# Patient Record
Sex: MALE | Race: WHITE | NOT HISPANIC OR LATINO | Employment: OTHER | URBAN - METROPOLITAN AREA
[De-identification: names, ages, dates, MRNs, and addresses within clinical notes are randomized per-mention and may not be internally consistent; named-entity substitution may affect disease eponyms.]

---

## 2017-01-06 ENCOUNTER — OFFICE VISIT (OUTPATIENT)
Dept: CARDIOLOGY | Facility: CLINIC | Age: 72
End: 2017-01-06

## 2017-01-06 VITALS
WEIGHT: 163 LBS | HEART RATE: 49 BPM | BODY MASS INDEX: 24.07 KG/M2 | DIASTOLIC BLOOD PRESSURE: 75 MMHG | SYSTOLIC BLOOD PRESSURE: 135 MMHG

## 2017-01-06 DIAGNOSIS — I25.10 CHRONIC CORONARY ARTERY DISEASE: Primary | ICD-10-CM

## 2017-01-06 DIAGNOSIS — E03.9 ACQUIRED HYPOTHYROIDISM: ICD-10-CM

## 2017-01-06 DIAGNOSIS — E78.5 HYPERLIPIDEMIA, UNSPECIFIED HYPERLIPIDEMIA TYPE: ICD-10-CM

## 2017-01-06 PROCEDURE — 93000 ELECTROCARDIOGRAM COMPLETE: CPT | Performed by: INTERNAL MEDICINE

## 2017-01-06 PROCEDURE — 99214 OFFICE O/P EST MOD 30 MIN: CPT | Performed by: INTERNAL MEDICINE

## 2017-01-06 NOTE — PROGRESS NOTES
Procedure   [facility]  Cardiology Progress Note    Patient Identification:  Name:Kunal Vargas  Age:71 y.o.  Sex: male  :  1945  MRN: [id number]           2017    Subjective:    Chief Complaint   Patient presents with   • Coronary Artery Disease       HPI     The patient is a 71-year-old white male with history for coronary artery disease, hyperdynamic left ventricle, frequent ventricular contractions, who presents for cardiac follow-up. He is on pravastatin and Toprol-XL. From a cardiac standpoint, he has no complaints. He is asymptomatic. He denies chest pain, pressure tightness. Denies palpitations, dizziness or syncope. Denies shortness of breath, orthopnea or PND. No edema. No recent change in weight, 158 pounds according to her office scale fully clothed. Cholesterol studies are followed by, Dr. Daniels.He is physically active most days of the week. He tries to watch his diet closely.     He had cardiac cath done in  that showed some mild plaque and he is on statin. His LDL is good. His ECG has been stable.    Review of Systems   Constitution: Negative for chills, fever and malaise/fatigue.   HENT: Negative for headaches.    Eyes: Negative for blurred vision and double vision.   Cardiovascular: Negative for chest pain, irregular heartbeat, leg swelling and palpitations.   Respiratory: Positive for snoring. Negative for shortness of breath.    Skin: Negative for color change.   Musculoskeletal: Positive for arthritis. Negative for joint pain.   Gastrointestinal: Negative for abdominal pain, nausea and vomiting.   Neurological: Positive for numbness. Negative for dizziness and light-headedness.   Psychiatric/Behavioral: Negative for depression.       Past Medical History   Diagnosis Date   • Hyperlipidemia        History reviewed. No pertinent past surgical history.    Social History     Social History   • Marital status:      Spouse name: N/A   • Number of children: N/A    • Years of education: N/A     Occupational History   • Not on file.     Social History Main Topics   • Smoking status: Never Smoker   • Smokeless tobacco: Not on file   • Alcohol use No   • Drug use: Not on file   • Sexual activity: Not on file     Other Topics Concern   • Not on file     Social History Narrative       Family History   Problem Relation Age of Onset   • Rheum arthritis Mother    • Aneurysm Father        Scheduled Meds:    Current Outpatient Prescriptions:   •  levothyroxine (LEVOTHROID) 25 MCG tablet, Take 1 tablet by mouth daily. (Patient taking differently: Take 50 mcg by mouth Daily.), Disp: 90 tablet, Rfl: 3  •  metoprolol succinate XL (TOPROL-XL) 50 MG 24 hr tablet, Take 1 tablet by mouth Daily., Disp: 30 tablet, Rfl: 3  •  pravastatin (PRAVACHOL) 40 MG tablet, Take 1 tablet by mouth Daily., Disp: 30 tablet, Rfl: 3    Objective:  Visit Vitals   • /75   • Pulse (!) 49   • Wt 163 lb (73.9 kg)   • BMI 24.07 kg/m2        Physical Exam  Physical Exam:    General: No acute distress.    Skin: Warm and dry, no diaphoresis noted   HEENT: No ptosis; external ear and nose normal; oral mucosa moist   Neck: Supple; no carotid bruits; no JVD, Trachea mid line   Heart: S1S2 regular rate and rhythm; mild murmurs; no gallop or rub appreciated, apex not displaced   Chest: Respirations regular, unlabored at rest, bilateral breath sounds have good air entry; no  wheezes auscultated.     Abdomen: Soft, non-tender, non-distended, positive bowel sounds  No hepatosplenomegaly   Extremities: Bilateral lower extremities have no pre-tibial pitting edema; Radials are palpable   Neurological: Alert and oriented x 3; no new motor deficits,           ECG 12 Lead  Date/Time: 1/6/2017 11:52 AM  Performed by: DEISY LISA  Authorized by: DEISY LISA   Rhythm: sinus bradycardia  Rate: normal  QRS axis: normal               Assessment:  Problem List Items Addressed This Visit        Cardiovascular and  Mediastinum    Chronic coronary artery disease - Primary       Endocrine    Acquired hypothyroidism       Other    Hyperlipidemia          Plan:    Overall clinically he has been stable with no angina.  His heart rate has been stable.  He has hyperdynamic LV and with betablocker he is doing OK.  He has sinus bradycardia. I asked him to cut down Toprol to 25 mg . I will see him only prn    He has family history of AAA and I ordered US of aorta abd      01/06/2017  Arben Butcher MD, FACC

## 2017-01-06 NOTE — MR AVS SNAPSHOT
Kunal Vargas   2017 10:30 AM   Office Visit    Dept Phone:  [phone]   Encounter #:  [id number]    Provider:  Mireya Butcher MD   Department:  [facility]                Your Full Care Plan              Your Updated Medication List          This list is accurate as of: 17 12:01 PM.  Always use your most recent med list.                levothyroxine 25 MCG tablet   Commonly known as:  LEVOTHROID   Take 1 tablet by mouth daily.       metoprolol succinate XL 50 MG 24 hr tablet   Commonly known as:  TOPROL-XL   Take 1 tablet by mouth Daily.       pravastatin 40 MG tablet   Commonly known as:  PRAVACHOL   Take 1 tablet by mouth Daily.               We Performed the Following     ECG 12 Lead     US Abdominal Aorta Limited       You Were Diagnosed With        Codes Comments    Chronic coronary artery disease    -  Primary ICD-10-CM: I25.10  ICD-9-CM: 414.00     Acquired hypothyroidism     ICD-10-CM: E03.9  ICD-9-CM: 244.9     Hyperlipidemia, unspecified hyperlipidemia type     ICD-10-CM: E78.5  ICD-9-CM: 272.4       Instructions     None    Patient Instructions History      Upcoming Appointments     Visit Type Date Time Department    OFFICE VISIT 2017 10:30 AM [facility]    [facility] 2/15/2017  9:00 AM Ocision[facility]    OFFICE VISIT 2017  9:45 AM Practice Management e-Tools Signup     [facility] [employer] allows you to send messages to your doctor, view your test results, renew your prescriptions, schedule appointments, and more. To sign up, go to [employer] and click on the Sign Up Now link in the New User? box. Enter your [employer] Activation Code exactly as it appears below along with the last four digits of your Social Security Number and your Date of Birth () to complete the sign-up process. If you do not sign up before the expiration date, you must request a new code.    [employer]  Activation Code: [id number]  Expires: 1/20/2017 12:01 PM    If you have questions, you can email [email or web address] or call [phone] to talk to our MyChart staff. Remember, [employer] is NOT to be used for urgent needs. For medical emergencies, dial 911.               Other Info from Your Visit           Your Appointments     Feb 15, 2017  9:00 AM EST   [facility] with [facility] (--)    [id number] [place] [id number] [phone]            Feb 22, 2017  9:45 AM EST   Office Visit with Chai Montesinos MD   [facility] (--)    [address] [place] [id number] [phone]           Arrive 15 minutes prior to appointment.              Allergies     No Known Allergies      Reason for Visit     Coronary Artery Disease           Vital Signs     Blood Pressure Pulse Weight Body Mass Index Smoking Status       135/75 49 163 lb (73.9 kg) 24.07 kg/m2 Never Smoker       Problems and Diagnoses Noted     Acquired underactive thyroid    Heart disease due to blocked artery    High cholesterol or triglycerides      Results     ECG 12 Lead

## 2017-01-06 NOTE — LETTER
2017     Chai Montesinos MD  [id number] [facility] [address]    Patient: Kunal Vargas   YOB: 1945   Date of Visit: 2017       Dear Dr. Jaguar MD:    Thank you for referring Kunal Vargas to me for evaluation. Below are the relevant portions of my assessment and plan of care.    If you have questions, please do not hesitate to call me. I look forward to following Kunal along with you.         Sincerely,        Mireya Butcher MD        CC: No Recipients  Mireya Butcher MD  2017 11:55 AM  Sign at close encounter  Procedure   [facility]  Cardiology Progress Note    Patient Identification:  Name:Kunal Vargas  Age:71 y.o.  Sex: male  :  1945  MRN: [id number]           2017    Subjective:    Chief Complaint   Patient presents with   • Coronary Artery Disease       HPI     The patient is a 71-year-old white male with history for coronary artery disease, hyperdynamic left ventricle, frequent ventricular contractions, who presents for cardiac follow-up. He is on pravastatin and Toprol-XL. From a cardiac standpoint, he has no complaints. He is asymptomatic. He denies chest pain, pressure tightness. Denies palpitations, dizziness or syncope. Denies shortness of breath, orthopnea or PND. No edema. No recent change in weight, 158 pounds according to her office scale fully clothed. Cholesterol studies are followed by, Dr. Daniels.He is physically active most days of the week. He tries to watch his diet closely.     He had cardiac cath done in  that showed some mild plaque and he is on statin. His LDL is good. His ECG has been stable.    Review of Systems   Constitution: Negative for chills, fever and malaise/fatigue.   HENT: Negative for headaches.    Eyes: Negative for blurred vision and double vision.   Cardiovascular: Negative for chest pain, irregular heartbeat, leg swelling and palpitations.   Respiratory: Positive for snoring.  Negative for shortness of breath.    Skin: Negative for color change.   Musculoskeletal: Positive for arthritis. Negative for joint pain.   Gastrointestinal: Negative for abdominal pain, nausea and vomiting.   Neurological: Positive for numbness. Negative for dizziness and light-headedness.   Psychiatric/Behavioral: Negative for depression.       Past Medical History   Diagnosis Date   • Hyperlipidemia        History reviewed. No pertinent past surgical history.    Social History     Social History   • Marital status:      Spouse name: N/A   • Number of children: N/A   • Years of education: N/A     Occupational History   • Not on file.     Social History Main Topics   • Smoking status: Never Smoker   • Smokeless tobacco: Not on file   • Alcohol use No   • Drug use: Not on file   • Sexual activity: Not on file     Other Topics Concern   • Not on file     Social History Narrative       Family History   Problem Relation Age of Onset   • Rheum arthritis Mother    • Aneurysm Father        Scheduled Meds:    Current Outpatient Prescriptions:   •  levothyroxine (LEVOTHROID) 25 MCG tablet, Take 1 tablet by mouth daily. (Patient taking differently: Take 50 mcg by mouth Daily.), Disp: 90 tablet, Rfl: 3  •  metoprolol succinate XL (TOPROL-XL) 50 MG 24 hr tablet, Take 1 tablet by mouth Daily., Disp: 30 tablet, Rfl: 3  •  pravastatin (PRAVACHOL) 40 MG tablet, Take 1 tablet by mouth Daily., Disp: 30 tablet, Rfl: 3    Objective:  Visit Vitals   • /75   • Pulse (!) 49   • Wt 163 lb (73.9 kg)   • BMI 24.07 kg/m2        Physical Exam  Physical Exam:    General: No acute distress.    Skin: Warm and dry, no diaphoresis noted   HEENT: No ptosis; external ear and nose normal; oral mucosa moist   Neck: Supple; no carotid bruits; no JVD, Trachea mid line   Heart: S1S2 regular rate and rhythm; mild murmurs; no gallop or rub appreciated, apex not displaced   Chest: Respirations regular, unlabored at rest, bilateral breath sounds  have good air entry; no  wheezes auscultated.     Abdomen: Soft, non-tender, non-distended, positive bowel sounds  No hepatosplenomegaly   Extremities: Bilateral lower extremities have no pre-tibial pitting edema; Radials are palpable   Neurological: Alert and oriented x 3; no new motor deficits,           ECG 12 Lead  Date/Time: 1/6/2017 11:52 AM  Performed by: DEISY BUTCHER  Authorized by: DEISY BUTCHER   Rhythm: sinus bradycardia  Rate: normal  QRS axis: normal               Assessment:  Problem List Items Addressed This Visit        Cardiovascular and Mediastinum    Chronic coronary artery disease - Primary       Endocrine    Acquired hypothyroidism       Other    Hyperlipidemia          Plan:    Overall clinically he has been stable with no angina.  His heart rate has been stable.  He has hyperdynamic LV and with betablocker he is doing OK.  He has sinus bradycardia. I asked him to cut down Toprol to 25 mg . I will see him only prn      01/06/2017  Arben Butcher MD, FACC

## 2017-01-12 ENCOUNTER — HOSPITAL ENCOUNTER (OUTPATIENT)
Dept: ULTRASOUND IMAGING | Facility: HOSPITAL | Age: 72
Discharge: HOME | End: 2017-01-12
Attending: INTERNAL MEDICINE | Admitting: INTERNAL MEDICINE

## 2017-01-12 PROCEDURE — 76775 US EXAM ABDO BACK WALL LIM: CPT

## 2017-01-18 ENCOUNTER — TELEPHONE (OUTPATIENT)
Dept: FAMILY MEDICINE CLINIC | Facility: CLINIC | Age: 72
End: 2017-01-18

## 2017-01-18 RX ORDER — LEVOTHYROXINE SODIUM 0.05 MG/1
50 TABLET ORAL DAILY
Qty: 30 TABLET | Refills: 1 | Status: SHIPPED | OUTPATIENT
Start: 2017-01-18 | End: 2017-03-16 | Stop reason: SDUPTHER

## 2017-01-19 RX ORDER — METOPROLOL SUCCINATE 50 MG/1
50 TABLET, EXTENDED RELEASE ORAL DAILY
Qty: 30 TABLET | Refills: 3 | Status: SHIPPED | OUTPATIENT
Start: 2017-01-19 | End: 2017-01-26 | Stop reason: SDUPTHER

## 2017-01-19 RX ORDER — PRAVASTATIN SODIUM 40 MG
40 TABLET ORAL DAILY
Qty: 30 TABLET | Refills: 3 | Status: SHIPPED | OUTPATIENT
Start: 2017-01-19 | End: 2017-05-18 | Stop reason: SDUPTHER

## 2017-01-25 ENCOUNTER — ANESTHESIA EVENT (OUTPATIENT)
Dept: GASTROENTEROLOGY | Facility: HOSPITAL | Age: 72
End: 2017-01-25

## 2017-01-25 ENCOUNTER — ANESTHESIA (OUTPATIENT)
Dept: GASTROENTEROLOGY | Facility: HOSPITAL | Age: 72
End: 2017-01-25

## 2017-01-25 PROCEDURE — 25010000002 PROPOFOL 10 MG/ML EMULSION: Performed by: ANESTHESIOLOGY

## 2017-01-25 RX ORDER — LIDOCAINE HYDROCHLORIDE 20 MG/ML
INJECTION, SOLUTION INFILTRATION; PERINEURAL AS NEEDED
Status: DISCONTINUED | OUTPATIENT
Start: 2017-01-25 | End: 2017-01-25 | Stop reason: SURG

## 2017-01-25 RX ORDER — PROPOFOL 10 MG/ML
VIAL (ML) INTRAVENOUS AS NEEDED
Status: DISCONTINUED | OUTPATIENT
Start: 2017-01-25 | End: 2017-01-25 | Stop reason: SURG

## 2017-01-25 RX ORDER — PROPOFOL 10 MG/ML
VIAL (ML) INTRAVENOUS CONTINUOUS PRN
Status: DISCONTINUED | OUTPATIENT
Start: 2017-01-25 | End: 2017-01-25 | Stop reason: SURG

## 2017-01-25 RX ADMIN — PROPOFOL 150 MG: 10 INJECTION, EMULSION INTRAVENOUS at 10:14

## 2017-01-25 RX ADMIN — PROPOFOL 140 MCG/KG/MIN: 10 INJECTION, EMULSION INTRAVENOUS at 10:14

## 2017-01-25 RX ADMIN — LIDOCAINE HYDROCHLORIDE 50 MG: 20 INJECTION, SOLUTION INFILTRATION; PERINEURAL at 10:14

## 2017-01-25 NOTE — ANESTHESIA POSTPROCEDURE EVALUATION
Patient: Kunal Vargas    Procedure Summary     Date Anesthesia Start Anesthesia Stop Room / Location    01/25/17 1010 1029  [facility] /  [facility]       Procedure Diagnosis Surgeon Provider    COLONOSCOPY TO CECUM AND TI WITH POLYPECTOMY  (N/A ) Screen for colon cancer  (Screen for colon cancer [Z12.11]) MD Jeff Bianchi MD          Anesthesia Type: MAC  Last vitals  /72 (01/25/17 1058)    Temp      Pulse 57 (01/25/17 1058)   Resp 14 (01/25/17 1058)    SpO2 100 % (01/25/17 1058)      Post Anesthesia Care and Evaluation    Patient location during evaluation: PACU  Patient participation: complete - patient participated  Level of consciousness: awake and alert  Pain management: adequate  Airway patency: patent  Anesthetic complications: No anesthetic complications    Cardiovascular status: acceptable  Respiratory status: acceptable  Hydration status: acceptable

## 2017-01-25 NOTE — ANESTHESIA PREPROCEDURE EVALUATION
Anesthesia Evaluation     Patient summary reviewed    Airway   Mallampati: II  TM distance: >3 FB  Neck ROM: limited  no difficulty expected  Dental - normal exam     Pulmonary     breath sounds clear to auscultation  Cardiovascular   Exercise tolerance: good (4-7 METS)    Rhythm: regular  Rate: normal    Neuro/Psych  GI/Hepatic/Renal/Endo      Musculoskeletal     Abdominal    Substance History      OB/GYN          Other                             Anesthesia Plan    ASA 2     MAC     intravenous induction

## 2017-01-26 ENCOUNTER — TELEPHONE (OUTPATIENT)
Dept: CARDIOLOGY | Facility: CLINIC | Age: 72
End: 2017-01-26

## 2017-01-26 RX ORDER — METOPROLOL SUCCINATE 25 MG/1
25 TABLET, EXTENDED RELEASE ORAL DAILY
Qty: 90 TABLET | Refills: 1 | Status: SHIPPED | OUTPATIENT
Start: 2017-01-26 | End: 2017-10-10 | Stop reason: SDUPTHER

## 2017-01-26 NOTE — TELEPHONE ENCOUNTER
----- Message from Geraldine Caballero sent at 1/26/2017 10:39 AM EST -----  Contact: [phone]  Abdominal ultrasound results  He thought he was reducing his Metoprolol from 50 mg to 25 mg?        S/w pt per [facility] last note pt to reduce metoprolol from 50 mg to 25 mg. Gave pt instructions and sent in refill to [facility].   Gave pt abdominal ultrasound results

## 2017-01-27 ENCOUNTER — DOCUMENTATION (OUTPATIENT)
Dept: SURGERY | Facility: CLINIC | Age: 72
End: 2017-01-27

## 2017-01-27 ENCOUNTER — TELEPHONE (OUTPATIENT)
Dept: SURGERY | Facility: CLINIC | Age: 72
End: 2017-01-27

## 2017-01-27 NOTE — TELEPHONE ENCOUNTER
----- Message from Cachorro Hancock MD sent at 1/27/2017  6:40 AM EST -----  Please inform him:  Colonoscopy 1/25/07 demonstrated small ascending colon polyp, pathology tubular adenoma. Five year surveillance recommended-put in computer for reminder

## 2017-01-27 NOTE — PROGRESS NOTES
Colonoscopy 1/25/07 demonstrated small ascending colon polyp, pathology tubular adenoma. Five year surveillance recommended.    Cachorro Hancock M.D.

## 2017-01-31 ENCOUNTER — TELEPHONE (OUTPATIENT)
Dept: CARDIOLOGY | Facility: CLINIC | Age: 72
End: 2017-01-31

## 2017-01-31 NOTE — TELEPHONE ENCOUNTER
----- Message from Mireya Butcher MD sent at 1/30/2017  7:04 AM EST -----  Normal aorta , no aneurysm, notify patient  ----- Message -----     From: Aimee, Rad Results Aline In     Sent: 1/12/2017   3:52 PM       To: Mireya Butcher MD        Pt was notified on 1/26

## 2017-02-13 DIAGNOSIS — E03.9 ACQUIRED HYPOTHYROIDISM: Primary | ICD-10-CM

## 2017-02-15 LAB
T4 FREE SERPL-MCNC: 1.35 NG/DL (ref 0.93–1.7)
TSH SERPL DL<=0.005 MIU/L-ACNC: 6.34 MIU/ML (ref 0.27–4.2)

## 2017-02-22 ENCOUNTER — OFFICE VISIT (OUTPATIENT)
Dept: FAMILY MEDICINE CLINIC | Facility: CLINIC | Age: 72
End: 2017-02-22

## 2017-02-22 VITALS
WEIGHT: 161.8 LBS | HEART RATE: 54 BPM | BODY MASS INDEX: 21.44 KG/M2 | SYSTOLIC BLOOD PRESSURE: 120 MMHG | HEIGHT: 73 IN | RESPIRATION RATE: 18 BRPM | DIASTOLIC BLOOD PRESSURE: 58 MMHG

## 2017-02-22 DIAGNOSIS — E78.5 HYPERLIPIDEMIA, UNSPECIFIED HYPERLIPIDEMIA TYPE: Primary | ICD-10-CM

## 2017-02-22 DIAGNOSIS — I25.10 CHRONIC CORONARY ARTERY DISEASE: ICD-10-CM

## 2017-02-22 DIAGNOSIS — E03.9 ACQUIRED HYPOTHYROIDISM: ICD-10-CM

## 2017-02-22 PROCEDURE — 99213 OFFICE O/P EST LOW 20 MIN: CPT | Performed by: INTERNAL MEDICINE

## 2017-02-22 NOTE — PATIENT INSTRUCTIONS
Your blood pressure is excellent.  Thyroid-stimulating hormone level is mildly elevated and free T4 level is normal.  Will continue current levothyroxine dose.  Continue current diet, exercise, and medicines and follow-up in 6 months with labs.

## 2017-03-17 RX ORDER — LEVOTHYROXINE SODIUM 0.05 MG/1
TABLET ORAL
Qty: 30 TABLET | Refills: 5 | Status: SHIPPED | OUTPATIENT
Start: 2017-03-17 | End: 2017-08-30

## 2017-04-03 ENCOUNTER — OFFICE VISIT (OUTPATIENT)
Dept: FAMILY MEDICINE CLINIC | Facility: CLINIC | Age: 72
End: 2017-04-03

## 2017-04-03 VITALS
WEIGHT: 158.2 LBS | SYSTOLIC BLOOD PRESSURE: 118 MMHG | OXYGEN SATURATION: 98 % | HEART RATE: 68 BPM | DIASTOLIC BLOOD PRESSURE: 62 MMHG | HEIGHT: 73 IN | BODY MASS INDEX: 20.97 KG/M2 | TEMPERATURE: 97.9 F

## 2017-04-03 DIAGNOSIS — J40 BRONCHITIS: Primary | ICD-10-CM

## 2017-04-03 PROCEDURE — 99213 OFFICE O/P EST LOW 20 MIN: CPT | Performed by: INTERNAL MEDICINE

## 2017-04-03 RX ORDER — AZITHROMYCIN 250 MG/1
TABLET, FILM COATED ORAL
Qty: 6 TABLET | Refills: 0 | Status: SHIPPED | OUTPATIENT
Start: 2017-04-03 | End: 2017-08-30

## 2017-04-03 NOTE — PROGRESS NOTES
Subjective   Kunal Vargas is a 71 y.o. male. Patient is here today for   Chief Complaint   Patient presents with   • URI   • Fever     low grade one week ago   • Cough   • Ear Fullness     not much   • Dizziness     feeling light headed          Vitals:    04/03/17 1601   BP: 118/62   Pulse: 68   Temp: 97.9 °F (36.6 °C)   SpO2: 98%     The following portions of the patient's history were reviewed and updated as appropriate: allergies, current medications, past family history, past medical history, past social history, past surgical history and problem list.    Past Medical History:   Diagnosis Date   • Disease of thyroid gland    • Hyperlipidemia    • Hyperlipidemia    • Irregular heartbeat       No Known Allergies   Social History     Social History   • Marital status:      Spouse name: N/A   • Number of children: N/A   • Years of education: N/A     Occupational History   • Not on file.     Social History Main Topics   • Smoking status: Never Smoker   • Smokeless tobacco: Not on file   • Alcohol use No   • Drug use: Not on file   • Sexual activity: Defer     Other Topics Concern   • Not on file     Social History Narrative        Current Outpatient Prescriptions:   •  azithromycin (ZITHROMAX) 250 MG tablet, Take 2 tablets the first day, then 1 tablet daily for 4 days., Disp: 6 tablet, Rfl: 0  •  levothyroxine (SYNTHROID, LEVOTHROID) 50 MCG tablet, TAKE ONE TABLET BY MOUTH ONCE DAILY, Disp: 30 tablet, Rfl: 5  •  metoprolol succinate XL (TOPROL-XL) 25 MG 24 hr tablet, Take 1 tablet by mouth Daily., Disp: 90 tablet, Rfl: 1  •  pravastatin (PRAVACHOL) 40 MG tablet, Take 1 tablet by mouth Daily., Disp: 30 tablet, Rfl: 3     Objective     History of Present Illness Kunal complains of a cough and some shortness of breath as well as lightheadedness that started a couple weeks ago.  He felt bad for a few days and then felt better.  He initially had a slight fever which resolved.  Today he complains of a deep  cough and some tightness in his chest.      Review of Systems   Constitutional: Positive for fatigue. Negative for chills and fever.   HENT: Negative for congestion, sinus pressure and sore throat.    Respiratory: Positive for cough and shortness of breath.    Musculoskeletal: Negative for myalgias.   Neurological: Negative for light-headedness.       Physical Exam   Constitutional: He appears well-developed and well-nourished.   HENT:   Right Ear: Tympanic membrane normal.   Left Ear: Tympanic membrane normal.   Nose: No mucosal edema.   Cardiovascular: Normal rate, regular rhythm and normal heart sounds.    Pulmonary/Chest: Effort normal and breath sounds normal. He has no wheezes. He has no rales.   Neurological: He is alert.   Vitals reviewed.      ASSESSMENT     Problem List Items Addressed This Visit        Respiratory    Bronchitis - Primary    Relevant Medications    azithromycin (ZITHROMAX) 250 MG tablet          PLAN  Patient Instructions   Your symptoms are consistent with bronchitis.  Your lung exam is clear.  A lot of fluids and start azithromycin as instructed.    Return if symptoms worsen or fail to improve.

## 2017-04-03 NOTE — PATIENT INSTRUCTIONS
Your symptoms are consistent with bronchitis.  Your lung exam is clear.  A lot of fluids and start azithromycin as instructed.

## 2017-05-19 RX ORDER — PRAVASTATIN SODIUM 40 MG
TABLET ORAL
Qty: 30 TABLET | Refills: 0 | Status: SHIPPED | OUTPATIENT
Start: 2017-05-19 | End: 2017-06-20 | Stop reason: SDUPTHER

## 2017-06-23 RX ORDER — PRAVASTATIN SODIUM 40 MG
TABLET ORAL
Qty: 30 TABLET | Refills: 0 | Status: SHIPPED | OUTPATIENT
Start: 2017-06-23 | End: 2017-07-24 | Stop reason: SDUPTHER

## 2017-07-25 RX ORDER — PRAVASTATIN SODIUM 40 MG
TABLET ORAL
Qty: 30 TABLET | Refills: 0 | Status: SHIPPED | OUTPATIENT
Start: 2017-07-25 | End: 2017-08-19 | Stop reason: SDUPTHER

## 2017-08-21 DIAGNOSIS — E03.9 ACQUIRED HYPOTHYROIDISM: ICD-10-CM

## 2017-08-21 DIAGNOSIS — E78.5 HYPERLIPIDEMIA, UNSPECIFIED HYPERLIPIDEMIA TYPE: ICD-10-CM

## 2017-08-21 DIAGNOSIS — I25.10 CHRONIC CORONARY ARTERY DISEASE: ICD-10-CM

## 2017-08-22 RX ORDER — PRAVASTATIN SODIUM 40 MG
TABLET ORAL
Qty: 30 TABLET | Refills: 3 | Status: SHIPPED | OUTPATIENT
Start: 2017-08-22 | End: 2017-12-27 | Stop reason: SDUPTHER

## 2017-08-24 LAB
ALBUMIN SERPL-MCNC: 4.2 G/DL (ref 3.5–5.2)
ALBUMIN/GLOB SERPL: 1.8 G/DL
ALP SERPL-CCNC: 73 U/L (ref 39–117)
ALT SERPL-CCNC: 20 U/L (ref 1–41)
APPEARANCE UR: CLEAR
AST SERPL-CCNC: 23 U/L (ref 1–40)
BACTERIA #/AREA URNS HPF: NORMAL /HPF
BASOPHILS # BLD AUTO: 0.02 10*3/MM3 (ref 0–0.2)
BASOPHILS NFR BLD AUTO: 0.3 % (ref 0–1.5)
BILIRUB SERPL-MCNC: 0.5 MG/DL (ref 0.1–1.2)
BILIRUB UR QL STRIP: NEGATIVE
BUN SERPL-MCNC: 22 MG/DL (ref 8–23)
BUN/CREAT SERPL: 18.2 (ref 7–25)
CALCIUM SERPL-MCNC: 9.5 MG/DL (ref 8.6–10.5)
CASTS URNS MICRO: NORMAL
CHLORIDE SERPL-SCNC: 104 MMOL/L (ref 98–107)
CHOLEST SERPL-MCNC: 139 MG/DL (ref 100–199)
CO2 SERPL-SCNC: 25.2 MMOL/L (ref 22–29)
COLOR UR: YELLOW
CREAT SERPL-MCNC: 1.21 MG/DL (ref 0.76–1.27)
EOSINOPHIL # BLD AUTO: 0.17 10*3/MM3 (ref 0–0.7)
EOSINOPHIL NFR BLD AUTO: 2.9 % (ref 0.3–6.2)
EPI CELLS #/AREA URNS HPF: NORMAL /HPF
ERYTHROCYTE [DISTWIDTH] IN BLOOD BY AUTOMATED COUNT: 13.2 % (ref 11.5–14.5)
GLOBULIN SER CALC-MCNC: 2.4 GM/DL
GLUCOSE SERPL-MCNC: 97 MG/DL (ref 65–99)
GLUCOSE UR QL: NEGATIVE
HCT VFR BLD AUTO: 41.4 % (ref 40.4–52.2)
HDL SERPL-SCNC: 32 UMOL/L
HDLC SERPL-MCNC: 49 MG/DL
HGB BLD-MCNC: 13 G/DL (ref 13.7–17.6)
HGB UR QL STRIP: NEGATIVE
IMM GRANULOCYTES # BLD: 0 10*3/MM3 (ref 0–0.03)
IMM GRANULOCYTES NFR BLD: 0 % (ref 0–0.5)
KETONES UR QL STRIP: NEGATIVE
LDL SERPL QN: 21.2 NM
LDL SERPL-SCNC: 912 NMOL/L
LDL SMALL SERPL-SCNC: 507 NMOL/L
LDLC SERPL CALC-MCNC: 80 MG/DL (ref 0–99)
LEUKOCYTE ESTERASE UR QL STRIP: NEGATIVE
LYMPHOCYTES # BLD AUTO: 1.81 10*3/MM3 (ref 0.9–4.8)
LYMPHOCYTES NFR BLD AUTO: 31 % (ref 19.6–45.3)
MCH RBC QN AUTO: 31.8 PG (ref 27–32.7)
MCHC RBC AUTO-ENTMCNC: 31.4 G/DL (ref 32.6–36.4)
MCV RBC AUTO: 101.2 FL (ref 79.8–96.2)
MONOCYTES # BLD AUTO: 0.65 10*3/MM3 (ref 0.2–1.2)
MONOCYTES NFR BLD AUTO: 11.1 % (ref 5–12)
NEUTROPHILS # BLD AUTO: 3.18 10*3/MM3 (ref 1.9–8.1)
NEUTROPHILS NFR BLD AUTO: 54.7 % (ref 42.7–76)
NITRITE UR QL STRIP: NEGATIVE
PH UR STRIP: 7 [PH] (ref 5–8)
PLATELET # BLD AUTO: 215 10*3/MM3 (ref 140–500)
POTASSIUM SERPL-SCNC: 4.7 MMOL/L (ref 3.5–5.2)
PROT SERPL-MCNC: 6.6 G/DL (ref 6–8.5)
PROT UR QL STRIP: NEGATIVE
RBC # BLD AUTO: 4.09 10*6/MM3 (ref 4.6–6)
RBC #/AREA URNS HPF: NORMAL /HPF
SODIUM SERPL-SCNC: 142 MMOL/L (ref 136–145)
SP GR UR: 1.01 (ref 1–1.03)
T4 FREE SERPL-MCNC: 1.34 NG/DL (ref 0.93–1.7)
TRIGL SERPL-MCNC: 49 MG/DL (ref 0–149)
TSH SERPL DL<=0.005 MIU/L-ACNC: 6.13 MIU/ML (ref 0.27–4.2)
UROBILINOGEN UR STRIP-MCNC: (no result) MG/DL
WBC # BLD AUTO: 5.83 10*3/MM3 (ref 4.5–10.7)
WBC #/AREA URNS HPF: NORMAL /HPF

## 2017-08-30 ENCOUNTER — OFFICE VISIT (OUTPATIENT)
Dept: FAMILY MEDICINE CLINIC | Facility: CLINIC | Age: 72
End: 2017-08-30

## 2017-08-30 VITALS
WEIGHT: 155 LBS | HEIGHT: 73 IN | HEART RATE: 48 BPM | SYSTOLIC BLOOD PRESSURE: 114 MMHG | DIASTOLIC BLOOD PRESSURE: 56 MMHG | BODY MASS INDEX: 20.54 KG/M2 | RESPIRATION RATE: 18 BRPM

## 2017-08-30 DIAGNOSIS — E78.5 HYPERLIPIDEMIA, UNSPECIFIED HYPERLIPIDEMIA TYPE: Primary | ICD-10-CM

## 2017-08-30 DIAGNOSIS — E03.9 ACQUIRED HYPOTHYROIDISM: ICD-10-CM

## 2017-08-30 DIAGNOSIS — L98.9 SKIN LESION: ICD-10-CM

## 2017-08-30 PROCEDURE — 99214 OFFICE O/P EST MOD 30 MIN: CPT | Performed by: INTERNAL MEDICINE

## 2017-08-30 RX ORDER — LEVOTHYROXINE SODIUM 0.07 MG/1
75 TABLET ORAL DAILY
Qty: 30 TABLET | Refills: 11 | Status: SHIPPED | OUTPATIENT
Start: 2017-08-30 | End: 2018-07-05 | Stop reason: SDUPTHER

## 2017-08-30 NOTE — PROGRESS NOTES
Subjective   Kunal Vargas is a 72 y.o. male. Patient is here today for   Chief Complaint   Patient presents with   • Hyperlipidemia   • Hypothyroidism          Vitals:    08/30/17 0955   BP: 114/56   Pulse: (!) 48   Resp: 18       The following portions of the patient's history were reviewed and updated as appropriate: allergies, current medications, past family history, past medical history, past social history, past surgical history and problem list.    Past Medical History:   Diagnosis Date   • Disease of thyroid gland    • Hyperlipidemia    • Hyperlipidemia    • Irregular heartbeat       No Known Allergies   Social History     Social History   • Marital status:      Spouse name: N/A   • Number of children: N/A   • Years of education: N/A     Occupational History   • Not on file.     Social History Main Topics   • Smoking status: Never Smoker   • Smokeless tobacco: Not on file   • Alcohol use No   • Drug use: Not on file   • Sexual activity: Defer     Other Topics Concern   • Not on file     Social History Narrative        Current Outpatient Prescriptions:   •  metoprolol succinate XL (TOPROL-XL) 25 MG 24 hr tablet, Take 1 tablet by mouth Daily., Disp: 90 tablet, Rfl: 1  •  pravastatin (PRAVACHOL) 40 MG tablet, TAKE ONE TABLET BY MOUTH ONCE DAILY, Disp: 30 tablet, Rfl: 3  •  levothyroxine (SYNTHROID) 75 MCG tablet, Take 1 tablet by mouth Daily., Disp: 30 tablet, Rfl: 11     Objective   History of Present Illness is here today for a blood pressure and lab follow-up.  He has hypothyroidism, hyperlipidemia, and a history of coronary artery disease.  He eats healthy and is physically active.  He does also exercise some.  His weight is been stable.  Today he has a skin lesion close to his nose that he wants me to look at.  He had an echocardiogram and a stress test 3 years ago.  He had a normal colonoscopy earlier this year.    Review of Systems   Constitutional: Negative for activity change and unexpected  weight change.   Respiratory: Negative.    Cardiovascular: Negative for chest pain.   Gastrointestinal: Negative.    Genitourinary: Negative.    Skin: Negative for color change.   Neurological: Negative for light-headedness.   Psychiatric/Behavioral: Negative.        Physical Exam   Constitutional: He appears well-developed and well-nourished.   Neck: Carotid bruit is not present. No thyromegaly present.   Cardiovascular: Normal rate, regular rhythm and normal heart sounds.    122/52   Pulmonary/Chest: Effort normal and breath sounds normal.   Neurological: He is alert.   Skin:   3 mm warty growth lateral to right nose   Psychiatric: He has a normal mood and affect.   Vitals reviewed.      ASSESSMENT     Problem List Items Addressed This Visit        Cardiovascular and Mediastinum    Hyperlipidemia - Primary       Endocrine    Acquired hypothyroidism    Relevant Medications    levothyroxine (SYNTHROID) 75 MCG tablet       Musculoskeletal and Integument    Skin lesion          PLAN  Patient Instructions   Your blood pressure is stable.  Suggest that you monitor it periodically.  LDL cholesterol is at goal.  A comprehensive metabolic panel is normal.  Thyroid-stimulating hormone level is elevated.  Will increase levothyroxine to 75 µg daily and plan on rechecking a thyroid-stimulating hormone level again in 6 months.  Also discussed exercise per AHA guidelines.      Return in about 6 months (around 2/28/2018) for labs TSH, lipid, CBC.

## 2017-08-30 NOTE — PATIENT INSTRUCTIONS
Your blood pressure is stable.  Suggest that you monitor it periodically.  LDL cholesterol is at goal.  A comprehensive metabolic panel is normal.  Thyroid-stimulating hormone level is elevated.  Will increase levothyroxine to 75 µg daily and plan on rechecking a thyroid-stimulating hormone level again in 6 months.  Also discussed exercise per AHA guidelines.

## 2017-10-10 ENCOUNTER — TELEPHONE (OUTPATIENT)
Dept: FAMILY MEDICINE CLINIC | Facility: CLINIC | Age: 72
End: 2017-10-10

## 2017-10-10 RX ORDER — METOPROLOL SUCCINATE 25 MG/1
25 TABLET, EXTENDED RELEASE ORAL DAILY
Qty: 90 TABLET | Refills: 1 | Status: SHIPPED | OUTPATIENT
Start: 2017-10-10 | End: 2018-03-21 | Stop reason: SDUPTHER

## 2017-10-10 NOTE — TELEPHONE ENCOUNTER
OK PET DR JOE. RX SENT TO PHARMACY     ----- Message from Viry Decker MA sent at 10/10/2017  1:44 PM EDT -----  Contact: PT   PT CALLED IN TODAY AND STATES DR. LISA WROTE HES METROPROLOL BUT IS NO LONGER IN PRACTICE. WOULD LIKE A REFILL BECAUSE HE STATES DR. JOE SAID HE WOULD WRITE THE PRESCRIPTIONS FOR HIM.

## 2017-12-27 ENCOUNTER — TELEPHONE (OUTPATIENT)
Dept: FAMILY MEDICINE CLINIC | Facility: CLINIC | Age: 72
End: 2017-12-27

## 2017-12-27 RX ORDER — PRAVASTATIN SODIUM 40 MG
40 TABLET ORAL NIGHTLY
Qty: 90 TABLET | Refills: 0 | Status: SHIPPED | OUTPATIENT
Start: 2017-12-27 | End: 2018-03-21 | Stop reason: SDUPTHER

## 2017-12-27 NOTE — TELEPHONE ENCOUNTER
Rx sent to pharmacy      ----- Message from Spring Zaragoza MA sent at 12/26/2017  9:32 AM EST -----  Contact: PT  I was going to send out medication, but it appears that this is prescribed by another provider.   ----- Message -----     From: Rhiannon Fan MA     Sent: 12/26/2017   9:20 AM       To: Spring Zaragoza MA      PT WOULD LIKE RX REFILL FOR pravastatin (PRAVACHOL) 40 MG tablet HE WOULD LIKE A 90 DAY SUPPLY RATHER THAN A 30 DAY IF POSSIBLE    PT IS USING [address] [phone] [facility] [phone]             PT CAN BE REACHED [phone]

## 2018-01-20 ENCOUNTER — HOSPITAL ENCOUNTER (INPATIENT)
Facility: HOSPITAL | Age: 73
LOS: 2 days | Discharge: HOME | End: 2018-01-22
Attending: EMERGENCY MEDICINE | Admitting: SURGERY

## 2018-01-20 ENCOUNTER — APPOINTMENT (OUTPATIENT)
Dept: CT IMAGING | Facility: HOSPITAL | Age: 73
End: 2018-01-20

## 2018-01-20 ENCOUNTER — APPOINTMENT (OUTPATIENT)
Dept: GENERAL RADIOLOGY | Facility: HOSPITAL | Age: 73
End: 2018-01-20

## 2018-01-20 DIAGNOSIS — K56.609 SBO (SMALL BOWEL OBSTRUCTION) (HCC): Primary | ICD-10-CM

## 2018-01-20 LAB
ALBUMIN SERPL-MCNC: 3.9 G/DL (ref 3.5–5.2)
ALBUMIN/GLOB SERPL: 1.3 G/DL
ALP SERPL-CCNC: 66 U/L (ref 39–117)
ALT SERPL W P-5'-P-CCNC: 16 U/L (ref 1–41)
ANION GAP SERPL CALCULATED.3IONS-SCNC: 9.9 MMOL/L
AST SERPL-CCNC: 22 U/L (ref 1–40)
BASOPHILS # BLD AUTO: 0.02 10*3/MM3 (ref 0–0.2)
BASOPHILS NFR BLD AUTO: 0.2 % (ref 0–1.5)
BILIRUB SERPL-MCNC: 0.5 MG/DL (ref 0.1–1.2)
BILIRUB UR QL STRIP: NEGATIVE
BUN BLD-MCNC: 24 MG/DL (ref 8–23)
BUN/CREAT SERPL: 22 (ref 7–25)
CALCIUM SPEC-SCNC: 8.9 MG/DL (ref 8.6–10.5)
CHLORIDE SERPL-SCNC: 104 MMOL/L (ref 98–107)
CLARITY UR: CLEAR
CO2 SERPL-SCNC: 25.1 MMOL/L (ref 22–29)
COLOR UR: YELLOW
CREAT BLD-MCNC: 1.09 MG/DL (ref 0.76–1.27)
DEPRECATED RDW RBC AUTO: 45.8 FL (ref 37–54)
EOSINOPHIL # BLD AUTO: 0.09 10*3/MM3 (ref 0–0.7)
EOSINOPHIL NFR BLD AUTO: 1.1 % (ref 0.3–6.2)
ERYTHROCYTE [DISTWIDTH] IN BLOOD BY AUTOMATED COUNT: 12.6 % (ref 11.5–14.5)
GFR SERPL CREATININE-BSD FRML MDRD: 66 ML/MIN/1.73
GLOBULIN UR ELPH-MCNC: 2.9 GM/DL
GLUCOSE BLD-MCNC: 126 MG/DL (ref 65–99)
GLUCOSE UR STRIP-MCNC: NEGATIVE MG/DL
HCT VFR BLD AUTO: 40.5 % (ref 40.4–52.2)
HGB BLD-MCNC: 13.2 G/DL (ref 13.7–17.6)
HGB UR QL STRIP.AUTO: NEGATIVE
HOLD SPECIMEN: NORMAL
HOLD SPECIMEN: NORMAL
IMM GRANULOCYTES # BLD: 0 10*3/MM3 (ref 0–0.03)
IMM GRANULOCYTES NFR BLD: 0 % (ref 0–0.5)
KETONES UR QL STRIP: NEGATIVE
LEUKOCYTE ESTERASE UR QL STRIP.AUTO: NEGATIVE
LIPASE SERPL-CCNC: 27 U/L (ref 13–60)
LYMPHOCYTES # BLD AUTO: 1.43 10*3/MM3 (ref 0.9–4.8)
LYMPHOCYTES NFR BLD AUTO: 17.5 % (ref 19.6–45.3)
MCH RBC QN AUTO: 32.5 PG (ref 27–32.7)
MCHC RBC AUTO-ENTMCNC: 32.6 G/DL (ref 32.6–36.4)
MCV RBC AUTO: 99.8 FL (ref 79.8–96.2)
MONOCYTES # BLD AUTO: 0.5 10*3/MM3 (ref 0.2–1.2)
MONOCYTES NFR BLD AUTO: 6.1 % (ref 5–12)
NEUTROPHILS # BLD AUTO: 6.11 10*3/MM3 (ref 1.9–8.1)
NEUTROPHILS NFR BLD AUTO: 75.1 % (ref 42.7–76)
NITRITE UR QL STRIP: NEGATIVE
PH UR STRIP.AUTO: 7 [PH] (ref 5–8)
PLATELET # BLD AUTO: 197 10*3/MM3 (ref 140–500)
PMV BLD AUTO: 10.7 FL (ref 6–12)
POTASSIUM BLD-SCNC: 4.7 MMOL/L (ref 3.5–5.2)
PROT SERPL-MCNC: 6.8 G/DL (ref 6–8.5)
PROT UR QL STRIP: NEGATIVE
RBC # BLD AUTO: 4.06 10*6/MM3 (ref 4.6–6)
SODIUM BLD-SCNC: 139 MMOL/L (ref 136–145)
SP GR UR STRIP: 1.01 (ref 1–1.03)
UROBILINOGEN UR QL STRIP: NORMAL
WBC NRBC COR # BLD: 8.15 10*3/MM3 (ref 4.5–10.7)
WHOLE BLOOD HOLD SPECIMEN: NORMAL
WHOLE BLOOD HOLD SPECIMEN: NORMAL

## 2018-01-20 PROCEDURE — 74177 CT ABD & PELVIS W/CONTRAST: CPT

## 2018-01-20 PROCEDURE — 25010000002 ONDANSETRON PER 1 MG: Performed by: EMERGENCY MEDICINE

## 2018-01-20 PROCEDURE — 83690 ASSAY OF LIPASE: CPT | Performed by: EMERGENCY MEDICINE

## 2018-01-20 PROCEDURE — 74018 RADEX ABDOMEN 1 VIEW: CPT

## 2018-01-20 PROCEDURE — 0 IOPAMIDOL 61 % SOLUTION: Performed by: EMERGENCY MEDICINE

## 2018-01-20 PROCEDURE — 99284 EMERGENCY DEPT VISIT MOD MDM: CPT

## 2018-01-20 PROCEDURE — 80053 COMPREHEN METABOLIC PANEL: CPT | Performed by: EMERGENCY MEDICINE

## 2018-01-20 PROCEDURE — 81003 URINALYSIS AUTO W/O SCOPE: CPT | Performed by: EMERGENCY MEDICINE

## 2018-01-20 PROCEDURE — 99221 1ST HOSP IP/OBS SF/LOW 40: CPT | Performed by: SURGERY

## 2018-01-20 PROCEDURE — 25010000002 HYDROMORPHONE PER 4 MG: Performed by: EMERGENCY MEDICINE

## 2018-01-20 PROCEDURE — 85025 COMPLETE CBC W/AUTO DIFF WBC: CPT | Performed by: EMERGENCY MEDICINE

## 2018-01-20 RX ORDER — METOPROLOL SUCCINATE 25 MG/1
25 TABLET, EXTENDED RELEASE ORAL DAILY
Status: DISCONTINUED | OUTPATIENT
Start: 2018-01-20 | End: 2018-01-22 | Stop reason: HOSPADM

## 2018-01-20 RX ORDER — ONDANSETRON 2 MG/ML
4 INJECTION INTRAMUSCULAR; INTRAVENOUS EVERY 6 HOURS PRN
Status: DISCONTINUED | OUTPATIENT
Start: 2018-01-20 | End: 2018-01-22 | Stop reason: HOSPADM

## 2018-01-20 RX ORDER — ONDANSETRON 4 MG/1
4 TABLET, FILM COATED ORAL EVERY 6 HOURS PRN
Status: DISCONTINUED | OUTPATIENT
Start: 2018-01-20 | End: 2018-01-22 | Stop reason: HOSPADM

## 2018-01-20 RX ORDER — ONDANSETRON 2 MG/ML
4 INJECTION INTRAMUSCULAR; INTRAVENOUS ONCE
Status: COMPLETED | OUTPATIENT
Start: 2018-01-20 | End: 2018-01-20

## 2018-01-20 RX ORDER — HYDROMORPHONE HYDROCHLORIDE 1 MG/ML
0.5 INJECTION, SOLUTION INTRAMUSCULAR; INTRAVENOUS; SUBCUTANEOUS
Status: DISCONTINUED | OUTPATIENT
Start: 2018-01-20 | End: 2018-01-22 | Stop reason: HOSPADM

## 2018-01-20 RX ORDER — SODIUM CHLORIDE 9 MG/ML
75 INJECTION, SOLUTION INTRAVENOUS CONTINUOUS
Status: DISCONTINUED | OUTPATIENT
Start: 2018-01-20 | End: 2018-01-22

## 2018-01-20 RX ORDER — HYDROMORPHONE HYDROCHLORIDE 1 MG/ML
0.5 INJECTION, SOLUTION INTRAMUSCULAR; INTRAVENOUS; SUBCUTANEOUS ONCE
Status: COMPLETED | OUTPATIENT
Start: 2018-01-20 | End: 2018-01-20

## 2018-01-20 RX ORDER — SODIUM CHLORIDE 0.9 % (FLUSH) 0.9 %
1-10 SYRINGE (ML) INJECTION AS NEEDED
Status: DISCONTINUED | OUTPATIENT
Start: 2018-01-20 | End: 2018-01-22 | Stop reason: HOSPADM

## 2018-01-20 RX ORDER — ONDANSETRON 4 MG/1
4 TABLET, ORALLY DISINTEGRATING ORAL EVERY 6 HOURS PRN
Status: DISCONTINUED | OUTPATIENT
Start: 2018-01-20 | End: 2018-01-22 | Stop reason: HOSPADM

## 2018-01-20 RX ORDER — LEVOTHYROXINE SODIUM 0.07 MG/1
75 TABLET ORAL DAILY
Status: DISCONTINUED | OUTPATIENT
Start: 2018-01-20 | End: 2018-01-22 | Stop reason: HOSPADM

## 2018-01-20 RX ORDER — SODIUM CHLORIDE 0.9 % (FLUSH) 0.9 %
10 SYRINGE (ML) INJECTION AS NEEDED
Status: DISCONTINUED | OUTPATIENT
Start: 2018-01-20 | End: 2018-01-20

## 2018-01-20 RX ORDER — NALOXONE HCL 0.4 MG/ML
0.4 VIAL (ML) INJECTION
Status: DISCONTINUED | OUTPATIENT
Start: 2018-01-20 | End: 2018-01-22 | Stop reason: HOSPADM

## 2018-01-20 RX ADMIN — IOPAMIDOL 85 ML: 612 INJECTION, SOLUTION INTRAVENOUS at 10:33

## 2018-01-20 RX ADMIN — ONDANSETRON 4 MG: 2 INJECTION INTRAMUSCULAR; INTRAVENOUS at 10:12

## 2018-01-20 RX ADMIN — SODIUM CHLORIDE 100 ML/HR: 9 INJECTION, SOLUTION INTRAVENOUS at 13:54

## 2018-01-20 RX ADMIN — SODIUM CHLORIDE 100 ML/HR: 9 INJECTION, SOLUTION INTRAVENOUS at 23:51

## 2018-01-20 RX ADMIN — HYDROMORPHONE HYDROCHLORIDE 0.5 MG: 1 INJECTION, SOLUTION INTRAMUSCULAR; INTRAVENOUS; SUBCUTANEOUS at 10:11

## 2018-01-20 RX ADMIN — SODIUM CHLORIDE 1000 ML: 9 INJECTION, SOLUTION INTRAVENOUS at 10:13

## 2018-01-20 RX ADMIN — FAMOTIDINE 20 MG: 10 INJECTION INTRAVENOUS at 21:07

## 2018-01-20 NOTE — ED TRIAGE NOTES
Pt complains of all over abdominal pain since 3 am. Denies nausea, vomiting or fever. Pt states pain is constant

## 2018-01-20 NOTE — ED PROVIDER NOTES
EMERGENCY DEPARTMENT ENCOUNTER    CHIEF COMPLAINT  Chief Complaint: abdominal pain  History given by: patient, spouse  History limited by: nothing  Room Number: 38/38  PMD: Chai Montesinos MD      HPI:  Pt is a 72 y.o. male who presents complaining of generalized, cramping abdominal pain that woke him from sleep around 03:00 this morning. Pt states there is nothing he can to do exacerbate or alleviate his pain and denies fever, chills, N/V/D, abdominal distension, constipation, dysuria, or hematuria. Pt states he did go out to eat last night and it is possible he ate some bad food. No abdominal surgical hx. Pt denies EtOH or smoking cigarettes.     Duration:  6 hours  Onset: sudden  Timing: waxing and waning  Location: generalized  Radiation: none  Quality: cramping  Intensity/Severity: moderate  Progression: unchanged  Associated Symptoms: none  Aggravating Factors: none  Alleviating Factors: none  Previous Episodes: none  Treatment before arrival: none    PAST MEDICAL HISTORY  Active Ambulatory Problems     Diagnosis Date Noted   • Hyperlipidemia 08/03/2016   • Echocardiogram abnormal 08/03/2016   • Abnormal electrocardiogram 08/03/2016   • Chronic coronary artery disease 08/03/2016   • Skin lesion 08/03/2016   • Acquired hypothyroidism 08/18/2016   • Bronchitis 04/03/2017     Resolved Ambulatory Problems     Diagnosis Date Noted   • No Resolved Ambulatory Problems     Past Medical History:   Diagnosis Date   • Disease of thyroid gland    • Hyperlipidemia    • Hyperlipidemia    • Irregular heartbeat        PAST SURGICAL HISTORY  Past Surgical History:   Procedure Laterality Date   • COLONOSCOPY N/A 1/25/2017    Procedure: COLONOSCOPY TO CECUM AND TI WITH POLYPECTOMY ;  Surgeon: Cachorro Hancock MD;  Location: [facility] ENDOSCOPY;  Service:        FAMILY HISTORY  Family History   Problem Relation Age of Onset   • Rheum arthritis Mother    • Aneurysm Father        SOCIAL HISTORY  Social History     Social History    • Marital status:      Spouse name: N/A   • Number of children: N/A   • Years of education: N/A     Occupational History   • Not on file.     Social History Main Topics   • Smoking status: Never Smoker   • Smokeless tobacco: Not on file   • Alcohol use No   • Drug use: Not on file   • Sexual activity: Defer     Other Topics Concern   • Not on file     Social History Narrative       ALLERGIES  Review of patient's allergies indicates no known allergies.    REVIEW OF SYSTEMS  Review of Systems   Constitutional: Negative.  Negative for chills and fever.   HENT: Negative.  Negative for sore throat.    Eyes: Negative.    Respiratory: Negative.  Negative for cough.    Cardiovascular: Negative.  Negative for chest pain.   Gastrointestinal: Positive for abdominal pain (generalized).   Genitourinary: Negative.  Negative for dysuria.   Musculoskeletal: Negative.  Negative for back pain.   Skin: Negative.  Negative for rash.   Neurological: Negative.  Negative for headaches.       PHYSICAL EXAM  ED Triage Vitals   Temp Heart Rate Resp BP SpO2   01/20/18 0757 01/20/18 0757 01/20/18 0757 01/20/18 0759 01/20/18 0757   97.5 °F (36.4 °C) 51 16 132/84 100 %      Temp src Heart Rate Source Patient Position BP Location FiO2 (%)   01/20/18 0757 01/20/18 0857 01/20/18 0759 01/20/18 0759 --   Tympanic Monitor Sitting Left arm        Physical Exam   Constitutional: He is oriented to person, place, and time and well-developed, well-nourished, and in no distress.   HENT:   Head: Normocephalic and atraumatic.   Mouth/Throat: Mucous membranes are dry.   Eyes: EOM are normal. Pupils are equal, round, and reactive to light.   Neck: Normal range of motion. Neck supple.   Cardiovascular: Regular rhythm and normal heart sounds.   Occasional extrasystoles are present. Bradycardia present.    Pulmonary/Chest: Effort normal and breath sounds normal. No respiratory distress.   Abdominal: Soft. He exhibits no distension. Bowel sounds are  hyperactive. There is tenderness (mild) in the left lower quadrant. There is no rebound and no guarding.   Musculoskeletal: Normal range of motion. He exhibits no edema.   Lymphadenopathy:     He has no cervical adenopathy.   Neurological: He is alert and oriented to person, place, and time. He has normal sensation and normal strength.   Skin: Skin is warm and dry.   Psychiatric: Mood and affect normal.   Nursing note and vitals reviewed.      LAB RESULTS  Lab Results (last 24 hours)     Procedure Component Value Units Date/Time    Urinalysis With / Culture If Indicated - Urine, Clean Catch [[id number]]  (Normal) Collected:  01/20/18 0828    Specimen:  Urine from Urine, Clean Catch Updated:  01/20/18 0834     Color, UA Yellow     Appearance, UA Clear     pH, UA 7.0     Specific Gravity, UA 1.013     Glucose, UA Negative     Ketones, UA Negative     Bilirubin, UA Negative     Blood, UA Negative     Protein, UA Negative     Leuk Esterase, UA Negative     Nitrite, UA Negative     Urobilinogen, UA 0.2 E.U./dL    Narrative:       Urine microscopic not indicated.    CBC & Differential [[id number]] Collected:  01/20/18 0856    Specimen:  Blood Updated:  01/20/18 0913    Narrative:       The following orders were created for panel order CBC & Differential.  Procedure                               Abnormality         Status                     ---------                               -----------         ------                     CBC Auto Differential[[id number]]        Abnormal            Final result                 Please view results for these tests on the individual orders.    Comprehensive Metabolic Panel [[id number]]  (Abnormal) Collected:  01/20/18 0856    Specimen:  Blood Updated:  01/20/18 0937     Glucose 126 (H) mg/dL      BUN 24 (H) mg/dL      Creatinine 1.09 mg/dL      Sodium 139 mmol/L      Potassium 4.7 mmol/L      Chloride 104 mmol/L      CO2 25.1 mmol/L      Calcium 8.9 mg/dL      Total Protein 6.8 g/dL       Albumin 3.90 g/dL      ALT (SGPT) 16 U/L      AST (SGOT) 22 U/L      Alkaline Phosphatase 66 U/L      Total Bilirubin 0.5 mg/dL      eGFR Non African Amer 66 mL/min/1.73      Globulin 2.9 gm/dL      A/G Ratio 1.3 g/dL      BUN/Creatinine Ratio 22.0     Anion Gap 9.9 mmol/L     Narrative:       The MDRD GFR formula is only valid for adults with stable renal function between ages 18 and 70.    Lipase [[id number]]  (Normal) Collected:  01/20/18 0856    Specimen:  Blood Updated:  01/20/18 0937     Lipase 27 U/L     CBC Auto Differential [[id number]]  (Abnormal) Collected:  01/20/18 0856    Specimen:  Blood Updated:  01/20/18 0913     WBC 8.15 10*3/mm3      RBC 4.06 (L) 10*6/mm3      Hemoglobin 13.2 (L) g/dL      Hematocrit 40.5 %      MCV 99.8 (H) fL      MCH 32.5 pg      MCHC 32.6 g/dL      RDW 12.6 %      RDW-SD 45.8 fl      MPV 10.7 fL      Platelets 197 10*3/mm3      Neutrophil % 75.1 %      Lymphocyte % 17.5 (L) %      Monocyte % 6.1 %      Eosinophil % 1.1 %      Basophil % 0.2 %      Immature Grans % 0.0 %      Neutrophils, Absolute 6.11 10*3/mm3      Lymphocytes, Absolute 1.43 10*3/mm3      Monocytes, Absolute 0.50 10*3/mm3      Eosinophils, Absolute 0.09 10*3/mm3      Basophils, Absolute 0.02 10*3/mm3      Immature Grans, Absolute 0.00 10*3/mm3           I ordered the above labs and reviewed the results    RADIOLOGY  CT Abdomen Pelvis With Contrast   Final Result           1. Evidence of early or partial small bowel obstruction, close follow-up   advised.   2. Cholelithiasis.   3. No obstructive uropathy. Subcentimeter hepatic and left renal low   density too small to characterize.       Discussed by telephone with Dr. Rodrigues at time of interpretation, 1043,   01/20/2018.       This report was finalized on 1/20/2018 10:50 AM by Dr. Ortiz Montes MD.          CT abd/pelvis: early partial SBO, gallstones, diverticulosis    I ordered the above noted radiological studies. Interpreted by radiologist. Discussed  with radiologist. Reviewed by me in PACS.       PROCEDURES  Procedures      PROGRESS AND CONSULTS  ED Course     9:16 AM  Ordered CT abd/pelvis, dilaudid, zofran and IV fluids.     10:48 AM  Pt rechecked and is resting comfortably. BP- 135/65 HR- 54 Temp- 97.5 °F (36.4 °C) (Tympanic) O2 sat- 100%. All pertinent lab/imaging/EKG findings discussed including early partial SBO seen on CT as well as gallstones and diverticulosis. Discussed with Pt that SBO does not appear to be surgical at this time and should resolved on its own with bowel rest. Pt/family verbalized understanding and agree with plan to consult general surgery regarding further treatment and admit. Pt states he has had colonoscopy by Dr. Hancock (general surgeon) in the past. All questions and concerns addressed at this time.     10:53 AM  Consult placed to general surgery.     11:41 AM  Discussed Pt's case with Dr. Gunter (general surgeon) who agrees to admit.         MEDICAL DECISION MAKING  Results were reviewed/discussed with the patient and they were also made aware of online access. Pt also made aware that some labs, such as cultures, will not be resulted during ER visit and follow up with PMD is necessary.     MDM  Number of Diagnoses or Management Options  SBO (small bowel obstruction):      Amount and/or Complexity of Data Reviewed  Clinical lab tests: ordered and reviewed (Unremarkable )  Tests in the radiology section of CPT®: ordered and reviewed (CT abd/pelvis: early partial SBO, gallstones, diverticulosis)  Discuss the patient with other providers: yes (Dr. Gunter (general surgeon))  Independent visualization of images, tracings, or specimens: yes    Patient Progress  Patient progress: stable         DIAGNOSIS  Final diagnoses:   SBO (small bowel obstruction)       DISPOSITION  ADMISSION    Discussed treatment plan and reason for admission with pt/family and admitting physician.  Pt/family voiced understanding of the plan for  admission for further testing/treatment as needed.         Latest Documented Vital Signs:  As of 11:43 AM  BP- 126/76 HR- 58 Temp- 97.5 °F (36.4 °C) (Tympanic) O2 sat- 100%    --  Documentation assistance provided by brenda Castle for Dr. Rodrigues.  Information recorded by the scribe was done at my direction and has been verified and validated by me.          Isa Castle  01/20/18 1143       Dc Rodrigues MD  01/20/18 1473

## 2018-01-20 NOTE — PLAN OF CARE
Problem: Patient Care Overview (Adult)  Goal: Plan of Care Review  Outcome: Ongoing (interventions implemented as appropriate)   01/20/18 4722   Coping/Psychosocial Response Interventions   Plan Of Care Reviewed With patient;spouse   Patient Care Overview   Progress no change   Outcome Evaluation   Outcome Summary/Follow up Plan started iv fluids, kub completed, npo except sips, no c/o presently     Goal: Adult Individualization and Mutuality  Outcome: Ongoing (interventions implemented as appropriate)    Goal: Discharge Needs Assessment  Outcome: Ongoing (interventions implemented as appropriate)      Problem: Pain, Acute (Adult)  Goal: Identify Related Risk Factors and Signs and Symptoms  Outcome: Outcome(s) achieved Date Met: 01/20/18    Goal: Acceptable Pain Control/Comfort Level  Outcome: Ongoing (interventions implemented as appropriate)      Problem: Bowel Obstruction (Adult)  Goal: Signs and Symptoms of Listed Potential Problems Will be Absent or Manageable (Bowel Obstruction)  Outcome: Ongoing (interventions implemented as appropriate)

## 2018-01-21 LAB
ALBUMIN SERPL-MCNC: 3.2 G/DL (ref 3.5–5.2)
ALBUMIN/GLOB SERPL: 1.2 G/DL
ALP SERPL-CCNC: 57 U/L (ref 39–117)
ALT SERPL W P-5'-P-CCNC: 13 U/L (ref 1–41)
ANION GAP SERPL CALCULATED.3IONS-SCNC: 10.2 MMOL/L
AST SERPL-CCNC: 17 U/L (ref 1–40)
BILIRUB SERPL-MCNC: 0.6 MG/DL (ref 0.1–1.2)
BUN BLD-MCNC: 19 MG/DL (ref 8–23)
BUN/CREAT SERPL: 18.4 (ref 7–25)
CALCIUM SPEC-SCNC: 8.4 MG/DL (ref 8.6–10.5)
CHLORIDE SERPL-SCNC: 108 MMOL/L (ref 98–107)
CO2 SERPL-SCNC: 22.8 MMOL/L (ref 22–29)
CREAT BLD-MCNC: 1.03 MG/DL (ref 0.76–1.27)
DEPRECATED RDW RBC AUTO: 46 FL (ref 37–54)
ERYTHROCYTE [DISTWIDTH] IN BLOOD BY AUTOMATED COUNT: 12.5 % (ref 11.5–14.5)
GFR SERPL CREATININE-BSD FRML MDRD: 71 ML/MIN/1.73
GLOBULIN UR ELPH-MCNC: 2.6 GM/DL
GLUCOSE BLD-MCNC: 81 MG/DL (ref 65–99)
HCT VFR BLD AUTO: 37.1 % (ref 40.4–52.2)
HGB BLD-MCNC: 11.9 G/DL (ref 13.7–17.6)
MCH RBC QN AUTO: 32.2 PG (ref 27–32.7)
MCHC RBC AUTO-ENTMCNC: 32.1 G/DL (ref 32.6–36.4)
MCV RBC AUTO: 100.5 FL (ref 79.8–96.2)
PLATELET # BLD AUTO: 177 10*3/MM3 (ref 140–500)
PMV BLD AUTO: 10.7 FL (ref 6–12)
POTASSIUM BLD-SCNC: 4.4 MMOL/L (ref 3.5–5.2)
PROT SERPL-MCNC: 5.8 G/DL (ref 6–8.5)
RBC # BLD AUTO: 3.69 10*6/MM3 (ref 4.6–6)
SODIUM BLD-SCNC: 141 MMOL/L (ref 136–145)
WBC NRBC COR # BLD: 5.9 10*3/MM3 (ref 4.5–10.7)

## 2018-01-21 PROCEDURE — 80053 COMPREHEN METABOLIC PANEL: CPT | Performed by: SURGERY

## 2018-01-21 PROCEDURE — 99232 SBSQ HOSP IP/OBS MODERATE 35: CPT | Performed by: SURGERY

## 2018-01-21 PROCEDURE — 85027 COMPLETE CBC AUTOMATED: CPT | Performed by: SURGERY

## 2018-01-21 RX ADMIN — FAMOTIDINE 20 MG: 10 INJECTION INTRAVENOUS at 20:30

## 2018-01-21 RX ADMIN — SODIUM CHLORIDE 100 ML/HR: 9 INJECTION, SOLUTION INTRAVENOUS at 20:06

## 2018-01-21 RX ADMIN — FAMOTIDINE 20 MG: 10 INJECTION INTRAVENOUS at 08:08

## 2018-01-21 RX ADMIN — SODIUM CHLORIDE 100 ML/HR: 9 INJECTION, SOLUTION INTRAVENOUS at 10:05

## 2018-01-21 RX ADMIN — METOPROLOL SUCCINATE 25 MG: 25 TABLET, FILM COATED, EXTENDED RELEASE ORAL at 20:31

## 2018-01-21 RX ADMIN — LEVOTHYROXINE SODIUM 75 MCG: 75 TABLET ORAL at 08:07

## 2018-01-21 NOTE — PLAN OF CARE
Problem: Patient Care Overview (Adult)  Goal: Plan of Care Review  Outcome: Ongoing (interventions implemented as appropriate)   01/21/18 0448   Coping/Psychosocial Response Interventions   Plan Of Care Reviewed With patient   Patient Care Overview   Progress improving   Outcome Evaluation   Outcome Summary/Follow up Plan ED admit for abdominal pain. no c/o pain or n/v since admission. BM x 1. repeat KUB in the AM. Tolerating ice chips.        Problem: Pain, Acute (Adult)  Goal: Acceptable Pain Control/Comfort Level  Outcome: Ongoing (interventions implemented as appropriate)      Problem: Bowel Obstruction (Adult)  Goal: Signs and Symptoms of Listed Potential Problems Will be Absent or Manageable (Bowel Obstruction)  Outcome: Ongoing (interventions implemented as appropriate)

## 2018-01-21 NOTE — H&P
CHIEF COMPLAINT:    Bowel obstruction    HISTORY OF PRESENT ILLNESS:    Kunal Vargas is a 72 y.o. male who presented to the emergency room today with a several hour history of cramping abdominal pain.  It awoke him this morning at around 3 AM.  There were no aggravating or relieving factors.  There is no associated nausea or diarrhea.  He has never experienced any similar pain.  Last bowel movement was yesterday.  It was normal.  There were no ill contacts at home.    Past Medical History:   Diagnosis Date   • Disease of thyroid gland    • Hyperlipidemia    • Hyperlipidemia    • Irregular heartbeat        Past Surgical History:   Procedure Laterality Date   • COLONOSCOPY N/A 1/25/2017    Procedure: COLONOSCOPY TO CECUM AND TI WITH POLYPECTOMY ;  Surgeon: Cachorro Hancock MD;  Location: [facility] ENDOSCOPY;  Service:          Current Facility-Administered Medications:   •  famotidine (PEPCID) injection 20 mg, 20 mg, Intravenous, BID, Ángel Gunter MD  •  HYDROmorphone (DILAUDID) injection 0.5 mg, 0.5 mg, Intravenous, Q2H PRN **AND** naloxone (NARCAN) injection 0.4 mg, 0.4 mg, Intravenous, Q5 Min PRN, Ángel Gunter MD  •  levothyroxine (SYNTHROID, LEVOTHROID) tablet 75 mcg, 75 mcg, Oral, Daily, Ángel Gunter MD  •  metoprolol succinate XL (TOPROL-XL) 24 hr tablet 25 mg, 25 mg, Oral, Daily, Ángel Gunter MD  •  ondansetron (ZOFRAN) tablet 4 mg, 4 mg, Oral, Q6H PRN **OR** ondansetron ODT (ZOFRAN-ODT) disintegrating tablet 4 mg, 4 mg, Oral, Q6H PRN **OR** ondansetron (ZOFRAN) injection 4 mg, 4 mg, Intravenous, Q6H PRN, Ángel Gunter MD  •  sodium chloride 0.9 % flush 1-10 mL, 1-10 mL, Intravenous, PRN, Ángel Gunter MD  •  sodium chloride 0.9 % infusion, 100 mL/hr, Intravenous, Continuous, Ángel Gunter MD, Last Rate: 100 mL/hr at 01/20/18 1354, 100 mL/hr at 01/20/18 1354    No Known Allergies    Family History   Problem Relation Age of Onset   • Rheum arthritis Mother    •  "Aneurysm Father        Social History     Social History   • Marital status:      Spouse name: N/A   • Number of children: N/A   • Years of education: N/A     Occupational History   • Not on file.     Social History Main Topics   • Smoking status: Never Smoker   • Smokeless tobacco: Not on file   • Alcohol use No   • Drug use: Not on file   • Sexual activity: Defer     Other Topics Concern   • Not on file     Social History Narrative       Review of Systems   Constitutional: Negative for unexpected weight change.   HENT: Negative for trouble swallowing.    Respiratory: Negative for shortness of breath.    Cardiovascular: Negative for chest pain.   Gastrointestinal: Positive for abdominal pain. Negative for blood in stool, constipation, diarrhea and nausea.   Genitourinary: Negative for dysuria.       Objective     /59 (BP Location: Right arm, Patient Position: Sitting)  Pulse 50  Temp 97.6 °F (36.4 °C) (Oral)   Resp 16  Ht 175.3 cm (69\")  Wt 70.3 kg (155 lb)  SpO2 100%  BMI 22.89 kg/m2    Physical Exam   Constitutional: He is oriented to person, place, and time. He appears well-developed and well-nourished. No distress.   HENT:   Head: Normocephalic and atraumatic.   Eyes: Conjunctivae are normal. No scleral icterus.   Cardiovascular: Normal rate, regular rhythm, normal heart sounds and intact distal pulses.    No murmur heard.  Pulmonary/Chest: Effort normal and breath sounds normal. No respiratory distress. He has no wheezes. He has no rales.   Abdominal: Soft. Bowel sounds are normal. He exhibits no distension. There is no tenderness. There is no guarding.   Musculoskeletal: He exhibits no edema or deformity.   Neurological: He is alert and oriented to person, place, and time.   Skin: Skin is warm and dry.   Psychiatric: He has a normal mood and affect.   Vitals reviewed.      DIAGNOSTIC DATA:    WBC 8.15, Hgb 13.2  Creat 1.09, CO2 25.1  LFT's normal    I reviewed the images and the report of " a CT scan of the abdomen and pelvis performed today.  There are gallstones.  There is no pericholecystic fluid or inflammation.  There is distention of several centrally located bowel loops.  There is decompression of distal bowel loops.  There is gas and stool within the colon.    ASSESSMENT:    Small bowel obstruction versus gastroenteritis    PLAN:    Repeat KUB tomorrow morning.    I will make decisions about dietary advancement based on exam and imaging.

## 2018-01-22 VITALS
DIASTOLIC BLOOD PRESSURE: 59 MMHG | TEMPERATURE: 97.6 F | HEART RATE: 55 BPM | HEIGHT: 69 IN | OXYGEN SATURATION: 98 % | WEIGHT: 155 LBS | RESPIRATION RATE: 16 BRPM | SYSTOLIC BLOOD PRESSURE: 117 MMHG | BODY MASS INDEX: 22.96 KG/M2

## 2018-01-22 RX ADMIN — LEVOTHYROXINE SODIUM 75 MCG: 75 TABLET ORAL at 07:06

## 2018-01-22 RX ADMIN — FAMOTIDINE 20 MG: 10 INJECTION INTRAVENOUS at 08:21

## 2018-01-22 NOTE — PROGRESS NOTES
Chief Complaint:    Small bowel obstruction versus gastroenteritis    Subjective:    No nausea or pain today. + flatus. + liquid BM.    Objective:    Vitals:    01/21/18 0329 01/21/18 0844 01/21/18 1559 01/21/18 1946   BP: 121/57 128/66 130/64 140/69   BP Location: Left arm Left arm Left arm Left arm   Patient Position: Lying Lying Lying Sitting   Pulse: 53 54 58 56   Resp: 16 16 16 16   Temp: 97.3 °F (36.3 °C) 97.2 °F (36.2 °C) 97.6 °F (36.4 °C) 98.1 °F (36.7 °C)   TempSrc: Oral Oral Oral Oral   SpO2: 99% 100% 100% 100%   Weight:       Height:           Lungs: Clear  Heart: Regular  Abdomen: soft. BS present. No tenderness.   Extremities: Warm    Labs reviewed.    Assessment:    Small bowel obstruction versus gastroenteritis    Plan:    Seems improved  Begin clear liquids.

## 2018-01-22 NOTE — PLAN OF CARE
Problem: Patient Care Overview (Adult)  Goal: Plan of Care Review  Outcome: Ongoing (interventions implemented as appropriate)   01/22/18 0118   Coping/Psychosocial Response Interventions   Plan Of Care Reviewed With patient   Patient Care Overview   Progress improving   Outcome Evaluation   Outcome Summary/Follow up Plan NO nausea /vomiting. Diet was advanced to clears tonight. Fulls today. If tolerates diet may possibly go home today. up ad selene. Denies pain       Problem: Pain, Acute (Adult)  Goal: Acceptable Pain Control/Comfort Level  Outcome: Ongoing (interventions implemented as appropriate)      Problem: Bowel Obstruction (Adult)  Goal: Signs and Symptoms of Listed Potential Problems Will be Absent or Manageable (Bowel Obstruction)  Outcome: Ongoing (interventions implemented as appropriate)

## 2018-01-23 ENCOUNTER — EPISODE CHANGES (OUTPATIENT)
Dept: CASE MANAGEMENT | Facility: OTHER | Age: 73
End: 2018-01-23

## 2018-01-29 ENCOUNTER — TRANSITIONAL CARE MANAGEMENT TELEPHONE ENCOUNTER (OUTPATIENT)
Dept: FAMILY MEDICINE CLINIC | Facility: CLINIC | Age: 73
End: 2018-01-29

## 2018-02-01 ENCOUNTER — OFFICE VISIT (OUTPATIENT)
Dept: FAMILY MEDICINE CLINIC | Facility: CLINIC | Age: 73
End: 2018-02-01

## 2018-02-01 VITALS
TEMPERATURE: 98 F | BODY MASS INDEX: 23.52 KG/M2 | SYSTOLIC BLOOD PRESSURE: 116 MMHG | WEIGHT: 158.8 LBS | HEIGHT: 69 IN | HEART RATE: 58 BPM | OXYGEN SATURATION: 98 % | DIASTOLIC BLOOD PRESSURE: 70 MMHG

## 2018-02-01 DIAGNOSIS — Z09 HOSPITAL DISCHARGE FOLLOW-UP: Primary | ICD-10-CM

## 2018-02-01 PROCEDURE — 99213 OFFICE O/P EST LOW 20 MIN: CPT | Performed by: INTERNAL MEDICINE

## 2018-02-01 NOTE — PATIENT INSTRUCTIONS
Reviewed and discussed hospital records including CT  findings and labs.  Gallstones are asymptomatic.  We will plan on repeating a blood count and compress metabolic panel additional labs in one month.

## 2018-02-01 NOTE — PROGRESS NOTES
Subjective   Kunal Vargas is a 72 y.o. male. Patient is here today for   Chief Complaint   Patient presents with   • bowl obstruction     hospital follow up small intestine          Vitals:    02/01/18 1347   BP: 116/70   Pulse: 58   Temp: 98 °F (36.7 °C)   SpO2: 98%     The following portions of the patient's history were reviewed and updated as appropriate: allergies, current medications, past family history, past medical history, past social history, past surgical history and problem list.    Past Medical History:   Diagnosis Date   • Disease of thyroid gland    • Hyperlipidemia    • Hyperlipidemia    • Irregular heartbeat       No Known Allergies   Social History     Social History   • Marital status:      Spouse name: N/A   • Number of children: N/A   • Years of education: N/A     Occupational History   • Not on file.     Social History Main Topics   • Smoking status: Never Smoker   • Smokeless tobacco: Not on file   • Alcohol use No   • Drug use: Not on file   • Sexual activity: Defer     Other Topics Concern   • Not on file     Social History Narrative        Current Outpatient Prescriptions:   •  levothyroxine (SYNTHROID) 75 MCG tablet, Take 1 tablet by mouth Daily., Disp: 30 tablet, Rfl: 11  •  metoprolol succinate XL (TOPROL-XL) 25 MG 24 hr tablet, Take 1 tablet by mouth Daily. (Patient taking differently: Take 25 mg by mouth Every Night.), Disp: 90 tablet, Rfl: 1  •  pravastatin (PRAVACHOL) 40 MG tablet, Take 1 tablet by mouth Every Night., Disp: 90 tablet, Rfl: 0     Objective     History of Present Illness Kunal is here for hospital discharge follow-up.  He was admitted to [facility] with a partial small bowel obstruction 10 days ago.  CT scan of the abdomen also showed multiple gallstones.  He as by mouth resolved with bowel rest and he was discharged in 2 days.  He has had no further symptoms.  His appetite and diet are back to normal and his bowel movements are regular.    Review of  Systems   Constitutional: Negative for unexpected weight change.   Respiratory: Negative.    Cardiovascular: Negative.    Gastrointestinal: Negative for abdominal pain, blood in stool, constipation, diarrhea, nausea and vomiting.       Physical Exam   Constitutional: He appears well-developed and well-nourished.   Cardiovascular: Normal rate, regular rhythm and normal heart sounds.    Pulmonary/Chest: Effort normal and breath sounds normal.   Abdominal: Soft. Bowel sounds are normal. He exhibits no mass. There is no tenderness.   Neurological: He is alert.   Psychiatric: He has a normal mood and affect.   Vitals reviewed.      ASSESSMENT     Problem List Items Addressed This Visit        Other    Hospital discharge follow-up - Primary          PLAN  Patient Instructions   Reviewed and discussed hospital records including CT  findings and labs.  Gallstones are asymptomatic.  We will plan on repeating a blood count and compress metabolic panel additional labs in one month.    Return in about 1 month (around 3/1/2018) for labsCBC,CMP,LIPID,TSH.

## 2018-02-21 ENCOUNTER — PATIENT OUTREACH (OUTPATIENT)
Dept: CASE MANAGEMENT | Facility: OTHER | Age: 73
End: 2018-02-21

## 2018-02-21 NOTE — OUTREACH NOTE
"Pt. Reports is he doing \"fine\" he has had f/u with PCP and having normal BM'sExplained role of Care Advisor and contact information given to patient.No other questions or concerns voiced at this time. Reviewed Gaps in Care and need to schedule Annual Wellness Visit.  "

## 2018-02-26 DIAGNOSIS — E03.9 ACQUIRED HYPOTHYROIDISM: ICD-10-CM

## 2018-02-26 DIAGNOSIS — E78.5 HYPERLIPIDEMIA, UNSPECIFIED HYPERLIPIDEMIA TYPE: ICD-10-CM

## 2018-02-26 DIAGNOSIS — Z11.59 NEED FOR HEPATITIS C SCREENING TEST: Primary | ICD-10-CM

## 2018-03-01 LAB
ALBUMIN SERPL-MCNC: 4 G/DL (ref 3.5–5.2)
ALBUMIN/GLOB SERPL: 1.7 G/DL
ALP SERPL-CCNC: 63 U/L (ref 39–117)
ALT SERPL-CCNC: 16 U/L (ref 1–41)
AST SERPL-CCNC: 26 U/L (ref 1–40)
BASOPHILS # BLD AUTO: 0.03 10*3/MM3 (ref 0–0.2)
BASOPHILS NFR BLD AUTO: 0.5 % (ref 0–1.5)
BILIRUB SERPL-MCNC: 0.5 MG/DL (ref 0.1–1.2)
BUN SERPL-MCNC: 28 MG/DL (ref 8–23)
BUN/CREAT SERPL: 25.2 (ref 7–25)
CALCIUM SERPL-MCNC: 9 MG/DL (ref 8.6–10.5)
CHLORIDE SERPL-SCNC: 105 MMOL/L (ref 98–107)
CHOLEST SERPL-MCNC: 144 MG/DL (ref 0–200)
CO2 SERPL-SCNC: 24.9 MMOL/L (ref 22–29)
CREAT SERPL-MCNC: 1.11 MG/DL (ref 0.76–1.27)
EOSINOPHIL # BLD AUTO: 0.22 10*3/MM3 (ref 0–0.7)
EOSINOPHIL NFR BLD AUTO: 3.3 % (ref 0.3–6.2)
ERYTHROCYTE [DISTWIDTH] IN BLOOD BY AUTOMATED COUNT: 12.9 % (ref 11.5–14.5)
GFR SERPLBLD CREATININE-BSD FMLA CKD-EPI: 65 ML/MIN/1.73
GFR SERPLBLD CREATININE-BSD FMLA CKD-EPI: 79 ML/MIN/1.73
GLOBULIN SER CALC-MCNC: 2.4 GM/DL
GLUCOSE SERPL-MCNC: 96 MG/DL (ref 65–99)
HCT VFR BLD AUTO: 40.5 % (ref 40.4–52.2)
HCV AB S/CO SERPL IA: <0.1 S/CO RATIO (ref 0–0.9)
HCV AB SERPL QL IA: NORMAL
HDLC SERPL-MCNC: 57 MG/DL (ref 40–60)
HGB BLD-MCNC: 12.9 G/DL (ref 13.7–17.6)
IMM GRANULOCYTES # BLD: 0 10*3/MM3 (ref 0–0.03)
IMM GRANULOCYTES NFR BLD: 0 % (ref 0–0.5)
LDLC SERPL CALC-MCNC: 77 MG/DL (ref 0–100)
LDLC/HDLC SERPL: 1.35 {RATIO}
LYMPHOCYTES # BLD AUTO: 1.82 10*3/MM3 (ref 0.9–4.8)
LYMPHOCYTES NFR BLD AUTO: 27.4 % (ref 19.6–45.3)
MCH RBC QN AUTO: 31.9 PG (ref 27–32.7)
MCHC RBC AUTO-ENTMCNC: 31.9 G/DL (ref 32.6–36.4)
MCV RBC AUTO: 100.2 FL (ref 79.8–96.2)
MONOCYTES # BLD AUTO: 0.58 10*3/MM3 (ref 0.2–1.2)
MONOCYTES NFR BLD AUTO: 8.7 % (ref 5–12)
NEUTROPHILS # BLD AUTO: 4 10*3/MM3 (ref 1.9–8.1)
NEUTROPHILS NFR BLD AUTO: 60.1 % (ref 42.7–76)
PLATELET # BLD AUTO: 242 10*3/MM3 (ref 140–500)
POTASSIUM SERPL-SCNC: 5.2 MMOL/L (ref 3.5–5.2)
PROT SERPL-MCNC: 6.4 G/DL (ref 6–8.5)
RBC # BLD AUTO: 4.04 10*6/MM3 (ref 4.6–6)
SODIUM SERPL-SCNC: 142 MMOL/L (ref 136–145)
TRIGL SERPL-MCNC: 50 MG/DL (ref 0–150)
TSH SERPL DL<=0.005 MIU/L-ACNC: 3.18 MIU/ML (ref 0.27–4.2)
VLDLC SERPL CALC-MCNC: 10 MG/DL (ref 5–40)
WBC # BLD AUTO: 6.65 10*3/MM3 (ref 4.5–10.7)

## 2018-03-08 ENCOUNTER — OFFICE VISIT (OUTPATIENT)
Dept: FAMILY MEDICINE CLINIC | Facility: CLINIC | Age: 73
End: 2018-03-08

## 2018-03-08 VITALS
WEIGHT: 162 LBS | HEIGHT: 69 IN | BODY MASS INDEX: 23.99 KG/M2 | SYSTOLIC BLOOD PRESSURE: 120 MMHG | RESPIRATION RATE: 16 BRPM | DIASTOLIC BLOOD PRESSURE: 66 MMHG | HEART RATE: 53 BPM

## 2018-03-08 DIAGNOSIS — E03.9 ACQUIRED HYPOTHYROIDISM: Primary | ICD-10-CM

## 2018-03-08 DIAGNOSIS — Z23 NEED FOR VACCINATION: ICD-10-CM

## 2018-03-08 DIAGNOSIS — E78.5 HYPERLIPIDEMIA, UNSPECIFIED HYPERLIPIDEMIA TYPE: ICD-10-CM

## 2018-03-08 DIAGNOSIS — I25.10 CHRONIC CORONARY ARTERY DISEASE: ICD-10-CM

## 2018-03-08 DIAGNOSIS — D64.9 ANEMIA, UNSPECIFIED TYPE: ICD-10-CM

## 2018-03-08 PROCEDURE — 90732 PPSV23 VACC 2 YRS+ SUBQ/IM: CPT | Performed by: INTERNAL MEDICINE

## 2018-03-08 PROCEDURE — 99214 OFFICE O/P EST MOD 30 MIN: CPT | Performed by: INTERNAL MEDICINE

## 2018-03-08 PROCEDURE — G0009 ADMIN PNEUMOCOCCAL VACCINE: HCPCS | Performed by: INTERNAL MEDICINE

## 2018-03-08 NOTE — PATIENT INSTRUCTIONS
Blood pressure is normal.  Lipids are excellent.  Thyroid-stimulating hormone level is normal.  Hepatitis C is negative.  Hemoglobin is mildly decreased at 12.9.  We'll check a Hemoccult.  Will give a Pneumovax 23 today.  You need to get a shingles vaccination as well as a TDAP.

## 2018-03-22 RX ORDER — METOPROLOL SUCCINATE 25 MG/1
TABLET, EXTENDED RELEASE ORAL
Qty: 90 TABLET | Refills: 1 | Status: SHIPPED | OUTPATIENT
Start: 2018-03-22 | End: 2018-10-10 | Stop reason: SDUPTHER

## 2018-03-22 RX ORDER — PRAVASTATIN SODIUM 40 MG
TABLET ORAL
Qty: 90 TABLET | Refills: 1 | Status: SHIPPED | OUTPATIENT
Start: 2018-03-22 | End: 2018-09-28 | Stop reason: SDUPTHER

## 2018-03-23 DIAGNOSIS — D64.9 ANEMIA, UNSPECIFIED TYPE: Primary | ICD-10-CM

## 2018-03-23 LAB — HEMOCCULT STL QL IA: NEGATIVE

## 2018-03-23 PROCEDURE — 82274 ASSAY TEST FOR BLOOD FECAL: CPT | Performed by: INTERNAL MEDICINE

## 2018-07-05 RX ORDER — LEVOTHYROXINE SODIUM 0.07 MG/1
75 TABLET ORAL DAILY
Qty: 30 TABLET | Refills: 11 | Status: SHIPPED | OUTPATIENT
Start: 2018-07-05 | End: 2019-06-25 | Stop reason: SDUPTHER

## 2018-08-14 ENCOUNTER — TRANSCRIBE ORDERS (OUTPATIENT)
Dept: ADMINISTRATIVE | Facility: HOSPITAL | Age: 73
End: 2018-08-14

## 2018-08-14 DIAGNOSIS — Z13.9 SCREENING PROCEDURE: Primary | ICD-10-CM

## 2018-08-15 ENCOUNTER — HOSPITAL ENCOUNTER (OUTPATIENT)
Dept: CARDIOLOGY | Facility: HOSPITAL | Age: 73
Discharge: HOME | End: 2018-08-15
Admitting: INTERNAL MEDICINE

## 2018-08-15 VITALS
HEIGHT: 69 IN | HEART RATE: 50 BPM | DIASTOLIC BLOOD PRESSURE: 56 MMHG | WEIGHT: 154 LBS | BODY MASS INDEX: 22.81 KG/M2 | SYSTOLIC BLOOD PRESSURE: 110 MMHG

## 2018-08-15 DIAGNOSIS — Z13.9 SCREENING PROCEDURE: ICD-10-CM

## 2018-08-15 LAB
BH CV ECHO MEAS - DIST AO DIAM: 1.52 CM
BH CV VAS BP LEFT ARM: NORMAL MMHG
BH CV VAS BP RIGHT ARM: NORMAL MMHG
BH CV XLRA MEAS - MID AO DIAM: 1.65 CM
BH CV XLRA MEAS - PAD LEFT ABI DP: 1.23
BH CV XLRA MEAS - PAD LEFT ABI PT: 1.29
BH CV XLRA MEAS - PAD LEFT ARM: 109 MMHG
BH CV XLRA MEAS - PAD LEFT LEG DP: 136 MMHG
BH CV XLRA MEAS - PAD LEFT LEG PT: 142 MMHG
BH CV XLRA MEAS - PAD RIGHT ABI DP: 1.23
BH CV XLRA MEAS - PAD RIGHT ABI PT: 1.27
BH CV XLRA MEAS - PAD RIGHT ARM: 110 MMHG
BH CV XLRA MEAS - PAD RIGHT LEG DP: 136 MMHG
BH CV XLRA MEAS - PAD RIGHT LEG PT: 140 MMHG
BH CV XLRA MEAS - PROX AO DIAM: 2.12 CM
BH CV XLRA MEAS LEFT ICA/CCA RATIO: 1.41
BH CV XLRA MEAS LEFT MID CCA PSV: NORMAL CM/SEC
BH CV XLRA MEAS LEFT MID ICA PSV: NORMAL CM/SEC
BH CV XLRA MEAS LEFT PROX ECA PSV: NORMAL CM/SEC
BH CV XLRA MEAS RIGHT ICA/CCA RATIO: 1.2
BH CV XLRA MEAS RIGHT MID CCA PSV: NORMAL CM/SEC
BH CV XLRA MEAS RIGHT MID ICA PSV: NORMAL CM/SEC
BH CV XLRA MEAS RIGHT PROX ECA PSV: NORMAL CM/SEC

## 2018-08-15 PROCEDURE — 93799 UNLISTED CV SVC/PROCEDURE: CPT

## 2018-09-11 DIAGNOSIS — D64.9 ANEMIA, UNSPECIFIED TYPE: Primary | ICD-10-CM

## 2018-09-12 ENCOUNTER — OFFICE VISIT (OUTPATIENT)
Dept: FAMILY MEDICINE CLINIC | Facility: CLINIC | Age: 73
End: 2018-09-12

## 2018-09-12 VITALS
WEIGHT: 155 LBS | BODY MASS INDEX: 22.96 KG/M2 | HEIGHT: 69 IN | RESPIRATION RATE: 16 BRPM | DIASTOLIC BLOOD PRESSURE: 58 MMHG | OXYGEN SATURATION: 100 % | HEART RATE: 52 BPM | SYSTOLIC BLOOD PRESSURE: 110 MMHG | TEMPERATURE: 97.2 F

## 2018-09-12 DIAGNOSIS — Z00.00 INITIAL MEDICARE ANNUAL WELLNESS VISIT: Primary | ICD-10-CM

## 2018-09-12 PROBLEM — Z09 HOSPITAL DISCHARGE FOLLOW-UP: Status: RESOLVED | Noted: 2018-02-01 | Resolved: 2018-09-12

## 2018-09-12 PROBLEM — J40 BRONCHITIS: Status: RESOLVED | Noted: 2017-04-03 | Resolved: 2018-09-12

## 2018-09-12 LAB
BASOPHILS # BLD AUTO: 0.03 10*3/MM3 (ref 0–0.2)
BASOPHILS NFR BLD AUTO: 0.6 % (ref 0–1.5)
EOSINOPHIL # BLD AUTO: 0.12 10*3/MM3 (ref 0–0.7)
EOSINOPHIL NFR BLD AUTO: 2.2 % (ref 0.3–6.2)
ERYTHROCYTE [DISTWIDTH] IN BLOOD BY AUTOMATED COUNT: 13.2 % (ref 11.5–14.5)
HCT VFR BLD AUTO: 39.4 % (ref 40.4–52.2)
HGB BLD-MCNC: 12.6 G/DL (ref 13.7–17.6)
IMM GRANULOCYTES # BLD: 0 10*3/MM3 (ref 0–0.03)
IMM GRANULOCYTES NFR BLD: 0 % (ref 0–0.5)
LYMPHOCYTES # BLD AUTO: 1.74 10*3/MM3 (ref 0.9–4.8)
LYMPHOCYTES NFR BLD AUTO: 32.2 % (ref 19.6–45.3)
MCH RBC QN AUTO: 31.8 PG (ref 27–32.7)
MCHC RBC AUTO-ENTMCNC: 32 G/DL (ref 32.6–36.4)
MCV RBC AUTO: 99.5 FL (ref 79.8–96.2)
MONOCYTES # BLD AUTO: 0.55 10*3/MM3 (ref 0.2–1.2)
MONOCYTES NFR BLD AUTO: 10.2 % (ref 5–12)
NEUTROPHILS # BLD AUTO: 2.97 10*3/MM3 (ref 1.9–8.1)
NEUTROPHILS NFR BLD AUTO: 54.8 % (ref 42.7–76)
PLATELET # BLD AUTO: 210 10*3/MM3 (ref 140–500)
RBC # BLD AUTO: 3.96 10*6/MM3 (ref 4.6–6)
WBC # BLD AUTO: 5.41 10*3/MM3 (ref 4.5–10.7)

## 2018-09-12 PROCEDURE — G0438 PPPS, INITIAL VISIT: HCPCS | Performed by: NURSE PRACTITIONER

## 2018-09-12 NOTE — PROGRESS NOTES
QUICK REFERENCE INFORMATION:  The ABCs of the Annual Wellness Visit    Initial Medicare Wellness Visit    HEALTH RISK ASSESSMENT    1945    Recent Hospitalizations:  Recently treated at the following:  [facility].        Current Medical Providers:  Patient Care Team:  Chai Montesinos MD as PCP - General  Chai Montesinos MD as PCP - Claims Attributed  Najma Yeager RN as Care Coordinator (Population Health)        Smoking Status:  History   Smoking Status   • Never Smoker   Smokeless Tobacco   • Not on file       Alcohol Consumption:  History   Alcohol Use No       Depression Screen:   PHQ-2/PHQ-9 Depression Screening 9/12/2018   Little interest or pleasure in doing things 0   Feeling down, depressed, or hopeless 0   Trouble falling or staying asleep, or sleeping too much -   Feeling tired or having little energy -   Poor appetite or overeating -   Feeling bad about yourself - or that you are a failure or have let yourself or your family down -   Trouble concentrating on things, such as reading the newspaper or watching television -   Moving or speaking so slowly that other people could have noticed. Or the opposite - being so fidgety or restless that you have been moving around a lot more than usual -   Thoughts that you would be better off dead, or of hurting yourself in some way -   Total Score 0   If you checked off any problems, how difficult have these problems made it for you to do your work, take care of things at home, or get along with other people? -       Health Habits and Functional and Cognitive Screening:  Functional & Cognitive Status 9/12/2018   Do you have difficulty preparing food and eating? No   Do you have difficulty bathing yourself, getting dressed or grooming yourself? No   Do you have difficulty using the toilet? No   Do you have difficulty moving around from place to place? No   Do you have trouble with steps or getting out of a bed or a chair? No   In the past  year have you fallen or experienced a near fall? No   Current Diet Well Balanced Diet   Dental Exam Up to date   Eye Exam Up to date   Exercise (times per week) 4 times per week   Current Exercise Activities Include Aerobics   Do you need help using the phone?  No   Are you deaf or do you have serious difficulty hearing?  Yes   Do you need help with transportation? No   Do you need help shopping? No   Do you need help preparing meals?  No   Do you need help with housework?  No   Do you need help with laundry? No   Do you need help taking your medications? No   Do you need help managing money? No   Do you ever drive or ride in a car without wearing a seat belt? No           Does the patient have evidence of cognitive impairment? No    Asiprin use counseling: Does not need ASA (and currently is not on it)      Recent Lab Results:    Visual Acuity:  No exam data present    Age-appropriate Screening Schedule:  Refer to the list below for future screening recommendations based on patient's age, sex and/or medical conditions. Orders for these recommended tests are listed in the plan section. The patient has been provided with a written plan.    Health Maintenance   Topic Date Due   • TDAP/TD VACCINES (1 - Tdap) 08/22/1964   • INFLUENZA VACCINE  08/01/2018   • LIPID PANEL  02/28/2019   • COLONOSCOPY  01/25/2027   • PNEUMOCOCCAL VACCINES (65+ LOW/MEDIUM RISK)  Completed   • ZOSTER VACCINE  Excluded        Subjective   History of Present Illness    Kunal Vargas is a 73 y.o. male who presents for an Annual Wellness Visit.    The following portions of the patient's history were reviewed and updated as appropriate: allergies, current medications, past family history, past medical history, past social history, past surgical history and problem list.    Outpatient Medications Prior to Visit   Medication Sig Dispense Refill   • levothyroxine (SYNTHROID) 75 MCG tablet Take 1 tablet by mouth Daily. 30 tablet 11   • metoprolol  "succinate XL (TOPROL-XL) 25 MG 24 hr tablet TAKE ONE TABLET BY MOUTH ONCE DAILY 90 tablet 1   • pravastatin (PRAVACHOL) 40 MG tablet TAKE ONE TABLET BY MOUTH EVERY NIGHT 90 tablet 1     No facility-administered medications prior to visit.        Patient Active Problem List   Diagnosis   • Hyperlipidemia   • Echocardiogram abnormal   • Abnormal electrocardiogram   • Chronic coronary artery disease   • Skin lesion   • Acquired hypothyroidism   • SBO (small bowel obstruction)   • Anemia       Advance Care Planning:  has an advance directive - a copy HAS NOT been provided. Have asked the patient to send this to us to add to record.    Identification of Risk Factors:  Risk factors include: cardiovascular risk.    Review of Systems    Compared to one year ago, the patient feels his physical health is the same.  Compared to one year ago, the patient feels his mental health is the same.    Objective     Physical Exam    Vitals:    09/12/18 1050   BP: 110/58   Pulse: 52   Resp: 16   Temp: 97.2 °F (36.2 °C)   SpO2: 100%   Weight: 70.3 kg (155 lb)   Height: 174 cm (68.5\")   PainSc: 0-No pain       Patient's Body mass index is 23.22 kg/m². BMI is within normal parameters. No follow-up required.      Assessment/Plan   Patient Self-Management and Personalized Health Advice  The patient has been provided with information about: prevention of cardiac or vascular disease and fall prevention and preventive services including:   · Fall Risk assessment done, TdaP vaccine, declines shingles vaccine, will get flu vaccine in oct, discussed tdap booster .    Visit Diagnoses:    ICD-10-CM ICD-9-CM   1. Initial Medicare annual wellness visit Z00.00 V70.0       No orders of the defined types were placed in this encounter.      Outpatient Encounter Prescriptions as of 9/12/2018   Medication Sig Dispense Refill   • levothyroxine (SYNTHROID) 75 MCG tablet Take 1 tablet by mouth Daily. 30 tablet 11   • metoprolol succinate XL (TOPROL-XL) 25 MG 24 " hr tablet TAKE ONE TABLET BY MOUTH ONCE DAILY 90 tablet 1   • pravastatin (PRAVACHOL) 40 MG tablet TAKE ONE TABLET BY MOUTH EVERY NIGHT 90 tablet 1     No facility-administered encounter medications on file as of 9/12/2018.        Reviewed use of high risk medication in the elderly: yes  Reviewed for potential of harmful drug interactions in the elderly: yes    Follow Up:  Follow up next week as scheduled      An After Visit Summary and PPPS with all of these plans were given to the patient.

## 2018-09-19 ENCOUNTER — OFFICE VISIT (OUTPATIENT)
Dept: FAMILY MEDICINE CLINIC | Facility: CLINIC | Age: 73
End: 2018-09-19

## 2018-09-19 VITALS
HEIGHT: 69 IN | SYSTOLIC BLOOD PRESSURE: 120 MMHG | TEMPERATURE: 98.1 F | HEART RATE: 49 BPM | BODY MASS INDEX: 22.96 KG/M2 | DIASTOLIC BLOOD PRESSURE: 62 MMHG | WEIGHT: 155 LBS | OXYGEN SATURATION: 99 %

## 2018-09-19 DIAGNOSIS — E78.5 HYPERLIPIDEMIA, UNSPECIFIED HYPERLIPIDEMIA TYPE: Primary | ICD-10-CM

## 2018-09-19 DIAGNOSIS — E03.9 ACQUIRED HYPOTHYROIDISM: ICD-10-CM

## 2018-09-19 DIAGNOSIS — D64.9 ANEMIA, UNSPECIFIED TYPE: ICD-10-CM

## 2018-09-19 DIAGNOSIS — I25.10 CHRONIC CORONARY ARTERY DISEASE: ICD-10-CM

## 2018-09-19 LAB
FERRITIN SERPL-MCNC: 187.2 NG/ML (ref 30–400)
IRON SATN MFR SERPL: 29 % (ref 20–50)
IRON SERPL-MCNC: 88 MCG/DL (ref 59–158)
TIBC SERPL-MCNC: 306 MCG/DL
TSH SERPL DL<=0.005 MIU/L-ACNC: 3.96 MIU/ML (ref 0.27–4.2)
UIBC SERPL-MCNC: 218 MCG/DL
VIT B12 SERPL-MCNC: 425 PG/ML (ref 211–946)

## 2018-09-19 PROCEDURE — 99214 OFFICE O/P EST MOD 30 MIN: CPT | Performed by: INTERNAL MEDICINE

## 2018-09-19 NOTE — PROGRESS NOTES
Subjective   Kunal Vargas is a 73 y.o. male. Patient is here today for   Chief Complaint   Patient presents with   • Hypertension   • Hypothyroidism   • Hyperlipidemia          Vitals:    09/19/18 1004   BP: 120/62   Pulse: (!) 49   Temp: 98.1 °F (36.7 °C)   SpO2: 99%       The following portions of the patient's history were reviewed and updated as appropriate: allergies, current medications, past family history, past medical history, past social history, past surgical history and problem list.    Past Medical History:   Diagnosis Date   • Disease of thyroid gland    • Hyperlipidemia    • Hyperlipidemia    • Irregular heartbeat       No Known Allergies   Social History     Social History   • Marital status:      Spouse name: N/A   • Number of children: N/A   • Years of education: N/A     Occupational History   • Not on file.     Social History Main Topics   • Smoking status: Never Smoker   • Smokeless tobacco: Not on file   • Alcohol use No   • Drug use: Unknown   • Sexual activity: Defer     Other Topics Concern   • Not on file     Social History Narrative   • No narrative on file        Current Outpatient Prescriptions:   •  levothyroxine (SYNTHROID) 75 MCG tablet, Take 1 tablet by mouth Daily., Disp: 30 tablet, Rfl: 11  •  metoprolol succinate XL (TOPROL-XL) 25 MG 24 hr tablet, TAKE ONE TABLET BY MOUTH ONCE DAILY, Disp: 90 tablet, Rfl: 1  •  pravastatin (PRAVACHOL) 40 MG tablet, TAKE ONE TABLET BY MOUTH EVERY NIGHT, Disp: 90 tablet, Rfl: 1     Objective   History of Present Illness Kunal is here for a blood pressure check and lab follow-up.  He has anemia, hypertension, hyperlipidemia, hypothyroidism, and nonobstructive coronary artery disease.  He had a cardiac catheterization in 2016.  He feels well.  He eats healthy and exercises on a regular basis.    Review of Systems   Constitutional: Negative for activity change.   Respiratory: Negative.    Cardiovascular: Negative for chest pain.    Neurological: Negative.    Psychiatric/Behavioral: Negative.        Physical Exam   Constitutional: He appears well-developed and well-nourished.   Neck: Carotid bruit is not present. No thyromegaly present.   Cardiovascular: Normal rate, regular rhythm and normal heart sounds.    118/60   Pulmonary/Chest: Effort normal and breath sounds normal.   Musculoskeletal: He exhibits no edema.   Neurological: He is alert.   Psychiatric: He has a normal mood and affect. His behavior is normal. Judgment and thought content normal.   Vitals reviewed.      ASSESSMENT     Problem List Items Addressed This Visit        Cardiovascular and Mediastinum    Hyperlipidemia - Primary    Chronic coronary artery disease       Endocrine    Acquired hypothyroidism    Relevant Orders    TSH       Hematopoietic and Hemostatic    Anemia    Relevant Orders    Vitamin B12    Ferritin    Iron Profile          PLAN  Patient Instructions   Blood pressure is normal.  Hemoglobin is mildly decreased at 12.6.  Will check some iron studies, B12 level, and thyroid-stimulating hormone level.      No Follow-up on file.

## 2018-09-19 NOTE — PATIENT INSTRUCTIONS
Blood pressure is normal.  Hemoglobin is mildly decreased at 12.6.  Will check some iron studies, B12 level, and thyroid-stimulating hormone level.

## 2018-09-20 ENCOUNTER — TELEPHONE (OUTPATIENT)
Dept: FAMILY MEDICINE CLINIC | Facility: CLINIC | Age: 73
End: 2018-09-20

## 2018-09-20 NOTE — TELEPHONE ENCOUNTER
I called patient and notified him, per Dr. Daniels, that his lab were normal. Patient understood.

## 2018-09-28 RX ORDER — PRAVASTATIN SODIUM 40 MG
40 TABLET ORAL
Qty: 90 TABLET | Refills: 1 | Status: SHIPPED | OUTPATIENT
Start: 2018-09-28 | End: 2019-04-07 | Stop reason: SDUPTHER

## 2018-10-10 RX ORDER — METOPROLOL SUCCINATE 25 MG/1
25 TABLET, EXTENDED RELEASE ORAL DAILY
Qty: 90 TABLET | Refills: 1 | Status: SHIPPED | OUTPATIENT
Start: 2018-10-10 | End: 2019-04-07 | Stop reason: SDUPTHER

## 2019-04-08 RX ORDER — METOPROLOL SUCCINATE 25 MG/1
TABLET, EXTENDED RELEASE ORAL
Qty: 90 TABLET | Refills: 0 | Status: SHIPPED | OUTPATIENT
Start: 2019-04-08 | End: 2019-07-16 | Stop reason: SDUPTHER

## 2019-04-08 RX ORDER — PRAVASTATIN SODIUM 40 MG
TABLET ORAL
Qty: 90 TABLET | Refills: 0 | Status: SHIPPED | OUTPATIENT
Start: 2019-04-08 | End: 2019-07-16 | Stop reason: SDUPTHER

## 2019-06-25 RX ORDER — LEVOTHYROXINE SODIUM 0.07 MG/1
75 TABLET ORAL DAILY
Qty: 30 TABLET | Refills: 0 | Status: SHIPPED | OUTPATIENT
Start: 2019-06-25 | End: 2019-07-16 | Stop reason: SDUPTHER

## 2019-07-16 RX ORDER — LEVOTHYROXINE SODIUM 0.07 MG/1
TABLET ORAL
Qty: 30 TABLET | Refills: 0 | Status: SHIPPED | OUTPATIENT
Start: 2019-07-16 | End: 2019-08-14

## 2019-07-16 RX ORDER — PRAVASTATIN SODIUM 40 MG
TABLET ORAL
Qty: 30 TABLET | Refills: 0 | Status: SHIPPED | OUTPATIENT
Start: 2019-07-16 | End: 2019-10-22

## 2019-07-16 RX ORDER — METOPROLOL SUCCINATE 25 MG/1
TABLET, EXTENDED RELEASE ORAL
Qty: 30 TABLET | Refills: 0 | Status: SHIPPED | OUTPATIENT
Start: 2019-07-16 | End: 2019-08-24 | Stop reason: SDUPTHER

## 2019-07-31 DIAGNOSIS — E03.9 ACQUIRED HYPOTHYROIDISM: Primary | ICD-10-CM

## 2019-07-31 DIAGNOSIS — D64.9 ANEMIA, UNSPECIFIED TYPE: ICD-10-CM

## 2019-08-01 DIAGNOSIS — E78.5 HYPERLIPIDEMIA, UNSPECIFIED HYPERLIPIDEMIA TYPE: Primary | ICD-10-CM

## 2019-08-01 DIAGNOSIS — E03.9 ACQUIRED HYPOTHYROIDISM: ICD-10-CM

## 2019-08-01 LAB
ALBUMIN SERPL-MCNC: 4.1 G/DL (ref 3.5–5.2)
ALBUMIN/GLOB SERPL: 1.9 G/DL
ALP SERPL-CCNC: 76 U/L (ref 39–117)
ALT SERPL-CCNC: 17 U/L (ref 1–41)
AST SERPL-CCNC: 25 U/L (ref 1–40)
BASOPHILS # BLD AUTO: 0.04 10*3/MM3 (ref 0–0.2)
BASOPHILS NFR BLD AUTO: 0.6 % (ref 0–1.5)
BILIRUB SERPL-MCNC: 0.6 MG/DL (ref 0.2–1.2)
BUN SERPL-MCNC: 20 MG/DL (ref 8–23)
BUN/CREAT SERPL: 17.2 (ref 7–25)
CALCIUM SERPL-MCNC: 8.7 MG/DL (ref 8.6–10.5)
CHLORIDE SERPL-SCNC: 104 MMOL/L (ref 98–107)
CHOLEST SERPL-MCNC: 137 MG/DL (ref 0–200)
CO2 SERPL-SCNC: 26 MMOL/L (ref 22–29)
CREAT SERPL-MCNC: 1.16 MG/DL (ref 0.76–1.27)
EOSINOPHIL # BLD AUTO: 0.12 10*3/MM3 (ref 0–0.4)
EOSINOPHIL NFR BLD AUTO: 1.8 % (ref 0.3–6.2)
ERYTHROCYTE [DISTWIDTH] IN BLOOD BY AUTOMATED COUNT: 12.7 % (ref 12.3–15.4)
GLOBULIN SER CALC-MCNC: 2.2 GM/DL
GLUCOSE SERPL-MCNC: 99 MG/DL (ref 65–99)
HCT VFR BLD AUTO: 40.7 % (ref 37.5–51)
HDLC SERPL-MCNC: 56 MG/DL (ref 40–60)
HGB BLD-MCNC: 12.8 G/DL (ref 13–17.7)
IMM GRANULOCYTES # BLD AUTO: 0.03 10*3/MM3 (ref 0–0.05)
IMM GRANULOCYTES NFR BLD AUTO: 0.5 % (ref 0–0.5)
LDLC SERPL CALC-MCNC: 72 MG/DL (ref 0–100)
LDLC/HDLC SERPL: 1.29 {RATIO}
LYMPHOCYTES # BLD AUTO: 1.4 10*3/MM3 (ref 0.7–3.1)
LYMPHOCYTES NFR BLD AUTO: 21.5 % (ref 19.6–45.3)
MCH RBC QN AUTO: 32.1 PG (ref 26.6–33)
MCHC RBC AUTO-ENTMCNC: 31.4 G/DL (ref 31.5–35.7)
MCV RBC AUTO: 102 FL (ref 79–97)
MONOCYTES # BLD AUTO: 0.58 10*3/MM3 (ref 0.1–0.9)
MONOCYTES NFR BLD AUTO: 8.9 % (ref 5–12)
NEUTROPHILS # BLD AUTO: 4.33 10*3/MM3 (ref 1.7–7)
NEUTROPHILS NFR BLD AUTO: 66.7 % (ref 42.7–76)
NRBC BLD AUTO-RTO: 0 /100 WBC (ref 0–0.2)
PLATELET # BLD AUTO: 229 10*3/MM3 (ref 140–450)
POTASSIUM SERPL-SCNC: 4.5 MMOL/L (ref 3.5–5.2)
PROT SERPL-MCNC: 6.3 G/DL (ref 6–8.5)
RBC # BLD AUTO: 3.99 10*6/MM3 (ref 4.14–5.8)
SODIUM SERPL-SCNC: 141 MMOL/L (ref 136–145)
TRIGL SERPL-MCNC: 45 MG/DL (ref 0–150)
TSH SERPL DL<=0.005 MIU/L-ACNC: 5.59 MIU/ML (ref 0.27–4.2)
VLDLC SERPL CALC-MCNC: 9 MG/DL
WBC # BLD AUTO: 6.5 10*3/MM3 (ref 3.4–10.8)

## 2019-08-14 ENCOUNTER — OFFICE VISIT (OUTPATIENT)
Dept: FAMILY MEDICINE CLINIC | Facility: CLINIC | Age: 74
End: 2019-08-14

## 2019-08-14 ENCOUNTER — HOSPITAL ENCOUNTER (OUTPATIENT)
Dept: GENERAL RADIOLOGY | Facility: HOSPITAL | Age: 74
Discharge: HOME | End: 2019-08-14
Admitting: INTERNAL MEDICINE

## 2019-08-14 VITALS
BODY MASS INDEX: 23.11 KG/M2 | SYSTOLIC BLOOD PRESSURE: 120 MMHG | RESPIRATION RATE: 16 BRPM | HEART RATE: 55 BPM | WEIGHT: 156 LBS | TEMPERATURE: 98.3 F | HEIGHT: 69 IN | DIASTOLIC BLOOD PRESSURE: 60 MMHG | OXYGEN SATURATION: 98 %

## 2019-08-14 DIAGNOSIS — E03.9 ACQUIRED HYPOTHYROIDISM: ICD-10-CM

## 2019-08-14 DIAGNOSIS — R20.0 NUMBNESS IN FEET: ICD-10-CM

## 2019-08-14 DIAGNOSIS — E78.5 HYPERLIPIDEMIA, UNSPECIFIED HYPERLIPIDEMIA TYPE: Primary | ICD-10-CM

## 2019-08-14 DIAGNOSIS — M25.531 RIGHT WRIST PAIN: ICD-10-CM

## 2019-08-14 DIAGNOSIS — I25.10 CHRONIC CORONARY ARTERY DISEASE: ICD-10-CM

## 2019-08-14 PROCEDURE — 69200 CLEAR OUTER EAR CANAL: CPT | Performed by: INTERNAL MEDICINE

## 2019-08-14 PROCEDURE — 99214 OFFICE O/P EST MOD 30 MIN: CPT | Performed by: INTERNAL MEDICINE

## 2019-08-14 PROCEDURE — 73110 X-RAY EXAM OF WRIST: CPT

## 2019-08-14 RX ORDER — LEVOTHYROXINE SODIUM 88 UG/1
88 TABLET ORAL DAILY
Qty: 90 TABLET | Refills: 3 | Status: SHIPPED | OUTPATIENT
Start: 2019-08-14 | End: 2020-08-31

## 2019-08-14 NOTE — PATIENT INSTRUCTIONS
Blood pressure is normal.  Lipids are normal.  LDL is 72 and HDL is 56.  Thyroid-stimulating hormone level is elevated.  Will increase levothyroxine to 88 mcg.  Hemoglobin is slightly decreased to 12.8.  Will x-ray wrist and call you the results.  Foot numbness is consistent with neuropathy.  Discussed getting nerve conduction studies.  Removed foreign body from left ear canal with forceps.

## 2019-08-14 NOTE — PROGRESS NOTES
Subjective   Kunal Vargas is a 73 y.o. male. Patient is here today for   Chief Complaint   Patient presents with   • Hyperlipidemia   • Hypertension   • Hypothyroidism          Vitals:    08/14/19 1040   BP: 120/60   Pulse: 55   Resp: 16   Temp: 98.3 °F (36.8 °C)   SpO2: 98%       The following portions of the patient's history were reviewed and updated as appropriate: allergies, current medications, past family history, past medical history, past social history, past surgical history and problem list.    Past Medical History:   Diagnosis Date   • Disease of thyroid gland    • Hyperlipidemia    • Hyperlipidemia    • Irregular heartbeat       No Known Allergies   Social History     Socioeconomic History   • Marital status:      Spouse name: Not on file   • Number of children: Not on file   • Years of education: Not on file   • Highest education level: Not on file   Tobacco Use   • Smoking status: Never Smoker   Substance and Sexual Activity   • Alcohol use: No   • Sexual activity: Defer        Current Outpatient Medications:   •  levothyroxine (SYNTHROID, LEVOTHROID) 88 MCG tablet, Take 1 tablet by mouth Daily., Disp: 90 tablet, Rfl: 3  •  metoprolol succinate XL (TOPROL-XL) 25 MG 24 hr tablet, TAKE 1 TABLET BY MOUTH ONCE DAILY, Disp: 30 tablet, Rfl: 0  •  pravastatin (PRAVACHOL) 40 MG tablet, TAKE 1 TABLET BY MOUTH AT BEDTIME, Disp: 30 tablet, Rfl: 0     Objective   History of Present Illness Kunal is here for a blood pressure lab follow-up.  He has hypertension, hypothyroidism, hyperlipidemia, and nonobstructive coronary artery disease.  He feels well.  He eats healthy and is physically active but has not been formally exercising.  He monitors his blood pressure and reports normal readings.  He wears hearing aids and a piece of the hearing aid is stuck in his left ear canal.  He also complains of right wrist pain and swelling that started in June.  He denies any injury.  He also complains of bilateral  feet numbers right greater than left that has been going on for years and is getting slightly worse.  The numbness does not bother him.  Vascular screening tests 1 year ago showed minimal bilateral carotid artery plaque without stenosis.    Review of Systems   Constitutional: Negative for activity change and unexpected weight change.   Respiratory: Negative.    Cardiovascular:        BP <120/80   Gastrointestinal: Negative.    Genitourinary: Negative.    Musculoskeletal:        As in HPI   Neurological:        As in HPI   Psychiatric/Behavioral: Negative.        Physical Exam   Constitutional: He appears well-developed and well-nourished.   Neck: Carotid bruit is not present.   Cardiovascular: Normal rate, regular rhythm and normal heart sounds.   124/60   Pulmonary/Chest: Effort normal and breath sounds normal.   Psychiatric: He has a normal mood and affect. His behavior is normal. Judgment and thought content normal.   Vitals reviewed.      ASSESSMENT     Problem List Items Addressed This Visit        Cardiovascular and Mediastinum    Hyperlipidemia - Primary    Chronic coronary artery disease       Endocrine    Acquired hypothyroidism    Relevant Medications    levothyroxine (SYNTHROID, LEVOTHROID) 88 MCG tablet       Nervous and Auditory    Right wrist pain    Relevant Orders    XR Wrist 3+ View Right    Numbness in feet          PLAN  Patient Instructions   Blood pressure is normal.  Lipids are normal.  LDL is 72 and HDL is 56.  Thyroid-stimulating hormone level is elevated.  Will increase levothyroxine to 88 mcg.  Hemoglobin is slightly decreased to 12.8.  Will x-ray wrist and call you the results.  Foot numbness is consistent with neuropathy.  Discussed getting nerve conduction studies.  Removed foreign body from left ear canal with forceps.      Return in about 6 months (around 2/14/2020) for labsCBC,B12,LIPID,TSH,PSA,BMP.

## 2019-08-26 RX ORDER — METOPROLOL SUCCINATE 25 MG/1
TABLET, EXTENDED RELEASE ORAL
Qty: 90 TABLET | Refills: 0 | Status: SHIPPED | OUTPATIENT
Start: 2019-08-26 | End: 2019-11-29 | Stop reason: SDUPTHER

## 2019-08-26 RX ORDER — PRAVASTATIN SODIUM 40 MG
TABLET ORAL
Qty: 90 TABLET | Refills: 0 | Status: SHIPPED | OUTPATIENT
Start: 2019-08-26 | End: 2019-11-29 | Stop reason: SDUPTHER

## 2019-10-22 ENCOUNTER — OFFICE VISIT (OUTPATIENT)
Dept: FAMILY MEDICINE CLINIC | Facility: CLINIC | Age: 74
End: 2019-10-22

## 2019-10-22 VITALS
HEIGHT: 69 IN | SYSTOLIC BLOOD PRESSURE: 118 MMHG | TEMPERATURE: 96.1 F | RESPIRATION RATE: 18 BRPM | BODY MASS INDEX: 23.4 KG/M2 | WEIGHT: 158 LBS | OXYGEN SATURATION: 99 % | DIASTOLIC BLOOD PRESSURE: 60 MMHG | HEART RATE: 68 BPM

## 2019-10-22 DIAGNOSIS — M79.89 RIGHT LEG SWELLING: ICD-10-CM

## 2019-10-22 DIAGNOSIS — L03.115 CELLULITIS OF RIGHT LOWER EXTREMITY: Primary | ICD-10-CM

## 2019-10-22 PROCEDURE — 99213 OFFICE O/P EST LOW 20 MIN: CPT | Performed by: INTERNAL MEDICINE

## 2019-10-22 RX ORDER — CEPHALEXIN 500 MG/1
500 CAPSULE ORAL 3 TIMES DAILY
Qty: 21 CAPSULE | Refills: 0 | Status: SHIPPED | OUTPATIENT
Start: 2019-10-22 | End: 2020-03-06

## 2019-10-22 NOTE — PROGRESS NOTES
Subjective   Kunal Vargas is a 74 y.o. male. Patient is here today for   Chief Complaint   Patient presents with   • Leg Problem     RIGHT X 1 WEEK          Vitals:    10/22/19 0942   BP: 118/60   Pulse: 68   Resp: 18   Temp: 96.1 °F (35.6 °C)   SpO2: 99%     The following portions of the patient's history were reviewed and updated as appropriate: allergies, current medications, past family history, past medical history, past social history, past surgical history and problem list.    Past Medical History:   Diagnosis Date   • Disease of thyroid gland    • Hyperlipidemia    • Hyperlipidemia    • Irregular heartbeat       No Known Allergies   Social History     Socioeconomic History   • Marital status:      Spouse name: Not on file   • Number of children: Not on file   • Years of education: Not on file   • Highest education level: Not on file   Tobacco Use   • Smoking status: Never Smoker   Substance and Sexual Activity   • Alcohol use: No   • Sexual activity: Defer        Current Outpatient Medications:   •  cephalexin (KEFLEX) 500 MG capsule, Take 1 capsule by mouth 3 (Three) Times a Day., Disp: 21 capsule, Rfl: 0  •  levothyroxine (SYNTHROID, LEVOTHROID) 88 MCG tablet, Take 1 tablet by mouth Daily., Disp: 90 tablet, Rfl: 3  •  metoprolol succinate XL (TOPROL-XL) 25 MG 24 hr tablet, TAKE 1 TABLET BY MOUTH ONCE DAILY, Disp: 90 tablet, Rfl: 0  •  pravastatin (PRAVACHOL) 40 MG tablet, TAKE 1 TABLET BY MOUTH AT BEDTIME, Disp: 90 tablet, Rfl: 0     Objective     History of Present Illness 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  He irrigated the wound and applied antibiotic ointment.  The wound is now scabbed.  He started to develop right lower extremity swelling yesterday and the area around the wound developed more tenderness.  He denies any fever chills, chest pain, shortness of breath.  He got a  tetanus shot last year.    Review of Systems   Constitutional: Negative for chills and fever.   Respiratory: Negative.    Cardiovascular: Positive for leg swelling.   Skin: Positive for wound.   Neurological: Negative.    Psychiatric/Behavioral: Negative.        Physical Exam   Constitutional: He appears well-developed and well-nourished.   Musculoskeletal:   There is moderate swelling of the right lower leg.  Calf is nontender.  Negative Homans sign.   Skin: There is erythema.   There is a large serrated scabbed abrasion right upper shin.  There is tenderness around the wound and mild erythema.   Psychiatric: He has a normal mood and affect. His behavior is normal. Judgment and thought content normal.   Vitals reviewed.      ASSESSMENT     Problem List Items Addressed This Visit        Other    Cellulitis of right lower extremity - Primary    Right leg swelling    Relevant Orders    Duplex Venous Lower Extremity - Right CAR          PLAN  Patient Instructions   You need to elevate the leg as much as possible above the level of your heart.  Start cephalexin 500 mg 3 times a day until finished.  We will obtain a venous Doppler to make sure you do not have a deep venous thrombosis related to trauma.    Return if symptoms worsen or fail to improve.

## 2019-10-22 NOTE — PATIENT INSTRUCTIONS
You need to elevate the leg as much as possible above the level of your heart.  Start cephalexin 500 mg 3 times a day until finished.  We will obtain a venous Doppler to make sure you do not have a deep venous thrombosis related to trauma.

## 2019-10-25 ENCOUNTER — HOSPITAL ENCOUNTER (OUTPATIENT)
Dept: CARDIOLOGY | Facility: HOSPITAL | Age: 74
Discharge: HOME | End: 2019-10-25
Admitting: INTERNAL MEDICINE

## 2019-10-25 LAB
BH CV LOWER VASCULAR LEFT COMMON FEMORAL AUGMENT: NORMAL
BH CV LOWER VASCULAR LEFT COMMON FEMORAL COMPETENT: NORMAL
BH CV LOWER VASCULAR LEFT COMMON FEMORAL COMPRESS: NORMAL
BH CV LOWER VASCULAR LEFT COMMON FEMORAL PHASIC: NORMAL
BH CV LOWER VASCULAR LEFT COMMON FEMORAL SPONT: NORMAL
BH CV LOWER VASCULAR RIGHT COMMON FEMORAL AUGMENT: NORMAL
BH CV LOWER VASCULAR RIGHT COMMON FEMORAL COMPETENT: NORMAL
BH CV LOWER VASCULAR RIGHT COMMON FEMORAL COMPRESS: NORMAL
BH CV LOWER VASCULAR RIGHT COMMON FEMORAL PHASIC: NORMAL
BH CV LOWER VASCULAR RIGHT COMMON FEMORAL SPONT: NORMAL
BH CV LOWER VASCULAR RIGHT DISTAL FEMORAL COMPRESS: NORMAL
BH CV LOWER VASCULAR RIGHT GASTRONEMIUS COMPRESS: NORMAL
BH CV LOWER VASCULAR RIGHT GREATER SAPH AK COMPRESS: NORMAL
BH CV LOWER VASCULAR RIGHT GREATER SAPH BK COMPRESS: NORMAL
BH CV LOWER VASCULAR RIGHT LESSER SAPH COMPRESS: NORMAL
BH CV LOWER VASCULAR RIGHT MID FEMORAL AUGMENT: NORMAL
BH CV LOWER VASCULAR RIGHT MID FEMORAL COMPETENT: NORMAL
BH CV LOWER VASCULAR RIGHT MID FEMORAL COMPRESS: NORMAL
BH CV LOWER VASCULAR RIGHT MID FEMORAL PHASIC: NORMAL
BH CV LOWER VASCULAR RIGHT MID FEMORAL SPONT: NORMAL
BH CV LOWER VASCULAR RIGHT PERONEAL COMPRESS: NORMAL
BH CV LOWER VASCULAR RIGHT POPLITEAL AUGMENT: NORMAL
BH CV LOWER VASCULAR RIGHT POPLITEAL COMPETENT: NORMAL
BH CV LOWER VASCULAR RIGHT POPLITEAL COMPRESS: NORMAL
BH CV LOWER VASCULAR RIGHT POPLITEAL PHASIC: NORMAL
BH CV LOWER VASCULAR RIGHT POPLITEAL SPONT: NORMAL
BH CV LOWER VASCULAR RIGHT POSTERIOR TIBIAL COMPRESS: NORMAL
BH CV LOWER VASCULAR RIGHT PROXIMAL FEMORAL COMPRESS: NORMAL
BH CV LOWER VASCULAR RIGHT SAPHENOFEMORAL JUNCTION COMPRESS: NORMAL

## 2019-10-25 PROCEDURE — 93971 EXTREMITY STUDY: CPT

## 2019-12-02 RX ORDER — METOPROLOL SUCCINATE 25 MG/1
TABLET, EXTENDED RELEASE ORAL
Qty: 90 TABLET | Refills: 1 | Status: SHIPPED | OUTPATIENT
Start: 2019-12-02 | End: 2020-06-01

## 2019-12-02 RX ORDER — PRAVASTATIN SODIUM 40 MG
TABLET ORAL
Qty: 90 TABLET | Refills: 1 | Status: SHIPPED | OUTPATIENT
Start: 2019-12-02 | End: 2020-06-01

## 2020-02-07 DIAGNOSIS — D64.9 ANEMIA, UNSPECIFIED TYPE: ICD-10-CM

## 2020-02-07 DIAGNOSIS — E78.5 HYPERLIPIDEMIA, UNSPECIFIED HYPERLIPIDEMIA TYPE: ICD-10-CM

## 2020-02-07 DIAGNOSIS — Z12.5 ENCOUNTER FOR SCREENING FOR MALIGNANT NEOPLASM OF PROSTATE: Primary | ICD-10-CM

## 2020-02-07 DIAGNOSIS — E03.9 ACQUIRED HYPOTHYROIDISM: ICD-10-CM

## 2020-02-27 LAB
BASOPHILS # BLD AUTO: 0.04 10*3/MM3 (ref 0–0.2)
BASOPHILS NFR BLD AUTO: 0.8 % (ref 0–1.5)
BUN SERPL-MCNC: 18 MG/DL (ref 8–23)
BUN/CREAT SERPL: 16.7 (ref 7–25)
CALCIUM SERPL-MCNC: 8.8 MG/DL (ref 8.6–10.5)
CHLORIDE SERPL-SCNC: 105 MMOL/L (ref 98–107)
CHOLEST SERPL-MCNC: 136 MG/DL (ref 0–200)
CO2 SERPL-SCNC: 25.2 MMOL/L (ref 22–29)
CREAT SERPL-MCNC: 1.08 MG/DL (ref 0.76–1.27)
EOSINOPHIL # BLD AUTO: 0.12 10*3/MM3 (ref 0–0.4)
EOSINOPHIL NFR BLD AUTO: 2.3 % (ref 0.3–6.2)
ERYTHROCYTE [DISTWIDTH] IN BLOOD BY AUTOMATED COUNT: 12 % (ref 12.3–15.4)
GLUCOSE SERPL-MCNC: 91 MG/DL (ref 65–99)
HCT VFR BLD AUTO: 37.5 % (ref 37.5–51)
HDLC SERPL-MCNC: 56 MG/DL (ref 40–60)
HGB BLD-MCNC: 12.8 G/DL (ref 13–17.7)
IMM GRANULOCYTES # BLD AUTO: 0.02 10*3/MM3 (ref 0–0.05)
IMM GRANULOCYTES NFR BLD AUTO: 0.4 % (ref 0–0.5)
LDLC SERPL CALC-MCNC: 69 MG/DL (ref 0–100)
LDLC/HDLC SERPL: 1.23 {RATIO}
LYMPHOCYTES # BLD AUTO: 1.1 10*3/MM3 (ref 0.7–3.1)
LYMPHOCYTES NFR BLD AUTO: 21.1 % (ref 19.6–45.3)
MCH RBC QN AUTO: 32.5 PG (ref 26.6–33)
MCHC RBC AUTO-ENTMCNC: 34.1 G/DL (ref 31.5–35.7)
MCV RBC AUTO: 95.2 FL (ref 79–97)
MONOCYTES # BLD AUTO: 0.53 10*3/MM3 (ref 0.1–0.9)
MONOCYTES NFR BLD AUTO: 10.2 % (ref 5–12)
NEUTROPHILS # BLD AUTO: 3.4 10*3/MM3 (ref 1.7–7)
NEUTROPHILS NFR BLD AUTO: 65.2 % (ref 42.7–76)
NRBC BLD AUTO-RTO: 0 /100 WBC (ref 0–0.2)
PLATELET # BLD AUTO: 220 10*3/MM3 (ref 140–450)
POTASSIUM SERPL-SCNC: 4.9 MMOL/L (ref 3.5–5.2)
PSA SERPL-MCNC: 0.94 NG/ML (ref 0–4)
RBC # BLD AUTO: 3.94 10*6/MM3 (ref 4.14–5.8)
SODIUM SERPL-SCNC: 140 MMOL/L (ref 136–145)
TRIGL SERPL-MCNC: 57 MG/DL (ref 0–150)
TSH SERPL DL<=0.005 MIU/L-ACNC: 4.42 UIU/ML (ref 0.27–4.2)
VIT B12 SERPL-MCNC: 570 PG/ML (ref 211–946)
VLDLC SERPL CALC-MCNC: 11.4 MG/DL
WBC # BLD AUTO: 5.21 10*3/MM3 (ref 3.4–10.8)

## 2020-03-06 ENCOUNTER — OFFICE VISIT (OUTPATIENT)
Dept: FAMILY MEDICINE CLINIC | Facility: CLINIC | Age: 75
End: 2020-03-06

## 2020-03-06 VITALS
BODY MASS INDEX: 23.22 KG/M2 | OXYGEN SATURATION: 98 % | SYSTOLIC BLOOD PRESSURE: 130 MMHG | RESPIRATION RATE: 16 BRPM | WEIGHT: 156.8 LBS | HEIGHT: 69 IN | HEART RATE: 59 BPM | TEMPERATURE: 97.6 F | DIASTOLIC BLOOD PRESSURE: 62 MMHG

## 2020-03-06 DIAGNOSIS — E78.5 HYPERLIPIDEMIA, UNSPECIFIED HYPERLIPIDEMIA TYPE: Primary | ICD-10-CM

## 2020-03-06 DIAGNOSIS — I25.10 CHRONIC CORONARY ARTERY DISEASE: ICD-10-CM

## 2020-03-06 DIAGNOSIS — E03.9 ACQUIRED HYPOTHYROIDISM: ICD-10-CM

## 2020-03-06 PROCEDURE — 99214 OFFICE O/P EST MOD 30 MIN: CPT | Performed by: INTERNAL MEDICINE

## 2020-03-06 NOTE — PATIENT INSTRUCTIONS
Blood pressure appears stable.  HDL cholesterol is excellent and LDL cholesterol is at goal.  Hemoglobin is slightly decreased at 12.8 and unchanged from previous.  B12 level is normal.  Thyroid-stimulating hormone level is minimally elevated.  Kidney and liver functions are normal.  Prostate-specific antigen is normal.  Continue diet, exercise, and current medicines.

## 2020-03-06 NOTE — PROGRESS NOTES
Subjective   Kunal Vargas is a 74 y.o. male. Patient is here today for   Chief Complaint   Patient presents with   • Hypothyroidism     HLD- FOLLOW UP LABS          Vitals:    03/06/20 1058   BP: 130/62   Pulse: 59   Resp: 16   Temp: 97.6 °F (36.4 °C)   SpO2: 98%     Body mass index is 23.14 kg/m².      The following portions of the patient's history were reviewed and updated as appropriate: allergies, current medications, past family history, past medical history, past social history, past surgical history and problem list.    Past Medical History:   Diagnosis Date   • Disease of thyroid gland    • Hyperlipidemia    • Hyperlipidemia    • Irregular heartbeat       No Known Allergies   Social History     Socioeconomic History   • Marital status:      Spouse name: Not on file   • Number of children: Not on file   • Years of education: Not on file   • Highest education level: Not on file   Tobacco Use   • Smoking status: Never Smoker   • Smokeless tobacco: Never Used   Substance and Sexual Activity   • Alcohol use: No   • Sexual activity: Defer        Current Outpatient Medications:   •  levothyroxine (SYNTHROID, LEVOTHROID) 88 MCG tablet, Take 1 tablet by mouth Daily., Disp: 90 tablet, Rfl: 3  •  metoprolol succinate XL (TOPROL-XL) 25 MG 24 hr tablet, TAKE 1 TABLET BY MOUTH ONCE DAILY, Disp: 90 tablet, Rfl: 1  •  pravastatin (PRAVACHOL) 40 MG tablet, TAKE 1 TABLET BY MOUTH AT BEDTIME, Disp: 90 tablet, Rfl: 1     Objective   History of Present Illness Kunal is here for blood pressure and lab follow-up.  He has hypothyroidism, coronary artery disease, mild anemia, and hyperlipidemia.  He feels well.  He continues to eat healthy and exercises on a regular basis.  His weight is unchanged.  He monitors his blood pressure reports stable readings.  He had vascular screening tests in August 2018 and had minimal carotid artery plaque.  Previous iron studies and B12 levels were normal.    Review of Systems    Constitutional: Negative for activity change and unexpected weight change.   Cardiovascular:        /65 average   Gastrointestinal: Negative.    Genitourinary: Negative.    Musculoskeletal: Negative.    Neurological: Negative.    Psychiatric/Behavioral: Negative.        Physical Exam   Constitutional: He appears well-developed and well-nourished.   Neck: Carotid bruit is not present.   Cardiovascular: Normal rate, regular rhythm and normal heart sounds.   126/62   Pulmonary/Chest: Effort normal and breath sounds normal.   Musculoskeletal: He exhibits no edema.   Psychiatric: He has a normal mood and affect. His behavior is normal. Judgment and thought content normal.   Vitals reviewed.      ASSESSMENT     Problem List Items Addressed This Visit        Cardiovascular and Mediastinum    Hyperlipidemia - Primary    Chronic coronary artery disease       Endocrine    Acquired hypothyroidism          PLAN  Patient Instructions   Blood pressure appears stable.  HDL cholesterol is excellent and LDL cholesterol is at goal.  Hemoglobin is slightly decreased at 12.8 and unchanged from previous.  B12 level is normal.  Thyroid-stimulating hormone level is minimally elevated.  Kidney and liver functions are normal.  Prostate-specific antigen is normal.  Continue diet, exercise, and current medicines.      Return in about 6 months (around 9/6/2020) for labs LIPID,CMP,TSH.

## 2020-06-01 RX ORDER — PRAVASTATIN SODIUM 40 MG
TABLET ORAL
Qty: 90 TABLET | Refills: 1 | Status: SHIPPED | OUTPATIENT
Start: 2020-06-01 | End: 2020-11-30

## 2020-06-01 RX ORDER — METOPROLOL SUCCINATE 25 MG/1
TABLET, EXTENDED RELEASE ORAL
Qty: 90 TABLET | Refills: 1 | Status: SHIPPED | OUTPATIENT
Start: 2020-06-01 | End: 2020-11-30

## 2020-06-08 ENCOUNTER — OFFICE VISIT (OUTPATIENT)
Dept: FAMILY MEDICINE CLINIC | Facility: CLINIC | Age: 75
End: 2020-06-08

## 2020-06-08 VITALS
RESPIRATION RATE: 16 BRPM | HEART RATE: 66 BPM | WEIGHT: 157.2 LBS | OXYGEN SATURATION: 99 % | SYSTOLIC BLOOD PRESSURE: 146 MMHG | HEIGHT: 69 IN | DIASTOLIC BLOOD PRESSURE: 70 MMHG | TEMPERATURE: 98.2 F | BODY MASS INDEX: 23.28 KG/M2

## 2020-06-08 DIAGNOSIS — R31.9 HEMATURIA, UNSPECIFIED TYPE: Primary | ICD-10-CM

## 2020-06-08 DIAGNOSIS — N30.01 ACUTE CYSTITIS WITH HEMATURIA: ICD-10-CM

## 2020-06-08 LAB
BILIRUB BLD-MCNC: NEGATIVE MG/DL
CLARITY, POC: CLEAR
COLOR UR: ABNORMAL
GLUCOSE UR STRIP-MCNC: NEGATIVE MG/DL
KETONES UR QL: NEGATIVE
LEUKOCYTE EST, POC: ABNORMAL
NITRITE UR-MCNC: NEGATIVE MG/ML
PH UR: 6 [PH] (ref 5–8)
PROT UR STRIP-MCNC: ABNORMAL MG/DL
RBC # UR STRIP: ABNORMAL /UL
SP GR UR: 1.01 (ref 1–1.03)
UROBILINOGEN UR QL: NORMAL

## 2020-06-08 PROCEDURE — 81003 URINALYSIS AUTO W/O SCOPE: CPT | Performed by: NURSE PRACTITIONER

## 2020-06-08 PROCEDURE — 99213 OFFICE O/P EST LOW 20 MIN: CPT | Performed by: NURSE PRACTITIONER

## 2020-06-08 RX ORDER — CIPROFLOXACIN 500 MG/1
500 TABLET, FILM COATED ORAL 2 TIMES DAILY
Qty: 20 TABLET | Refills: 0 | Status: SHIPPED | OUTPATIENT
Start: 2020-06-08 | End: 2020-06-10

## 2020-06-08 NOTE — PROGRESS NOTES
"Subjective     Kunal Vargas is a 74 y.o.. male.     Blood in Urine   This is a new problem. Episode onset: two weeks ago. The problem has been waxing and waning since onset. He is experiencing no pain. Irritative symptoms do not include frequency, nocturia or urgency. Obstructive symptoms include a weak stream. Obstructive symptoms do not include a slower stream. Pertinent negatives include no dysuria, fever, flank pain, nausea or vomiting. There is no history of kidney stones.       The following portions of the patient's history were reviewed and updated as appropriate: allergies, current medications, past family history, past medical history, past social history, past surgical history and problem list.    Past Medical History:   Diagnosis Date   • Disease of thyroid gland    • Hyperlipidemia    • Hyperlipidemia    • Irregular heartbeat        Past Surgical History:   Procedure Laterality Date   • COLONOSCOPY N/A 1/25/2017    Procedure: COLONOSCOPY TO CECUM AND TI WITH POLYPECTOMY ;  Surgeon: Cachorro Hancock MD;  Location: [facility] ENDOSCOPY;  Service:        Review of Systems   Constitutional: Negative for fever.   Gastrointestinal: Negative for nausea and vomiting.   Genitourinary: Positive for hematuria. Negative for decreased urine volume, discharge, dysuria, flank pain, frequency, nocturia, penile pain, penile swelling, scrotal swelling, testicular pain and urgency.       No Known Allergies    Objective     Vitals:    06/08/20 1006   BP: 146/70   BP Location: Right arm   Patient Position: Sitting   Cuff Size: Adult   Pulse: 66   Resp: 16   Temp: 98.2 °F (36.8 °C)   SpO2: 99%   Weight: 71.3 kg (157 lb 3.2 oz)   Height: 175.3 cm (69\")     Body mass index is 23.21 kg/m².    Physical Exam   Constitutional: He appears well-developed.   HENT:   Head: Normocephalic.   Eyes: Pupils are equal, round, and reactive to light.   Cardiovascular: Normal rate and regular rhythm.   Pulmonary/Chest: Effort normal and breath " sounds normal.   Musculoskeletal: Normal range of motion.   Neurological: He is alert.   Vitals reviewed.        Current Outpatient Medications:   •  levothyroxine (SYNTHROID, LEVOTHROID) 88 MCG tablet, Take 1 tablet by mouth Daily., Disp: 90 tablet, Rfl: 3  •  metoprolol succinate XL (TOPROL-XL) 25 MG 24 hr tablet, TAKE ONE TABLET BY MOUTH EVERY DAY, Disp: 90 tablet, Rfl: 1  •  pravastatin (PRAVACHOL) 40 MG tablet, TAKE ONE TABLET BY MOUTH EVERY NIGHT AT BEDTIME, Disp: 90 tablet, Rfl: 1  •  ciprofloxacin (Cipro) 500 MG tablet, Take 1 tablet by mouth 2 (Two) Times a Day for 10 days., Disp: 20 tablet, Rfl: 0    Lab Results (last 24 hours)     Procedure Component Value Units Date/Time    POC Urinalysis Dipstick, Automated [[id number]]  (Abnormal) Collected:  06/08/20 1025    Specimen:  Urine Updated:  06/08/20 1030     Color Red     Clarity, UA Clear     Specific Gravity  1.010     pH, Urine 6.0     Leukocytes Trace     Nitrite, UA Negative     Protein,  mg/dL mg/dL      Glucose, UA Negative mg/dL      Ketones, UA Negative     Urobilinogen, UA Normal     Bilirubin Negative     Blood, UA Large        Assessment/Plan   Kunal was seen today for blood in urine.    Diagnoses and all orders for this visit:    Hematuria, unspecified type  -     POC Urinalysis Dipstick, Automated    Acute cystitis with hematuria  -     ciprofloxacin (Cipro) 500 MG tablet; Take 1 tablet by mouth 2 (Two) Times a Day for 10 days.  -     Urine Culture - Urine, Urine, Clean Catch        Patient Instructions   Drink more water      Return if symptoms worsen or fail to improve.

## 2020-06-10 ENCOUNTER — TELEPHONE (OUTPATIENT)
Dept: FAMILY MEDICINE CLINIC | Facility: CLINIC | Age: 75
End: 2020-06-10

## 2020-06-10 LAB
BACTERIA UR CULT: NO GROWTH
BACTERIA UR CULT: NORMAL

## 2020-06-10 NOTE — TELEPHONE ENCOUNTER
Pt called and informed of negative urine culture results. Pt stating he stopped abx d/t leg cramps; pt informed that is fine. Pt informed he can throw abx away. Pt stating that he has not had anymore bleeding when urinating. Pt informed possible clearing of kidney stone. Pt informed to continue to drink plenty of water and if hematuria comes back to call/rto. Pt verb. Understanding.

## 2020-06-10 NOTE — TELEPHONE ENCOUNTER
PATIENT CALLED AND STATES HE IS ON  ciprofloxacin (Cipro) 500 MG tablet  HE IS HAVING SOME ISSUES TAKING THIS MEDICATION. HE IS HAVING LEG CRAMPS, AND THE LITERATURE STATES ANYONE WITH FAMILY HISTORY OF AORTIC ANEURYSM SHOULD NOT TAKE THIS MEDICATION. HIS FATHER AND COUSIN ON HIS FATHER SIDE HAD AORTIC ANEURYSM AND  FROM THIS. HE IS TESTED EVERY 2 YEARS.       PLEASE CALL AND ADVISE [phone]    [id number] [place], [place] - [address] [address] AT [facility] - [phone]  - [phone]   [phone]

## 2020-06-17 ENCOUNTER — TELEPHONE (OUTPATIENT)
Dept: FAMILY MEDICINE CLINIC | Facility: CLINIC | Age: 75
End: 2020-06-17

## 2020-06-17 NOTE — TELEPHONE ENCOUNTER
PATIENT CALLED IN AND STATED HE IS STILL HAVING BLOOD IN HIS URINE AND WANTED TO KNOW WHAT TO DO NEXT PLEASE CALL PATIENT BACK AT [phone]

## 2020-06-18 ENCOUNTER — OFFICE VISIT (OUTPATIENT)
Dept: FAMILY MEDICINE CLINIC | Facility: CLINIC | Age: 75
End: 2020-06-18

## 2020-06-18 VITALS
HEIGHT: 69 IN | WEIGHT: 157.4 LBS | TEMPERATURE: 98.4 F | HEART RATE: 53 BPM | RESPIRATION RATE: 18 BRPM | SYSTOLIC BLOOD PRESSURE: 140 MMHG | OXYGEN SATURATION: 99 % | BODY MASS INDEX: 23.31 KG/M2 | DIASTOLIC BLOOD PRESSURE: 60 MMHG

## 2020-06-18 DIAGNOSIS — R31.9 HEMATURIA, UNSPECIFIED TYPE: Primary | ICD-10-CM

## 2020-06-18 LAB
BILIRUB BLD-MCNC: NEGATIVE MG/DL
CLARITY, POC: CLEAR
COLOR UR: ABNORMAL
GLUCOSE UR STRIP-MCNC: NEGATIVE MG/DL
KETONES UR QL: NEGATIVE
LEUKOCYTE EST, POC: NEGATIVE
NITRITE UR-MCNC: NEGATIVE MG/ML
PH UR: 7 [PH] (ref 5–8)
PROT UR STRIP-MCNC: NEGATIVE MG/DL
RBC # UR STRIP: ABNORMAL /UL
SP GR UR: 1.01 (ref 1–1.03)
UROBILINOGEN UR QL: NORMAL

## 2020-06-18 PROCEDURE — 99213 OFFICE O/P EST LOW 20 MIN: CPT | Performed by: NURSE PRACTITIONER

## 2020-06-18 PROCEDURE — 81003 URINALYSIS AUTO W/O SCOPE: CPT | Performed by: NURSE PRACTITIONER

## 2020-06-18 NOTE — PROGRESS NOTES
"Subjective     Kunal Vargas is a 74 y.o.. male.     Pt here today for f/u on hematuria. Pt seen on 6/8/20 for hematuria which had started one week before that. At that time U/A results showed leukocytes, protein and blood. Pt started on Cipro for possible cystitis. Pt went home and started on cipro but thought he was having calf pain and was instructed to stop medication. Pt's urine culture came back from that U/A which was negative. Pt stating hematuria waxed and waned and still having increase in light and darkness of hematuria. Pt stating he is drinking more water now but still is drinking about 5-6 cups of coffee a day. Pt stating he is drinking tea occassionally.       The following portions of the patient's history were reviewed and updated as appropriate: allergies, current medications, past family history, past medical history, past social history, past surgical history and problem list.    Past Medical History:   Diagnosis Date   • Disease of thyroid gland    • Hyperlipidemia    • Hyperlipidemia    • Irregular heartbeat        Past Surgical History:   Procedure Laterality Date   • COLONOSCOPY N/A 1/25/2017    Procedure: COLONOSCOPY TO CECUM AND TI WITH POLYPECTOMY ;  Surgeon: Cachorro Hancock MD;  Location: [facility] ENDOSCOPY;  Service:        Review of Systems   Constitutional: Negative for fever.   Gastrointestinal: Negative for nausea and vomiting.   Genitourinary: Positive for hematuria. Negative for discharge, dysuria, flank pain, frequency, penile pain, penile swelling, scrotal swelling, testicular pain and urgency.       No Known Allergies    Objective     Vitals:    06/18/20 1110   BP: 140/60   Pulse: 53   Resp: 18   Temp: 98.4 °F (36.9 °C)   SpO2: 99%   Weight: 71.4 kg (157 lb 6.4 oz)   Height: 175.3 cm (69.02\")     Body mass index is 23.23 kg/m².    Physical Exam   Constitutional: He is oriented to person, place, and time. He appears well-developed.   HENT:   Head: Normocephalic.   Eyes: Pupils " are equal, round, and reactive to light.   Cardiovascular: Normal rate and regular rhythm.   Pulmonary/Chest: Effort normal and breath sounds normal.   Musculoskeletal: Normal range of motion.   Neurological: He is alert and oriented to person, place, and time.   Vitals reviewed.        Current Outpatient Medications:   •  levothyroxine (SYNTHROID, LEVOTHROID) 88 MCG tablet, Take 1 tablet by mouth Daily., Disp: 90 tablet, Rfl: 3  •  metoprolol succinate XL (TOPROL-XL) 25 MG 24 hr tablet, TAKE ONE TABLET BY MOUTH EVERY DAY, Disp: 90 tablet, Rfl: 1  •  pravastatin (PRAVACHOL) 40 MG tablet, TAKE ONE TABLET BY MOUTH EVERY NIGHT AT BEDTIME, Disp: 90 tablet, Rfl: 1    Lab Results (last 24 hours)     Procedure Component Value Units Date/Time    POC Urinalysis Dipstick, Automated [[id number]]  (Abnormal) Collected:  06/18/20 1139    Specimen:  Urine Updated:  06/18/20 1139     Color Straw     Clarity, UA Clear     Specific Gravity  1.010     pH, Urine 7.0     Leukocytes Negative     Nitrite, UA Negative     Protein, POC Negative mg/dL      Glucose, UA Negative mg/dL      Ketones, UA Negative     Urobilinogen, UA Normal     Bilirubin Negative     Blood, UA Large          Assessment/Plan   Kunal was seen today for follow-up.    Diagnoses and all orders for this visit:    Hematuria, unspecified type  -     POC Urinalysis Dipstick, Automated  -     Cancel: Microalbumin / Creatinine Urine Ratio - Urine, Clean Catch; Future  -     US Renal Bilateral; Future  -     Comprehensive metabolic panel  -     CBC & Differential  -     Microalbumin / Creatinine Urine Ratio - Urine, Clean Catch        Patient Instructions   Hematuria: will collect labs today (CBC, CMP, urine micro/creat ratio), order renal U/S and await results; possible referral to urology in future, awaiting testing results.       Return if symptoms worsen or fail to improve, for follow up as needed.

## 2020-06-18 NOTE — PATIENT INSTRUCTIONS
Hematuria: will collect labs today (CBC, CMP, urine micro/creat ratio), order renal U/S and await results; possible referral to urology in future, awaiting testing results.

## 2020-06-19 ENCOUNTER — TELEPHONE (OUTPATIENT)
Dept: FAMILY MEDICINE CLINIC | Facility: CLINIC | Age: 75
End: 2020-06-19

## 2020-06-19 DIAGNOSIS — R31.9 HEMATURIA, UNSPECIFIED TYPE: Primary | ICD-10-CM

## 2020-06-19 LAB
ALBUMIN SERPL-MCNC: 4.4 G/DL (ref 3.5–5.2)
ALBUMIN/CREAT UR: 303 MG/G CREAT (ref 0–29)
ALBUMIN/GLOB SERPL: 2 G/DL
ALP SERPL-CCNC: 75 U/L (ref 39–117)
ALT SERPL-CCNC: 16 U/L (ref 1–41)
AST SERPL-CCNC: 23 U/L (ref 1–40)
BASOPHILS # BLD AUTO: 0.05 10*3/MM3 (ref 0–0.2)
BASOPHILS NFR BLD AUTO: 0.8 % (ref 0–1.5)
BILIRUB SERPL-MCNC: 0.5 MG/DL (ref 0.2–1.2)
BUN SERPL-MCNC: 18 MG/DL (ref 8–23)
BUN/CREAT SERPL: 15.5 (ref 7–25)
CALCIUM SERPL-MCNC: 9.3 MG/DL (ref 8.6–10.5)
CHLORIDE SERPL-SCNC: 105 MMOL/L (ref 98–107)
CO2 SERPL-SCNC: 27.2 MMOL/L (ref 22–29)
CREAT SERPL-MCNC: 1.16 MG/DL (ref 0.76–1.27)
CREAT UR-MCNC: 22.9 MG/DL
EOSINOPHIL # BLD AUTO: 0.15 10*3/MM3 (ref 0–0.4)
EOSINOPHIL NFR BLD AUTO: 2.5 % (ref 0.3–6.2)
ERYTHROCYTE [DISTWIDTH] IN BLOOD BY AUTOMATED COUNT: 12.2 % (ref 12.3–15.4)
GLOBULIN SER CALC-MCNC: 2.2 GM/DL
GLUCOSE SERPL-MCNC: 95 MG/DL (ref 65–99)
HCT VFR BLD AUTO: 37.5 % (ref 37.5–51)
HGB BLD-MCNC: 12.5 G/DL (ref 13–17.7)
IMM GRANULOCYTES # BLD AUTO: 0.02 10*3/MM3 (ref 0–0.05)
IMM GRANULOCYTES NFR BLD AUTO: 0.3 % (ref 0–0.5)
LYMPHOCYTES # BLD AUTO: 1.59 10*3/MM3 (ref 0.7–3.1)
LYMPHOCYTES NFR BLD AUTO: 26.4 % (ref 19.6–45.3)
MCH RBC QN AUTO: 32.6 PG (ref 26.6–33)
MCHC RBC AUTO-ENTMCNC: 33.3 G/DL (ref 31.5–35.7)
MCV RBC AUTO: 97.9 FL (ref 79–97)
MICROALBUMIN UR-MCNC: 69.5 UG/ML
MONOCYTES # BLD AUTO: 0.71 10*3/MM3 (ref 0.1–0.9)
MONOCYTES NFR BLD AUTO: 11.8 % (ref 5–12)
NEUTROPHILS # BLD AUTO: 3.5 10*3/MM3 (ref 1.7–7)
NEUTROPHILS NFR BLD AUTO: 58.2 % (ref 42.7–76)
NRBC BLD AUTO-RTO: 0 /100 WBC (ref 0–0.2)
PLATELET # BLD AUTO: 202 10*3/MM3 (ref 140–450)
POTASSIUM SERPL-SCNC: 5 MMOL/L (ref 3.5–5.2)
PROT SERPL-MCNC: 6.6 G/DL (ref 6–8.5)
RBC # BLD AUTO: 3.83 10*6/MM3 (ref 4.14–5.8)
SODIUM SERPL-SCNC: 138 MMOL/L (ref 136–145)
WBC # BLD AUTO: 6.02 10*3/MM3 (ref 3.4–10.8)

## 2020-06-19 NOTE — TELEPHONE ENCOUNTER
Pt called and informed of lab results. Pt informed to keep appt. For renal u/s. Pt informed of referral to urologist. Pt verb. Understanding.

## 2020-06-26 ENCOUNTER — HOSPITAL ENCOUNTER (OUTPATIENT)
Dept: ULTRASOUND IMAGING | Facility: HOSPITAL | Age: 75
Discharge: HOME | End: 2020-06-26
Admitting: NURSE PRACTITIONER

## 2020-06-26 DIAGNOSIS — R31.9 HEMATURIA, UNSPECIFIED TYPE: ICD-10-CM

## 2020-06-26 PROCEDURE — 76775 US EXAM ABDO BACK WALL LIM: CPT

## 2020-08-19 DIAGNOSIS — E78.5 HYPERLIPIDEMIA, UNSPECIFIED HYPERLIPIDEMIA TYPE: Primary | ICD-10-CM

## 2020-08-31 RX ORDER — LEVOTHYROXINE SODIUM 88 UG/1
TABLET ORAL
Qty: 90 TABLET | Refills: 5 | Status: SHIPPED | OUTPATIENT
Start: 2020-08-31 | End: 2021-09-07

## 2020-09-01 ENCOUNTER — TRANSCRIBE ORDERS (OUTPATIENT)
Dept: PREADMISSION TESTING | Facility: HOSPITAL | Age: 75
End: 2020-09-01

## 2020-09-01 DIAGNOSIS — Z01.818 OTHER SPECIFIED PRE-OPERATIVE EXAMINATION: Primary | ICD-10-CM

## 2020-09-03 ENCOUNTER — APPOINTMENT (OUTPATIENT)
Dept: PREADMISSION TESTING | Facility: HOSPITAL | Age: 75
End: 2020-09-03

## 2020-09-03 VITALS
BODY MASS INDEX: 23.11 KG/M2 | DIASTOLIC BLOOD PRESSURE: 66 MMHG | HEART RATE: 52 BPM | HEIGHT: 69 IN | TEMPERATURE: 97.6 F | SYSTOLIC BLOOD PRESSURE: 153 MMHG | OXYGEN SATURATION: 99 % | WEIGHT: 156 LBS | RESPIRATION RATE: 16 BRPM

## 2020-09-03 LAB
ANION GAP SERPL CALCULATED.3IONS-SCNC: 7.3 MMOL/L (ref 5–15)
BUN SERPL-MCNC: 19 MG/DL (ref 8–23)
BUN/CREAT SERPL: 17.1 (ref 7–25)
CALCIUM SPEC-SCNC: 9.1 MG/DL (ref 8.6–10.5)
CHLORIDE SERPL-SCNC: 106 MMOL/L (ref 98–107)
CO2 SERPL-SCNC: 23.7 MMOL/L (ref 22–29)
CREAT SERPL-MCNC: 1.11 MG/DL (ref 0.76–1.27)
DEPRECATED RDW RBC AUTO: 43.7 FL (ref 37–54)
ERYTHROCYTE [DISTWIDTH] IN BLOOD BY AUTOMATED COUNT: 12.1 % (ref 12.3–15.4)
GFR SERPL CREATININE-BSD FRML MDRD: 65 ML/MIN/1.73
GLUCOSE SERPL-MCNC: 107 MG/DL (ref 65–99)
HCT VFR BLD AUTO: 35.8 % (ref 37.5–51)
HGB BLD-MCNC: 12.1 G/DL (ref 13–17.7)
MCH RBC QN AUTO: 33 PG (ref 26.6–33)
MCHC RBC AUTO-ENTMCNC: 33.8 G/DL (ref 31.5–35.7)
MCV RBC AUTO: 97.5 FL (ref 79–97)
PLATELET # BLD AUTO: 192 10*3/MM3 (ref 140–450)
PMV BLD AUTO: 10.5 FL (ref 6–12)
POTASSIUM SERPL-SCNC: 4.4 MMOL/L (ref 3.5–5.2)
RBC # BLD AUTO: 3.67 10*6/MM3 (ref 4.14–5.8)
SODIUM SERPL-SCNC: 137 MMOL/L (ref 136–145)
WBC # BLD AUTO: 4.85 10*3/MM3 (ref 3.4–10.8)

## 2020-09-03 PROCEDURE — 85027 COMPLETE CBC AUTOMATED: CPT | Performed by: UROLOGY

## 2020-09-03 PROCEDURE — 93005 ELECTROCARDIOGRAM TRACING: CPT

## 2020-09-03 PROCEDURE — 93010 ELECTROCARDIOGRAM REPORT: CPT | Performed by: INTERNAL MEDICINE

## 2020-09-03 PROCEDURE — 36415 COLL VENOUS BLD VENIPUNCTURE: CPT

## 2020-09-03 PROCEDURE — 80048 BASIC METABOLIC PNL TOTAL CA: CPT | Performed by: UROLOGY

## 2020-09-03 NOTE — DISCHARGE INSTRUCTIONS
ARRIVAL TIME 07:30          General Instructions:  • Do not eat or drink anything after midnight the night before surgery.  • Patients who avoid smoking, chewing tobacco and alcohol for 4 weeks prior to surgery have a reduced risk of post-operative complications.  Quit smoking as many days before surgery as you can.  • Do not smoke, use chewing tobacco or drink alcohol the day of surgery.   • If applicable bring your C-PAP/ BI-PAP machine.  • Bring any papers given to you in the doctor’s office.  • Wear clean comfortable clothes.  • Do not wear contact lenses, false eyelashes or make-up.  Bring a case for your glasses.   • Bring crutches or walker if applicable.  • Remove all piercings.  Leave jewelry and any other valuables at home.  • Hair extensions with metal clips must be removed prior to surgery.  • The Pre-Admission Testing nurse will instruct you to bring medications if unable to obtain an accurate list in Pre-Admission Testing.            Preventing a Surgical Site Infection:  • For 2 to 3 days before surgery, avoid shaving with a razor because the razor can irritate skin and make it easier to develop an infection.    • Any areas of open skin can increase the risk of a post-operative wound infection by allowing bacteria to enter and travel throughout the body.  Notify your surgeon if you have any skin wounds / rashes even if it is not near the expected surgical site.  The area will need assessed to determine if surgery should be delayed until it is healed.  • The night prior to surgery shower using a fresh bar of anti-bacterial soap (such as Dial) and clean washcloth.  Sleep in a clean bed with clean clothing.  Do not allow pets to sleep with you.  • Shower on the morning of surgery using a fresh bar of anti-bacterial soap (such as Dial) and clean washcloth.  Dry with a clean towel and dress in clean clothing.  • Ask your surgeon if you will be receiving antibiotics prior to surgery.  • Make sure you, your  family, and all healthcare providers clean their hands with soap and water or an alcohol based hand  before caring for you or your wound.    Day of surgery:  Your arrival time is approximately two hours before your scheduled surgery time.  Upon arrival, a Pre-op nurse and Anesthesiologist will review your health history, obtain vital signs, and answer questions you may have.  The only belongings needed at this time will be your home medications and if applicable your C-PAP/BI-PAP machine.  If you are staying overnight your family can leave the rest of your belongings in the car and bring them to your room later.  A Pre-op nurse will start an IV and you may receive medication in preparation for surgery, including something to help you relax.  Your family will be able to see you in the Pre-op area.  Two visitors at a time will be allowed in the Pre-op room.  While you are in surgery your family should notify the waiting room  if they leave the waiting room area and provide a contact phone number.    Please be aware that surgery does come with discomfort.  We want to make every effort to control your discomfort so please discuss any uncontrolled symptoms with your nurse.   Your doctor will most likely have prescribed pain medications.      If you are going home after surgery you will receive individualized written care instructions before being discharged.  A responsible adult must drive you to and from the hospital on the day of your surgery and stay with you for 24 hours.    If you are staying overnight following surgery, you will be transported to your hospital room following the recovery period.  [facility] has all private rooms.    If you have any questions please call Pre-Admission Testing at [phone].  Deductibles and co-payments are collected on the day of service. Please be prepared to pay the required co-pay, deductible or deposit on the day of service as defined by  your plan.    Patient Education for Self-Quarantine Process    Following your COVID testing, we strongly recommend that you do not leave your home after you have been tested for COVID except to get medical care. This includes not going to work, school or to public areas.  If this is not possible for you to do please limit your activities to only required outings.  Be sure to wear a mask when you are with other people, practice social distancing and wash your hands frequently.      The following items provide additional details to keep you safe.  • Wash your hands with soap and water frequently for at least 20 seconds.   • Avoid touching your eyes, nose and mouth with unwashed hands.  • Do not share anything - utensils, towels, food from the same bowl.   • Have your own utensils, drinking glass, dishes, towels and bedding.   • Do not have visitors.   • Do use FaceTime to stay in touch with family and friends.  • You should stay in a specific room away from others if possible.   • Stay at least 6 feet away from others in the home if you cannot have a dedicated room to yourself.   • Do not snuggle with your pet. While the CDC says there is no evidence that pets can spread COVID-19 or be infected from humans, it is probably best to avoid “petting, snuggling, being kissed or licked and sharing food (during self-quarantine)”, according to the CDC.   • Sanitize household surfaces daily. Include all high touch areas (door handles, light switches, phones, countertops, etc.)  • Do not share a bathroom with others, if possible.   • Wear a mask around others in your home if you are unable to stay in a separate room or 6 feet apart. If  you are unable to wear a mask, have your family member wear a mask if they must be within 6 feet of you.   Call your surgeon immediately if you experience any of the following symptoms:  • Sore Throat  • Shortness of Breath or difficulty breathing  • Cough  • Chills  • Body soreness or muscle  pain  • Headache  • Fever  • New loss of taste or smell  • Do not arrive for your surgery ill.  Your procedure will need to be rescheduled to another time.  You will need to call your physician before the day of surgery to avoid any unnecessary exposure to hospital staff as well as other patients.

## 2020-09-08 ENCOUNTER — LAB (OUTPATIENT)
Dept: LAB | Facility: HOSPITAL | Age: 75
End: 2020-09-08

## 2020-09-08 DIAGNOSIS — Z01.818 OTHER SPECIFIED PRE-OPERATIVE EXAMINATION: ICD-10-CM

## 2020-09-08 PROCEDURE — C9803 HOPD COVID-19 SPEC COLLECT: HCPCS

## 2020-09-08 PROCEDURE — U0004 COV-19 TEST NON-CDC HGH THRU: HCPCS

## 2020-09-09 LAB
ALBUMIN SERPL-MCNC: 4.3 G/DL (ref 3.5–5.2)
ALBUMIN/GLOB SERPL: 2.5 G/DL
ALP SERPL-CCNC: 82 U/L (ref 39–117)
ALT SERPL-CCNC: 12 U/L (ref 1–41)
AST SERPL-CCNC: 20 U/L (ref 1–40)
BILIRUB SERPL-MCNC: 0.5 MG/DL (ref 0–1.2)
BUN SERPL-MCNC: 18 MG/DL (ref 8–23)
BUN/CREAT SERPL: 15.1 (ref 7–25)
CALCIUM SERPL-MCNC: 8.8 MG/DL (ref 8.6–10.5)
CHLORIDE SERPL-SCNC: 104 MMOL/L (ref 98–107)
CHOLEST SERPL-MCNC: 138 MG/DL (ref 0–200)
CO2 SERPL-SCNC: 26 MMOL/L (ref 22–29)
CREAT SERPL-MCNC: 1.19 MG/DL (ref 0.76–1.27)
GLOBULIN SER CALC-MCNC: 1.7 GM/DL
GLUCOSE SERPL-MCNC: 97 MG/DL (ref 65–99)
HDLC SERPL-MCNC: 58 MG/DL (ref 40–60)
LDLC SERPL CALC-MCNC: 68 MG/DL (ref 0–100)
LDLC/HDLC SERPL: 1.17 {RATIO}
POTASSIUM SERPL-SCNC: 4.3 MMOL/L (ref 3.5–5.2)
PROT SERPL-MCNC: 6 G/DL (ref 6–8.5)
SARS-COV-2 RNA RESP QL NAA+PROBE: NOT DETECTED
SODIUM SERPL-SCNC: 137 MMOL/L (ref 136–145)
TRIGL SERPL-MCNC: 60 MG/DL (ref 0–150)
TSH SERPL DL<=0.005 MIU/L-ACNC: 3.96 UIU/ML (ref 0.27–4.2)
VLDLC SERPL CALC-MCNC: 12 MG/DL

## 2020-09-10 ENCOUNTER — ANESTHESIA (OUTPATIENT)
Dept: PERIOP | Facility: HOSPITAL | Age: 75
End: 2020-09-10
Payer: MEDICARE

## 2020-09-10 ENCOUNTER — HOSPITAL ENCOUNTER (OUTPATIENT)
Facility: HOSPITAL | Age: 75
Setting detail: HOSPITAL OUTPATIENT SURGERY
Discharge: HOME | End: 2020-09-10
Attending: UROLOGY | Admitting: UROLOGY
Payer: MEDICARE

## 2020-09-10 ENCOUNTER — ANESTHESIA EVENT (OUTPATIENT)
Dept: PERIOP | Facility: HOSPITAL | Age: 75
End: 2020-09-10
Payer: MEDICARE

## 2020-09-10 ENCOUNTER — APPOINTMENT (OUTPATIENT)
Dept: GENERAL RADIOLOGY | Facility: HOSPITAL | Age: 75
End: 2020-09-10
Payer: MEDICARE

## 2020-09-10 VITALS
RESPIRATION RATE: 16 BRPM | SYSTOLIC BLOOD PRESSURE: 160 MMHG | DIASTOLIC BLOOD PRESSURE: 74 MMHG | HEIGHT: 69 IN | HEART RATE: 58 BPM | BODY MASS INDEX: 22.66 KG/M2 | TEMPERATURE: 97.6 F | OXYGEN SATURATION: 100 % | WEIGHT: 153 LBS

## 2020-09-10 DIAGNOSIS — D30.10: ICD-10-CM

## 2020-09-10 PROCEDURE — 25010000002 FENTANYL CITRATE (PF) 100 MCG/2ML SOLUTION: Performed by: REGISTERED NURSE

## 2020-09-10 PROCEDURE — C1758 CATHETER, URETERAL: HCPCS | Performed by: UROLOGY

## 2020-09-10 PROCEDURE — C2617 STENT, NON-COR, TEM W/O DEL: HCPCS | Performed by: UROLOGY

## 2020-09-10 PROCEDURE — 25010000002 CEFAZOLIN 1-4 GM/50ML-% SOLUTION: Performed by: UROLOGY

## 2020-09-10 PROCEDURE — 25010000002 PROPOFOL 10 MG/ML EMULSION: Performed by: REGISTERED NURSE

## 2020-09-10 PROCEDURE — 88305 TISSUE EXAM BY PATHOLOGIST: CPT | Performed by: UROLOGY

## 2020-09-10 PROCEDURE — C1769 GUIDE WIRE: HCPCS | Performed by: UROLOGY

## 2020-09-10 PROCEDURE — 74420 UROGRAPHY RTRGR +-KUB: CPT

## 2020-09-10 PROCEDURE — 25010000002 ONDANSETRON PER 1 MG: Performed by: REGISTERED NURSE

## 2020-09-10 PROCEDURE — 0 IOTHALAMATE 60 % SOLUTION: Performed by: UROLOGY

## 2020-09-10 PROCEDURE — 25010000002 KETOROLAC TROMETHAMINE PER 15 MG: Performed by: REGISTERED NURSE

## 2020-09-10 DEVICE — URETERAL STENT
Type: IMPLANTABLE DEVICE | Site: URETER | Status: FUNCTIONAL
Brand: CONTOUR™

## 2020-09-10 RX ORDER — FLUMAZENIL 0.1 MG/ML
0.2 INJECTION INTRAVENOUS AS NEEDED
Status: DISCONTINUED | OUTPATIENT
Start: 2020-09-10 | End: 2020-09-10 | Stop reason: HOSPADM

## 2020-09-10 RX ORDER — DIPHENHYDRAMINE HCL 25 MG
25 CAPSULE ORAL
Status: DISCONTINUED | OUTPATIENT
Start: 2020-09-10 | End: 2020-09-10 | Stop reason: HOSPADM

## 2020-09-10 RX ORDER — HYDRALAZINE HYDROCHLORIDE 20 MG/ML
5 INJECTION INTRAMUSCULAR; INTRAVENOUS
Status: DISCONTINUED | OUTPATIENT
Start: 2020-09-10 | End: 2020-09-10 | Stop reason: HOSPADM

## 2020-09-10 RX ORDER — FAMOTIDINE 10 MG/ML
20 INJECTION, SOLUTION INTRAVENOUS ONCE
Status: COMPLETED | OUTPATIENT
Start: 2020-09-10 | End: 2020-09-10

## 2020-09-10 RX ORDER — PROPOFOL 10 MG/ML
VIAL (ML) INTRAVENOUS AS NEEDED
Status: DISCONTINUED | OUTPATIENT
Start: 2020-09-10 | End: 2020-09-10 | Stop reason: SURG

## 2020-09-10 RX ORDER — FENTANYL CITRATE 50 UG/ML
50 INJECTION, SOLUTION INTRAMUSCULAR; INTRAVENOUS
Status: DISCONTINUED | OUTPATIENT
Start: 2020-09-10 | End: 2020-09-10 | Stop reason: HOSPADM

## 2020-09-10 RX ORDER — OXYCODONE AND ACETAMINOPHEN 7.5; 325 MG/1; MG/1
1 TABLET ORAL ONCE AS NEEDED
Status: DISCONTINUED | OUTPATIENT
Start: 2020-09-10 | End: 2020-09-10 | Stop reason: HOSPADM

## 2020-09-10 RX ORDER — SODIUM CHLORIDE 0.9 % (FLUSH) 0.9 %
3-10 SYRINGE (ML) INJECTION AS NEEDED
Status: DISCONTINUED | OUTPATIENT
Start: 2020-09-10 | End: 2020-09-10 | Stop reason: HOSPADM

## 2020-09-10 RX ORDER — PROMETHAZINE HYDROCHLORIDE 25 MG/1
25 TABLET ORAL ONCE AS NEEDED
Status: DISCONTINUED | OUTPATIENT
Start: 2020-09-10 | End: 2020-09-10 | Stop reason: HOSPADM

## 2020-09-10 RX ORDER — LIDOCAINE HYDROCHLORIDE 10 MG/ML
0.5 INJECTION, SOLUTION EPIDURAL; INFILTRATION; INTRACAUDAL; PERINEURAL ONCE AS NEEDED
Status: DISCONTINUED | OUTPATIENT
Start: 2020-09-10 | End: 2020-09-10 | Stop reason: HOSPADM

## 2020-09-10 RX ORDER — NALOXONE HCL 0.4 MG/ML
0.2 VIAL (ML) INJECTION AS NEEDED
Status: DISCONTINUED | OUTPATIENT
Start: 2020-09-10 | End: 2020-09-10 | Stop reason: HOSPADM

## 2020-09-10 RX ORDER — KETOROLAC TROMETHAMINE 30 MG/ML
INJECTION, SOLUTION INTRAMUSCULAR; INTRAVENOUS AS NEEDED
Status: DISCONTINUED | OUTPATIENT
Start: 2020-09-10 | End: 2020-09-10 | Stop reason: SURG

## 2020-09-10 RX ORDER — EPHEDRINE SULFATE 50 MG/ML
5 INJECTION, SOLUTION INTRAVENOUS ONCE AS NEEDED
Status: DISCONTINUED | OUTPATIENT
Start: 2020-09-10 | End: 2020-09-10 | Stop reason: HOSPADM

## 2020-09-10 RX ORDER — FENTANYL CITRATE 50 UG/ML
INJECTION, SOLUTION INTRAMUSCULAR; INTRAVENOUS AS NEEDED
Status: DISCONTINUED | OUTPATIENT
Start: 2020-09-10 | End: 2020-09-10 | Stop reason: SURG

## 2020-09-10 RX ORDER — LABETALOL HYDROCHLORIDE 5 MG/ML
5 INJECTION, SOLUTION INTRAVENOUS
Status: DISCONTINUED | OUTPATIENT
Start: 2020-09-10 | End: 2020-09-10 | Stop reason: HOSPADM

## 2020-09-10 RX ORDER — SODIUM CHLORIDE, SODIUM LACTATE, POTASSIUM CHLORIDE, CALCIUM CHLORIDE 600; 310; 30; 20 MG/100ML; MG/100ML; MG/100ML; MG/100ML
9 INJECTION, SOLUTION INTRAVENOUS CONTINUOUS
Status: DISCONTINUED | OUTPATIENT
Start: 2020-09-10 | End: 2020-09-10 | Stop reason: HOSPADM

## 2020-09-10 RX ORDER — LIDOCAINE HYDROCHLORIDE 20 MG/ML
INJECTION, SOLUTION INFILTRATION; PERINEURAL AS NEEDED
Status: DISCONTINUED | OUTPATIENT
Start: 2020-09-10 | End: 2020-09-10 | Stop reason: SURG

## 2020-09-10 RX ORDER — CEFAZOLIN SODIUM 1 G/50ML
1 INJECTION, SOLUTION INTRAVENOUS ONCE
Status: COMPLETED | OUTPATIENT
Start: 2020-09-10 | End: 2020-09-10

## 2020-09-10 RX ORDER — MIDAZOLAM HYDROCHLORIDE 1 MG/ML
1 INJECTION INTRAMUSCULAR; INTRAVENOUS
Status: DISCONTINUED | OUTPATIENT
Start: 2020-09-10 | End: 2020-09-10 | Stop reason: HOSPADM

## 2020-09-10 RX ORDER — HYDROMORPHONE HYDROCHLORIDE 1 MG/ML
0.5 INJECTION, SOLUTION INTRAMUSCULAR; INTRAVENOUS; SUBCUTANEOUS
Status: DISCONTINUED | OUTPATIENT
Start: 2020-09-10 | End: 2020-09-10 | Stop reason: HOSPADM

## 2020-09-10 RX ORDER — SODIUM CHLORIDE 0.9 % (FLUSH) 0.9 %
3 SYRINGE (ML) INJECTION EVERY 12 HOURS SCHEDULED
Status: DISCONTINUED | OUTPATIENT
Start: 2020-09-10 | End: 2020-09-10 | Stop reason: HOSPADM

## 2020-09-10 RX ORDER — ONDANSETRON 2 MG/ML
INJECTION INTRAMUSCULAR; INTRAVENOUS AS NEEDED
Status: DISCONTINUED | OUTPATIENT
Start: 2020-09-10 | End: 2020-09-10 | Stop reason: SURG

## 2020-09-10 RX ORDER — DIPHENHYDRAMINE HYDROCHLORIDE 50 MG/ML
12.5 INJECTION INTRAMUSCULAR; INTRAVENOUS
Status: DISCONTINUED | OUTPATIENT
Start: 2020-09-10 | End: 2020-09-10 | Stop reason: HOSPADM

## 2020-09-10 RX ORDER — PROMETHAZINE HYDROCHLORIDE 25 MG/1
25 SUPPOSITORY RECTAL ONCE AS NEEDED
Status: DISCONTINUED | OUTPATIENT
Start: 2020-09-10 | End: 2020-09-10 | Stop reason: HOSPADM

## 2020-09-10 RX ORDER — HYDROCODONE BITARTRATE AND ACETAMINOPHEN 7.5; 325 MG/1; MG/1
1 TABLET ORAL ONCE AS NEEDED
Status: DISCONTINUED | OUTPATIENT
Start: 2020-09-10 | End: 2020-09-10 | Stop reason: HOSPADM

## 2020-09-10 RX ORDER — ONDANSETRON 2 MG/ML
4 INJECTION INTRAMUSCULAR; INTRAVENOUS ONCE AS NEEDED
Status: DISCONTINUED | OUTPATIENT
Start: 2020-09-10 | End: 2020-09-10 | Stop reason: HOSPADM

## 2020-09-10 RX ADMIN — PROPOFOL 200 MG: 10 INJECTION, EMULSION INTRAVENOUS at 09:41

## 2020-09-10 RX ADMIN — CEFAZOLIN SODIUM 1 G: 1 INJECTION, SOLUTION INTRAVENOUS at 09:46

## 2020-09-10 RX ADMIN — FENTANYL CITRATE 50 MCG: 50 INJECTION INTRAMUSCULAR; INTRAVENOUS at 09:47

## 2020-09-10 RX ADMIN — SODIUM CHLORIDE, POTASSIUM CHLORIDE, SODIUM LACTATE AND CALCIUM CHLORIDE 9 ML/HR: 600; 310; 30; 20 INJECTION, SOLUTION INTRAVENOUS at 08:56

## 2020-09-10 RX ADMIN — FENTANYL CITRATE 25 MCG: 50 INJECTION INTRAMUSCULAR; INTRAVENOUS at 09:56

## 2020-09-10 RX ADMIN — KETOROLAC TROMETHAMINE 30 MG: 30 INJECTION, SOLUTION INTRAMUSCULAR; INTRAVENOUS at 10:21

## 2020-09-10 RX ADMIN — FAMOTIDINE 20 MG: 10 INJECTION INTRAVENOUS at 08:56

## 2020-09-10 RX ADMIN — FENTANYL CITRATE 25 MCG: 50 INJECTION INTRAMUSCULAR; INTRAVENOUS at 10:11

## 2020-09-10 RX ADMIN — LIDOCAINE HYDROCHLORIDE 100 MG: 20 INJECTION, SOLUTION INFILTRATION; PERINEURAL at 09:41

## 2020-09-10 RX ADMIN — ONDANSETRON HYDROCHLORIDE 4 MG: 2 SOLUTION INTRAMUSCULAR; INTRAVENOUS at 09:47

## 2020-09-10 NOTE — ANESTHESIA POSTPROCEDURE EVALUATION
"Patient: Kunal Vargas    Procedure Summary     Date:  09/10/20 Room / Location:  [facility] OR  / [facility] MAIN OR    Anesthesia Start:  0936 Anesthesia Stop:  1033    Procedure:  CYSTOSCOPY, LEFT URETEROSCOPY W/BIOPSY AND STENT PLACEMENT, TRANSURETHRAL RESECTION OF BLADDER TUMOR (N/A ) Diagnosis:      Surgeon:  Ángel Florentino MD Provider:  Tonio Landeros MD    Anesthesia Type:  general ASA Status:  2          Anesthesia Type: general    Vitals  Vitals Value Taken Time   /71 9/10/2020 11:00 AM   Temp 36.4 °C (97.6 °F) 9/10/2020 10:30 AM   Pulse 56 9/10/2020 11:15 AM   Resp 14 9/10/2020 11:15 AM   SpO2 100 % 9/10/2020 11:15 AM           Post Anesthesia Care and Evaluation    Patient location during evaluation: bedside  Patient participation: complete - patient participated  Level of consciousness: awake and alert  Pain management: adequate  Airway patency: patent  Anesthetic complications: No anesthetic complications    Cardiovascular status: acceptable  Respiratory status: acceptable  Hydration status: acceptable    Comments: /74   Pulse 58   Temp 36.4 °C (97.6 °F) (Oral)   Resp 16   Ht 175.3 cm (69\")   Wt 69.4 kg (153 lb)   SpO2 100%   BMI 22.59 kg/m²       "

## 2020-09-10 NOTE — ANESTHESIA PROCEDURE NOTES
Airway  Urgency: elective    Date/Time: 9/10/2020 9:42 AM  Airway not difficult    General Information and Staff    Patient location during procedure: OR  CRNA: Mariama Hidalgo CRNA    Indications and Patient Condition  Indications for airway management: airway protection    Preoxygenated: yes  MILS maintained throughout  Mask difficulty assessment: 0 - not attempted    Final Airway Details  Final airway type: supraglottic airway      Successful airway: LMA  Size 4    Number of attempts at approach: 1  Assessment: lips, teeth, and gum same as pre-op and atraumatic intubation    Additional Comments  Atraumatic insertion

## 2020-09-10 NOTE — ANESTHESIA PREPROCEDURE EVALUATION
Anesthesia Evaluation     Patient summary reviewed and Nursing notes reviewed                Airway   Mallampati: II  TM distance: >3 FB  Neck ROM: full  No difficulty expected  Dental - normal exam     Pulmonary - normal exam   Cardiovascular     Rhythm: regular  Rate: abnormal    (+) CAD, hyperlipidemia,     ROS comment: SB on ECG/mild CAD on cath  PE comment: SB    Neuro/Psych  (+) numbness,       ROS Comment: Numbness in feet, was told it was neuropathy  GI/Hepatic/Renal/Endo    (+)  GERD,  thyroid problem hypothyroidism    Musculoskeletal     Abdominal  - normal exam   Substance History      OB/GYN          Other      history of cancer      Other Comment: Hx bladder CA                Anesthesia Plan    ASA 2     general   (Probable LMA unless surgeon needs muscle relaxation)  intravenous induction     Anesthetic plan, all risks, benefits, and alternatives have been provided, discussed and informed consent has been obtained with: patient.    Plan discussed with CRNA.

## 2020-09-11 LAB
CYTO UR: NORMAL
LAB AP CASE REPORT: NORMAL
PATH REPORT.FINAL DX SPEC: NORMAL
PATH REPORT.GROSS SPEC: NORMAL

## 2020-09-16 ENCOUNTER — OFFICE VISIT (OUTPATIENT)
Dept: FAMILY MEDICINE CLINIC | Facility: CLINIC | Age: 75
End: 2020-09-16

## 2020-09-16 VITALS
HEIGHT: 69 IN | DIASTOLIC BLOOD PRESSURE: 70 MMHG | WEIGHT: 154 LBS | BODY MASS INDEX: 22.81 KG/M2 | RESPIRATION RATE: 18 BRPM | SYSTOLIC BLOOD PRESSURE: 150 MMHG | HEART RATE: 47 BPM | OXYGEN SATURATION: 98 % | TEMPERATURE: 98 F

## 2020-09-16 DIAGNOSIS — E78.5 HYPERLIPIDEMIA, UNSPECIFIED HYPERLIPIDEMIA TYPE: ICD-10-CM

## 2020-09-16 DIAGNOSIS — E03.9 ACQUIRED HYPOTHYROIDISM: ICD-10-CM

## 2020-09-16 DIAGNOSIS — Z23 NEED FOR VACCINATION: Primary | ICD-10-CM

## 2020-09-16 DIAGNOSIS — I10 ESSENTIAL HYPERTENSION: ICD-10-CM

## 2020-09-16 DIAGNOSIS — I25.10 CHRONIC CORONARY ARTERY DISEASE: ICD-10-CM

## 2020-09-16 PROBLEM — D64.9 ANEMIA: Status: RESOLVED | Noted: 2018-03-08 | Resolved: 2020-09-16

## 2020-09-16 PROCEDURE — G0008 ADMIN INFLUENZA VIRUS VAC: HCPCS | Performed by: INTERNAL MEDICINE

## 2020-09-16 PROCEDURE — 90694 VACC AIIV4 NO PRSRV 0.5ML IM: CPT | Performed by: INTERNAL MEDICINE

## 2020-09-16 PROCEDURE — 99214 OFFICE O/P EST MOD 30 MIN: CPT | Performed by: INTERNAL MEDICINE

## 2020-09-16 RX ORDER — HYDROCODONE BITARTRATE AND ACETAMINOPHEN 5; 325 MG/1; MG/1
1 TABLET ORAL EVERY 4 HOURS PRN
COMMUNITY
Start: 2020-09-10 | End: 2021-04-07

## 2020-09-16 NOTE — PROGRESS NOTES
Subjective   Kunal Vargas is a 75 y.o. male. Patient is here today for   Chief Complaint   Patient presents with   • Hyperlipidemia   • Hypothyroidism   • Hypertension          Vitals:    09/16/20 1101   BP: 150/70   Pulse: (!) 47   Resp: 18   Temp: 98 °F (36.7 °C)   SpO2: 98%     Body mass index is 22.74 kg/m².      The following portions of the patient's history were reviewed and updated as appropriate: allergies, current medications, past family history, past medical history, past social history, past surgical history and problem list.    Past Medical History:   Diagnosis Date   • Bladder tumor    • Eczema    • GERD (gastroesophageal reflux disease)     NO MEDS   • Hematuria    • History of acute hepatitis     1962   • Hyperlipidemia    • Hypothyroid       No Known Allergies   Social History     Socioeconomic History   • Marital status:      Spouse name: Not on file   • Number of children: Not on file   • Years of education: Not on file   • Highest education level: Not on file   Tobacco Use   • Smoking status: Never Smoker   • Smokeless tobacco: Never Used   Substance and Sexual Activity   • Alcohol use: No   • Drug use: Never   • Sexual activity: Defer        Current Outpatient Medications:   •  HYDROcodone-acetaminophen (NORCO) 5-325 MG per tablet, TK 1-2 TS PO Q 4 H PRN P, Disp: , Rfl:   •  levothyroxine (SYNTHROID, LEVOTHROID) 88 MCG tablet, TAKE ONE TABLET BY MOUTH EVERY DAY (Patient taking differently: Take 88 mcg by mouth Daily.), Disp: 90 tablet, Rfl: 5  •  metoprolol succinate XL (TOPROL-XL) 25 MG 24 hr tablet, TAKE ONE TABLET BY MOUTH EVERY DAY (Patient taking differently: Take 25 mg by mouth Every Night.), Disp: 90 tablet, Rfl: 1  •  pravastatin (PRAVACHOL) 40 MG tablet, TAKE ONE TABLET BY MOUTH EVERY NIGHT AT BEDTIME (Patient taking differently: Take 40 mg by mouth Every Night.), Disp: 90 tablet, Rfl: 1     Objective   History of Present Illness Kunal is here for blood pressure check and  lab follow-up.  He has hypertension, hyperlipidemia, hypothyroidism, and nonobstructive coronary artery disease.  He generally feels well.  He eats healthy but has not been exercising.  He recently had a kidney biopsy after he developed hematuria and a subsequent CT showed filling defects.  Pathology is in epic.  He has not been checking his blood pressure.  His weight is down a few pounds from 6 months ago.  He had vascular screening test 2 years ago which showed some carotid artery plaque without stenosis.  He had a cardiac catheterization in 2014 which showed nonobstructive coronary artery disease.  Immunizations are up-to-date except for this years flu shot.    Review of Systems   Constitutional: Positive for activity change.        Weight loss 4 lbs   Respiratory: Negative for cough and shortness of breath.    Cardiovascular: Negative.    Gastrointestinal: Negative.    Genitourinary:        As in HPI   Neurological: Negative.    Psychiatric/Behavioral: Negative.        Physical Exam  Constitutional:       Appearance: Normal appearance.   Neck:      Vascular: No carotid bruit.   Cardiovascular:      Rate and Rhythm: Normal rate and regular rhythm.      Comments: 136/60,142/60  Pulmonary:      Effort: Pulmonary effort is normal.      Breath sounds: Normal breath sounds.   Musculoskeletal:      Right lower leg: No edema.      Left lower leg: No edema.   Neurological:      Mental Status: He is alert.   Psychiatric:         Mood and Affect: Mood normal.         Behavior: Behavior normal.         Thought Content: Thought content normal.         Judgment: Judgment normal.         ASSESSMENT     Problem List Items Addressed This Visit        Cardiovascular and Mediastinum    Hyperlipidemia    Chronic coronary artery disease    Essential hypertension       Endocrine    Acquired hypothyroidism      Other Visit Diagnoses     Need for vaccination    -  Primary    Relevant Orders    Fluad Quad 65+ yrs (4230-5321)           PLAN  Patient Instructions   Blood pressure is high today.  You need to monitor your blood pressure correctly at home as instructed while resting relaxed.  Normal is less than 120/80.  Lipids are excellent.  Kidney and liver functions are normal.  Thyroid-stimulating hormone level is normal.  Continue diet and resume exercise when able.  We will continue current medicines.  Suggest getting carotid vascular screening sometime in the next 6 months.  Reviewed and discussed biopsy results.      Return in about 6 months (around 3/16/2021) for labsTSH,BMP,CBC.

## 2020-09-16 NOTE — PATIENT INSTRUCTIONS
Blood pressure is high today.  You need to monitor your blood pressure correctly at home as instructed while resting relaxed.  Normal is less than 120/80.  Lipids are excellent.  Kidney and liver functions are normal.  Thyroid-stimulating hormone level is normal.  Continue diet and resume exercise when able.  We will continue current medicines.  Suggest getting carotid vascular screening sometime in the next 6 months.  Reviewed and discussed biopsy results.

## 2020-11-02 ENCOUNTER — TRANSCRIBE ORDERS (OUTPATIENT)
Dept: PREADMISSION TESTING | Facility: HOSPITAL | Age: 75
End: 2020-11-02

## 2020-11-02 DIAGNOSIS — Z01.818 OTHER SPECIFIED PRE-OPERATIVE EXAMINATION: Primary | ICD-10-CM

## 2020-11-05 ENCOUNTER — APPOINTMENT (OUTPATIENT)
Dept: PREADMISSION TESTING | Facility: HOSPITAL | Age: 75
End: 2020-11-05

## 2020-11-05 VITALS
OXYGEN SATURATION: 100 % | WEIGHT: 156.5 LBS | RESPIRATION RATE: 16 BRPM | HEIGHT: 69 IN | HEART RATE: 59 BPM | TEMPERATURE: 97.9 F | SYSTOLIC BLOOD PRESSURE: 134 MMHG | DIASTOLIC BLOOD PRESSURE: 60 MMHG | BODY MASS INDEX: 23.18 KG/M2

## 2020-11-05 LAB
ANION GAP SERPL CALCULATED.3IONS-SCNC: 6.9 MMOL/L (ref 5–15)
BUN SERPL-MCNC: 20 MG/DL (ref 8–23)
BUN/CREAT SERPL: 19 (ref 7–25)
CALCIUM SPEC-SCNC: 9.3 MG/DL (ref 8.6–10.5)
CHLORIDE SERPL-SCNC: 106 MMOL/L (ref 98–107)
CO2 SERPL-SCNC: 26.1 MMOL/L (ref 22–29)
CREAT SERPL-MCNC: 1.05 MG/DL (ref 0.76–1.27)
DEPRECATED RDW RBC AUTO: 46.6 FL (ref 37–54)
ERYTHROCYTE [DISTWIDTH] IN BLOOD BY AUTOMATED COUNT: 12.4 % (ref 12.3–15.4)
GFR SERPL CREATININE-BSD FRML MDRD: 69 ML/MIN/1.73
GLUCOSE SERPL-MCNC: 88 MG/DL (ref 65–99)
HCT VFR BLD AUTO: 38.6 % (ref 37.5–51)
HGB BLD-MCNC: 12.6 G/DL (ref 13–17.7)
MCH RBC QN AUTO: 32.6 PG (ref 26.6–33)
MCHC RBC AUTO-ENTMCNC: 32.6 G/DL (ref 31.5–35.7)
MCV RBC AUTO: 100 FL (ref 79–97)
PLATELET # BLD AUTO: 219 10*3/MM3 (ref 140–450)
PMV BLD AUTO: 10.2 FL (ref 6–12)
POTASSIUM SERPL-SCNC: 5.3 MMOL/L (ref 3.5–5.2)
RBC # BLD AUTO: 3.86 10*6/MM3 (ref 4.14–5.8)
SODIUM SERPL-SCNC: 139 MMOL/L (ref 136–145)
WBC # BLD AUTO: 5.79 10*3/MM3 (ref 3.4–10.8)

## 2020-11-05 PROCEDURE — 36415 COLL VENOUS BLD VENIPUNCTURE: CPT

## 2020-11-05 PROCEDURE — 80048 BASIC METABOLIC PNL TOTAL CA: CPT | Performed by: UROLOGY

## 2020-11-05 PROCEDURE — 85027 COMPLETE CBC AUTOMATED: CPT | Performed by: UROLOGY

## 2020-11-05 NOTE — DISCHARGE INSTRUCTIONS
Take the following medications the morning of surgery with a small sip of water:  LEVOTHYROXINE    ARRIVAL TIME 0530 TO MAIN OR       If you are on prescription narcotic pain medication to control your pain you may also take that medication the morning of surgery.    General Instructions:  • Do not eat or drink anything after midnight the night before surgery.  • Infants may have breast milk up to four hours before surgery.  • Infants drinking formula may drink formula up to six hours before surgery.   • Patients who avoid smoking, chewing tobacco and alcohol for 4 weeks prior to surgery have a reduced risk of post-operative complications.  Quit smoking as many days before surgery as you can.  • Do not smoke, use chewing tobacco or drink alcohol the day of surgery.   • If applicable bring your C-PAP/ BI-PAP machine.  • Bring any papers given to you in the doctor’s office.  • Wear clean comfortable clothes.  • Do not wear contact lenses, false eyelashes or make-up.  Bring a case for your glasses.   • Bring crutches or walker if applicable.  • Remove all piercings.  Leave jewelry and any other valuables at home.  • Hair extensions with metal clips must be removed prior to surgery.  • The Pre-Admission Testing nurse will instruct you to bring medications if unable to obtain an accurate list in Pre-Admission Testing.            Preventing a Surgical Site Infection:  • For 2 to 3 days before surgery, avoid shaving with a razor because the razor can irritate skin and make it easier to develop an infection.    • Any areas of open skin can increase the risk of a post-operative wound infection by allowing bacteria to enter and travel throughout the body.  Notify your surgeon if you have any skin wounds / rashes even if it is not near the expected surgical site.  The area will need assessed to determine if surgery should be delayed until it is healed.  • The night prior to surgery shower using a fresh bar of anti-bacterial  soap (such as Dial) and clean washcloth.  Sleep in a clean bed with clean clothing.  Do not allow pets to sleep with you.  • Shower on the morning of surgery using a fresh bar of anti-bacterial soap (such as Dial) and clean washcloth.  Dry with a clean towel and dress in clean clothing.  • Ask your surgeon if you will be receiving antibiotics prior to surgery.  • Make sure you, your family, and all healthcare providers clean their hands with soap and water or an alcohol based hand  before caring for you or your wound.    Day of surgery:  Your arrival time is approximately two hours before your scheduled surgery time.  Upon arrival, a Pre-op nurse and Anesthesiologist will review your health history, obtain vital signs, and answer questions you may have.  The only belongings needed at this time will be your home medications and if applicable your C-PAP/BI-PAP machine.  If you are staying overnight your family can leave the rest of your belongings in the car and bring them to your room later.  A Pre-op nurse will start an IV and you may receive medication in preparation for surgery, including something to help you relax.  While you are in surgery your family should notify the waiting room  if they leave the waiting room area and provide a contact phone number.    Please be aware that surgery does come with discomfort.  We want to make every effort to control your discomfort so please discuss any uncontrolled symptoms with your nurse.   Your doctor will most likely have prescribed pain medications.      If you are going home after surgery you will receive individualized written care instructions before being discharged.  A responsible adult must drive you to and from the hospital on the day of your surgery and stay with you for 24 hours.    If you are staying overnight following surgery, you will be transported to your hospital room following the recovery period.  [facility] has all  private rooms.    If you have any questions please call Pre-Admission Testing at [phone].  Deductibles and co-payments are collected on the day of service. Please be prepared to pay the required co-pay, deductible or deposit on the day of service as defined by your plan.    Patient Education for Self-Quarantine Process    Following your COVID testing, we strongly recommend that you do not leave your home after you have been tested for COVID except to get medical care. This includes not going to work, school or to public areas.  If this is not possible for you to do please limit your activities to only required outings.  Be sure to wear a mask when you are with other people, practice social distancing and wash your hands frequently.      The following items provide additional details to keep you safe.  • Wash your hands with soap and water frequently for at least 20 seconds.   • Avoid touching your eyes, nose and mouth with unwashed hands.  • Do not share anything - utensils, towels, food from the same bowl.   • Have your own utensils, drinking glass, dishes, towels and bedding.   • Do not have visitors.   • Do use FaceTime to stay in touch with family and friends.  • You should stay in a specific room away from others if possible.   • Stay at least 6 feet away from others in the home if you cannot have a dedicated room to yourself.   • Do not snuggle with your pet. While the CDC says there is no evidence that pets can spread COVID-19 or be infected from humans, it is probably best to avoid “petting, snuggling, being kissed or licked and sharing food (during self-quarantine)”, according to the CDC.   • Sanitize household surfaces daily. Include all high touch areas (door handles, light switches, phones, countertops, etc.)  • Do not share a bathroom with others, if possible.   • Wear a mask around others in your home if you are unable to stay in a separate room or 6 feet apart. If  you are unable to wear a mask,  have your family member wear a mask if they must be within 6 feet of you.   Call your surgeon immediately if you experience any of the following symptoms:  • Sore Throat  • Shortness of Breath or difficulty breathing  • Cough  • Chills  • Body soreness or muscle pain  • Headache  • Fever  • New loss of taste or smell  • Do not arrive for your surgery ill.  Your procedure will need to be rescheduled to another time.  You will need to call your physician before the day of surgery to avoid any unnecessary exposure to hospital staff as well as other patients.

## 2020-11-10 ENCOUNTER — LAB (OUTPATIENT)
Dept: LAB | Facility: HOSPITAL | Age: 75
End: 2020-11-10

## 2020-11-10 DIAGNOSIS — Z01.818 OTHER SPECIFIED PRE-OPERATIVE EXAMINATION: ICD-10-CM

## 2020-11-10 PROCEDURE — C9803 HOPD COVID-19 SPEC COLLECT: HCPCS

## 2020-11-10 PROCEDURE — U0004 COV-19 TEST NON-CDC HGH THRU: HCPCS

## 2020-11-11 LAB — SARS-COV-2 RNA RESP QL NAA+PROBE: NOT DETECTED

## 2020-11-11 NOTE — H&P
*   2020 - Ángel Florentino MD (Signed-Off)      Patient: ARMANDO WHITE (Male) Date: 2020 at 3:45PM     : 1945 (74) PCP: TRUNG Acharya) M.D. (FAX: [phone])     Account: [id number] Referring: TRUNG Acharya) M.D. (FAX: [phone])         Subjective    Chief Complaint  NEW PT HEMATURIA  History of Present Illness   BPH: ARMANDO is a 74 year old, Declined to specify, Male who presents today with symptoms consistent with BPH. The patient has the following prior PSAs:   2016: 0.95 ng/mL   2020: 0.93 ng/mL  Paitnet c/o noc x 2, [place]ka FOS  No BPH meds  No family h/o CaP I reviewed information above on 2020.   QUINTIN 2020 - normal  CT A/P with 2018 - normal  UC 2020 - neg      Patinet c/o intermittent gross hem  Paitnet denies kidney stone, badders stones, flank pain I reviewed information above on 2020.  History  Past Medical History: Anemia; Blood in Urine/ Stool; HEMATURIA; Gastrointestinal Issues; SMALL BOWEL OBSTRUCTION; Heart Disease; CAD; Urination Problems Flu vaccine Pneumococcal vaccine THYROID DISEASE   Surgical History: Patient denies.   Hospitalization History: Patient denies.   Social History: Current smoking status: Never smoker. Alcohol consumption status: never. ARMANDO denies the use of recreational drugs. The patient is not sexually active.   Family Medical History:   Father: AORTIC ANEURYSM.   Complete PFSH reviewed on 2020 no changes except NP.  Other Treating Clinicians:   Primary Care Physician: DR. TRUNG JOE  Review of Systems   Constitutional: No Fever.   Immunologic: No Latex Allergy.   Neurological: No Decreased Sensation.   Gastrointestinal: No Constipation.   Respiratory: No Shortness of Breath, Current tobacco non-user.   Ear/Nose/Throat/Mouth: No Hearing Loss.   Hematologic: No Easy Bruising.   Musculoskeletal: No new bone pain.   Cardiovascular: No Chest Pain.   Genitourinary: No Leaking Urine or Wetting himself.   Pain:  Patient reports no pain.   BMI: BMI is abnormal - high.   Current Meds: Documented.   Complete ROS reviewed 2020, no changes.  Allergies   No Known Drug Allergies  Current Medications   levothyroxine oral - ORAL   pravastatin oral - ORAL   Toprol XL 25 mg tablet,extended release - Tablet Sustained Release 24 hr ORAL    Objective    Vitals - 3:55 PM   Height: 5 ft 9 in   Weight: 155 lbs   BMI: 22.89   BSA: 1.85   Blood Pressure:   Sittin/64 mmHg - Left Upper Extremity; 55 Beats/Min  Urinalysis Results   Procedure: Automated urinalysis with microscopy   Urinalysis:   Color: Yellow   Clarity: Clear   Glucose: Negative   Bilirubin: Negative   Ketone: Negative   Specific Gravity: 1.020   Blood: moderate   pH: 6.0   Protein: Negative   Urobilinogens: 0.2 mg/100mL   Nitrates: Negative   Leukocytes: Negative  Bladder Scan Results   Post Void Residual: 0 mL   N40.1 BPH with LUTS  Physical Exam   Constitutional: General appearance is normal. Vitals: Reviewed. Ht-5 ft 9 in Wt-155 lbs BP-150/64.   Psychiatric: Orientation normal. Mood and affect normal.   Eyes: Pupils/irises normal. Exterior inspection conjunctivae and lids normal.   ENT: Hearing normal. Exterior inspection of ears and nose normal.   Skin: Inspection normal. Palpation normal.   Musculoskeletal: Gait normal. Inspection of nails and digits reveal no abnormalities.   Male Genitourinary: Prostate is enlarged. Prostate is smooth. Prostate is symmetric.   Procedure: Automated urinalysis with microscopy   Urine Micro:   RBC: 3-5/ HPF    Assessment      Plan    Counseling  Today I discussed the following with ARMANDO:   General Issues:   The patient was asked to call if any new voiding symptoms or  issues arise.   The risks and complications of therapy were discussed with the patient.   All risks were discussed and all questions were answered   BPH - observe  Gross Hematuria TCCA RENAL PELVIS  Needs nephrou discussed pros cons risks at length  Will  schedule

## 2020-11-12 ENCOUNTER — HOSPITAL ENCOUNTER (INPATIENT)
Facility: HOSPITAL | Age: 75
LOS: 5 days | Discharge: HOME | End: 2020-11-17
Attending: UROLOGY | Admitting: UROLOGY

## 2020-11-12 ENCOUNTER — ANESTHESIA (OUTPATIENT)
Dept: PERIOP | Facility: HOSPITAL | Age: 75
End: 2020-11-12

## 2020-11-12 ENCOUNTER — ANESTHESIA EVENT (OUTPATIENT)
Dept: PERIOP | Facility: HOSPITAL | Age: 75
End: 2020-11-12

## 2020-11-12 DIAGNOSIS — R31.0 GROSS HEMATURIA: ICD-10-CM

## 2020-11-12 DIAGNOSIS — C65.2 MALIGNANT NEOPLASM OF LEFT RENAL PELVIS (HCC): ICD-10-CM

## 2020-11-12 PROBLEM — N28.89 RENAL MASS: Status: ACTIVE | Noted: 2020-11-12

## 2020-11-12 PROCEDURE — 25010000002 FENTANYL CITRATE (PF) 100 MCG/2ML SOLUTION: Performed by: NURSE ANESTHETIST, CERTIFIED REGISTERED

## 2020-11-12 PROCEDURE — 25010000002 DEXAMETHASONE PER 1 MG: Performed by: NURSE ANESTHETIST, CERTIFIED REGISTERED

## 2020-11-12 PROCEDURE — 25010000002 PROPOFOL 10 MG/ML EMULSION: Performed by: NURSE ANESTHETIST, CERTIFIED REGISTERED

## 2020-11-12 PROCEDURE — 25010000002 HYDROMORPHONE PER 4 MG: Performed by: NURSE ANESTHETIST, CERTIFIED REGISTERED

## 2020-11-12 PROCEDURE — 25010000002 ONDANSETRON PER 1 MG: Performed by: NURSE ANESTHETIST, CERTIFIED REGISTERED

## 2020-11-12 PROCEDURE — 88307 TISSUE EXAM BY PATHOLOGIST: CPT | Performed by: UROLOGY

## 2020-11-12 PROCEDURE — 25010000003 CEFAZOLIN IN DEXTROSE 2-4 GM/100ML-% SOLUTION: Performed by: UROLOGY

## 2020-11-12 PROCEDURE — 25010000002 CEFAZOLIN 1-4 GM/50ML-% SOLUTION: Performed by: UROLOGY

## 2020-11-12 PROCEDURE — 25010000002 NEOSTIGMINE PER 0.5 MG: Performed by: NURSE ANESTHETIST, CERTIFIED REGISTERED

## 2020-11-12 PROCEDURE — 25010000002 SUCCINYLCHOLINE PER 20 MG: Performed by: NURSE ANESTHETIST, CERTIFIED REGISTERED

## 2020-11-12 DEVICE — ENDOPATH ETS45 2.5MM RELOADS (VASCULAR/THIN)
Type: IMPLANTABLE DEVICE | Status: FUNCTIONAL
Brand: ENDOPATH

## 2020-11-12 DEVICE — LIGACLIP 10-M/L, 10MM ENDOSCOPIC ROTATING MULTIPLE CLIP APPLIERS
Type: IMPLANTABLE DEVICE | Status: FUNCTIONAL
Brand: LIGACLIP

## 2020-11-12 RX ORDER — DIPHENHYDRAMINE HYDROCHLORIDE 50 MG/ML
12.5 INJECTION INTRAMUSCULAR; INTRAVENOUS
Status: DISCONTINUED | OUTPATIENT
Start: 2020-11-12 | End: 2020-11-12 | Stop reason: HOSPADM

## 2020-11-12 RX ORDER — SODIUM CHLORIDE 0.9 % (FLUSH) 0.9 %
3 SYRINGE (ML) INJECTION EVERY 12 HOURS SCHEDULED
Status: DISCONTINUED | OUTPATIENT
Start: 2020-11-12 | End: 2020-11-12 | Stop reason: HOSPADM

## 2020-11-12 RX ORDER — DEXTROSE AND SODIUM CHLORIDE 5; .45 G/100ML; G/100ML
100 INJECTION, SOLUTION INTRAVENOUS CONTINUOUS
Status: DISCONTINUED | OUTPATIENT
Start: 2020-11-12 | End: 2020-11-14

## 2020-11-12 RX ORDER — HYDROCODONE BITARTRATE AND ACETAMINOPHEN 5; 325 MG/1; MG/1
2 TABLET ORAL EVERY 4 HOURS PRN
Status: DISCONTINUED | OUTPATIENT
Start: 2020-11-12 | End: 2020-11-17 | Stop reason: HOSPADM

## 2020-11-12 RX ORDER — MAGNESIUM HYDROXIDE 1200 MG/15ML
LIQUID ORAL AS NEEDED
Status: DISCONTINUED | OUTPATIENT
Start: 2020-11-12 | End: 2020-11-12 | Stop reason: HOSPADM

## 2020-11-12 RX ORDER — ROCURONIUM BROMIDE 10 MG/ML
INJECTION, SOLUTION INTRAVENOUS AS NEEDED
Status: DISCONTINUED | OUTPATIENT
Start: 2020-11-12 | End: 2020-11-12 | Stop reason: SURG

## 2020-11-12 RX ORDER — NALOXONE HCL 0.4 MG/ML
0.4 VIAL (ML) INJECTION
Status: DISCONTINUED | OUTPATIENT
Start: 2020-11-12 | End: 2020-11-17 | Stop reason: HOSPADM

## 2020-11-12 RX ORDER — LABETALOL HYDROCHLORIDE 5 MG/ML
5 INJECTION, SOLUTION INTRAVENOUS
Status: DISCONTINUED | OUTPATIENT
Start: 2020-11-12 | End: 2020-11-12 | Stop reason: HOSPADM

## 2020-11-12 RX ORDER — PROMETHAZINE HYDROCHLORIDE 12.5 MG/1
12.5 TABLET ORAL EVERY 6 HOURS PRN
Status: DISCONTINUED | OUTPATIENT
Start: 2020-11-12 | End: 2020-11-17 | Stop reason: HOSPADM

## 2020-11-12 RX ORDER — GLYCOPYRROLATE 0.2 MG/ML
INJECTION INTRAMUSCULAR; INTRAVENOUS AS NEEDED
Status: DISCONTINUED | OUTPATIENT
Start: 2020-11-12 | End: 2020-11-12 | Stop reason: SURG

## 2020-11-12 RX ORDER — LIDOCAINE HYDROCHLORIDE 10 MG/ML
0.5 INJECTION, SOLUTION EPIDURAL; INFILTRATION; INTRACAUDAL; PERINEURAL ONCE AS NEEDED
Status: DISCONTINUED | OUTPATIENT
Start: 2020-11-12 | End: 2020-11-12 | Stop reason: HOSPADM

## 2020-11-12 RX ORDER — EPHEDRINE SULFATE 50 MG/ML
5 INJECTION, SOLUTION INTRAVENOUS ONCE AS NEEDED
Status: DISCONTINUED | OUTPATIENT
Start: 2020-11-12 | End: 2020-11-12 | Stop reason: HOSPADM

## 2020-11-12 RX ORDER — FENTANYL CITRATE 50 UG/ML
50 INJECTION, SOLUTION INTRAMUSCULAR; INTRAVENOUS
Status: DISCONTINUED | OUTPATIENT
Start: 2020-11-12 | End: 2020-11-12 | Stop reason: HOSPADM

## 2020-11-12 RX ORDER — FENTANYL CITRATE 50 UG/ML
INJECTION, SOLUTION INTRAMUSCULAR; INTRAVENOUS AS NEEDED
Status: DISCONTINUED | OUTPATIENT
Start: 2020-11-12 | End: 2020-11-12 | Stop reason: SURG

## 2020-11-12 RX ORDER — DIPHENHYDRAMINE HCL 25 MG
25 CAPSULE ORAL
Status: DISCONTINUED | OUTPATIENT
Start: 2020-11-12 | End: 2020-11-12 | Stop reason: HOSPADM

## 2020-11-12 RX ORDER — FAMOTIDINE 10 MG/ML
20 INJECTION, SOLUTION INTRAVENOUS ONCE
Status: COMPLETED | OUTPATIENT
Start: 2020-11-12 | End: 2020-11-12

## 2020-11-12 RX ORDER — DEXAMETHASONE SODIUM PHOSPHATE 10 MG/ML
INJECTION INTRAMUSCULAR; INTRAVENOUS AS NEEDED
Status: DISCONTINUED | OUTPATIENT
Start: 2020-11-12 | End: 2020-11-12 | Stop reason: SURG

## 2020-11-12 RX ORDER — SUCCINYLCHOLINE CHLORIDE 20 MG/ML
INJECTION INTRAMUSCULAR; INTRAVENOUS AS NEEDED
Status: DISCONTINUED | OUTPATIENT
Start: 2020-11-12 | End: 2020-11-12 | Stop reason: SURG

## 2020-11-12 RX ORDER — NALOXONE HCL 0.4 MG/ML
0.2 VIAL (ML) INJECTION AS NEEDED
Status: DISCONTINUED | OUTPATIENT
Start: 2020-11-12 | End: 2020-11-12 | Stop reason: HOSPADM

## 2020-11-12 RX ORDER — PROPOFOL 10 MG/ML
VIAL (ML) INTRAVENOUS AS NEEDED
Status: DISCONTINUED | OUTPATIENT
Start: 2020-11-12 | End: 2020-11-12 | Stop reason: SURG

## 2020-11-12 RX ORDER — METOPROLOL SUCCINATE 25 MG/1
25 TABLET, EXTENDED RELEASE ORAL NIGHTLY
Status: DISCONTINUED | OUTPATIENT
Start: 2020-11-12 | End: 2020-11-17 | Stop reason: HOSPADM

## 2020-11-12 RX ORDER — HYDROCODONE BITARTRATE AND ACETAMINOPHEN 7.5; 325 MG/1; MG/1
1 TABLET ORAL ONCE AS NEEDED
Status: DISCONTINUED | OUTPATIENT
Start: 2020-11-12 | End: 2020-11-12 | Stop reason: HOSPADM

## 2020-11-12 RX ORDER — PROMETHAZINE HYDROCHLORIDE 12.5 MG/1
12.5 SUPPOSITORY RECTAL EVERY 6 HOURS PRN
Status: DISCONTINUED | OUTPATIENT
Start: 2020-11-12 | End: 2020-11-17 | Stop reason: HOSPADM

## 2020-11-12 RX ORDER — LEVOTHYROXINE SODIUM 88 UG/1
88 TABLET ORAL DAILY
Status: DISCONTINUED | OUTPATIENT
Start: 2020-11-12 | End: 2020-11-17 | Stop reason: HOSPADM

## 2020-11-12 RX ORDER — OXYCODONE AND ACETAMINOPHEN 7.5; 325 MG/1; MG/1
1 TABLET ORAL ONCE AS NEEDED
Status: DISCONTINUED | OUTPATIENT
Start: 2020-11-12 | End: 2020-11-12 | Stop reason: HOSPADM

## 2020-11-12 RX ORDER — BISACODYL 10 MG
10 SUPPOSITORY, RECTAL RECTAL 2 TIMES DAILY
Status: DISCONTINUED | OUTPATIENT
Start: 2020-11-12 | End: 2020-11-17 | Stop reason: HOSPADM

## 2020-11-12 RX ORDER — WOUND DRESSING ADHESIVE - LIQUID
LIQUID MISCELLANEOUS AS NEEDED
Status: DISCONTINUED | OUTPATIENT
Start: 2020-11-12 | End: 2020-11-12 | Stop reason: HOSPADM

## 2020-11-12 RX ORDER — PROMETHAZINE HYDROCHLORIDE 12.5 MG/1
12.5 TABLET ORAL EVERY 6 HOURS PRN
Status: DISCONTINUED | OUTPATIENT
Start: 2020-11-12 | End: 2020-11-12 | Stop reason: SDUPTHER

## 2020-11-12 RX ORDER — CEFAZOLIN SODIUM 1 G/50ML
1 INJECTION, SOLUTION INTRAVENOUS ONCE
Status: COMPLETED | OUTPATIENT
Start: 2020-11-12 | End: 2020-11-12

## 2020-11-12 RX ORDER — BUPIVACAINE HYDROCHLORIDE 2.5 MG/ML
INJECTION, SOLUTION INFILTRATION; PERINEURAL AS NEEDED
Status: DISCONTINUED | OUTPATIENT
Start: 2020-11-12 | End: 2020-11-12 | Stop reason: HOSPADM

## 2020-11-12 RX ORDER — SODIUM CHLORIDE, SODIUM LACTATE, POTASSIUM CHLORIDE, CALCIUM CHLORIDE 600; 310; 30; 20 MG/100ML; MG/100ML; MG/100ML; MG/100ML
9 INJECTION, SOLUTION INTRAVENOUS CONTINUOUS
Status: DISCONTINUED | OUTPATIENT
Start: 2020-11-12 | End: 2020-11-16

## 2020-11-12 RX ORDER — CEFAZOLIN SODIUM 2 G/100ML
2 INJECTION, SOLUTION INTRAVENOUS EVERY 8 HOURS
Status: COMPLETED | OUTPATIENT
Start: 2020-11-12 | End: 2020-11-12

## 2020-11-12 RX ORDER — LIDOCAINE HYDROCHLORIDE 20 MG/ML
INJECTION, SOLUTION INFILTRATION; PERINEURAL AS NEEDED
Status: DISCONTINUED | OUTPATIENT
Start: 2020-11-12 | End: 2020-11-12 | Stop reason: SURG

## 2020-11-12 RX ORDER — FLUMAZENIL 0.1 MG/ML
0.2 INJECTION INTRAVENOUS AS NEEDED
Status: DISCONTINUED | OUTPATIENT
Start: 2020-11-12 | End: 2020-11-12 | Stop reason: HOSPADM

## 2020-11-12 RX ORDER — ALBUTEROL SULFATE 2.5 MG/3ML
2.5 SOLUTION RESPIRATORY (INHALATION) ONCE AS NEEDED
Status: DISCONTINUED | OUTPATIENT
Start: 2020-11-12 | End: 2020-11-12 | Stop reason: HOSPADM

## 2020-11-12 RX ORDER — SODIUM CHLORIDE 0.9 % (FLUSH) 0.9 %
3-10 SYRINGE (ML) INJECTION AS NEEDED
Status: DISCONTINUED | OUTPATIENT
Start: 2020-11-12 | End: 2020-11-12 | Stop reason: HOSPADM

## 2020-11-12 RX ORDER — PROMETHAZINE HYDROCHLORIDE 25 MG/1
25 SUPPOSITORY RECTAL ONCE AS NEEDED
Status: DISCONTINUED | OUTPATIENT
Start: 2020-11-12 | End: 2020-11-12 | Stop reason: HOSPADM

## 2020-11-12 RX ORDER — ONDANSETRON 2 MG/ML
4 INJECTION INTRAMUSCULAR; INTRAVENOUS ONCE AS NEEDED
Status: DISCONTINUED | OUTPATIENT
Start: 2020-11-12 | End: 2020-11-12 | Stop reason: HOSPADM

## 2020-11-12 RX ORDER — MORPHINE SULFATE 2 MG/ML
2 INJECTION, SOLUTION INTRAMUSCULAR; INTRAVENOUS
Status: DISCONTINUED | OUTPATIENT
Start: 2020-11-12 | End: 2020-11-17 | Stop reason: HOSPADM

## 2020-11-12 RX ORDER — PRAVASTATIN SODIUM 40 MG
40 TABLET ORAL NIGHTLY
Status: DISCONTINUED | OUTPATIENT
Start: 2020-11-12 | End: 2020-11-17 | Stop reason: HOSPADM

## 2020-11-12 RX ORDER — ONDANSETRON 2 MG/ML
INJECTION INTRAMUSCULAR; INTRAVENOUS AS NEEDED
Status: DISCONTINUED | OUTPATIENT
Start: 2020-11-12 | End: 2020-11-12 | Stop reason: SURG

## 2020-11-12 RX ORDER — PROMETHAZINE HYDROCHLORIDE 25 MG/1
25 TABLET ORAL ONCE AS NEEDED
Status: DISCONTINUED | OUTPATIENT
Start: 2020-11-12 | End: 2020-11-12 | Stop reason: HOSPADM

## 2020-11-12 RX ORDER — SODIUM CHLORIDE 9 MG/ML
INJECTION, SOLUTION INTRAVENOUS AS NEEDED
Status: DISCONTINUED | OUTPATIENT
Start: 2020-11-12 | End: 2020-11-12 | Stop reason: HOSPADM

## 2020-11-12 RX ORDER — HYDROMORPHONE HYDROCHLORIDE 1 MG/ML
0.5 INJECTION, SOLUTION INTRAMUSCULAR; INTRAVENOUS; SUBCUTANEOUS
Status: DISCONTINUED | OUTPATIENT
Start: 2020-11-12 | End: 2020-11-12 | Stop reason: HOSPADM

## 2020-11-12 RX ADMIN — DEXTROSE AND SODIUM CHLORIDE 100 ML/HR: 5; 450 INJECTION, SOLUTION INTRAVENOUS at 15:07

## 2020-11-12 RX ADMIN — PRAVASTATIN SODIUM 40 MG: 40 TABLET ORAL at 22:29

## 2020-11-12 RX ADMIN — FAMOTIDINE 20 MG: 10 INJECTION INTRAVENOUS at 07:03

## 2020-11-12 RX ADMIN — SODIUM CHLORIDE, POTASSIUM CHLORIDE, SODIUM LACTATE AND CALCIUM CHLORIDE: 600; 310; 30; 20 INJECTION, SOLUTION INTRAVENOUS at 09:56

## 2020-11-12 RX ADMIN — HYDROMORPHONE HYDROCHLORIDE 0.5 MG: 1 INJECTION, SOLUTION INTRAMUSCULAR; INTRAVENOUS; SUBCUTANEOUS at 11:56

## 2020-11-12 RX ADMIN — CEFAZOLIN SODIUM 2 G: 2 INJECTION, SOLUTION INTRAVENOUS at 23:17

## 2020-11-12 RX ADMIN — ROCURONIUM BROMIDE 5 MG: 10 INJECTION INTRAVENOUS at 07:35

## 2020-11-12 RX ADMIN — FENTANYL CITRATE 50 MCG: 50 INJECTION, SOLUTION INTRAMUSCULAR; INTRAVENOUS at 11:30

## 2020-11-12 RX ADMIN — GLYCOPYRROLATE 0.2 MG: 0.2 INJECTION INTRAMUSCULAR; INTRAVENOUS at 07:33

## 2020-11-12 RX ADMIN — ROCURONIUM BROMIDE 45 MG: 10 INJECTION INTRAVENOUS at 07:44

## 2020-11-12 RX ADMIN — FENTANYL CITRATE 50 MCG: 50 INJECTION, SOLUTION INTRAMUSCULAR; INTRAVENOUS at 11:25

## 2020-11-12 RX ADMIN — GLYCOPYRROLATE 0.4 MG: 0.2 INJECTION INTRAMUSCULAR; INTRAVENOUS at 10:44

## 2020-11-12 RX ADMIN — CEFAZOLIN SODIUM 1 G: 1 INJECTION, SOLUTION INTRAVENOUS at 07:31

## 2020-11-12 RX ADMIN — SUCCINYLCHOLINE CHLORIDE 120 MG: 20 INJECTION, SOLUTION INTRAMUSCULAR; INTRAVENOUS; PARENTERAL at 07:35

## 2020-11-12 RX ADMIN — PROPOFOL 150 MG: 10 INJECTION, EMULSION INTRAVENOUS at 07:35

## 2020-11-12 RX ADMIN — SODIUM CHLORIDE, POTASSIUM CHLORIDE, SODIUM LACTATE AND CALCIUM CHLORIDE 9 ML/HR: 600; 310; 30; 20 INJECTION, SOLUTION INTRAVENOUS at 07:03

## 2020-11-12 RX ADMIN — NEOSTIGMINE METHYLSULFATE 2 MG: 1 INJECTION INTRAMUSCULAR; INTRAVENOUS; SUBCUTANEOUS at 10:48

## 2020-11-12 RX ADMIN — CEFAZOLIN SODIUM 2 G: 2 INJECTION, SOLUTION INTRAVENOUS at 16:22

## 2020-11-12 RX ADMIN — HYDROMORPHONE HYDROCHLORIDE 0.5 MG: 1 INJECTION, SOLUTION INTRAMUSCULAR; INTRAVENOUS; SUBCUTANEOUS at 12:17

## 2020-11-12 RX ADMIN — DEXAMETHASONE SODIUM PHOSPHATE 8 MG: 10 INJECTION INTRAMUSCULAR; INTRAVENOUS at 08:42

## 2020-11-12 RX ADMIN — METOPROLOL SUCCINATE 25 MG: 25 TABLET, EXTENDED RELEASE ORAL at 20:43

## 2020-11-12 RX ADMIN — FENTANYL CITRATE 50 MCG: 50 INJECTION INTRAMUSCULAR; INTRAVENOUS at 07:55

## 2020-11-12 RX ADMIN — ROCURONIUM BROMIDE 10 MG: 10 INJECTION INTRAVENOUS at 10:00

## 2020-11-12 RX ADMIN — LIDOCAINE HYDROCHLORIDE 80 MG: 20 INJECTION, SOLUTION INFILTRATION; PERINEURAL at 07:35

## 2020-11-12 RX ADMIN — FENTANYL CITRATE 50 MCG: 50 INJECTION INTRAMUSCULAR; INTRAVENOUS at 07:33

## 2020-11-12 RX ADMIN — ONDANSETRON HYDROCHLORIDE 4 MG: 2 SOLUTION INTRAMUSCULAR; INTRAVENOUS at 10:43

## 2020-11-12 NOTE — PLAN OF CARE
Goal Outcome Evaluation:  Plan of Care Reviewed With: patient     Outcome Summary: vss POD 0 left nephrectomy, patient state he does not like taking pain medication.  Patient tolerating activity able to get up to the chair stand by asst.  Patient is [place].  Wife at bedside.  Patient on ancef IV.  Tolerating clear  liquid diet.  Has ROBYN x1 and FC.  CTM closely

## 2020-11-12 NOTE — PLAN OF CARE
Goal Outcome Evaluation:  Plan of Care Reviewed With: patient     Outcome Summary: vss POD 0 left nephrectomy, patient state he does not like taking pain medication.  Patient tolerating activity able to get up to the chair stand by asst.  Patient [place].  Wife at bedside.  Patient on ancef IV.  Tolerating clear  liquid diet.  Has ROBYN x1 and FC.  CTM closely

## 2020-11-12 NOTE — CONSULTS
Internal Medicine Consult  INTERNAL MEDICINE   [facility]       Patient Identification:  Name: Kunal Vargas  Age: 75 y.o.  Sex: male  :  1945  MRN: [id number]                   Primary Care Physician: Chai Montesinos MD                               Requesting physician: Dr. Florentino  Date of consultation: 2020  Reason for consultation medical comanagement of hypertension and hypothyroidism in the patient who is elective left nephroureterectomy for transitional cell carcinoma.  Chief Complaint: Came in today for elective left nephroureterectomy.    History of Present Illness:   Patient is a 75-year-old male with past medical history as noted below developed hematuria few months ago resulting in referral to Dr. Florentino. Extensive  work-up and evaluation including resulted in the diagnosis of transitional cell carcinoma.  Patient was.  And was brought in today for elective left nephroureterectomy.  Patient was seen postoperatively.  Patient is currently comfortable and denies any specific complaints.  He specifically denies chest pain shortness breath nausea vomiting or diarrhea.      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)      Past Surgical History:  Past Surgical History:   Procedure Laterality Date   • CARDIAC CATHETERIZATION     • COLONOSCOPY N/A 2017    Procedure: COLONOSCOPY TO CECUM AND TI WITH POLYPECTOMY ;  Surgeon: Cachorro Hancock MD;  Location: [facility] ENDOSCOPY;  Service:    • TRANSURETHRAL RESECTION OF BLADDER TUMOR N/A 9/10/2020    Procedure: CYSTOSCOPY, LEFT URETEROSCOPY W/BIOPSY AND STENT PLACEMENT, TRANSURETHRAL RESECTION OF BLADDER TUMOR;  Surgeon: Mishel  Ángel LEAL MD;  Location: [facility];  Service: Urology;  Laterality: N/A;      Home Meds:  Medications Prior to Admission   Medication Sig Dispense Refill Last Dose   • levothyroxine (SYNTHROID, LEVOTHROID) 88 MCG tablet TAKE ONE TABLET BY MOUTH EVERY DAY (Patient taking differently: Take 88 mcg by mouth Daily.) 90 tablet 5 11/12/2020 at 0400   • metoprolol succinate XL (TOPROL-XL) 25 MG 24 hr tablet TAKE ONE TABLET BY MOUTH EVERY DAY (Patient taking differently: Take 25 mg by mouth Every Night.) 90 tablet 1 11/11/2020 at 2100   • pravastatin (PRAVACHOL) 40 MG tablet TAKE ONE TABLET BY MOUTH EVERY NIGHT AT BEDTIME (Patient taking differently: Take 40 mg by mouth Every Night.) 90 tablet 1 11/11/2020 at 2100   • HYDROcodone-acetaminophen (NORCO) 5-325 MG per tablet Take 1 tablet by mouth Every 4 (Four) Hours As Needed.   Unknown at Unknown time     Current Meds:     Current Facility-Administered Medications:   •  bisacodyl (DULCOLAX) suppository 10 mg, 10 mg, Rectal, BID, Ángel Florentino MD  •  ceFAZolin in dextrose (ANCEF) IVPB solution 2 g, 2 g, Intravenous, Q8H, Ángel Florentino MD, 2 g at 11/12/20 1622  •  dextrose 5 % and sodium chloride 0.45 % infusion, 100 mL/hr, Intravenous, Continuous, Ángel Florentino MD, Last Rate: 100 mL/hr at 11/12/20 1507, 100 mL/hr at 11/12/20 1507  •  HYDROcodone-acetaminophen (NORCO) 5-325 MG per tablet 2 tablet, 2 tablet, Oral, Q4H PRN, Ángel Florentino MD  •  lactated ringers infusion, 9 mL/hr, Intravenous, Continuous, Ángel Florentino MD, Last Rate: 9 mL/hr at 11/12/20 0703  •  levothyroxine (SYNTHROID, LEVOTHROID) tablet 88 mcg, 88 mcg, Oral, Daily, Ángel Florentino MD  •  metoprolol succinate XL (TOPROL-XL) 24 hr tablet 25 mg, 25 mg, Oral, Nightly, Ángel Florentino MD  •  morphine injection 2 mg, 2 mg, Intravenous, Q1H PRN **AND** naloxone (NARCAN) injection 0.4 mg, 0.4 mg, Intravenous, Q5 Min PRN, Ángel Florentino MD  •  pravastatin (PRAVACHOL) tablet 40 mg,  "40 mg, Oral, Nightly, Ángel Florentino MD  •  promethazine (PHENERGAN) tablet 12.5 mg, 12.5 mg, Oral, Q6H PRN **OR** promethazine (PHENERGAN) suppository 12.5 mg, 12.5 mg, Rectal, Q6H PRN, Ángel Florentino MD  Allergies:  No Known Allergies  Social History:   Social History     Tobacco Use   • Smoking status: Never Smoker   • Smokeless tobacco: Never Used   Substance Use Topics   • Alcohol use: No      Family History:  Family History   Problem Relation Age of Onset   • Rheum arthritis Mother    • Aortic aneurysm Father    • Aortic aneurysm Cousin    • Malig Hyperthermia Neg Hx           Review of Systems  See history of present illness and past medical history.   Constitutional: Remarkable for no fever or chills.  Cardiovascular: Remarkable for no chest pain or shortness of breath  GI: Remarkable for no nausea vomiting or diarrhea  : Remarkable for no burning urination frequency urgency he has currently catheter in place and has left-sided nephroureterectomy site dressed.  Musculoskeletal: Remarkable for no specific joint aches and pain  Neurological: Remarkable for no loss of consciousness or continence.    Remainder of ROS is negative.      Vitals:   /76 (BP Location: Left arm, Patient Position: Lying)   Pulse 66   Temp 97 °F (36.1 °C) (Oral)   Resp 16   Ht 175.3 cm (69\")   Wt 70.8 kg (156 lb 1.4 oz)   SpO2 100%   BMI 23.05 kg/m²   I/O:     Intake/Output Summary (Last 24 hours) at 11/12/2020 1732  Last data filed at 11/12/2020 1253  Gross per 24 hour   Intake 1400 ml   Output 585 ml   Net 815 ml     Exam:  Patient is examined using the personal protective equipment as per guidelines from infection control for this particular patient as enacted.  Hand washing was performed before and after patient interaction.  General Appearance:    Alert, cooperative, no distress, appears stated age, comfortable does not appear to be in any acute distress sitting in the recliner.   Head:    Normocephalic, " without obvious abnormality, atraumatic   Eyes:    PERRL, conjunctiva/corneas clear, EOM's intact, both eyes   Ears:    Normal external ear canals, both ears   Nose:   Nares normal, septum midline, mucosa normal, no drainage    or sinus tenderness   Throat:   Lips, tongue, gums normal; oral mucosa pink and moist   Neck:   Supple, symmetrical, trachea midline, no adenopathy;     thyroid:  no enlargement/tenderness/nodules; no carotid    bruit or JVD   Back:     Symmetric, no curvature, ROM normal, no CVA tenderness   Lungs:     Clear to auscultation bilaterally, respirations unlabored   Chest Wall:    No tenderness or deformity    Heart:   S1-S2 regular   Abdomen:    Left-sided nephroureterectomy at the site dressed   Extremities:   Extremities normal, atraumatic, no cyanosis or edema   Pulses:   Pulses palpable in all extremities; symmetric all extremities   Skin:   Skin color normal, Skin is warm and dry,  no rashes or palpable lesions   Neurologic:  Grossly nonfocal       Data Review:  Preop labs from 11/5/2020 reviewed  Microbiology Results (last 10 days)     Procedure Component Value - Date/Time    COVID PRE-OP / PRE-PROCEDURE SCREENING ORDER (NO ISOLATION) - Swab, Nasopharynx [[id number]] Collected: 11/10/20 1015    Lab Status: Final result Specimen: Swab from Nasopharynx Updated: 11/11/20 1150    Narrative:      The following orders were created for panel order COVID PRE-OP / PRE-PROCEDURE SCREENING ORDER (NO ISOLATION) - Swab, Nasopharynx.  Procedure                               Abnormality         Status                     ---------                               -----------         ------                     COVID-19,BIOTAP, NP/OP S...[[id number]]                      Final result                 Please view results for these tests on the individual orders.    COVID-19,BIOTAP, NP/OP SWAB IN TRANSPORT MEDIA OR SALINE 24-36 HR TAT - Swab, Nasopharynx [[id number]] Collected: 11/10/20 1015    Lab Status: Final  result Specimen: Swab from Nasopharynx Updated: 11/11/20 1150     SARS-CoV-2 PCR Not Detected     Comment: Nucleic acid specific to SARS-CoV-2 (COVID-19) virus was not detected in  this sample by the TaqPath (TM) COVID-19 Combo Kit.          SARS-CoV-2 (COVID-19) nucleic acid testing performed using [employer] Aptima (R) SARS-CoV-2 Assay or [employer] TaqPath (TM)  COVID-19 Combo Kit as indicated above under Emergency Use Authorization (EUA) from the FDA. Aptima (R) and TaqPath (TM) test performance  characteristics verified by [facility] in accordance with the FDAs Guidance Document (Policy for Diagnostic Tests for Coronavirus Disease-2019  during the Public Health Emergency) issued on March 16, 2020. The laboratory is regulated under CLIA as qualified to perform high-complexity testing  Unless otherwise noted, all testing was performed at [facility], CLIA No. [id number], [place] State Licensee No. 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) [C65.2] Unknown   • Status post nephrectomy - left nephrouretrectomy -11/12/2020 [Z90.5] Not Applicable   • Essential hypertension [I10] Yes   • Acquired hypothyroidism [E03.9] Yes   • Chronic coronary artery disease [I25.10] Yes       Medical decision making:  Left renal mass with history of hematuria with biopsy-proven transitional cell carcinoma of the renal pelvis-patient is status post left nephroureterectomy on 11/12/2020-management of pain, DVT prophylaxis, activity guidelines and urinary retention including decision for keeping or removing the Nagy catheter and eventual discharge disposition per primary urology service.  Coronary artery disease history of  - currently asymptomatic-monitor keep an eye on his hemodynamics.  History of hypertension-continue his beta-blockers and monitor his clinical course.  Avoid hypotensive  episodes.  Keep an eye on his renal function.  Hypothyroidism-continue thyroid replacement therapy.  Lab results performed as part of the preop evaluation shows borderline hyperkalemia-plan is to check BMP in the morning.      Swati Otto MD   11/12/2020  17:32 EST  Much of this encounter note is an electronic transcription/translation of spoken language to printed text. The electronic translation of spoken language may permit erroneous, or at times, nonsensical words or phrases to be inadvertently transcribed; Although I have reviewed the note for such errors, some may still exist

## 2020-11-12 NOTE — OP NOTE
PREOPERATIVE DIAGNOSIS:  Left renal transitional cell carcinoma.      POSTOPERATIVE DIAGNOSIS:  Left renal transitional cell carcinoma.      PROCEDURE PERFORMED:  Left nephroureterectomy.     COMPLICATIONS:  None.      CONSULTATIONS:  None.     SURGEON:  Ángel Caban MD    ASSISTANT:  DEREK Cameron    ESTIMATED BLOOD LOSS:  Minimal.     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  He presents now for nephroureterectomy.  I discussed the pros, cons, risks and complications.  He understands and wishes to proceed.     DESCRIPTION OF PROCEDURE:  The patient was brought into the operating room and placed on the operating room table.  General anesthesia was induced.  A Nagy catheter and sequential compression boots were placed.  He was placed in the lateral decubitus position. After sterile prep and drape, standard retroperitoneal trocar sites were placed.  One at the tip of the 12th rib, one over the iliac crest over the anterior iliac spine and one at the tip of the 11th rib.  The ureter was identified first, dissected free.  It was moderately adherent due to the indwelling stent.  The renal artery and vein were then identified.  The artery was triply clipped proximally and distally before division.  An Endo BEVERLY was used to transect and control the renal vein.  The specimen was dissected free with Gerota fascia and the adrenal.  Blood supply  was clipped before division.  Eventually, the kidney was completely free.  All trocars were removed.  All retroperitoneal gas was evacuated.  There was a small rent in the peritoneum so free intra-abdominal air postop should be expected.  Steri-Strips and Mastisol were applied.  After closing the fascial incisions with 0 Vicryl suture and the skin with 4-0 Vicryl suture.  Patient was placed supine, reprepped and draped.  A suprapubic skin incision was made from the umbilicus to the pubis.  The  retroperitoneal space was entered.  The specimen was delivered into the wound.  The ureter was dissected as far possibly before opening the bladder.  The bladder was then opened between opposing Babcocks.  The distal ureter and cuff of bladder were excised.  The specimen was passed off the surgical field.  The defect from the ureter was closed with a 2-0 Vicryl and 2-0 chromic suture.  The 2-0 chromic being on the mucosal layer.  The anterior cystotomy was closed in identical fashion.   A Herbert drain was brought up through a stab incision in the right lower quadrant and sutured to the skin.  It drained not only the bladder repair, but also the renal bed.  The fascia was closed with a running 4-0 Vicryl subcuticular suture.  A 2-0 silk suture was used to secure the drain.  Skin was closed with skin clips.  It should be noted the pelvis and the wound were thoroughly irrigated in the process of closing.  The patient was then awakened and taken to the recovery room in good and stable condition.  There were no complications.

## 2020-11-12 NOTE — ANESTHESIA PROCEDURE NOTES
Airway  Urgency: elective    Date/Time: 11/12/2020 7:38 AM  Airway not difficult    General Information and Staff    Patient location during procedure: OR  Anesthesiologist: Christelle Del Angel MD  CRNA: Ángel Padilla CRNA    Indications and Patient Condition  Indications for airway management: airway protection    Preoxygenated: yes  MILS maintained throughout  Mask difficulty assessment: 1 - vent by mask    Final Airway Details  Final airway type: endotracheal airway      Successful airway: ETT  Cuffed: yes   Successful intubation technique: direct laryngoscopy  Facilitating devices/methods: cricoid pressure  Endotracheal tube insertion site: oral  Blade: Jose Carlos  Blade size: 3  ETT size (mm): 8.0  Cormack-Lehane Classification: grade I - full view of glottis  Placement verified by: chest auscultation and capnometry   Inital cuff pressure (cm H2O): 22  Cuff volume (mL): 8  Measured from: lips  ETT/EBT  to lips (cm): 24  Number of attempts at approach: 2

## 2020-11-12 NOTE — ANESTHESIA PREPROCEDURE EVALUATION
Anesthesia Evaluation     Patient summary reviewed and Nursing notes reviewed   no history of anesthetic complications:  NPO Solid Status: > 8 hours  NPO Liquid Status: > 2 hours           Airway   Mallampati: II  TM distance: >3 FB  Neck ROM: full  Dental          Pulmonary    (-) COPD, asthma, shortness of breath, sleep apnea, not a smoker  Cardiovascular   Exercise tolerance: poor (<4 METS)    ECG reviewed  Patient on routine beta blocker and Beta blocker given within 24 hours of surgery    (+) hypertension well controlled less than 2 medications, CAD, hyperlipidemia,   (-) pacemaker, valvular problems/murmurs, past MI, dysrhythmias, angina, CHF, PALOMARES, cardiac stents, CABG, PVD, DVT    ROS comment: Nonobstructive CAD on cath  In 2014    Neuro/Psych  (-) seizures, TIA, CVA, weakness, numbness, dementia    ROS Comment: Hard of hearing   GI/Hepatic/Renal/Endo    (+)  GERD well controlled,  thyroid problem hypothyroidism  (-)  obesity, morbid obesity, liver disease, no renal disease, diabetes    ROS Comment: Renal mass     Musculoskeletal     (-) back pain, neck pain, neck stiffness, chronic pain  Abdominal    Substance History   (-) alcohol use, drug use     OB/GYN    (-)  Pregnant        Other        (-) blood dyscrasia, arthritis, history of cancer, autoimmune disease, chronic steroid use                  Anesthesia Plan    ASA 3     general     intravenous induction     Anesthetic plan, all risks, benefits, and alternatives have been provided, discussed and informed consent has been obtained with: patient.

## 2020-11-12 NOTE — BRIEF OP NOTE
NEPHROURETERECTOMY LAPAROSCOPIC  Progress Note    Kunal Vargas  11/12/2020    Pre-op Diagnosis:   **tcca pelvis  Post-Op Diagnosis Codes:   **same    Procedure/CPT® Codes:        Procedure(s):  LEFT LAPAROSCOPIC NEPHROURETERECTOMY    Surgeon(s):  Ángel Florentino MD    Anesthesia: General    Staff:   Circulator: Nidia Hallman RN; Pavithra Ortez RN  Scrub Person: Jaiden Baez  Assistant: Monica Sanchez PA  Orientee: Kelli Andrade RN  Assistant: Monica Sanchez PA      Estimated Blood Loss: <500ml    Urine Voided: * No values recorded between 11/12/2020  7:25 AM and 11/12/2020 10:54 AM *    Specimens:                Specimens     ID Source Type Tests Collected By Collected At Frozen?      A Kidney, Left Tissue · TISSUE PATHOLOGY EXAM   Ángel Florentino MD 11/12/20 1027 No     Description: LEFT KIDNEY AND LEFT URETER    This specimen was not marked as sent.                Drains:   Closed/Suction Drain Abdomen Bulb 19 Fr. (Active)   Dressing Status Clean;Dry;Intact 11/12/20 1053   Status To bulb suction 11/12/20 1053       Urethral Catheter Silicone;Double-lumen;Straight-tip 16 Fr. (Active)       [REMOVED] Ureteral Drain/Stent Left ureter 6 Fr. (Removed)       Findings: 0    Complications: **0    Assistant: Monica Sanchez PA  was responsible for performing the following activities: Suturing and their skilled assistance was necessary for the success of this case.    Ángel Florentino MD     Date: 11/12/2020  Time: 10:54 EST

## 2020-11-12 NOTE — ANESTHESIA POSTPROCEDURE EVALUATION
"Patient: Kunal Vargas    Procedure Summary     Date: 11/12/20 Room / Location: [facility] OR  / [facility] MAIN OR    Anesthesia Start: 0729 Anesthesia Stop: 1118    Procedure: LEFT LAPAROSCOPIC NEPHROURETERECTOMY (Left Abdomen) Diagnosis:     Surgeon: Ángel Florentino MD Provider: Christelle Del Angel MD    Anesthesia Type: general ASA Status: 3          Anesthesia Type: general    Vitals  Vitals Value Taken Time   /84 11/12/20 1215   Temp 36.4 °C (97.6 °F) 11/12/20 1113   Pulse 60 11/12/20 1145   Resp 16 11/12/20 1145   SpO2 100 % 11/12/20 1230   Vitals shown include unvalidated device data.        Post Anesthesia Care and Evaluation    Patient location during evaluation: bedside  Patient participation: complete - patient participated  Level of consciousness: awake  Pain score: 3  Pain management: adequate  Airway patency: patent  Anesthetic complications: No anesthetic complications  PONV Status: none  Cardiovascular status: acceptable  Respiratory status: acceptable  Hydration status: acceptable    Comments: /79   Pulse 60   Temp 36.4 °C (97.6 °F) (Oral)   Resp 16   Ht 175.3 cm (69\")   Wt 70.8 kg (156 lb 1.4 oz)   SpO2 100%   BMI 23.05 kg/m²       "

## 2020-11-13 LAB
ANION GAP SERPL CALCULATED.3IONS-SCNC: 5.1 MMOL/L (ref 5–15)
BASOPHILS # BLD AUTO: 0.03 10*3/MM3 (ref 0–0.2)
BASOPHILS NFR BLD AUTO: 0.3 % (ref 0–1.5)
BUN SERPL-MCNC: 21 MG/DL (ref 8–23)
BUN/CREAT SERPL: 12.4 (ref 7–25)
CALCIUM SPEC-SCNC: 8 MG/DL (ref 8.6–10.5)
CHLORIDE SERPL-SCNC: 104 MMOL/L (ref 98–107)
CO2 SERPL-SCNC: 22.9 MMOL/L (ref 22–29)
CREAT SERPL-MCNC: 1.7 MG/DL (ref 0.76–1.27)
DEPRECATED RDW RBC AUTO: 42.6 FL (ref 37–54)
EOSINOPHIL # BLD AUTO: 0 10*3/MM3 (ref 0–0.4)
EOSINOPHIL NFR BLD AUTO: 0 % (ref 0.3–6.2)
ERYTHROCYTE [DISTWIDTH] IN BLOOD BY AUTOMATED COUNT: 12.2 % (ref 12.3–15.4)
GFR SERPL CREATININE-BSD FRML MDRD: 39 ML/MIN/1.73
GLUCOSE SERPL-MCNC: 128 MG/DL (ref 65–99)
HCT VFR BLD AUTO: 31.4 % (ref 37.5–51)
HGB BLD-MCNC: 10.3 G/DL (ref 13–17.7)
IMM GRANULOCYTES # BLD AUTO: 0.05 10*3/MM3 (ref 0–0.05)
IMM GRANULOCYTES NFR BLD AUTO: 0.5 % (ref 0–0.5)
LYMPHOCYTES # BLD AUTO: 1.16 10*3/MM3 (ref 0.7–3.1)
LYMPHOCYTES NFR BLD AUTO: 11.1 % (ref 19.6–45.3)
MCH RBC QN AUTO: 31.6 PG (ref 26.6–33)
MCHC RBC AUTO-ENTMCNC: 32.8 G/DL (ref 31.5–35.7)
MCV RBC AUTO: 96.3 FL (ref 79–97)
MONOCYTES # BLD AUTO: 1.17 10*3/MM3 (ref 0.1–0.9)
MONOCYTES NFR BLD AUTO: 11.2 % (ref 5–12)
NEUTROPHILS NFR BLD AUTO: 76.9 % (ref 42.7–76)
NEUTROPHILS NFR BLD AUTO: 8.03 10*3/MM3 (ref 1.7–7)
NRBC BLD AUTO-RTO: 0 /100 WBC (ref 0–0.2)
PLATELET # BLD AUTO: 169 10*3/MM3 (ref 140–450)
PMV BLD AUTO: 10.7 FL (ref 6–12)
POTASSIUM SERPL-SCNC: 4.8 MMOL/L (ref 3.5–5.2)
RBC # BLD AUTO: 3.26 10*6/MM3 (ref 4.14–5.8)
SODIUM SERPL-SCNC: 132 MMOL/L (ref 136–145)
WBC # BLD AUTO: 10.44 10*3/MM3 (ref 3.4–10.8)

## 2020-11-13 PROCEDURE — 63710000001 DIPHENHYDRAMINE PER 50 MG: Performed by: UROLOGY

## 2020-11-13 PROCEDURE — 85025 COMPLETE CBC W/AUTO DIFF WBC: CPT | Performed by: UROLOGY

## 2020-11-13 PROCEDURE — 80048 BASIC METABOLIC PNL TOTAL CA: CPT | Performed by: UROLOGY

## 2020-11-13 RX ORDER — ACETAMINOPHEN 500 MG
1000 TABLET ORAL EVERY 8 HOURS PRN
Status: DISCONTINUED | OUTPATIENT
Start: 2020-11-13 | End: 2020-11-17 | Stop reason: HOSPADM

## 2020-11-13 RX ORDER — ACETAMINOPHEN 325 MG/1
650 TABLET ORAL EVERY 6 HOURS PRN
Status: DISCONTINUED | OUTPATIENT
Start: 2020-11-13 | End: 2020-11-17 | Stop reason: HOSPADM

## 2020-11-13 RX ORDER — DIPHENHYDRAMINE HCL 25 MG
25 CAPSULE ORAL NIGHTLY PRN
Status: DISCONTINUED | OUTPATIENT
Start: 2020-11-13 | End: 2020-11-17 | Stop reason: HOSPADM

## 2020-11-13 RX ADMIN — DEXTROSE AND SODIUM CHLORIDE 100 ML/HR: 5; 450 INJECTION, SOLUTION INTRAVENOUS at 20:05

## 2020-11-13 RX ADMIN — DIPHENHYDRAMINE HYDROCHLORIDE 25 MG: 25 CAPSULE ORAL at 22:05

## 2020-11-13 RX ADMIN — ACETAMINOPHEN 1000 MG: 500 TABLET, FILM COATED ORAL at 22:06

## 2020-11-13 RX ADMIN — BISACODYL 10 MG: 10 SUPPOSITORY RECTAL at 20:05

## 2020-11-13 RX ADMIN — DEXTROSE AND SODIUM CHLORIDE 100 ML/HR: 5; 450 INJECTION, SOLUTION INTRAVENOUS at 11:25

## 2020-11-13 RX ADMIN — METOPROLOL SUCCINATE 25 MG: 25 TABLET, EXTENDED RELEASE ORAL at 20:05

## 2020-11-13 RX ADMIN — ACETAMINOPHEN 1000 MG: 500 TABLET, FILM COATED ORAL at 14:03

## 2020-11-13 RX ADMIN — LEVOTHYROXINE SODIUM 88 MCG: 88 TABLET ORAL at 09:27

## 2020-11-13 RX ADMIN — BISACODYL 10 MG: 10 SUPPOSITORY RECTAL at 09:27

## 2020-11-13 RX ADMIN — ACETAMINOPHEN 650 MG: 325 TABLET, FILM COATED ORAL at 00:50

## 2020-11-13 RX ADMIN — DEXTROSE AND SODIUM CHLORIDE 100 ML/HR: 5; 450 INJECTION, SOLUTION INTRAVENOUS at 00:52

## 2020-11-13 RX ADMIN — PRAVASTATIN SODIUM 40 MG: 40 TABLET ORAL at 20:05

## 2020-11-13 RX ADMIN — HYDROCODONE BITARTRATE AND ACETAMINOPHEN 2 TABLET: 5; 325 TABLET ORAL at 02:42

## 2020-11-13 NOTE — PLAN OF CARE
Goal Outcome Evaluation:  Plan of Care Reviewed With: patient  Progress: no change  VSS. Pain controlled with tylenol extra strength. Nagy draining pink urine, ROBYN drain on the right. 3 lap sites on the left and midline incision closed with staples. Ambulated in the biggs twice and has sat in the chair. Suppository given. Continue to monitor.

## 2020-11-13 NOTE — PROGRESS NOTES
Discharge Planning Assessment  [facility]     Patient Name: Kunal Vargas  MRN: [id number]  Today's Date: 11/13/2020    Admit Date: 11/12/2020    Discharge Needs Assessment     Row Name 11/13/20 1242       Living Environment    Lives With  spouse    Current Living Arrangements  home/apartment/condo    Potentially Unsafe Housing Conditions  other (see comments) no concerns    Primary Care Provided by  self    Provides Primary Care For  no one    Family Caregiver if Needed  spouse    Quality of Family Relationships  helpful;involved;supportive    Able to Return to Prior Arrangements  yes       Resource/Environmental Concerns    Resource/Environmental Concerns  none    Transportation Concerns  car, none       Transition Planning    Patient/Family Anticipates Transition to  home with family    Patient/Family Anticipated Services at Transition  none    Transportation Anticipated  family or friend will provide       Discharge Needs Assessment    Readmission Within the Last 30 Days  no previous admission in last 30 days    Equipment Currently Used at Home  none    Concerns to be Addressed  no discharge needs identified;denies needs/concerns at this time    Anticipated Changes Related to Illness  none    Equipment Needed After Discharge  none        Discharge Plan     Row Name 11/13/20 1933       Plan    Plan  Home with spouse    Patient/Family in Agreement with Plan  yes    Plan Comments  CCP met with patient and patient’s spouse at bedside. CCP role explained and discharge planning discussed. Face sheet verified and IMM noted. Patient’s PCP is Dr. Daniels. Patient lives with his spouse, with 6 steps to the entrance (handrail present). Patient’s bedroom and bathroom are on the main level. Patient is independent with his ADLs and does not use DME. Patient has not used home health or been to SNF. Patient’s preferred pharmacy is [facility] in [place]. Patient has been ambulating without difficulty. Patient is to be  discharged with alford cath; CCP discussed home health; patient declined stating his wife can assist him or his granddaughter who is an RN. CCP will follow for any needs to arise. Wanda CONWAY        Continued Care and Services - Admitted Since 11/12/2020    Coordination has not been started for this encounter.         Demographic Summary     Row Name 11/13/20 1242       General Information    Admission Type  inpatient    Required Notices Provided  Important Message from Medicare    Referral Source  admission list    Reason for Consult  discharge planning    Preferred Language  English     Used During This Interaction  no        Functional Status     Row Name 11/13/20 1242       Functional Status    Usual Activity Tolerance  good    Current Activity Tolerance  good       Functional Status, IADL    Medications  independent    Meal Preparation  independent    Housekeeping  independent    Laundry  independent    Shopping  independent       Mental Status    General Appearance WDL  WDL       Mental Status Summary    Recent Changes in Mental Status/Cognitive Functioning  no changes        Psychosocial    No documentation.       Abuse/Neglect    No documentation.       Legal    No documentation.       Substance Abuse    No documentation.       Patient Forms    No documentation.           ZORAIDA Maurer

## 2020-11-13 NOTE — PROGRESS NOTES
Name: Kunal Vargas ADMIT: 2020   : 1945  PCP: Chai Montesinos MD    MRN: [id number] LOS: 1 days   AGE/SEX: 75 y.o. male  ROOM: [facility]     Subjective   Subjective   Patient is lying on the bed and is not in any major distress.  Denies nausea, vomiting abdominal pain, chest pain.       Objective   Objective   Vital Signs  Temp:  [97.1 °F (36.2 °C)-98.9 °F (37.2 °C)] 97.2 °F (36.2 °C)  Heart Rate:  [50-61] 57  Resp:  [16-18] 16  BP: (108-150)/(48-59) 140/59  SpO2:  [98 %-100 %] 100 %  on   ;   Device (Oxygen Therapy): room air  Body mass index is 23.05 kg/m².  Physical Exam   HEENT:  Atraumatic, normocephalic.  PERRLA.  Extraocular movements intact.  Conjunctivae pink.  Sclerae, no icterus.  Mucous membranes dry.  Oropharynx is  clear.  NECK:  Supple.  No JVD.  HEART:  Regular rate and rhythm.  Normal S1, S2.   LUNGS:  Fairly clear to auscultation anteriorly.  No wheezes.  No crackles.  ABDOMEN:   Soft, nontender.  Bowel sounds present.  No rebound.  No  Guarding.  His midline incision has been healing well with no drainage.  EXTREMITIES:  No cyanosis, clubbing, or edema.  Palpable pedal pulses.  NEURO:  Grossly nonfocal.  No facial asymmetry.  Good strength in all 4  extremities.  SKIN:  Warm and dry.  No evidence of rashes.      Results Review     I reviewed the patient's new clinical results.  Results from last 7 days   Lab Units 20  0544   WBC 10*3/mm3 10.44   HEMOGLOBIN g/dL 10.3*   PLATELETS 10*3/mm3 169     Results from last 7 days   Lab Units 20  0544   SODIUM mmol/L 132*   POTASSIUM mmol/L 4.8   CHLORIDE mmol/L 104   CO2 mmol/L 22.9   BUN mg/dL 21   CREATININE mg/dL 1.70*   GLUCOSE mg/dL 128*   Estimated Creatinine Clearance: 37.6 mL/min (A) (by C-G formula based on SCr of 1.7 mg/dL (H)).    Results from last 7 days   Lab Units 20  0544   CALCIUM mg/dL 8.0*       SARS-CoV-2 PCR   Date Value Ref Range Status   11/10/2020 Not Detected Not Detected Final     Comment:      Nucleic acid specific to SARS-CoV-2 (COVID-19) virus was not detected in  this sample by the TaqPath (TM) COVID-19 Combo Kit.          SARS-CoV-2 (COVID-19) nucleic acid testing performed using [employer] Aptima (R) SARS-CoV-2 Assay or Admitly TaqPath (TM)  COVID-19 Combo Kit as indicated above under Emergency Use Authorization (EUA) from the FDA. Aptima (R) and TaqPath (TM) test performance  characteristics verified by [facility] in accordance with the FDAs Guidance Document (Policy for Diagnostic Tests for Coronavirus Disease-2019  during the Public Health Emergency) issued on March 16, 2020. The laboratory is regulated under [employer] as qualified to perform high-complexity testing  Unless otherwise noted, all testing was performed at [facility], [id number], [place] State Licensee No. [id number]   09/08/2020 Not Detected  Final     Comment:     Nucleic acid specific to SARS-CoV-2 (COVID-19) virus was not detected inthis sample by the TaqPath (TM) COVID-19 Combo Kit.SARS-CoV-2 (COVID-19) nucleic acid testing performed using [employer] Aptima (R) SARS-CoV-2 Assay or Admitly TaqPath   (TM)COVID-19 Combo Kit as indicated above under Emergency Use Authorization (EUA) from the FDA. Aptima (R) and TaqPath (TM) test performancecharacteristics verified by [facility] in accordance with the FDAs Guidance Document (Policy for Diagnostic   Tests for Coronavirus Disease-2019during the Public Health Emergency) issued on March 16, 2020. The laboratory is regulated under CLIA as qualified to perform high-complexity testingUnless otherwise noted, all testing was performed at [facility],   [address]IA No[address] [id number], [place] State Licensee No. [id number]     No results found for: HGBA1C, POCGLU    FL Retrograde Pyelogram In OR  INTRAOPERATIVE PORTABLE VIEW ABDOMEN     CLINICAL INFORMATION: Post cystogram     FINDINGS: A complicating process is not identified.     Fluoroscopy time 1 minute 30 seconds 3 images      This report was finalized on 9/10/2020 10:49 AM by Dr. Rich Marie M.D.       Scheduled Medications  bisacodyl, 10 mg, Rectal, BID  levothyroxine, 88 mcg, Oral, Daily  metoprolol succinate XL, 25 mg, Oral, Nightly  pravastatin, 40 mg, Oral, Nightly    Infusions  dextrose 5 % and sodium chloride 0.45 %, 100 mL/hr, Last Rate: 100 mL/hr (11/13/20 1125)  lactated ringers, 9 mL/hr, Last Rate: 9 mL/hr (11/12/20 0703)    Diet  Diet Clear Liquid       Assessment/Plan     Active Hospital Problems    Diagnosis  POA   • **Renal mass [N28.89]  Yes   • 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 (CMS/HCC) [C65.2]  Unknown   • Status post nephrectomy - left nephrouretrectomy -11/12/2020 [Z90.5]  Not Applicable   • Essential hypertension [I10]  Yes   • Acquired hypothyroidism [E03.9]  Yes   • Chronic coronary artery disease [I25.10]  Yes      Resolved Hospital Problems   No resolved problems to display.         1. Status post left nephrectomy, today is postop day 1. Of note underwent nephrectomy secondary to transitional cell cancer.  I have encouraged patient to use incentive spirometry and participate with physical therapy.  Urology is also following.  2. Hypertension, on metoprolol and blood pressure is reasonably well controlled.    3. Hypothyroidism, on Synthroid.    4. Hyperlipidemia, on statins.  5. On SCDs for DVT prophylaxis.      Joao Ivey MD  [facility]  11/13/20  17:16 EST

## 2020-11-13 NOTE — PROGRESS NOTES
Pod one  Done well mod pain. No nvfc  Wounds ok. Nagy in place  Urine clear  Will plan dc Sunday Monday and cath out two weeks. rx left onnchart

## 2020-11-13 NOTE — PLAN OF CARE
Problem: Adult Inpatient Plan of Care  Goal: Plan of Care Review  Outcome: Ongoing, Progressing  Flowsheets  Taken 11/13/2020 0608 by Lisa Belcher, RN  Progress: no change  Outcome Summary: Pt had difficulty falling asleep, would like something ordered for sleep tonight. NORCO x1. Tylenol x1. Abx continued. Jeancarlos/rocío in place. vss     98

## 2020-11-13 NOTE — NURSING NOTE
"Falls Prevention Teach-Back Education     Kunal Vargas is a 75 y.o. male admitted to [facility] on 11/12/2020  5:41 AM. Diagnoses of Gross hematuria and Malignant neoplasm of left renal pelvis (CMS/HCC) were pertinent to this visit.    Fall Risk Assessment  [facility] High Risk Falls Assessment (If Fall score is >/=13, add the Fall Risk CPG to the care plan)   Fallen in past 6 months: 0--> No  Mental Status: 0--> no mental status change  Elimination: 0--> No elimination issues  Mobility: 2--> Requires assistance- transfer, walker, etc.  Medications: 1--> Sedatives, 1--> Laxatives  Nurses' Clinical Judgement: 5  Total Fall Risk Score: 11  Total Fall Risk Score: 11    The patient viewed the informational video on strategies to prevent falling while hospitalized entitled \"Patient Safety: Protecting Yourself in the Hospital (Part 3)\".  The patient was able to teach back at least three things that can be done to lessen the risk for falling while in the hospital.  Additionally, the patient was able to verbalize what they need to do before getting out of bed in an effort to prevent a fall.    Nurse: Kae Julien RN  "

## 2020-11-14 PROCEDURE — 63710000001 PROMETHAZINE PER 12.5 MG: Performed by: UROLOGY

## 2020-11-14 PROCEDURE — 63710000001 DIPHENHYDRAMINE PER 50 MG: Performed by: UROLOGY

## 2020-11-14 RX ORDER — CALCIUM CARBONATE 200(500)MG
2 TABLET,CHEWABLE ORAL ONCE
Status: COMPLETED | OUTPATIENT
Start: 2020-11-15 | End: 2020-11-14

## 2020-11-14 RX ORDER — SODIUM CHLORIDE 9 MG/ML
150 INJECTION, SOLUTION INTRAVENOUS CONTINUOUS
Status: DISCONTINUED | OUTPATIENT
Start: 2020-11-14 | End: 2020-11-17 | Stop reason: HOSPADM

## 2020-11-14 RX ADMIN — SODIUM CHLORIDE 125 ML/HR: 9 INJECTION, SOLUTION INTRAVENOUS at 14:39

## 2020-11-14 RX ADMIN — LEVOTHYROXINE SODIUM 88 MCG: 88 TABLET ORAL at 08:54

## 2020-11-14 RX ADMIN — DEXTROSE AND SODIUM CHLORIDE 100 ML/HR: 5; 450 INJECTION, SOLUTION INTRAVENOUS at 04:19

## 2020-11-14 RX ADMIN — ANTACID TABLETS 2 TABLET: 500 TABLET, CHEWABLE ORAL at 23:40

## 2020-11-14 RX ADMIN — ACETAMINOPHEN 1000 MG: 500 TABLET, FILM COATED ORAL at 21:24

## 2020-11-14 RX ADMIN — METOPROLOL SUCCINATE 25 MG: 25 TABLET, EXTENDED RELEASE ORAL at 20:44

## 2020-11-14 RX ADMIN — PRAVASTATIN SODIUM 40 MG: 40 TABLET ORAL at 20:44

## 2020-11-14 RX ADMIN — PROMETHAZINE HYDROCHLORIDE 12.5 MG: 12.5 TABLET ORAL at 10:24

## 2020-11-14 RX ADMIN — DIPHENHYDRAMINE HYDROCHLORIDE 25 MG: 25 CAPSULE ORAL at 21:24

## 2020-11-14 RX ADMIN — ACETAMINOPHEN 1000 MG: 500 TABLET, FILM COATED ORAL at 13:05

## 2020-11-14 NOTE — PROGRESS NOTES
POD 2 Lap left nephroureterectomy     CC: I feel pretty good    Patient sitting up in chair  Pain is controlled  Abdomen - no distention, incision c/d/i  ROBYN - serosanguinous, 20cc  Alford intact, yellow urine  Phallus wnl    AVSS  WBC 10.4  Hgb 10.3  Cr 1.7    Tolerating clears    Plan:  Ambulate  I/S  Advance to full liquid    Possible d/c Sun or Monday  Will plan home with alford

## 2020-11-14 NOTE — PLAN OF CARE
Problem: Adult Inpatient Plan of Care  Goal: Plan of Care Review  Outcome: Ongoing, Progressing  Flowsheets  Taken 11/14/2020 0420 by Lisa Belcher RN  Progress: improving  Outcome Summary: Pt slept well tonight. Pain is under control. Ambulated halls. ROBYN/Lilo in place. Incisions c/d/i. Suppository given. vss

## 2020-11-14 NOTE — PLAN OF CARE
Goal Outcome Evaluation:  Plan of Care Reviewed With: patient, spouse  Progress: improving  Outcome Summary: diet advanced to full liquids, oral phenergan given once for heartburn/nausea/belching, had bm, walked in halls, alford with pink urine, shaheed with mod output, tylenol give once for pain of 3

## 2020-11-15 ENCOUNTER — APPOINTMENT (OUTPATIENT)
Dept: GENERAL RADIOLOGY | Facility: HOSPITAL | Age: 75
End: 2020-11-15

## 2020-11-15 PROBLEM — N17.9 ACUTE KIDNEY FAILURE: Status: ACTIVE | Noted: 2020-11-15

## 2020-11-15 LAB
ANION GAP SERPL CALCULATED.3IONS-SCNC: 7.4 MMOL/L (ref 5–15)
BASOPHILS # BLD AUTO: 0.04 10*3/MM3 (ref 0–0.2)
BASOPHILS NFR BLD AUTO: 0.4 % (ref 0–1.5)
BUN SERPL-MCNC: 22 MG/DL (ref 8–23)
BUN/CREAT SERPL: 11.3 (ref 7–25)
CALCIUM SPEC-SCNC: 8.8 MG/DL (ref 8.6–10.5)
CHLORIDE SERPL-SCNC: 108 MMOL/L (ref 98–107)
CHLORIDE UR-SCNC: 136 MMOL/L
CO2 SERPL-SCNC: 23.6 MMOL/L (ref 22–29)
CREAT FLD-MCNC: 1.9 MG/DL
CREAT SERPL-MCNC: 1.94 MG/DL (ref 0.76–1.27)
CREAT UR-MCNC: 125.8 MG/DL
DEPRECATED RDW RBC AUTO: 39.8 FL (ref 37–54)
EOSINOPHIL # BLD AUTO: 0.05 10*3/MM3 (ref 0–0.4)
EOSINOPHIL NFR BLD AUTO: 0.5 % (ref 0.3–6.2)
ERYTHROCYTE [DISTWIDTH] IN BLOOD BY AUTOMATED COUNT: 11.7 % (ref 12.3–15.4)
GFR SERPL CREATININE-BSD FRML MDRD: 34 ML/MIN/1.73
GLUCOSE SERPL-MCNC: 104 MG/DL (ref 65–99)
HCT VFR BLD AUTO: 33.7 % (ref 37.5–51)
HGB BLD-MCNC: 11.6 G/DL (ref 13–17.7)
IMM GRANULOCYTES # BLD AUTO: 0.07 10*3/MM3 (ref 0–0.05)
IMM GRANULOCYTES NFR BLD AUTO: 0.7 % (ref 0–0.5)
LYMPHOCYTES # BLD AUTO: 0.97 10*3/MM3 (ref 0.7–3.1)
LYMPHOCYTES NFR BLD AUTO: 10.2 % (ref 19.6–45.3)
MCH RBC QN AUTO: 32.1 PG (ref 26.6–33)
MCHC RBC AUTO-ENTMCNC: 34.4 G/DL (ref 31.5–35.7)
MCV RBC AUTO: 93.4 FL (ref 79–97)
MONOCYTES # BLD AUTO: 0.82 10*3/MM3 (ref 0.1–0.9)
MONOCYTES NFR BLD AUTO: 8.6 % (ref 5–12)
NEUTROPHILS NFR BLD AUTO: 7.53 10*3/MM3 (ref 1.7–7)
NEUTROPHILS NFR BLD AUTO: 79.6 % (ref 42.7–76)
NRBC BLD AUTO-RTO: 0 /100 WBC (ref 0–0.2)
PLATELET # BLD AUTO: 175 10*3/MM3 (ref 140–450)
PMV BLD AUTO: 10.6 FL (ref 6–12)
POTASSIUM SERPL-SCNC: 5.4 MMOL/L (ref 3.5–5.2)
PROT UR-MCNC: 93 MG/DL
PROT/CREAT UR: 739.3 MG/G CREA (ref 0–200)
RBC # BLD AUTO: 3.61 10*6/MM3 (ref 4.14–5.8)
SODIUM SERPL-SCNC: 139 MMOL/L (ref 136–145)
SODIUM UR-SCNC: 155 MMOL/L
WBC # BLD AUTO: 9.48 10*3/MM3 (ref 3.4–10.8)

## 2020-11-15 PROCEDURE — 85025 COMPLETE CBC W/AUTO DIFF WBC: CPT | Performed by: INTERNAL MEDICINE

## 2020-11-15 PROCEDURE — 82436 ASSAY OF URINE CHLORIDE: CPT | Performed by: INTERNAL MEDICINE

## 2020-11-15 PROCEDURE — 74018 RADEX ABDOMEN 1 VIEW: CPT

## 2020-11-15 PROCEDURE — 84156 ASSAY OF PROTEIN URINE: CPT | Performed by: INTERNAL MEDICINE

## 2020-11-15 PROCEDURE — 63710000001 PROMETHAZINE PER 12.5 MG: Performed by: UROLOGY

## 2020-11-15 PROCEDURE — 63710000001 DIPHENHYDRAMINE PER 50 MG: Performed by: UROLOGY

## 2020-11-15 PROCEDURE — 80048 BASIC METABOLIC PNL TOTAL CA: CPT | Performed by: INTERNAL MEDICINE

## 2020-11-15 PROCEDURE — 84300 ASSAY OF URINE SODIUM: CPT | Performed by: INTERNAL MEDICINE

## 2020-11-15 PROCEDURE — 82570 ASSAY OF URINE CREATININE: CPT | Performed by: INTERNAL MEDICINE

## 2020-11-15 PROCEDURE — 82570 ASSAY OF URINE CREATININE: CPT | Performed by: NURSE PRACTITIONER

## 2020-11-15 RX ORDER — SODIUM POLYSTYRENE SULFONATE 15 G/60ML
15 SUSPENSION ORAL; RECTAL ONCE
Status: COMPLETED | OUTPATIENT
Start: 2020-11-15 | End: 2020-11-15

## 2020-11-15 RX ORDER — PANTOPRAZOLE SODIUM 40 MG/10ML
40 INJECTION, POWDER, LYOPHILIZED, FOR SOLUTION INTRAVENOUS
Status: DISCONTINUED | OUTPATIENT
Start: 2020-11-15 | End: 2020-11-17 | Stop reason: HOSPADM

## 2020-11-15 RX ORDER — ONDANSETRON 2 MG/ML
4 INJECTION INTRAMUSCULAR; INTRAVENOUS EVERY 6 HOURS PRN
Status: DISCONTINUED | OUTPATIENT
Start: 2020-11-15 | End: 2020-11-17 | Stop reason: HOSPADM

## 2020-11-15 RX ORDER — LACTULOSE 10 G/15ML
20 SOLUTION ORAL 2 TIMES DAILY
Status: DISCONTINUED | OUTPATIENT
Start: 2020-11-15 | End: 2020-11-17 | Stop reason: HOSPADM

## 2020-11-15 RX ADMIN — METOPROLOL SUCCINATE 25 MG: 25 TABLET, EXTENDED RELEASE ORAL at 23:05

## 2020-11-15 RX ADMIN — LEVOTHYROXINE SODIUM 88 MCG: 88 TABLET ORAL at 09:17

## 2020-11-15 RX ADMIN — PRAVASTATIN SODIUM 40 MG: 40 TABLET ORAL at 23:05

## 2020-11-15 RX ADMIN — PROMETHAZINE HYDROCHLORIDE 12.5 MG: 12.5 TABLET ORAL at 12:29

## 2020-11-15 RX ADMIN — PANTOPRAZOLE SODIUM 40 MG: 40 INJECTION, POWDER, FOR SOLUTION INTRAVENOUS at 23:04

## 2020-11-15 RX ADMIN — DIPHENHYDRAMINE HYDROCHLORIDE 25 MG: 25 CAPSULE ORAL at 23:05

## 2020-11-15 RX ADMIN — BISACODYL 10 MG: 10 SUPPOSITORY RECTAL at 03:17

## 2020-11-15 RX ADMIN — BISACODYL 10 MG: 10 SUPPOSITORY RECTAL at 23:05

## 2020-11-15 RX ADMIN — PROMETHAZINE HYDROCHLORIDE 12.5 MG: 12.5 TABLET ORAL at 03:15

## 2020-11-15 RX ADMIN — ACETAMINOPHEN 1000 MG: 500 TABLET, FILM COATED ORAL at 23:04

## 2020-11-15 RX ADMIN — SODIUM POLYSTYRENE SULFONATE 15 G: 15 SUSPENSION ORAL; RECTAL at 19:10

## 2020-11-15 RX ADMIN — LACTULOSE 20 G: 20 SOLUTION ORAL at 23:06

## 2020-11-15 RX ADMIN — BISACODYL 10 MG: 10 SUPPOSITORY RECTAL at 09:25

## 2020-11-15 NOTE — PLAN OF CARE
Problem: Adult Inpatient Plan of Care  Goal: Plan of Care Review  Outcome: Ongoing, Not Progressing  Flowsheets (Taken 11/15/2020 0446)  Progress: no change  Plan of Care Reviewed With: patient  Outcome Summary: Pt had an episode of heartburn, belching and nausea this a.m. Phenergan given & cool cloth applied. Suppository given..IVF continued. vss    1 episode of large amount of green emesis this morning.

## 2020-11-15 NOTE — PLAN OF CARE
Goal Outcome Evaluation:  Plan of Care Reviewed With: patient  Progress: no change  Complaining of not feeling well today. Patient had 1 episode of emesis - about 250cc green output today.  Phenergan tablet given.  Full liquid diet - not tolerating well but wants to have clears & full liquids for variety.  ROBYN body fluid sent for creatinine.  Ambulated halls x1. May go home with alford.  Last BM 11-14 - small liquid.  Patient states not passing gas.  +Belching.  Faint bowel sounds.

## 2020-11-15 NOTE — PROGRESS NOTES
Name: Kunal Vargas ADMIT: 2020   : 1945  PCP: Chai Montesinos MD    MRN: [id number] LOS: 3 days   AGE/SEX: 75 y.o. male  ROOM: [facility]     Subjective   Subjective   Patient feels better today.  Resolved blood in the Nagy catheter.  Positive abdominal pain..  No fever or chills.  Positive repeated nausea and vomiting.  No bowel movement for the last few days.  Positive gas.      Review of Systems  Cardiovascular/respiratory.  No chest pain/no shortness of breath/no cough/no hemoptysis     Objective   Objective   Vital Signs  Temp:  [98.6 °F (37 °C)-99.3 °F (37.4 °C)] 98.6 °F (37 °C)  Heart Rate:  [60-65] 63  Resp:  [16-18] 18  BP: (133-169)/(64-73) 169/70  SpO2:  [96 %-99 %] 98 %  on   ;   Device (Oxygen Therapy): room air    Intake/Output Summary (Last 24 hours) at 11/15/2020 1757  Last data filed at 11/15/2020 1614  Gross per 24 hour   Intake --   Output 3135 ml   Net -3135 ml     Body mass index is 23.05 kg/m².      20  0606   Weight: 70.8 kg (156 lb 1.4 oz)     Physical Exam    General.  Elderly gentleman.  Alert oriented x3 no apparent pain distress or diaphoresis normal mood and affect  Eyes.  Pupils equal round and reactive intact extraocular musculature no pallor no jaundice normal conjunctivae and lids  Oral cavity.  Moist mucous membrane.  Neck.  Supple no JVD no lymphadenopathy no thyromegaly  Cardiovascular.  Regular rate and rhythm no gallops or murmurs  Chest.  Clear to auscultation bilaterally with no added sounds  Abdomen.  Soft lax no localized tenderness.  Mild distention.  Normal bowel sounds.  Of the surgical wound of the lower abdomen with a drain draining a clear bloody fluid  .  Clear urine in the Nagy catheter.  Extremities.  No clubbing cyanosis or edema  CNS.  No acute focal neurological deficits    Results Review:      Results from last 7 days   Lab Units 11/15/20  0758 20  0544   SODIUM mmol/L 139 132*   POTASSIUM mmol/L 5.4* 4.8   CHLORIDE mmol/L  108* 104   CO2 mmol/L 23.6 22.9   BUN mg/dL 22 21   CREATININE mg/dL 1.94* 1.70*   GLUCOSE mg/dL 104* 128*   CALCIUM mg/dL 8.8 8.0*     Estimated Creatinine Clearance: 32.9 mL/min (A) (by C-G formula based on SCr of 1.94 mg/dL (H)).                            Invalid input(s):  PHOS        Invalid input(s): LDLCALC  Results from last 7 days   Lab Units 11/15/20  0758 11/13/20  0544   WBC 10*3/mm3 9.48 10.44   HEMOGLOBIN g/dL 11.6* 10.3*   HEMATOCRIT % 33.7* 31.4*   PLATELETS 10*3/mm3 175 169   MCV fL 93.4 96.3   MCH pg 32.1 31.6   MCHC g/dL 34.4 32.8   RDW % 11.7* 12.2*   RDW-SD fl 39.8 42.6   MPV fL 10.6 10.7   NEUTROPHIL % % 79.6* 76.9*   LYMPHOCYTE % % 10.2* 11.1*   MONOCYTES % % 8.6 11.2   EOSINOPHIL % % 0.5 0.0*   BASOPHIL % % 0.4 0.3   IMM GRAN % % 0.7* 0.5   NEUTROS ABS 10*3/mm3 7.53* 8.03*   LYMPHS ABS 10*3/mm3 0.97 1.16   MONOS ABS 10*3/mm3 0.82 1.17*   EOS ABS 10*3/mm3 0.05 0.00   BASOS ABS 10*3/mm3 0.04 0.03   IMMATURE GRANS (ABS) 10*3/mm3 0.07* 0.05   NRBC /100 WBC 0.0 0.0                                           Imaging:  Imaging Results (Last 24 Hours)     ** No results found for the last 24 hours. 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           · Postoperative day #3 of left nephrectomy secondary to transitional cell carcinoma.  Increasing abdominal pain.  New nausea and vomiting.  Will check KUB..  Urology following the patient.  · Post operative blood loss anemia.  Stable hemoglobin.     · Acute renal failure/hyperkalemia.  Most likely secondary to hypovolemia during surgery leading to prerenal azotemia and renal mass loss.  Worsening BUN and creatinine today.  Will increase IV fluids.  And give Kayexalate x1.  Consult nephrology.  · Hypertension.  Good control on metoprolol.  No evidence of angina or congestive heart failure continue the same.  · Hypothyroidism.  Clinically euthyroid on replacement continue the same.  · Dyslipidemia.  Currently on pravastatin.  · Nausea/vomiting/constipation.  Will check KUB.  Continue as needed Zofran and Phenergan.  Plan IV Protonix check KUB and add lactulose  · Discussed with patient and wife.      Aby Lake MD  [facility]  11/15/20  17:57 EST

## 2020-11-15 NOTE — PROGRESS NOTES
CC: I threw up last night    POD 3 Lap left nephroureterectomy      Patient resting in bed  Pain is controlled  Complains of intermittent nausea, had emesis last night  Reports small BM last night    Abdomen - no distention, incision c/d/i  ROBYN - serosanguinous, 220cc out last 24 hr  Alford intact, blood tinged  Phallus wnl     AVSS  AM bmp pending  WBC 9.4  Hgb 11.6    Plan:  Ambulate  I/S  Full liquid or clears as tolerated  ROBYN fluid for cre  Zofran     Possible d/c Monday  Will plan home with alford

## 2020-11-15 NOTE — PROGRESS NOTES
I attempted to do the consult as requested when I went to see the patient he was not in his room he was in radiology, I reviewed the chart, I ordered urine studies and will check labs early in the morning and I will do the consult early tomorrow morning.  I discussed my presence in the room with the patient's wife

## 2020-11-16 ENCOUNTER — APPOINTMENT (OUTPATIENT)
Dept: CT IMAGING | Facility: HOSPITAL | Age: 75
End: 2020-11-16

## 2020-11-16 PROBLEM — K56.7 ILEUS (HCC): Status: ACTIVE | Noted: 2020-11-16

## 2020-11-16 LAB
ALBUMIN SERPL-MCNC: 3.2 G/DL (ref 3.5–5.2)
ANION GAP SERPL CALCULATED.3IONS-SCNC: 9.3 MMOL/L (ref 5–15)
BASOPHILS # BLD AUTO: 0.02 10*3/MM3 (ref 0–0.2)
BASOPHILS NFR BLD AUTO: 0.3 % (ref 0–1.5)
BUN SERPL-MCNC: 27 MG/DL (ref 8–23)
BUN/CREAT SERPL: 14.3 (ref 7–25)
CALCIUM SPEC-SCNC: 8.7 MG/DL (ref 8.6–10.5)
CHLORIDE SERPL-SCNC: 108 MMOL/L (ref 98–107)
CO2 SERPL-SCNC: 21.7 MMOL/L (ref 22–29)
CREAT SERPL-MCNC: 1.89 MG/DL (ref 0.76–1.27)
CREAT UR-MCNC: 125.7 MG/DL
DEPRECATED RDW RBC AUTO: 41.6 FL (ref 37–54)
EOSINOPHIL # BLD AUTO: 0.14 10*3/MM3 (ref 0–0.4)
EOSINOPHIL NFR BLD AUTO: 1.8 % (ref 0.3–6.2)
ERYTHROCYTE [DISTWIDTH] IN BLOOD BY AUTOMATED COUNT: 12 % (ref 12.3–15.4)
GFR SERPL CREATININE-BSD FRML MDRD: 35 ML/MIN/1.73
GLUCOSE SERPL-MCNC: 89 MG/DL (ref 65–99)
HCT VFR BLD AUTO: 31 % (ref 37.5–51)
HGB BLD-MCNC: 10.6 G/DL (ref 13–17.7)
IMM GRANULOCYTES # BLD AUTO: 0.03 10*3/MM3 (ref 0–0.05)
IMM GRANULOCYTES NFR BLD AUTO: 0.4 % (ref 0–0.5)
LYMPHOCYTES # BLD AUTO: 1.23 10*3/MM3 (ref 0.7–3.1)
LYMPHOCYTES NFR BLD AUTO: 15.5 % (ref 19.6–45.3)
MAGNESIUM SERPL-MCNC: 2.2 MG/DL (ref 1.6–2.4)
MCH RBC QN AUTO: 33 PG (ref 26.6–33)
MCHC RBC AUTO-ENTMCNC: 34.2 G/DL (ref 31.5–35.7)
MCV RBC AUTO: 96.6 FL (ref 79–97)
MONOCYTES # BLD AUTO: 0.78 10*3/MM3 (ref 0.1–0.9)
MONOCYTES NFR BLD AUTO: 9.8 % (ref 5–12)
NEUTROPHILS NFR BLD AUTO: 5.75 10*3/MM3 (ref 1.7–7)
NEUTROPHILS NFR BLD AUTO: 72.2 % (ref 42.7–76)
NRBC BLD AUTO-RTO: 0 /100 WBC (ref 0–0.2)
PHOSPHATE SERPL-MCNC: 3.2 MG/DL (ref 2.5–4.5)
PLATELET # BLD AUTO: 181 10*3/MM3 (ref 140–450)
PMV BLD AUTO: 10.7 FL (ref 6–12)
POTASSIUM SERPL-SCNC: 4.5 MMOL/L (ref 3.5–5.2)
PROT UR-MCNC: 137 MG/DL
PROT/CREAT UR: 1089.9 MG/G CREA (ref 0–200)
RBC # BLD AUTO: 3.21 10*6/MM3 (ref 4.14–5.8)
SODIUM SERPL-SCNC: 139 MMOL/L (ref 136–145)
URATE SERPL-MCNC: 6.6 MG/DL (ref 3.4–7)
WBC # BLD AUTO: 7.95 10*3/MM3 (ref 3.4–10.8)

## 2020-11-16 PROCEDURE — 82570 ASSAY OF URINE CREATININE: CPT | Performed by: INTERNAL MEDICINE

## 2020-11-16 PROCEDURE — 80069 RENAL FUNCTION PANEL: CPT | Performed by: INTERNAL MEDICINE

## 2020-11-16 PROCEDURE — 84156 ASSAY OF PROTEIN URINE: CPT | Performed by: INTERNAL MEDICINE

## 2020-11-16 PROCEDURE — 74176 CT ABD & PELVIS W/O CONTRAST: CPT

## 2020-11-16 PROCEDURE — 84550 ASSAY OF BLOOD/URIC ACID: CPT | Performed by: INTERNAL MEDICINE

## 2020-11-16 PROCEDURE — 99221 1ST HOSP IP/OBS SF/LOW 40: CPT | Performed by: SURGERY

## 2020-11-16 PROCEDURE — 83735 ASSAY OF MAGNESIUM: CPT | Performed by: INTERNAL MEDICINE

## 2020-11-16 PROCEDURE — 85025 COMPLETE CBC W/AUTO DIFF WBC: CPT | Performed by: INTERNAL MEDICINE

## 2020-11-16 RX ADMIN — LACTULOSE 20 G: 20 SOLUTION ORAL at 20:53

## 2020-11-16 RX ADMIN — PRAVASTATIN SODIUM 40 MG: 40 TABLET ORAL at 20:54

## 2020-11-16 RX ADMIN — BISACODYL 10 MG: 10 SUPPOSITORY RECTAL at 09:38

## 2020-11-16 RX ADMIN — LACTULOSE 20 G: 20 SOLUTION ORAL at 09:38

## 2020-11-16 RX ADMIN — METOPROLOL SUCCINATE 25 MG: 25 TABLET, EXTENDED RELEASE ORAL at 20:54

## 2020-11-16 RX ADMIN — PANTOPRAZOLE SODIUM 40 MG: 40 INJECTION, POWDER, FOR SOLUTION INTRAVENOUS at 06:10

## 2020-11-16 RX ADMIN — LEVOTHYROXINE SODIUM 88 MCG: 88 TABLET ORAL at 09:39

## 2020-11-16 RX ADMIN — SODIUM CHLORIDE 150 ML/HR: 9 INJECTION, SOLUTION INTRAVENOUS at 19:47

## 2020-11-16 RX ADMIN — BISACODYL 10 MG: 10 SUPPOSITORY RECTAL at 20:54

## 2020-11-16 NOTE — PROGRESS NOTES
Pod 4  Doing well except nv. Has flatus and bm and decreased abd cramping this am.  Getting dulcolax intermittently.   vss af  abd with moderate distention wound ok  Labs stable and decreasing shaheed output  Likely has ileus. Will await ct. Cont ambulation and dulcolax.

## 2020-11-16 NOTE — CONSULTS
Referring Provider: Aby Lake MD  Reason for Consultation: Elevation patient with worsening renal function    Subjective     Chief complaint No chief complaint on file.      History of present illness:    This is a very pleasant 75-year-old  male who underwent left radical nephro ureterectomy on 11/12/2020 because of biopsy-proven transitional cell cancer in the left renal pelvis.  He had increased creatinine since the procedure, hence nephrology consult was requested.  Preoperative creatinine on 11/5/2020 was 1.05 and GFR was 69 mL/min.  Postoperatively creatinine started to rise 1.7 on the day after the procedure and it is today 1.89 very stable for the past 3 days.  He has a history of coronary artery disease, eczema, GERD, history of hepatitis in 1962, dyslipidemia and hypothyroidism, denies diabetes mellitus or hypertension, no history of lung disease.    The patient today is feeling much better he had no abdominal discomfort no nausea or vomiting, he started passing flatus, he had no dysuria.  No lightheadedness.  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)      Past Surgical History:   Procedure Laterality Date   • CARDIAC CATHETERIZATION     • COLONOSCOPY N/A 1/25/2017    Procedure: COLONOSCOPY TO CECUM AND TI WITH POLYPECTOMY ;  Surgeon: Cachorro Hancock MD;  Location: [facility] ENDOSCOPY;  Service:    • NEPHROURETERECTOMY Left 11/12/2020    Procedure: LEFT LAPAROSCOPIC NEPHROURETERECTOMY;  Surgeon: Ángel Florentino MD;  Location: [facility] OR;  Service: Urology;  Laterality: Left;   • TRANSURETHRAL RESECTION OF BLADDER TUMOR N/A 9/10/2020    Procedure: CYSTOSCOPY, LEFT URETEROSCOPY W/BIOPSY AND STENT PLACEMENT,  "TRANSURETHRAL RESECTION OF BLADDER TUMOR;  Surgeon: Ánegl Florentino MD;  Location: [facility] OR;  Service: Urology;  Laterality: N/A;     Family History   Problem Relation Age of Onset   • Rheum arthritis Mother    • Aortic aneurysm Father    • Aortic aneurysm Cousin    • Malig Hyperthermia Neg Hx        Social History     Tobacco Use   • Smoking status: Never Smoker   • Smokeless tobacco: Never Used   Substance Use Topics   • Alcohol use: No   • Drug use: Never     Medications Prior to Admission   Medication Sig Dispense Refill Last Dose   • levothyroxine (SYNTHROID, LEVOTHROID) 88 MCG tablet TAKE ONE TABLET BY MOUTH EVERY DAY (Patient taking differently: Take 88 mcg by mouth Daily.) 90 tablet 5 11/12/2020 at 0400   • metoprolol succinate XL (TOPROL-XL) 25 MG 24 hr tablet TAKE ONE TABLET BY MOUTH EVERY DAY (Patient taking differently: Take 25 mg by mouth Every Night.) 90 tablet 1 11/11/2020 at 2100   • pravastatin (PRAVACHOL) 40 MG tablet TAKE ONE TABLET BY MOUTH EVERY NIGHT AT BEDTIME (Patient taking differently: Take 40 mg by mouth Every Night.) 90 tablet 1 11/11/2020 at 2100   • HYDROcodone-acetaminophen (NORCO) 5-325 MG per tablet Take 1 tablet by mouth Every 4 (Four) Hours As Needed.   Unknown at Unknown time     Allergies:  Patient has no known allergies.    Review of Systems  14 points review of system was performed and it was negative other than what noted above in the HPI    Objective     Vital Signs  Temp:  [98.2 °F (36.8 °C)-98.7 °F (37.1 °C)] 98.3 °F (36.8 °C)  Heart Rate:  [57-65] 58  Resp:  [16-18] 16  BP: (137-169)/(58-73) 140/65    Flowsheet Rows      First Filed Value   Admission Height  175.3 cm (69\") Documented at 11/12/2020 0606   Admission Weight  70.8 kg (156 lb 1.4 oz) Documented at 11/12/2020 0606           No intake/output data recorded.  I/O last 3 completed shifts:  In: -   Out: 2565 [Urine:2070; Emesis/NG output:275; Drains:220]    Intake/Output Summary (Last 24 hours) at 11/16/2020 " 1047  Last data filed at 11/16/2020 0659  Gross per 24 hour   Intake --   Output 1365 ml   Net -1365 ml       Physical Exam:  General Appearance: alert, oriented x 3, no acute distress,   Skin: warm and dry  HEENT: pupils round and reactive to light, oral mucosa normal,   Neck: supple, no JVD, trachea midline, no carotid bruits, no cervical or supraclavicular lymphadenopathy  Lungs: CTA, unlabored breathing effort  Heart: RRR, normal S1 and S2, no S3, no rub  Abdomen: soft, tenderness associated with the surgical incision, normoactive bowel sounds  : no palpable bladder,  Extremities: no edema, cyanosis or clubbing  Neuro: normal speech and mental status, moving all extremities      Results Review:  Results for orders placed or performed during the hospital encounter of 11/12/20   Basic Metabolic Panel    Specimen: Blood   Result Value Ref Range    Glucose 128 (H) 65 - 99 mg/dL    BUN 21 8 - 23 mg/dL    Creatinine 1.70 (H) 0.76 - 1.27 mg/dL    Sodium 132 (L) 136 - 145 mmol/L    Potassium 4.8 3.5 - 5.2 mmol/L    Chloride 104 98 - 107 mmol/L    CO2 22.9 22.0 - 29.0 mmol/L    Calcium 8.0 (L) 8.6 - 10.5 mg/dL    eGFR Non African Amer 39 (L) >60 mL/min/1.73    BUN/Creatinine Ratio 12.4 7.0 - 25.0    Anion Gap 5.1 5.0 - 15.0 mmol/L   CBC Auto Differential    Specimen: Blood   Result Value Ref Range    WBC 10.44 3.40 - 10.80 10*3/mm3    RBC 3.26 (L) 4.14 - 5.80 10*6/mm3    Hemoglobin 10.3 (L) 13.0 - 17.7 g/dL    Hematocrit 31.4 (L) 37.5 - 51.0 %    MCV 96.3 79.0 - 97.0 fL    MCH 31.6 26.6 - 33.0 pg    MCHC 32.8 31.5 - 35.7 g/dL    RDW 12.2 (L) 12.3 - 15.4 %    RDW-SD 42.6 37.0 - 54.0 fl    MPV 10.7 6.0 - 12.0 fL    Platelets 169 140 - 450 10*3/mm3    Neutrophil % 76.9 (H) 42.7 - 76.0 %    Lymphocyte % 11.1 (L) 19.6 - 45.3 %    Monocyte % 11.2 5.0 - 12.0 %    Eosinophil % 0.0 (L) 0.3 - 6.2 %    Basophil % 0.3 0.0 - 1.5 %    Immature Grans % 0.5 0.0 - 0.5 %    Neutrophils, Absolute 8.03 (H) 1.70 - 7.00 10*3/mm3     Lymphocytes, Absolute 1.16 0.70 - 3.10 10*3/mm3    Monocytes, Absolute 1.17 (H) 0.10 - 0.90 10*3/mm3    Eosinophils, Absolute 0.00 0.00 - 0.40 10*3/mm3    Basophils, Absolute 0.03 0.00 - 0.20 10*3/mm3    Immature Grans, Absolute 0.05 0.00 - 0.05 10*3/mm3    nRBC 0.0 0.0 - 0.2 /100 WBC   Basic Metabolic Panel    Specimen: Blood   Result Value Ref Range    Glucose 104 (H) 65 - 99 mg/dL    BUN 22 8 - 23 mg/dL    Creatinine 1.94 (H) 0.76 - 1.27 mg/dL    Sodium 139 136 - 145 mmol/L    Potassium 5.4 (H) 3.5 - 5.2 mmol/L    Chloride 108 (H) 98 - 107 mmol/L    CO2 23.6 22.0 - 29.0 mmol/L    Calcium 8.8 8.6 - 10.5 mg/dL    eGFR Non African Amer 34 (L) >60 mL/min/1.73    BUN/Creatinine Ratio 11.3 7.0 - 25.0    Anion Gap 7.4 5.0 - 15.0 mmol/L   CBC Auto Differential    Specimen: Blood   Result Value Ref Range    WBC 9.48 3.40 - 10.80 10*3/mm3    RBC 3.61 (L) 4.14 - 5.80 10*6/mm3    Hemoglobin 11.6 (L) 13.0 - 17.7 g/dL    Hematocrit 33.7 (L) 37.5 - 51.0 %    MCV 93.4 79.0 - 97.0 fL    MCH 32.1 26.6 - 33.0 pg    MCHC 34.4 31.5 - 35.7 g/dL    RDW 11.7 (L) 12.3 - 15.4 %    RDW-SD 39.8 37.0 - 54.0 fl    MPV 10.6 6.0 - 12.0 fL    Platelets 175 140 - 450 10*3/mm3    Neutrophil % 79.6 (H) 42.7 - 76.0 %    Lymphocyte % 10.2 (L) 19.6 - 45.3 %    Monocyte % 8.6 5.0 - 12.0 %    Eosinophil % 0.5 0.3 - 6.2 %    Basophil % 0.4 0.0 - 1.5 %    Immature Grans % 0.7 (H) 0.0 - 0.5 %    Neutrophils, Absolute 7.53 (H) 1.70 - 7.00 10*3/mm3    Lymphocytes, Absolute 0.97 0.70 - 3.10 10*3/mm3    Monocytes, Absolute 0.82 0.10 - 0.90 10*3/mm3    Eosinophils, Absolute 0.05 0.00 - 0.40 10*3/mm3    Basophils, Absolute 0.04 0.00 - 0.20 10*3/mm3    Immature Grans, Absolute 0.07 (H) 0.00 - 0.05 10*3/mm3    nRBC 0.0 0.0 - 0.2 /100 WBC   Creatinine, Body Fluid - Body Fluid, Peritoneum    Specimen: Peritoneum; Body Fluid   Result Value Ref Range    Creatinine, Fluid 1.9 mg/dL   Protein / Creatinine Ratio, Urine -    Specimen: Urine   Result Value Ref Range     Protein/Creatinine Ratio, Urine 739.3 (H) 0.0 - 200.0 mg/G Crea    Creatinine, Urine 125.8 mg/dL    Total Protein, Urine 93.0 mg/dL   Sodium, Urine, Random -    Specimen: Urine   Result Value Ref Range    Sodium, Urine 155 mmol/L   Chloride, Urine, Random -    Specimen: Urine   Result Value Ref Range    Chloride, Urine 136 mmol/L   Uric Acid    Specimen: Blood   Result Value Ref Range    Uric Acid 6.6 3.4 - 7.0 mg/dL   Renal Function Panel    Specimen: Blood   Result Value Ref Range    Glucose 89 65 - 99 mg/dL    BUN 27 (H) 8 - 23 mg/dL    Creatinine 1.89 (H) 0.76 - 1.27 mg/dL    Sodium 139 136 - 145 mmol/L    Potassium 4.5 3.5 - 5.2 mmol/L    Chloride 108 (H) 98 - 107 mmol/L    CO2 21.7 (L) 22.0 - 29.0 mmol/L    Calcium 8.7 8.6 - 10.5 mg/dL    Albumin 3.20 (L) 3.50 - 5.20 g/dL    Phosphorus 3.2 2.5 - 4.5 mg/dL    Anion Gap 9.3 5.0 - 15.0 mmol/L    BUN/Creatinine Ratio 14.3 7.0 - 25.0    eGFR Non African Amer 35 (L) >60 mL/min/1.73   Magnesium    Specimen: Blood   Result Value Ref Range    Magnesium 2.2 1.6 - 2.4 mg/dL   CBC Auto Differential    Specimen: Blood   Result Value Ref Range    WBC 7.95 3.40 - 10.80 10*3/mm3    RBC 3.21 (L) 4.14 - 5.80 10*6/mm3    Hemoglobin 10.6 (L) 13.0 - 17.7 g/dL    Hematocrit 31.0 (L) 37.5 - 51.0 %    MCV 96.6 79.0 - 97.0 fL    MCH 33.0 26.6 - 33.0 pg    MCHC 34.2 31.5 - 35.7 g/dL    RDW 12.0 (L) 12.3 - 15.4 %    RDW-SD 41.6 37.0 - 54.0 fl    MPV 10.7 6.0 - 12.0 fL    Platelets 181 140 - 450 10*3/mm3    Neutrophil % 72.2 42.7 - 76.0 %    Lymphocyte % 15.5 (L) 19.6 - 45.3 %    Monocyte % 9.8 5.0 - 12.0 %    Eosinophil % 1.8 0.3 - 6.2 %    Basophil % 0.3 0.0 - 1.5 %    Immature Grans % 0.4 0.0 - 0.5 %    Neutrophils, Absolute 5.75 1.70 - 7.00 10*3/mm3    Lymphocytes, Absolute 1.23 0.70 - 3.10 10*3/mm3    Monocytes, Absolute 0.78 0.10 - 0.90 10*3/mm3    Eosinophils, Absolute 0.14 0.00 - 0.40 10*3/mm3    Basophils, Absolute 0.02 0.00 - 0.20 10*3/mm3    Immature Grans, Absolute 0.03 0.00  - 0.05 10*3/mm3    nRBC 0.0 0.0 - 0.2 /100 WBC     Imaging Results (Last 72 Hours)     Procedure Component Value Units Date/Time    CT Abdomen Pelvis Without Contrast [[id number]] Collected: 11/16/20 1039     Updated: 11/16/20 1039    Narrative:      CT ABDOMEN AND PELVIS WITHOUT IV CONTRAST     HISTORY: Nausea and vomiting; status post left radical nephrectomy  11/12/2020.     TECHNIQUE: Radiation dose reduction techniques were utilized, including  automated exposure control and exposure modulation based on body size.   3 mm images were obtained through the abdomen and pelvis without IV  contrast.      COMPARISON: CT abdomen and pelvis 01/20/2018.     FINDINGS:  Trace left pleural effusion is present.     Proximal small bowel loops are mild to moderately dilated measuring up  to 3.8 cm. Distal small bowel loops are decompressed. There is a  relative transition point within the left lower quadrant (image 108).  The colon and rectum are filled with air and a few air-fluid levels. The  appendix is unremarkable.     Cholelithiasis is present. The liver, pancreas, spleen, right adrenal  gland and right kidney have an unremarkable noncontrast CT appearance.  There is no right-sided hydronephrosis. Gallbladder seen compressed by  Nagy catheter not well visualized.     Postsurgical changes from left radical nephrectomy are present.  Ill-defined soft tissue thickening and fat stranding is present within  the operative bed. Small amount of free intraperitoneal fluid is  scattered throughout the abdomen and pelvis.     No abdominopelvic adenopathy is present by size criteria.     Colonic diverticulosis is present. Scattered free intraperitoneal air is  present throughout the abdomen and pelvis.       Impression:      1.  Mild-to-moderate distention of small bowel loops proximally with  decompression of small bowel loops distally. While findings may be  related to ileus, given the presence of a transition point within the  mid  to distal small bowel in early versus partial small bowel  obstruction would appear similar in the appropriate clinical context and  correlation with patient history is recommended. Small bowel  follow-through and/or general surgery consultation should be considered.  2.  Postsurgical changes from recent left radical nephrectomy are  present.  3.  Other findings as above.             XR Abdomen KUB [[id number]] Collected: 11/15/20 1950     Updated: 11/15/20 1955    Narrative:      PROCEDURE:  XR ABDOMEN KUB-     HISTORY: Abdominal pain, nausea, vomiting. Left kidney removed on  Thursday.     COMPARISON: KUB 01/20/2018.     FINDINGS:    2 views of the abdomen and pelvis were obtained. The lung bases are not  included in the field-of-view. There are dilated air-filled loops of  small bowel in the upper abdomen measuring up to 3.3 cm. There is also  air in multiple loops of colon. Findings could reflect obstruction or  ileus and would be better assessed with CT. There are surgical clips  projecting over the left upper abdomen. There are surgical staples  projecting over the midline pelvis. There is a left lower quadrant  surgical drain and a Nagy catheter. There is multilevel degenerative  disc disease.     This report was finalized on 11/15/2020 7:52 PM by Dr. Michelle Mclain M.D.               bisacodyl, 10 mg, Rectal, BID  lactulose, 20 g, Oral, BID  levothyroxine, 88 mcg, Oral, Daily  metoprolol succinate XL, 25 mg, Oral, Nightly  pantoprazole, 40 mg, Intravenous, Q AM  pravastatin, 40 mg, Oral, Nightly      lactated ringers, 9 mL/hr, Last Rate: 9 mL/hr (11/12/20 0703)  sodium chloride, 150 mL/hr, Last Rate: 150 mL/hr (11/15/20 1853)        Assessment/Plan   1.  Acute kidney injury on probable CKD stage II which is considered normal for the patient at his age.  Associated with the loss of renal mass after his radical left nephrectomy.  Given the patient age not certain if right kidney with hypertrophy large enough  to compensate for the loss of the left kidney.  His electrolytes and volume status are within normal range.  2.  Radical left nephrectomy for transitional cell cancer of the left renal pelvis  3.  Coronary artery disease, stable  4.  Hypothyroidism  5.  Anemia, hemoglobin today is 10.6.      Plan:  1.  I agree with the present treatment.  2.  The patient needs to follow no added salt diet and avoid all type of nonsteroidal anti-inflammatory drugs.  3.  Avoid all potential nephrotoxic agents  4.  Surveillance lab  5.  Needs follow-up as an outpatient after discharge        Thank you for asking me to see this patient in consult        I discussed the patient's findings and my recommendations with the patient    Watson Lowe MD  11/16/20  10:47 EST      Much of this encounter note is an electronic transcription/translation of spoken language to printed text. The electronic translation of spoken language may permit erroneous, or at times, nonsensical words or phrases to be inadvertently transcribed; Although I have reviewed the note for such errors, some may still exist

## 2020-11-16 NOTE — NURSING NOTE
Emesis x2, about 1000cc. KUB shows possible obstruction/illeus.. Notified [facility]. New order for CT, NPO & NG-tube placement if vomiting persists.

## 2020-11-16 NOTE — PLAN OF CARE
Problem: Adult Inpatient Plan of Care  Goal: Plan of Care Review  Outcome: Ongoing, Not Progressing  Flowsheets  Taken 11/16/2020 0413  Progress: no change  Outcome Summary: Pt did not have anymore episodes of emesis after new orders placed. CT was not able to get pt in lastnight, will get CT this A.M. Remains NPO. Slept well through-out night. IVF contiued. vss

## 2020-11-16 NOTE — PROGRESS NOTES
"    Name: Kunal Vargas ADMIT: 2020   : 1945  PCP: Chai Montesinos MD    MRN: [id number] LOS: 4 days   AGE/SEX: 75 y.o. male  ROOM: [facility]     Subjective   Subjective    Feeling \"great\".  No nausea or vomiting since yesterday evening.  Had a large soft, slightly formed and some liquid bowel movement today.  Denies pain.  Ambulating in the [place], already 3 times today and plans to walk again.  Using IS.  No additional concerns.       Objective   Objective   Vital Signs  Temp:  [98.2 °F (36.8 °C)-98.6 °F (37 °C)] 98.3 °F (36.8 °C)  Heart Rate:  [57-65] 58  Resp:  [16-18] 16  BP: (137-169)/(58-70) 140/65  SpO2:  [98 %-99 %] 98 %  on   ;   Device (Oxygen Therapy): room air  Body mass index is 23.05 kg/m².  Physical Exam  Constitutional:       General: He is not in acute distress.     Appearance: Normal appearance. He is normal weight.      Comments: Elderly-appearing   HENT:      Head: Normocephalic and atraumatic.      Nose: Nose normal.      Mouth/Throat:      Mouth: Mucous membranes are moist.   Eyes:      General:         Right eye: No discharge.         Left eye: No discharge.      Extraocular Movements: Extraocular movements intact.      Conjunctiva/sclera: Conjunctivae normal.   Neck:      Musculoskeletal: Normal range of motion and neck supple.   Cardiovascular:      Rate and Rhythm: Normal rate and regular rhythm.      Pulses: Normal pulses.      Heart sounds: Normal heart sounds.   Pulmonary:      Effort: Pulmonary effort is normal. No respiratory distress.      Breath sounds: Normal breath sounds.   Abdominal:      General: Bowel sounds are normal. There is no distension.      Palpations: Abdomen is soft.      Tenderness: There is abdominal tenderness.   Genitourinary:     Comments: Nagy catheter intact draining blood-tinged urine.  Musculoskeletal: Normal range of motion.   Skin:     General: Skin is warm and dry.      Capillary Refill: Capillary refill takes less than 2 seconds.      " Comments: Low midline abdominal incision open to air, closed with staples.  No redness draining or inflammation.    ROBYN drain to right lower quadrant, dressing intact, bulb compressed, draining serous sanguinous moderate amount.   Neurological:      General: No focal deficit present.      Mental Status: He is alert and oriented to person, place, and time.   Psychiatric:         Mood and Affect: Mood normal.         Behavior: Behavior normal.         Results Review     I reviewed the patient's new clinical results.  Results from last 7 days   Lab Units 11/16/20  0546 11/15/20  0758 11/13/20  0544   WBC 10*3/mm3 7.95 9.48 10.44   HEMOGLOBIN g/dL 10.6* 11.6* 10.3*   PLATELETS 10*3/mm3 181 175 169     Results from last 7 days   Lab Units 11/16/20  0546 11/15/20  0758 11/13/20  0544   SODIUM mmol/L 139 139 132*   POTASSIUM mmol/L 4.5 5.4* 4.8   CHLORIDE mmol/L 108* 108* 104   CO2 mmol/L 21.7* 23.6 22.9   BUN mg/dL 27* 22 21   CREATININE mg/dL 1.89* 1.94* 1.70*   GLUCOSE mg/dL 89 104* 128*   Estimated Creatinine Clearance: 33.8 mL/min (A) (by C-G formula based on SCr of 1.89 mg/dL (H)).  Results from last 7 days   Lab Units 11/16/20  0546   ALBUMIN g/dL 3.20*     Results from last 7 days   Lab Units 11/16/20  0546 11/15/20  0758 11/13/20  0544   CALCIUM mg/dL 8.7 8.8 8.0*   ALBUMIN g/dL 3.20*  --   --    MAGNESIUM mg/dL 2.2  --   --    PHOSPHORUS mg/dL 3.2  --   --        SARS-CoV-2 PCR   Date Value Ref Range Status   11/10/2020 Not Detected Not Detected Final     Comment:     Nucleic acid specific to SARS-CoV-2 (COVID-19) virus was not detected in  this sample by the TaqPath (TM) COVID-19 Combo Kit.          SARS-CoV-2 (COVID-19) nucleic acid testing performed using [employer] Aptima (R) SARS-CoV-2 Assay or [employer] TaqPath (TM)  COVID-19 Combo Kit as indicated above under Emergency Use Authorization (EUA) from the FDA. Aptima (R) and TaqPath (TM) test performance  characteristics verified by [facility] in  accordance with the FDAs Guidance Document (Policy for Diagnostic Tests for Coronavirus Disease-2019  during the Public Health Emergency) issued on March 16, 2020. The laboratory is regulated under CLIA as qualified to perform high-complexity testing  Unless otherwise noted, all testing was performed at [facility], CLIA No. [id number], [place] State Licensee No. [id number]   09/08/2020 Not Detected  Final     Comment:     Nucleic acid specific to SARS-CoV-2 (COVID-19) virus was not detected inthis sample by the TaqPath (TM) COVID-19 Combo Kit.SARS-CoV-2 (COVID-19) nucleic acid testing performed using [employer] Aptima (R) SARS-CoV-2 Assay or [employer] TaqPath   (TM)COVID-19 Combo Kit as indicated above under Emergency Use Authorization (EUA) from the FDA. Aptima (R) and TaqPath (TM) test performancecharacteristics verified by [facility] in accordance with the FDAs Guidance Document (Policy for Diagnostic   Tests for Coronavirus Disease-2019during the Public Health Emergency) issued on March 16, 2020. The laboratory is regulated under CLIA as qualified to perform high-complexity testingUnless otherwise noted, all testing was performed at [facility],   [employer] No. [id number], [place] State Licensee No. [id number]     No results found for: HGBA1C, POCGLU    CT Abdomen Pelvis Without Contrast  Narrative: CT ABDOMEN AND PELVIS WITHOUT IV CONTRAST     HISTORY: Nausea and vomiting; status post left radical nephrectomy  11/12/2020.     TECHNIQUE: Radiation dose reduction techniques were utilized, including  automated exposure control and exposure modulation based on body size.   3 mm images were obtained through the abdomen and pelvis without IV  contrast.      COMPARISON: CT abdomen and pelvis 01/20/2018.     FINDINGS:  Trace left pleural effusion is present.     Proximal small bowel loops are mild to moderately dilated measuring up  to 3.8 cm. Distal small bowel loops are decompressed. There is a  relative transition  point within the left lower quadrant (image 108).  The colon and rectum are filled with air and a few air-fluid levels. The  appendix is unremarkable.     Cholelithiasis is present. The liver, pancreas, spleen, right adrenal  gland and right kidney have an unremarkable noncontrast CT appearance.  There is no right-sided hydronephrosis. Gallbladder seen compressed by  Nagy catheter not well visualized.     Postsurgical changes from left radical nephrectomy are present.  Ill-defined soft tissue thickening and fat stranding is present within  the operative bed. Small amount of free intraperitoneal fluid is  scattered throughout the abdomen and pelvis.     No abdominopelvic adenopathy is present by size criteria.     Colonic diverticulosis is present. Scattered free intraperitoneal air is  present throughout the abdomen and pelvis.     Impression: 1.  Mild-to-moderate distention of small bowel loops proximally with  decompression of small bowel loops distally. While findings may be  related to ileus, given the presence of a transition point within the  mid to distal small bowel in early versus partial small bowel  obstruction would appear similar in the appropriate clinical context and  correlation with patient history is recommended. Small bowel  follow-through and/or general surgery consultation should be considered.  2.  Postsurgical changes from recent left radical nephrectomy are  present.  3.  Other findings as above.             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     · Possible ileus/nausea/vomiting/constipation.    KUB and CT abdomen and pelvis both show air loops and distention and possible ileus.  No additional nausea and vomiting since last night.  He had a large soft loose and formed bowel movement.  General surgery was consulted by urology.  Will hold off on diet advancement and defer to general surgery.    · Postoperative day #4 of left nephrectomy secondary to transitional cell carcinoma.    Defer postop care to Urology following the patient.  He is ambulating and doing regular pulmonary hygiene.  · Post operative blood loss anemia.  Hemoglobin stable at 10.6.    · Acute renal failure/on probable CKD stage II.  Most likely secondary to hypovolemia during surgery leading to prerenal azotemia and renal mass loss.  Creatinine improving, 1.9 today down from 1.94.  BUN slightly increased at 27.  Will continue IV fluids.    Hyperkalemia resolved, potassium now 4.5.   Low-salt diet when diet advanced.  Nephrology following.  · Hypertension.  Good control on metoprolol, BP running 130s-140s/58-73.  No evidence of angina or congestive heart failure continue the same.  · Hypothyroidism.  Clinically euthyroid on replacement continue the same.  · Dyslipidemia.  Currently on pravastatin.     PAVAN Levy  [facility]  11/16/20  11:38 EST    Patient was placed in face mask on first look.  I wore protective equipment throughout this patient encounter including a face mask, gloves and protective eyewear.  Hand hygiene was  performed before donning protective equipment and after removal when leaving the room.

## 2020-11-16 NOTE — PLAN OF CARE
Goal Outcome Evaluation:  Plan of Care Reviewed With: patient  Progress: no change       Pt is feeling much better today and has not vomited during shift. BM this morning. Ambulated in halls with RN and spouse. Bowel sounds active.  Still NPO

## 2020-11-16 NOTE — CONSULTS
Inpatient General Surgery Consult  Consult performed by: Juan M Alcantar Jr., MD  Consult ordered by: Ángel Florentino MD          Patient Care Team:  Chai Montesinos MD as PCP - General    Chief complaint: Ileus    Subjective     History of Present Illness     The patient is a very pleasant 75-year-old male who was admitted on 11/12/2020 for definitive management of left renal transitional cell carcinoma.  He underwent a left nephroureterectomy on the day of admission.  He initially did quite well until postoperative day 3 when he developed nausea and vomiting.  He began having flatus and bowel movements but continued to have nausea and vomiting.  A CT scan of the abdomen and pelvis was performed that showed dilated loops of small bowel with a transition that was felt to be suspicious for a partial small bowel obstruction.  The patient feels much better today with no nausea or vomiting and has had several bowel movements.    Review of Systems   Constitutional: Negative for fatigue and fever.   Respiratory: Negative for chest tightness and shortness of breath.    Cardiovascular: Negative for chest pain and palpitations.   Gastrointestinal: Negative for abdominal pain, blood in stool, constipation, diarrhea, nausea and vomiting.        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)    ,   Past Surgical History:   Procedure Laterality Date   • CARDIAC CATHETERIZATION     • COLONOSCOPY N/A 1/25/2017    Procedure: COLONOSCOPY TO CECUM AND TI WITH POLYPECTOMY ;  Surgeon: Cachorro Hancock MD;  Location: [facility] ENDOSCOPY;  Service:    • NEPHROURETERECTOMY Left 11/12/2020    Procedure: LEFT LAPAROSCOPIC NEPHROURETERECTOMY;  Surgeon: Ángel Florentino,  MD;  Location: [facility] OR;  Service: Urology;  Laterality: Left;   • TRANSURETHRAL RESECTION OF BLADDER TUMOR N/A 9/10/2020    Procedure: CYSTOSCOPY, LEFT URETEROSCOPY W/BIOPSY AND STENT PLACEMENT, TRANSURETHRAL RESECTION OF BLADDER TUMOR;  Surgeon: Ángel Florentino MD;  Location: [facility] OR;  Service: Urology;  Laterality: N/A;   ,   Family History   Problem Relation Age of Onset   • Rheum arthritis Mother    • Aortic aneurysm Father    • Aortic aneurysm Cousin    • Malig Hyperthermia Neg Hx    ,   Social History     Tobacco Use   • Smoking status: Never Smoker   • Smokeless tobacco: Never Used   Substance Use Topics   • Alcohol use: No   • Drug use: Never     E-cigarette/Vaping     E-cigarette/Vaping Substances     E-cigarette/Vaping Devices        and Allergies:  Patient has no known allergies.    Objective      Vital Signs  Temp:  [97 °F (36.1 °C)-98.4 °F (36.9 °C)] 97 °F (36.1 °C)  Heart Rate:  [57-65] 58  Resp:  [16-18] 16  BP: (134-149)/(57-65) 134/57    Physical Exam  Constitutional:       Appearance: Normal appearance. He is well-developed. He is not toxic-appearing.   Eyes:      General: No scleral icterus.  Pulmonary:      Effort: Pulmonary effort is normal. No respiratory distress.   Abdominal:      General: Bowel sounds are normal. There is distension (Mildly distended).      Palpations: Abdomen is soft.      Tenderness: There is no abdominal tenderness.   Skin:     General: Skin is warm and dry.   Neurological:      Mental Status: He is alert and oriented to person, place, and time.   Psychiatric:         Behavior: Behavior normal.         Thought Content: Thought content normal.         Judgment: Judgment normal.         Results Review:    I reviewed the patient's new clinical results.        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  Ileus: The patient developed nausea and vomiting on postoperative day 3 after a left nephroureterectomy.  CT scan of the abdomen and pelvis suggested a possible partial small bowel obstruction.  His clinical course is much more suggestive of an expected postop ileus that is now clinically resolved.  He will be started on a full liquid diet and this will be advanced as he tolerates.  If he is able to tolerate a diet, he should be ready for discharge to home tomorrow.    I discussed the patients findings and my recommendations with patient    Juan M Alcantar Jr., MD  11/16/20  18:56 EST

## 2020-11-17 ENCOUNTER — READMISSION MANAGEMENT (OUTPATIENT)
Dept: CALL CENTER | Facility: HOSPITAL | Age: 75
End: 2020-11-17

## 2020-11-17 VITALS
TEMPERATURE: 97.3 F | BODY MASS INDEX: 23.12 KG/M2 | SYSTOLIC BLOOD PRESSURE: 123 MMHG | WEIGHT: 156.09 LBS | OXYGEN SATURATION: 99 % | DIASTOLIC BLOOD PRESSURE: 61 MMHG | HEIGHT: 69 IN | RESPIRATION RATE: 16 BRPM | HEART RATE: 55 BPM

## 2020-11-17 LAB
ALBUMIN SERPL-MCNC: 2.7 G/DL (ref 3.5–5.2)
ANION GAP SERPL CALCULATED.3IONS-SCNC: 6 MMOL/L (ref 5–15)
BASOPHILS # BLD AUTO: 0.04 10*3/MM3 (ref 0–0.2)
BASOPHILS NFR BLD AUTO: 0.6 % (ref 0–1.5)
BUN SERPL-MCNC: 24 MG/DL (ref 8–23)
BUN/CREAT SERPL: 15.6 (ref 7–25)
CALCIUM SPEC-SCNC: 7.5 MG/DL (ref 8.6–10.5)
CHLORIDE SERPL-SCNC: 113 MMOL/L (ref 98–107)
CO2 SERPL-SCNC: 19 MMOL/L (ref 22–29)
CREAT SERPL-MCNC: 1.54 MG/DL (ref 0.76–1.27)
DEPRECATED RDW RBC AUTO: 40.4 FL (ref 37–54)
EOSINOPHIL # BLD AUTO: 0.26 10*3/MM3 (ref 0–0.4)
EOSINOPHIL NFR BLD AUTO: 4.1 % (ref 0.3–6.2)
ERYTHROCYTE [DISTWIDTH] IN BLOOD BY AUTOMATED COUNT: 11.9 % (ref 12.3–15.4)
GFR SERPL CREATININE-BSD FRML MDRD: 44 ML/MIN/1.73
GLUCOSE SERPL-MCNC: 81 MG/DL (ref 65–99)
HCT VFR BLD AUTO: 27.9 % (ref 37.5–51)
HGB BLD-MCNC: 9.3 G/DL (ref 13–17.7)
IMM GRANULOCYTES # BLD AUTO: 0.06 10*3/MM3 (ref 0–0.05)
IMM GRANULOCYTES NFR BLD AUTO: 0.9 % (ref 0–0.5)
LAB AP CASE REPORT: NORMAL
LAB AP DIAGNOSIS COMMENT: NORMAL
LAB AP SYNOPTIC CHECKLIST: NORMAL
LYMPHOCYTES # BLD AUTO: 1.14 10*3/MM3 (ref 0.7–3.1)
LYMPHOCYTES NFR BLD AUTO: 18 % (ref 19.6–45.3)
MAGNESIUM SERPL-MCNC: 1.9 MG/DL (ref 1.6–2.4)
MCH RBC QN AUTO: 31.2 PG (ref 26.6–33)
MCHC RBC AUTO-ENTMCNC: 33.3 G/DL (ref 31.5–35.7)
MCV RBC AUTO: 93.6 FL (ref 79–97)
MONOCYTES # BLD AUTO: 0.7 10*3/MM3 (ref 0.1–0.9)
MONOCYTES NFR BLD AUTO: 11.1 % (ref 5–12)
NEUTROPHILS NFR BLD AUTO: 4.12 10*3/MM3 (ref 1.7–7)
NEUTROPHILS NFR BLD AUTO: 65.3 % (ref 42.7–76)
NRBC BLD AUTO-RTO: 0 /100 WBC (ref 0–0.2)
PATH REPORT.FINAL DX SPEC: NORMAL
PATH REPORT.GROSS SPEC: NORMAL
PHOSPHATE SERPL-MCNC: 2.3 MG/DL (ref 2.5–4.5)
PLATELET # BLD AUTO: 169 10*3/MM3 (ref 140–450)
PMV BLD AUTO: 10.2 FL (ref 6–12)
POTASSIUM SERPL-SCNC: 4.1 MMOL/L (ref 3.5–5.2)
RBC # BLD AUTO: 2.98 10*6/MM3 (ref 4.14–5.8)
SODIUM SERPL-SCNC: 138 MMOL/L (ref 136–145)
URATE SERPL-MCNC: 6.4 MG/DL (ref 3.4–7)
WBC # BLD AUTO: 6.32 10*3/MM3 (ref 3.4–10.8)

## 2020-11-17 PROCEDURE — 0TT70ZZ RESECTION OF LEFT URETER, OPEN APPROACH: ICD-10-PCS | Performed by: UROLOGY

## 2020-11-17 PROCEDURE — 85025 COMPLETE CBC W/AUTO DIFF WBC: CPT | Performed by: INTERNAL MEDICINE

## 2020-11-17 PROCEDURE — 99231 SBSQ HOSP IP/OBS SF/LOW 25: CPT | Performed by: SURGERY

## 2020-11-17 PROCEDURE — 80069 RENAL FUNCTION PANEL: CPT | Performed by: INTERNAL MEDICINE

## 2020-11-17 PROCEDURE — 83735 ASSAY OF MAGNESIUM: CPT | Performed by: INTERNAL MEDICINE

## 2020-11-17 PROCEDURE — 84550 ASSAY OF BLOOD/URIC ACID: CPT | Performed by: INTERNAL MEDICINE

## 2020-11-17 PROCEDURE — 0TT10ZZ RESECTION OF LEFT KIDNEY, OPEN APPROACH: ICD-10-PCS | Performed by: UROLOGY

## 2020-11-17 RX ADMIN — BISACODYL 10 MG: 10 SUPPOSITORY RECTAL at 09:28

## 2020-11-17 RX ADMIN — PANTOPRAZOLE SODIUM 40 MG: 40 INJECTION, POWDER, FOR SOLUTION INTRAVENOUS at 05:25

## 2020-11-17 RX ADMIN — LEVOTHYROXINE SODIUM 88 MCG: 88 TABLET ORAL at 05:25

## 2020-11-17 RX ADMIN — LACTULOSE 20 G: 20 SOLUTION ORAL at 09:28

## 2020-11-17 RX ADMIN — SODIUM CHLORIDE 150 ML/HR: 9 INJECTION, SOLUTION INTRAVENOUS at 03:02

## 2020-11-17 NOTE — DISCHARGE SUMMARY
"Doing well. No nvfc. abd soft. Tolerating po ambulating well min pain urine clear    LOS: 5 days   Patient Care Team:  Chai Montesinos MD as PCP - General        Subjective     History of Present Illness    Subjective      Objective     Vital Signs  Temp:  [97 °F (36.1 °C)-98.3 °F (36.8 °C)] 97.3 °F (36.3 °C)  Heart Rate:  [55-62] 55  Resp:  [16-18] 16  BP: (123-150)/(56-70) 123/61    Objective:  General Appearance:  Comfortable.    Vital signs: (most recent): Blood pressure 123/61, pulse 55, temperature 97.3 °F (36.3 °C), temperature source Oral, resp. rate 16, height 175.3 cm (69\"), weight 70.8 kg (156 lb 1.4 oz), SpO2 99 %.  Vital signs are normal.  No fever.    Output: Producing urine and producing stool.    HEENT: Normal HEENT exam.    Lungs:  Normal effort and normal respiratory rate.    Abdomen: Abdomen is soft.              Results Review:  New clinical results reviewed.        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       Ángel Florentino MD  11/17/20  07:32 EST            "

## 2020-11-17 NOTE — PROGRESS NOTES
Case Management Discharge Note      Final Note: Home with spouse. RN to instruct regarding F/C care. No needs noted. Family to transport per private auto.         Selected Continued Care - Admitted Since 11/12/2020     Destination    No services have been selected for the patient.              Durable Medical Equipment    No services have been selected for the patient.              Dialysis/Infusion    No services have been selected for the patient.              Home Medical Care    No services have been selected for the patient.              Therapy    No services have been selected for the patient.              Community Resources    No services have been selected for the patient.                       Final Discharge Disposition Code: 01 - home or self-care

## 2020-11-17 NOTE — NURSING NOTE
Nagy bag changed to leg bag, instruction provided w/ teach back on how to maintain and change bag. Wife @ bedside. Extra supplies given.

## 2020-11-17 NOTE — NURSING NOTE
S/w Spouse Renetta Vargas regarding norco prescription. RN forgot to include w/ AVS. Spouse confirmed that they do not need the norco prescription and that it will be fine to go ahead and shred the script.

## 2020-11-17 NOTE — PLAN OF CARE
Goal Outcome Evaluation:  Plan of Care Reviewed With: patient  Progress: improving  Outcome Summary: Denies pain or Nausea tonight.  ambulated in halls with assist.  Tolerating full liq diet tonight.  Alford cath to bsd, plan is to go home with alford.  NS at 150 .  ROBYN drain with 60 output, serosang.  BM yesterday.  Lap sites intact.

## 2020-11-17 NOTE — PROGRESS NOTES
Chief Complaint:    Ileus    Subjective:    The patient is feeling well and tolerating a full liquid diet.  He is having bowel function.    Objective:    Temp:  [97 °F (36.1 °C)-98.3 °F (36.8 °C)] 97.3 °F (36.3 °C)  Heart Rate:  [55-62] 55  Resp:  [16-18] 16  BP: (123-150)/(56-70) 123/61    Physical Exam  Constitutional:       Appearance: He is not ill-appearing or toxic-appearing.   Abdominal:      Palpations: Abdomen is soft.      Tenderness: There is no abdominal tenderness.   Neurological:      Mental Status: He is alert.   Psychiatric:         Behavior: Behavior is cooperative.         Results:    WBC is 6.32.  H/H is 9.3/27.9.  BUN is 24 and creatinine is 1.54.    Assessment/Plan:    The patient has an expected postop ileus that is clinically resolving.  His diet will be advanced.  I will sign off but remain available if needed.    Juan M Alcantar Jr., M.D.

## 2020-11-17 NOTE — OUTREACH NOTE
Prep Survey      Responses   [facility] patient discharged from?  [place]   Is LACE score < 7 ?  No   Eligibility  [facility]   Date of Admission  11/12/20   Date of Discharge  11/17/20   Discharge Disposition  Home or Self Care   Discharge diagnosis  Renal mass-left lap nephroureterectomy this visit   Does the patient have one of the following disease processes/diagnoses(primary or secondary)?  General Surgery   Does the patient have Home health ordered?  No   Is there a DME ordered?  No   Prep survey completed?  Yes          Rachna James RN

## 2020-11-18 ENCOUNTER — TRANSITIONAL CARE MANAGEMENT TELEPHONE ENCOUNTER (OUTPATIENT)
Dept: CALL CENTER | Facility: HOSPITAL | Age: 75
End: 2020-11-18

## 2020-11-18 NOTE — OUTREACH NOTE
Call Center TCM Note      Responses   [facility] patient discharged from?  [place]   Does the patient have one of the following disease processes/diagnoses(primary or secondary)?  General Surgery   TCM attempt successful?  Yes   Call start time  0922   Call end time  0927   Meds reviewed with patient/caregiver?  Yes   Does the patient have all medications related to this admission filled (includes all antibiotics, pain medications, etc.)  N/A   Prescription comments  PATIENT STATES HE WAS GIVEN A PAPER PRESCRIPTION FOR HYDROCODONE, BUT HE DID NOT WANT IT, SO IT WAS SHREDDED.    Is the patient taking all medications as directed (includes completed medication regime)?  Yes   Does the patient have a follow up appointment scheduled with their surgeon?  Yes [SURGEON FOLLOW UP IS NEXT WEEK]   Has the patient kept scheduled appointments due by today?  N/A   Has home health visited the patient within 72 hours of discharge?  N/A   Did the patient receive a copy of their discharge instructions?  Yes   Nursing interventions  Reviewed instructions with patient   What is the patient's perception of their health status since discharge?  Improving   Nursing interventions  Nurse provided patient education   Is the patient /caregiver able to teach back basic post-op care?  Continue use of incentive spirometry at least 1 week post discharge, Practice 'cough and deep breath', Drive as instructed by MD in discharge instructions, Take showers only when approved by MD-sponge bathe until then, No tub bath, swimming, or hot tub until instructed by MD, Keep incision areas clean,dry and protected, Do not remove steri-strips, Lifting as instructed by MD in discharge instructions   Is the patient/caregiver able to teach back signs and symptoms of incisional infection?  Increased redness, swelling or pain at the incisonal site, Increased drainage or bleeding, Pus or odor from incision, Fever, Incisional warmth   Is the  patient/caregiver able to teach back steps to recovery at home?  Set small, achievable goals for return to baseline health, Rest and rebuild strength, gradually increase activity, Make a list of questions for surgeon's appointment   Is the patient/caregiver able to teach back the hierarchy of who to call/visit for symptoms/problems? PCP, Specialist, Home health nurse, Urgent Care, ED, 911  Yes   TCM call completed?  Yes          Alesha Sosa LPN    11/18/2020, 09:29 EST

## 2020-11-30 RX ORDER — METOPROLOL SUCCINATE 25 MG/1
TABLET, EXTENDED RELEASE ORAL
Qty: 90 TABLET | Refills: 3 | Status: SHIPPED | OUTPATIENT
Start: 2020-11-30 | End: 2021-12-07

## 2020-11-30 RX ORDER — PRAVASTATIN SODIUM 40 MG
TABLET ORAL
Qty: 90 TABLET | Refills: 3 | Status: SHIPPED | OUTPATIENT
Start: 2020-11-30 | End: 2021-12-07

## 2021-04-07 ENCOUNTER — OFFICE VISIT (OUTPATIENT)
Dept: FAMILY MEDICINE CLINIC | Facility: CLINIC | Age: 76
End: 2021-04-07

## 2021-04-07 VITALS
OXYGEN SATURATION: 98 % | SYSTOLIC BLOOD PRESSURE: 126 MMHG | HEART RATE: 57 BPM | DIASTOLIC BLOOD PRESSURE: 70 MMHG | RESPIRATION RATE: 18 BRPM | BODY MASS INDEX: 23.13 KG/M2 | HEIGHT: 69 IN | WEIGHT: 156.2 LBS | TEMPERATURE: 97.7 F

## 2021-04-07 DIAGNOSIS — E03.9 ACQUIRED HYPOTHYROIDISM: ICD-10-CM

## 2021-04-07 DIAGNOSIS — N18.30 STAGE 3 CHRONIC KIDNEY DISEASE, UNSPECIFIED WHETHER STAGE 3A OR 3B CKD (HCC): ICD-10-CM

## 2021-04-07 DIAGNOSIS — I10 ESSENTIAL HYPERTENSION: ICD-10-CM

## 2021-04-07 DIAGNOSIS — E78.5 HYPERLIPIDEMIA, UNSPECIFIED HYPERLIPIDEMIA TYPE: Primary | ICD-10-CM

## 2021-04-07 DIAGNOSIS — I25.10 CHRONIC CORONARY ARTERY DISEASE: ICD-10-CM

## 2021-04-07 PROCEDURE — 99214 OFFICE O/P EST MOD 30 MIN: CPT | Performed by: INTERNAL MEDICINE

## 2021-04-07 RX ORDER — TRIAMCINOLONE ACETONIDE 1 MG/G
CREAM TOPICAL
COMMUNITY
Start: 2021-02-04

## 2021-04-07 NOTE — PROGRESS NOTES
Subjective   Kunal Vargas is a 75 y.o. male. Patient is here today for   Chief Complaint   Patient presents with   • Hyperlipidemia     follow up           Vitals:    04/07/21 1056   BP: 126/70   Pulse: 57   Resp: 18   Temp: 97.7 °F (36.5 °C)   SpO2: 98%     Body mass index is 23.07 kg/m².      The following portions of the patient's history were reviewed and updated as appropriate: allergies, current medications, past family history, past medical history, past social history, past surgical history and problem list.    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)       No Known Allergies   Social History     Socioeconomic History   • Marital status:      Spouse name: Not on file   • Number of children: Not on file   • Years of education: Not on file   • Highest education level: Not on file   Tobacco Use   • Smoking status: Never Smoker   • Smokeless tobacco: Never Used   Substance and Sexual Activity   • Alcohol use: No   • Drug use: Never   • Sexual activity: Defer        Current Outpatient Medications:   •  levothyroxine (SYNTHROID, LEVOTHROID) 88 MCG tablet, TAKE ONE TABLET BY MOUTH EVERY DAY (Patient taking differently: Take 88 mcg by mouth Daily.), Disp: 90 tablet, Rfl: 5  •  metoprolol succinate XL (TOPROL-XL) 25 MG 24 hr tablet, TAKE 1 TABLET BY MOUTH EVERY DAY, Disp: 90 tablet, Rfl: 3  •  pravastatin (PRAVACHOL) 40 MG tablet, TAKE 1 TABLET BY MOUTH EVERY NIGHT AT BEDTIME, Disp: 90 tablet, Rfl: 3  •  triamcinolone (KENALOG) 0.1 % cream, , Disp: , Rfl:      Objective   History of Present Illness Kunal is here for blood pressure check and lab follow-up.  He has hypertension, chronic kidney disease, anemia, hyperlipidemia,  hypothyroidism, coronary artery disease.  He feels well.  He eats healthy and exercises on a regular basis.  His weight is stable.  He monitors his blood pressure reports stable readings.  He had a nephrectomy last year for renal cancer.  Kidney functions bumped up after surgery and he also had a postop anemia.  Nephrology was consulted.  He had vascular screening tests in August 2018 which showed minimal bilateral carotid artery plaque.    Review of Systems   Constitutional: Negative for activity change and unexpected weight change.   Respiratory: Negative for cough and shortness of breath.    Cardiovascular:        's   Gastrointestinal: Negative.    Genitourinary:        As in HPI   Neurological: Negative.    Psychiatric/Behavioral: Negative.        Physical Exam  Vitals reviewed.   Constitutional:       Appearance: Normal appearance.   Neck:      Vascular: No carotid bruit.   Cardiovascular:      Rate and Rhythm: Normal rate and regular rhythm.      Heart sounds: Normal heart sounds.   Pulmonary:      Effort: Pulmonary effort is normal.      Breath sounds: Normal breath sounds.   Musculoskeletal:      Right lower leg: No edema.      Left lower leg: No edema.   Neurological:      Mental Status: He is alert.   Psychiatric:         Mood and Affect: Mood normal.         Behavior: Behavior normal.         Thought Content: Thought content normal.         Judgment: Judgment normal.         ASSESSMENT     Problems Addressed this Visit        Cardiac and Vasculature    Hyperlipidemia - Primary    Chronic coronary artery disease    Essential hypertension       Endocrine and Metabolic    Acquired hypothyroidism       Genitourinary and Reproductive     Stage 3 chronic kidney disease (CMS/[facility])      Diagnoses       Codes Comments    Hyperlipidemia, unspecified hyperlipidemia type    -  Primary ICD-10-CM: E78.5  ICD-9-CM: 272.4     Essential hypertension     ICD-10-CM: I10  ICD-9-CM: 401.9     Chronic coronary artery  disease     ICD-10-CM: I25.10  ICD-9-CM: 414.00     Acquired hypothyroidism     ICD-10-CM: E03.9  ICD-9-CM: 244.9     Stage 3 chronic kidney disease, unspecified whether stage 3a or 3b CKD (CMS/HCC)     ICD-10-CM: N18.30  ICD-9-CM: 585.3           PLAN  There are no Patient Instructions on file for this visit.    Return in about 6 months (around 10/7/2021), or AWV, for labs CBC,bmp,IRON PROFILE,FERRITIN,M/C RATIO.

## 2021-04-07 NOTE — PATIENT INSTRUCTIONS
Blood pressure is well controlled.  Lipids are normal.  Creatinine is higher at 1.87.  Hemoglobin is mildly decreased at 11.5.  Thyroid-stimulating hormone level is normal.  Discussed importance of adequate hydration and avoidance of nonsteroidal anti-inflammatory agents.  Continue diet, exercise, and current medicines.  Suggest getting vascular screening test again.

## 2021-05-12 ENCOUNTER — TRANSCRIBE ORDERS (OUTPATIENT)
Dept: ADMINISTRATIVE | Facility: HOSPITAL | Age: 76
End: 2021-05-12

## 2021-05-12 DIAGNOSIS — Z13.6 ENCOUNTER FOR SCREENING FOR VASCULAR DISEASE: Primary | ICD-10-CM

## 2021-08-03 ENCOUNTER — HOSPITAL ENCOUNTER (OUTPATIENT)
Dept: CARDIOLOGY | Facility: HOSPITAL | Age: 76
Discharge: HOME | End: 2021-08-03
Admitting: SURGERY

## 2021-08-03 VITALS
DIASTOLIC BLOOD PRESSURE: 66 MMHG | WEIGHT: 151 LBS | SYSTOLIC BLOOD PRESSURE: 125 MMHG | BODY MASS INDEX: 22.88 KG/M2 | HEART RATE: 48 BPM | HEIGHT: 68 IN

## 2021-08-03 DIAGNOSIS — Z13.6 ENCOUNTER FOR SCREENING FOR VASCULAR DISEASE: ICD-10-CM

## 2021-08-03 LAB
BH CV ECHO MEAS - DIST AO DIAM: 1.57 CM
BH CV VAS BP LEFT ARM: NORMAL MMHG
BH CV VAS BP RIGHT ARM: NORMAL MMHG
BH CV XLRA MEAS - MID AO DIAM: 1.77 CM
BH CV XLRA MEAS - PAD LEFT ABI DP: 1.24
BH CV XLRA MEAS - PAD LEFT ABI PT: 1.34
BH CV XLRA MEAS - PAD LEFT ARM: 123 MMHG
BH CV XLRA MEAS - PAD LEFT LEG DP: 155 MMHG
BH CV XLRA MEAS - PAD LEFT LEG PT: 167 MMHG
BH CV XLRA MEAS - PAD RIGHT ABI DP: 1.22
BH CV XLRA MEAS - PAD RIGHT ABI PT: 1.3
BH CV XLRA MEAS - PAD RIGHT ARM: 125 MMHG
BH CV XLRA MEAS - PAD RIGHT LEG DP: 153 MMHG
BH CV XLRA MEAS - PAD RIGHT LEG PT: 163 MMHG
BH CV XLRA MEAS - PROX AO DIAM: 1.84 CM
BH CV XLRA MEAS LEFT ICA/CCA RATIO: 1.21
BH CV XLRA MEAS LEFT MID CCA PSV: NORMAL CM/SEC
BH CV XLRA MEAS LEFT MID ICA PSV: NORMAL CM/SEC
BH CV XLRA MEAS LEFT PROX ECA PSV: NORMAL CM/SEC
BH CV XLRA MEAS RIGHT ICA/CCA RATIO: 1.35
BH CV XLRA MEAS RIGHT MID CCA PSV: NORMAL CM/SEC
BH CV XLRA MEAS RIGHT MID ICA PSV: NORMAL CM/SEC
BH CV XLRA MEAS RIGHT PROX ECA PSV: NORMAL CM/SEC

## 2021-08-03 PROCEDURE — 93799 UNLISTED CV SVC/PROCEDURE: CPT

## 2021-08-13 ENCOUNTER — TELEPHONE (OUTPATIENT)
Dept: FAMILY MEDICINE CLINIC | Facility: CLINIC | Age: 76
End: 2021-08-13

## 2021-08-13 NOTE — TELEPHONE ENCOUNTER
PT IS REQUESTING A CALL BACK TO SEE IF HE NEEDS TO BE SEEN REGARDING HIS RECENT LABS FROM HIS UROLOGISTS.

## 2021-08-20 NOTE — TELEPHONE ENCOUNTER
PATIENT WOULD LIKE TO KNOW IF DR. JOE HAS LOOKED AT HIS TEST RESULTS AND WANTS HIM TO COME IN AND GO OVER THOSE. PLEASE CALL HIM BACK ASAP!

## 2021-08-24 ENCOUNTER — OFFICE VISIT (OUTPATIENT)
Dept: FAMILY MEDICINE CLINIC | Facility: CLINIC | Age: 76
End: 2021-08-24

## 2021-08-24 VITALS
SYSTOLIC BLOOD PRESSURE: 132 MMHG | RESPIRATION RATE: 18 BRPM | WEIGHT: 154 LBS | OXYGEN SATURATION: 98 % | DIASTOLIC BLOOD PRESSURE: 70 MMHG | HEART RATE: 57 BPM | BODY MASS INDEX: 23.34 KG/M2 | TEMPERATURE: 98 F | HEIGHT: 68 IN

## 2021-08-24 DIAGNOSIS — I10 ESSENTIAL HYPERTENSION: Primary | ICD-10-CM

## 2021-08-24 DIAGNOSIS — E78.5 HYPERLIPIDEMIA, UNSPECIFIED HYPERLIPIDEMIA TYPE: ICD-10-CM

## 2021-08-24 DIAGNOSIS — N18.30 STAGE 3 CHRONIC KIDNEY DISEASE, UNSPECIFIED WHETHER STAGE 3A OR 3B CKD (HCC): ICD-10-CM

## 2021-08-24 DIAGNOSIS — E03.9 ACQUIRED HYPOTHYROIDISM: ICD-10-CM

## 2021-08-24 DIAGNOSIS — I25.10 CHRONIC CORONARY ARTERY DISEASE: ICD-10-CM

## 2021-08-24 PROCEDURE — 99214 OFFICE O/P EST MOD 30 MIN: CPT | Performed by: INTERNAL MEDICINE

## 2021-08-24 NOTE — PROGRESS NOTES
Subjective   Kunal Vargas is a 76 y.o. male. Patient is here today for   Chief Complaint   Patient presents with   • Hyperlipidemia     lab fu          Vitals:    08/24/21 1508   BP: 132/70   Pulse: 57   Resp: 18   Temp: 98 °F (36.7 °C)   SpO2: 98%     Body mass index is 23.42 kg/m².    The following portions of the patient's history were reviewed and updated as appropriate: allergies, current medications, past family history, past medical history, past social history, past surgical history and problem list.    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)       No Known Allergies   Social History     Socioeconomic History   • Marital status:      Spouse name: Not on file   • Number of children: Not on file   • Years of education: Not on file   • Highest education level: Not on file   Tobacco Use   • Smoking status: Never Smoker   • Smokeless tobacco: Never Used   Substance and Sexual Activity   • Alcohol use: No   • Drug use: Never   • Sexual activity: Defer        Current Outpatient Medications:   •  levothyroxine (SYNTHROID, LEVOTHROID) 88 MCG tablet, TAKE ONE TABLET BY MOUTH EVERY DAY (Patient taking differently: Take 88 mcg by mouth Daily.), Disp: 90 tablet, Rfl: 5  •  metoprolol succinate XL (TOPROL-XL) 25 MG 24 hr tablet, TAKE 1 TABLET BY MOUTH EVERY DAY, Disp: 90 tablet, Rfl: 3  •  pravastatin (PRAVACHOL) 40 MG tablet, TAKE 1 TABLET BY MOUTH EVERY NIGHT AT BEDTIME, Disp: 90 tablet, Rfl: 3  •  triamcinolone (KENALOG) 0.1 % cream, , Disp: , Rfl:      Objective     History of Present Illness Kunal had a nephrectomy last November and now has stage III chronic kidney disease.  He also has hypothyroidism, hyperlipidemia, coronary  artery disease.  He is followed by urology and recently had labs that showed elevated BUN and creatinine.  He had normal TSH in March.  He does not drink a lot of water.  He does get lightheaded if he stands up too quick.  He does spend time working outside doing yard work at times.    Review of Systems   Constitutional: Positive for fatigue. Negative for activity change and unexpected weight change.   Respiratory: Negative.    Cardiovascular: Negative.    Gastrointestinal: Negative.    Genitourinary: Negative for difficulty urinating.   Neurological: Positive for light-headedness.   Psychiatric/Behavioral: Negative.        Physical Exam  Vitals reviewed.   Constitutional:       Appearance: Normal appearance.   Cardiovascular:      Rate and Rhythm: Normal rate and regular rhythm.      Heart sounds: Normal heart sounds.      Comments: Systolic blood pressure 140 sitting and 120 standing  Musculoskeletal:      Right lower leg: No edema.      Left lower leg: No edema.   Neurological:      Mental Status: He is alert.   Psychiatric:         Mood and Affect: Mood normal.         Behavior: Behavior normal.         Thought Content: Thought content normal.         Judgment: Judgment normal.         ASSESSMENT reviewed and discussed labs from urology.  BUN creatinine are significantly higher than when previously checked.  You have evidence of an adequate fluid intake or dehydration as her systolic blood pressure drops when you stand.  You should be drinking at least a liter and a half a day and much more if you spend any time outside in the heat.  Suggest that you hydrate 1 day and return for recheck of renal function and thyroid-stimulating hormone level.     Problems Addressed this Visit        Cardiac and Vasculature    Hyperlipidemia    Chronic coronary artery disease    Essential hypertension - Primary       Endocrine and Metabolic    Acquired hypothyroidism       Genitourinary and Reproductive     Stage 3 chronic kidney  disease (CMS/[facility])      Diagnoses       Codes Comments    Essential hypertension    -  Primary ICD-10-CM: I10  ICD-9-CM: 401.9     Hyperlipidemia, unspecified hyperlipidemia type     ICD-10-CM: E78.5  ICD-9-CM: 272.4     Chronic coronary artery disease     ICD-10-CM: I25.10  ICD-9-CM: 414.00     Acquired hypothyroidism     ICD-10-CM: E03.9  ICD-9-CM: 244.9     Stage 3 chronic kidney disease, unspecified whether stage 3a or 3b CKD (CMS/HCC)     ICD-10-CM: N18.30  ICD-9-CM: 585.3           PLAN  There are no Patient Instructions on file for this visit.  No follow-ups on file.

## 2021-08-26 DIAGNOSIS — E03.9 ACQUIRED HYPOTHYROIDISM: Primary | ICD-10-CM

## 2021-08-27 LAB
ALBUMIN SERPL-MCNC: 3.9 G/DL (ref 3.7–4.7)
BUN SERPL-MCNC: 32 MG/DL (ref 8–27)
BUN/CREAT SERPL: 16 (ref 10–24)
CALCIUM SERPL-MCNC: 8.3 MG/DL (ref 8.6–10.2)
CHLORIDE SERPL-SCNC: 103 MMOL/L (ref 96–106)
CO2 SERPL-SCNC: 19 MMOL/L (ref 20–29)
CREAT SERPL-MCNC: 1.97 MG/DL (ref 0.76–1.27)
GLUCOSE SERPL-MCNC: 84 MG/DL (ref 65–99)
PHOSPHATE SERPL-MCNC: 3.5 MG/DL (ref 2.8–4.1)
POTASSIUM SERPL-SCNC: 5.2 MMOL/L (ref 3.5–5.2)
SODIUM SERPL-SCNC: 133 MMOL/L (ref 134–144)
TSH SERPL DL<=0.005 MIU/L-ACNC: 1.98 UIU/ML (ref 0.45–4.5)

## 2021-09-07 RX ORDER — LEVOTHYROXINE SODIUM 88 UG/1
88 TABLET ORAL DAILY
Qty: 90 TABLET | Refills: 3 | Status: SHIPPED | OUTPATIENT
Start: 2021-09-07 | End: 2022-08-31

## 2021-09-18 ENCOUNTER — IMMUNIZATION (OUTPATIENT)
Dept: VACCINE CLINIC | Facility: HOSPITAL | Age: 76
End: 2021-09-18

## 2021-09-18 PROCEDURE — 0001A: CPT | Performed by: INTERNAL MEDICINE

## 2021-09-18 PROCEDURE — 91300 HC SARSCOV02 VAC 30MCG/0.3ML IM: CPT | Performed by: INTERNAL MEDICINE

## 2021-09-18 PROCEDURE — 0003A: CPT | Performed by: INTERNAL MEDICINE

## 2021-10-11 DIAGNOSIS — D64.9 ANEMIA, UNSPECIFIED TYPE: Primary | ICD-10-CM

## 2021-10-11 DIAGNOSIS — R73.03 PREDIABETES: ICD-10-CM

## 2021-10-11 DIAGNOSIS — I10 ESSENTIAL HYPERTENSION: ICD-10-CM

## 2021-10-14 LAB
ALBUMIN/CREAT UR: <9 MG/G CREAT (ref 0–29)
BASOPHILS # BLD AUTO: 0.04 10*3/MM3 (ref 0–0.2)
BASOPHILS NFR BLD AUTO: 0.8 % (ref 0–1.5)
BUN SERPL-MCNC: 26 MG/DL (ref 8–23)
BUN/CREAT SERPL: 14.7 (ref 7–25)
CALCIUM SERPL-MCNC: 8.7 MG/DL (ref 8.6–10.5)
CHLORIDE SERPL-SCNC: 108 MMOL/L (ref 98–107)
CO2 SERPL-SCNC: 23.6 MMOL/L (ref 22–29)
CREAT SERPL-MCNC: 1.77 MG/DL (ref 0.76–1.27)
CREAT UR-MCNC: 33.9 MG/DL
EOSINOPHIL # BLD AUTO: 0.26 10*3/MM3 (ref 0–0.4)
EOSINOPHIL NFR BLD AUTO: 5.3 % (ref 0.3–6.2)
ERYTHROCYTE [DISTWIDTH] IN BLOOD BY AUTOMATED COUNT: 12.5 % (ref 12.3–15.4)
FERRITIN SERPL-MCNC: 197 NG/ML (ref 30–400)
GLUCOSE SERPL-MCNC: 91 MG/DL (ref 65–99)
HCT VFR BLD AUTO: 35.1 % (ref 37.5–51)
HGB BLD-MCNC: 11.1 G/DL (ref 13–17.7)
IMM GRANULOCYTES # BLD AUTO: 0.02 10*3/MM3 (ref 0–0.05)
IMM GRANULOCYTES NFR BLD AUTO: 0.4 % (ref 0–0.5)
IRON SATN MFR SERPL: 28 % (ref 20–50)
IRON SERPL-MCNC: 79 MCG/DL (ref 59–158)
LYMPHOCYTES # BLD AUTO: 1.32 10*3/MM3 (ref 0.7–3.1)
LYMPHOCYTES NFR BLD AUTO: 26.9 % (ref 19.6–45.3)
MCH RBC QN AUTO: 31.7 PG (ref 26.6–33)
MCHC RBC AUTO-ENTMCNC: 31.6 G/DL (ref 31.5–35.7)
MCV RBC AUTO: 100.3 FL (ref 79–97)
MICROALBUMIN UR-MCNC: <3 UG/ML
MONOCYTES # BLD AUTO: 0.53 10*3/MM3 (ref 0.1–0.9)
MONOCYTES NFR BLD AUTO: 10.8 % (ref 5–12)
NEUTROPHILS # BLD AUTO: 2.73 10*3/MM3 (ref 1.7–7)
NEUTROPHILS NFR BLD AUTO: 55.8 % (ref 42.7–76)
NRBC BLD AUTO-RTO: 0 /100 WBC (ref 0–0.2)
PLATELET # BLD AUTO: 182 10*3/MM3 (ref 140–450)
POTASSIUM SERPL-SCNC: 5.2 MMOL/L (ref 3.5–5.2)
RBC # BLD AUTO: 3.5 10*6/MM3 (ref 4.14–5.8)
SODIUM SERPL-SCNC: 138 MMOL/L (ref 136–145)
TIBC SERPL-MCNC: 282 MCG/DL
UIBC SERPL-MCNC: 203 MCG/DL (ref 112–346)
WBC # BLD AUTO: 4.9 10*3/MM3 (ref 3.4–10.8)

## 2021-10-21 ENCOUNTER — OFFICE VISIT (OUTPATIENT)
Dept: FAMILY MEDICINE CLINIC | Facility: CLINIC | Age: 76
End: 2021-10-21

## 2021-10-21 VITALS
TEMPERATURE: 97.5 F | HEIGHT: 68 IN | DIASTOLIC BLOOD PRESSURE: 52 MMHG | BODY MASS INDEX: 23.7 KG/M2 | SYSTOLIC BLOOD PRESSURE: 112 MMHG | HEART RATE: 56 BPM | WEIGHT: 156.4 LBS | RESPIRATION RATE: 18 BRPM | OXYGEN SATURATION: 98 %

## 2021-10-21 DIAGNOSIS — E78.5 HYPERLIPIDEMIA, UNSPECIFIED HYPERLIPIDEMIA TYPE: ICD-10-CM

## 2021-10-21 DIAGNOSIS — I10 ESSENTIAL HYPERTENSION: Primary | ICD-10-CM

## 2021-10-21 DIAGNOSIS — Z12.5 PROSTATE CANCER SCREENING: ICD-10-CM

## 2021-10-21 DIAGNOSIS — I25.10 CHRONIC CORONARY ARTERY DISEASE: ICD-10-CM

## 2021-10-21 DIAGNOSIS — N18.30 STAGE 3 CHRONIC KIDNEY DISEASE, UNSPECIFIED WHETHER STAGE 3A OR 3B CKD (HCC): ICD-10-CM

## 2021-10-21 DIAGNOSIS — E03.9 ACQUIRED HYPOTHYROIDISM: ICD-10-CM

## 2021-10-21 PROCEDURE — G0439 PPPS, SUBSEQ VISIT: HCPCS | Performed by: INTERNAL MEDICINE

## 2021-10-21 PROCEDURE — 1125F AMNT PAIN NOTED PAIN PRSNT: CPT | Performed by: INTERNAL MEDICINE

## 2021-10-21 PROCEDURE — 99214 OFFICE O/P EST MOD 30 MIN: CPT | Performed by: INTERNAL MEDICINE

## 2021-10-21 PROCEDURE — 1160F RVW MEDS BY RX/DR IN RCRD: CPT | Performed by: INTERNAL MEDICINE

## 2021-10-21 PROCEDURE — 1170F FXNL STATUS ASSESSED: CPT | Performed by: INTERNAL MEDICINE

## 2021-10-21 NOTE — PROGRESS NOTES
The ABCs of the Annual Wellness Visit  Subsequent Medicare Wellness Visit    Chief Complaint   Patient presents with   • Medicare Wellness-subsequent     wellness      Subjective    History of Present Illness:  Kunal Vargas is a 76 y.o. male who presents for a Subsequent Medicare Wellness Visit, blood pressure check, and lab follow-up.  He has hypertension, hyperlipidemia, hypothyroidism, coronary artery disease, chronic kidney disease, kidney cancer status post nephrectomy.  He feels well.  He eats healthy and exercises on a regular basis.  His weight has been stable.  He does not monitor his blood pressure.  He does not add salt but does not really watch dietary sodium.  He has a follow-up with urology in a few days..    The following portions of the patient's history were reviewed and   updated as appropriate: allergies, current medications, past family history, past medical history, past social history, past surgical history and problem list.    Compared to one year ago, the patient feels his physical   health is better.    Compared to one year ago, the patient feels his mental   health is the same.    Recent Hospitalizations:  This patient has had a [facility] admission record on file within the last 365 days.    Current Medical Providers:  Patient Care Team:  Chai Montesinos MD as PCP - General    Outpatient Medications Prior to Visit   Medication Sig Dispense Refill   • levothyroxine (SYNTHROID, LEVOTHROID) 88 MCG tablet Take 1 tablet by mouth Daily. 90 tablet 3   • metoprolol succinate XL (TOPROL-XL) 25 MG 24 hr tablet TAKE 1 TABLET BY MOUTH EVERY DAY 90 tablet 3   • pravastatin (PRAVACHOL) 40 MG tablet TAKE 1 TABLET BY MOUTH EVERY NIGHT AT BEDTIME 90 tablet 3   • triamcinolone (KENALOG) 0.1 % cream        No facility-administered medications prior to visit.       No opioid medication identified on active medication list. I have reviewed chart for other potential  high risk medication/s and  "harmful drug interactions in the elderly.          Aspirin is not on active medication list.  Aspirin use is not indicated based on review of current medical condition/s. Risk of harm outweighs potential benefits.  .    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  ACP discussion was held with the patient during this visit. Patient has an advance directive in EMR which is still valid.     Review of Systems   Constitutional: Negative for activity change.   Respiratory: Negative.    Cardiovascular: Negative for chest pain and leg swelling.   Gastrointestinal: Negative.    Genitourinary: Negative.         Objective    Vitals:    10/21/21 1017   BP: 112/52   BP Location: Right arm   Patient Position: Sitting   Pulse: 56   Resp: 18   Temp: 97.5 °F (36.4 °C)   TempSrc: Oral   SpO2: 98%   Weight: 70.9 kg (156 lb 6.4 oz)   Height: 172.7 cm (67.99\")     BMI Readings from Last 1 Encounters:   10/21/21 23.79 kg/m²   BMI is within normal parameters. No follow-up required.    Does the patient have evidence of cognitive impairment? No    Physical Exam  Lab Results   Component Value Date    GLU 91 10/13/2021            HEALTH RISK ASSESSMENT    Smoking Status:  Social History     Tobacco Use   Smoking Status Never Smoker   Smokeless Tobacco Never Used     Alcohol Consumption:  Social History     Substance and Sexual Activity "   Alcohol Use No     Fall Risk Screen:    PERNELLADI Fall Risk Assessment was completed, and patient is at LOW risk for falls.Assessment completed on:10/21/2021    Depression Screening:  PHQ-2/PHQ-9 Depression Screening 10/21/2021   Little interest or pleasure in doing things 0   Feeling down, depressed, or hopeless 0   Trouble falling or staying asleep, or sleeping too much -   Feeling tired or having little energy -   Poor appetite or overeating -   Feeling bad about yourself - or that you are a failure or have let yourself or your family down -   Trouble concentrating on things, such as reading the newspaper or watching television -   Moving or speaking so slowly that other people could have noticed. Or the opposite - being so fidgety or restless that you have been moving around a lot more than usual -   Thoughts that you would be better off dead, or of hurting yourself in some way -   Total Score 0   If you checked off any problems, how difficult have these problems made it for you to do your work, take care of things at home, or get along with other people? -       Health Habits and Functional and Cognitive Screening:  Functional & Cognitive Status 10/21/2021   Do you have difficulty preparing food and eating? No   Do you have difficulty bathing yourself, getting dressed or grooming yourself? No   Do you have difficulty using the toilet? No   Do you have difficulty moving around from place to place? No   Do you have trouble with steps or getting out of a bed or a chair? No   Current Diet Well Balanced Diet   Dental Exam Up to date   Eye Exam Up to date   Exercise (times per week) 4 times per week   Current Exercises Include Walking;Yard Work   Current Exercise Activities Include -   Do you need help using the phone?  No   Are you deaf or do you have serious difficulty hearing?  No   Do you need help with transportation? No   Do you need help shopping? No   Do you need help preparing meals?  No   Do you need help  with housework?  No   Do you need help with laundry? No   Do you need help taking your medications? No   Do you need help managing money? No   Do you ever drive or ride in a car without wearing a seat belt? No       Age-appropriate Screening Schedule:  Refer to the list below for future screening recommendations based on patient's age, sex and/or medical conditions. Orders for these recommended tests are listed in the plan section. The patient has been provided with a written plan.    Health Maintenance   Topic Date Due   • LIPID PANEL  09/09/2021   • INFLUENZA VACCINE  11/22/2021 (Originally 8/1/2021)   • TDAP/TD VACCINES (2 - Td or Tdap) 10/29/2028   • ZOSTER VACCINE  Discontinued              Assessment/Plan  Blood pressure is well controlled.  Serum creatinine and BUN are lower than when previously checked.  Microalbumin creatinine ratio is normal.  Liver functions are normal.  Hemoglobin is mildly decreased.  Iron studies are normal.  Discussed importance of low-sodium diet.  Continue exercise and current medicines.  We will also check a lipid panel and a PSA today.  CMS Preventative Services Quick Reference  Risk Factors Identified During Encounter  Cardiovascular Disease  Chronic Pain   Glaucoma or Family History of Glaucoma  Hearing Problem  Immunizations Discussed/Encouraged (specific Immunizations; Tdap, Hepatitis A Vaccine/Series, Hepatitis B Vaccine/Series, Influenza, Pneumococcal 23, Shingrix and COVID19  Inactivity/Sedentary  The above risks/problems have been discussed with the patient.  Follow up actions/plans if indicated are seen below in the Assessment/Plan Section.  Pertinent information has been shared with the patient in the After Visit Summary.    Diagnoses and all orders for this visit:    1. Essential hypertension (Primary)    2. Hyperlipidemia, unspecified hyperlipidemia type    3. Chronic coronary artery disease    4. Acquired hypothyroidism    5. Stage 3 chronic kidney disease,  unspecified whether stage 3a or 3b CKD (HCC)        Follow Up:   No follow-ups on file.     An After Visit Summary and PPPS were made available to the patient.    {Optional Chart Navigation Links

## 2021-10-22 LAB
CHOLEST SERPL-MCNC: 150 MG/DL (ref 100–199)
HDLC SERPL-MCNC: 58 MG/DL
LDLC SERPL CALC-MCNC: 75 MG/DL (ref 0–99)
LDLC/HDLC SERPL: 1.3 RATIO (ref 0–3.6)
PSA SERPL-MCNC: 1.6 NG/ML (ref 0–4)
TRIGL SERPL-MCNC: 89 MG/DL (ref 0–149)
VLDLC SERPL CALC-MCNC: 17 MG/DL (ref 5–40)

## 2021-12-07 RX ORDER — METOPROLOL SUCCINATE 25 MG/1
TABLET, EXTENDED RELEASE ORAL
Qty: 90 TABLET | Refills: 3 | Status: SHIPPED | OUTPATIENT
Start: 2021-12-07 | End: 2022-12-05

## 2021-12-07 RX ORDER — PRAVASTATIN SODIUM 40 MG
TABLET ORAL
Qty: 90 TABLET | Refills: 3 | Status: SHIPPED | OUTPATIENT
Start: 2021-12-07 | End: 2022-12-05

## 2022-04-08 LAB
ALBUMIN SERPL-MCNC: 4.2 G/DL (ref 3.7–4.7)
ALBUMIN/CREAT UR: <7 MG/G CREAT (ref 0–29)
ALBUMIN/GLOB SERPL: 1.8 {RATIO} (ref 1.2–2.2)
ALP SERPL-CCNC: 98 IU/L (ref 44–121)
ALT SERPL-CCNC: 14 IU/L (ref 0–44)
AST SERPL-CCNC: 22 IU/L (ref 0–40)
BASOPHILS # BLD AUTO: 0.1 X10E3/UL (ref 0–0.2)
BASOPHILS NFR BLD AUTO: 1 %
BILIRUB SERPL-MCNC: 0.5 MG/DL (ref 0–1.2)
BUN SERPL-MCNC: 31 MG/DL (ref 8–27)
BUN/CREAT SERPL: 18 (ref 10–24)
CALCIUM SERPL-MCNC: 8.8 MG/DL (ref 8.6–10.2)
CHLORIDE SERPL-SCNC: 107 MMOL/L (ref 96–106)
CO2 SERPL-SCNC: 19 MMOL/L (ref 20–29)
CREAT SERPL-MCNC: 1.76 MG/DL (ref 0.76–1.27)
CREAT UR-MCNC: 44.2 MG/DL
EGFRCR SERPLBLD CKD-EPI 2021: 40 ML/MIN/1.73
EOSINOPHIL # BLD AUTO: 0.3 X10E3/UL (ref 0–0.4)
EOSINOPHIL NFR BLD AUTO: 5 %
ERYTHROCYTE [DISTWIDTH] IN BLOOD BY AUTOMATED COUNT: 12.1 % (ref 11.6–15.4)
FERRITIN SERPL-MCNC: 248 NG/ML (ref 30–400)
GLOBULIN SER CALC-MCNC: 2.3 G/DL (ref 1.5–4.5)
GLUCOSE SERPL-MCNC: 101 MG/DL (ref 65–99)
HCT VFR BLD AUTO: 36.1 % (ref 37.5–51)
HGB BLD-MCNC: 12.1 G/DL (ref 13–17.7)
IMM GRANULOCYTES # BLD AUTO: 0 X10E3/UL (ref 0–0.1)
IMM GRANULOCYTES NFR BLD AUTO: 0 %
IRON SATN MFR SERPL: 35 % (ref 15–55)
IRON SERPL-MCNC: 90 UG/DL (ref 38–169)
LYMPHOCYTES # BLD AUTO: 1.3 X10E3/UL (ref 0.7–3.1)
LYMPHOCYTES NFR BLD AUTO: 22 %
MCH RBC QN AUTO: 32.2 PG (ref 26.6–33)
MCHC RBC AUTO-ENTMCNC: 33.5 G/DL (ref 31.5–35.7)
MCV RBC AUTO: 96 FL (ref 79–97)
MICROALBUMIN UR-MCNC: <3 UG/ML
MONOCYTES # BLD AUTO: 0.6 X10E3/UL (ref 0.1–0.9)
MONOCYTES NFR BLD AUTO: 11 %
NEUTROPHILS # BLD AUTO: 3.5 X10E3/UL (ref 1.4–7)
NEUTROPHILS NFR BLD AUTO: 61 %
PLATELET # BLD AUTO: 226 X10E3/UL (ref 150–450)
POTASSIUM SERPL-SCNC: 5.5 MMOL/L (ref 3.5–5.2)
PROT SERPL-MCNC: 6.5 G/DL (ref 6–8.5)
RBC # BLD AUTO: 3.76 X10E6/UL (ref 4.14–5.8)
SODIUM SERPL-SCNC: 138 MMOL/L (ref 134–144)
TIBC SERPL-MCNC: 256 UG/DL (ref 250–450)
UIBC SERPL-MCNC: 166 UG/DL (ref 111–343)
WBC # BLD AUTO: 5.8 X10E3/UL (ref 3.4–10.8)

## 2022-04-12 ENCOUNTER — OFFICE VISIT (OUTPATIENT)
Dept: FAMILY MEDICINE CLINIC | Facility: CLINIC | Age: 77
End: 2022-04-12

## 2022-04-12 VITALS
HEART RATE: 56 BPM | HEIGHT: 68 IN | DIASTOLIC BLOOD PRESSURE: 66 MMHG | WEIGHT: 157 LBS | OXYGEN SATURATION: 100 % | RESPIRATION RATE: 18 BRPM | TEMPERATURE: 96.9 F | BODY MASS INDEX: 23.79 KG/M2 | SYSTOLIC BLOOD PRESSURE: 138 MMHG

## 2022-04-12 DIAGNOSIS — Z12.11 COLON CANCER SCREENING: ICD-10-CM

## 2022-04-12 DIAGNOSIS — E03.9 ACQUIRED HYPOTHYROIDISM: ICD-10-CM

## 2022-04-12 DIAGNOSIS — I25.10 CHRONIC CORONARY ARTERY DISEASE: ICD-10-CM

## 2022-04-12 DIAGNOSIS — N18.30 STAGE 3 CHRONIC KIDNEY DISEASE, UNSPECIFIED WHETHER STAGE 3A OR 3B CKD: ICD-10-CM

## 2022-04-12 DIAGNOSIS — E78.5 HYPERLIPIDEMIA, UNSPECIFIED HYPERLIPIDEMIA TYPE: ICD-10-CM

## 2022-04-12 DIAGNOSIS — I10 ESSENTIAL HYPERTENSION: Primary | ICD-10-CM

## 2022-04-12 PROCEDURE — 99214 OFFICE O/P EST MOD 30 MIN: CPT | Performed by: INTERNAL MEDICINE

## 2022-04-12 NOTE — PROGRESS NOTES
Subjective   Kunal Vargas is a 76 y.o. male. Patient is here today for   Chief Complaint   Patient presents with   • Hyperlipidemia   • STAGE 3 CHRONIC,KIDNEY DISEASE          Vitals:    04/12/22 0904   BP: 138/66   Pulse: 56   Resp: 18   Temp: 96.9 °F (36.1 °C)   SpO2: 100%     Body mass index is 23.88 kg/m².      The following portions of the patient's history were reviewed and updated as appropriate: allergies, current medications, past family history, past medical history, past social history, past surgical history and problem list.    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 (HCC)       No Known Allergies   Social History     Socioeconomic History   • Marital status:    Tobacco Use   • Smoking status: Never Smoker   • Smokeless tobacco: Never Used   Substance and Sexual Activity   • Alcohol use: No   • Drug use: Never   • Sexual activity: Defer        Current Outpatient Medications:   •  levothyroxine (SYNTHROID, LEVOTHROID) 88 MCG tablet, Take 1 tablet by mouth Daily., Disp: 90 tablet, Rfl: 3  •  metoprolol succinate XL (TOPROL-XL) 25 MG 24 hr tablet, TAKE ONE TABLET BY MOUTH DAILY, Disp: 90 tablet, Rfl: 3  •  pravastatin (PRAVACHOL) 40 MG tablet, TAKE ONE TABLET BY MOUTH EVERY NIGHT AT BEDTIME, Disp: 90 tablet, Rfl: 3  •  triamcinolone (KENALOG) 0.1 % cream, , Disp: , Rfl:      Objective   History of Present Illness Kunal is here for blood pressure check and lab follow-up.  He has hypothyroidism, hyperlipidemia, coronary artery disease, chronic kidney disease, mild anemia, kidney cancer status post nephrectomy 18 months ago.  He feels well.  He eats healthy and exercises on a regular basis.  His weight is unchanged.  He monitors his  blood pressure reports systolic readings around 125.  His weight has been stable.  He had vascular screening test last year which showed bilateral carotid artery plaque without stenosis.  He had a colonoscopy in 2017 and had a tubular adenoma.    Review of Systems   Constitutional: Negative for activity change and unexpected weight change.   Respiratory: Negative.    Cardiovascular: Negative.    Gastrointestinal: Negative.    Genitourinary: Negative.    Neurological: Negative.    Psychiatric/Behavioral: Negative.        Physical Exam  Vitals reviewed.   Constitutional:       Appearance: Normal appearance.   Neck:      Vascular: No carotid bruit.   Cardiovascular:      Rate and Rhythm: Normal rate and regular rhythm.      Heart sounds: Normal heart sounds.      Comments: 125/55  Pulmonary:      Effort: Pulmonary effort is normal.      Breath sounds: Normal breath sounds.   Musculoskeletal:      Right lower leg: No edema.      Left lower leg: No edema.   Neurological:      Mental Status: He is alert.   Psychiatric:         Mood and Affect: Mood normal.         Behavior: Behavior normal.         Thought Content: Thought content normal.         Judgment: Judgment normal.         ASSESSMENT blood pressure is stable.  Kidney functions are stable.  Hemoglobin is stable at 11.1.  Iron studies are normal.  Continue healthy heart diet and regular exercise and avoidance of nonsteroidal anti-inflammatory agents.  We will continue current medicines.  We will set up a screening colonoscopy.     Problems Addressed this Visit        Cardiac and Vasculature    Hyperlipidemia    Chronic coronary artery disease    Essential hypertension - Primary       Endocrine and Metabolic    Acquired hypothyroidism       Genitourinary and Reproductive     Stage 3 chronic kidney disease (HCC)      Other Visit Diagnoses     Colon cancer screening        Relevant Orders    Ambulatory Referral For Screening Colonoscopy      Diagnoses       Codes  Comments    Essential hypertension    -  Primary ICD-10-CM: I10  ICD-9-CM: 401.9     Hyperlipidemia, unspecified hyperlipidemia type     ICD-10-CM: E78.5  ICD-9-CM: 272.4     Chronic coronary artery disease     ICD-10-CM: I25.10  ICD-9-CM: 414.00     Acquired hypothyroidism     ICD-10-CM: E03.9  ICD-9-CM: 244.9     Stage 3 chronic kidney disease, unspecified whether stage 3a or 3b CKD (HCC)     ICD-10-CM: N18.30  ICD-9-CM: 585.3     Colon cancer screening     ICD-10-CM: Z12.11  ICD-9-CM: V76.51           PLAN  There are no Patient Instructions on file for this visit.    Return in about 6 months (around 10/12/2022) for labs.  Answers for HPI/ROS submitted by the patient on 4/10/2022  What is the primary reason for your visit?: Physical

## 2022-05-05 ENCOUNTER — PREP FOR SURGERY (OUTPATIENT)
Dept: OTHER | Facility: HOSPITAL | Age: 77
End: 2022-05-05

## 2022-05-05 DIAGNOSIS — Z86.010 HISTORY OF ADENOMATOUS POLYP OF COLON: Primary | ICD-10-CM

## 2022-06-08 PROBLEM — Z86.0101 HISTORY OF ADENOMATOUS POLYP OF COLON: Status: ACTIVE | Noted: 2022-06-08

## 2022-06-08 PROBLEM — Z86.010 HISTORY OF ADENOMATOUS POLYP OF COLON: Status: ACTIVE | Noted: 2022-06-08

## 2022-08-31 RX ORDER — LEVOTHYROXINE SODIUM 88 UG/1
TABLET ORAL
Qty: 90 TABLET | Refills: 0 | Status: SHIPPED | OUTPATIENT
Start: 2022-08-31 | End: 2022-12-05

## 2022-10-12 ENCOUNTER — HOSPITAL ENCOUNTER (OUTPATIENT)
Facility: HOSPITAL | Age: 77
Setting detail: HOSPITAL OUTPATIENT SURGERY
Discharge: HOME | End: 2022-10-12
Attending: SURGERY | Admitting: SURGERY

## 2022-10-12 ENCOUNTER — ANESTHESIA (OUTPATIENT)
Dept: GASTROENTEROLOGY | Facility: HOSPITAL | Age: 77
End: 2022-10-12

## 2022-10-12 ENCOUNTER — ANESTHESIA EVENT (OUTPATIENT)
Dept: GASTROENTEROLOGY | Facility: HOSPITAL | Age: 77
End: 2022-10-12

## 2022-10-12 VITALS
HEART RATE: 53 BPM | HEIGHT: 69 IN | SYSTOLIC BLOOD PRESSURE: 141 MMHG | TEMPERATURE: 97.9 F | DIASTOLIC BLOOD PRESSURE: 74 MMHG | OXYGEN SATURATION: 100 % | RESPIRATION RATE: 18 BRPM | BODY MASS INDEX: 22.1 KG/M2 | WEIGHT: 149.2 LBS

## 2022-10-12 DIAGNOSIS — Z86.010 HISTORY OF ADENOMATOUS POLYP OF COLON: ICD-10-CM

## 2022-10-12 PROCEDURE — 25010000002 PROPOFOL 10 MG/ML EMULSION: Performed by: ANESTHESIOLOGY

## 2022-10-12 PROCEDURE — 88305 TISSUE EXAM BY PATHOLOGIST: CPT | Performed by: SURGERY

## 2022-10-12 PROCEDURE — 45380 COLONOSCOPY AND BIOPSY: CPT | Performed by: SURGERY

## 2022-10-12 PROCEDURE — S0260 H&P FOR SURGERY: HCPCS | Performed by: SURGERY

## 2022-10-12 RX ORDER — PROPOFOL 10 MG/ML
VIAL (ML) INTRAVENOUS AS NEEDED
Status: DISCONTINUED | OUTPATIENT
Start: 2022-10-12 | End: 2022-10-12 | Stop reason: SURG

## 2022-10-12 RX ORDER — LIDOCAINE HYDROCHLORIDE 20 MG/ML
INJECTION, SOLUTION INFILTRATION; PERINEURAL AS NEEDED
Status: DISCONTINUED | OUTPATIENT
Start: 2022-10-12 | End: 2022-10-12 | Stop reason: SURG

## 2022-10-12 RX ORDER — SODIUM CHLORIDE, SODIUM LACTATE, POTASSIUM CHLORIDE, CALCIUM CHLORIDE 600; 310; 30; 20 MG/100ML; MG/100ML; MG/100ML; MG/100ML
30 INJECTION, SOLUTION INTRAVENOUS CONTINUOUS PRN
Status: DISCONTINUED | OUTPATIENT
Start: 2022-10-12 | End: 2022-10-12 | Stop reason: HOSPADM

## 2022-10-12 RX ORDER — SODIUM CHLORIDE 0.9 % (FLUSH) 0.9 %
10 SYRINGE (ML) INJECTION AS NEEDED
Status: DISCONTINUED | OUTPATIENT
Start: 2022-10-12 | End: 2022-10-12 | Stop reason: HOSPADM

## 2022-10-12 RX ORDER — SODIUM CHLORIDE 0.9 % (FLUSH) 0.9 %
10 SYRINGE (ML) INJECTION EVERY 12 HOURS SCHEDULED
Status: DISCONTINUED | OUTPATIENT
Start: 2022-10-12 | End: 2022-10-12 | Stop reason: HOSPADM

## 2022-10-12 RX ADMIN — PROPOFOL 150 MG: 10 INJECTION, EMULSION INTRAVENOUS at 12:14

## 2022-10-12 RX ADMIN — LIDOCAINE HYDROCHLORIDE 60 MG: 20 INJECTION, SOLUTION INFILTRATION; PERINEURAL at 12:14

## 2022-10-12 RX ADMIN — PROPOFOL 200 MCG/KG/MIN: 10 INJECTION, EMULSION INTRAVENOUS at 12:14

## 2022-10-12 RX ADMIN — SODIUM CHLORIDE, POTASSIUM CHLORIDE, SODIUM LACTATE AND CALCIUM CHLORIDE 30 ML/HR: 600; 310; 30; 20 INJECTION, SOLUTION INTRAVENOUS at 11:20

## 2022-10-12 NOTE — OP NOTE
PREOPERATIVE DIAGNOSIS:  • High risk screening secondary to personal history of adenomatous polyps    POSTOPERATIVE DIAGNOSIS AND FINDINGS:  • Small transverse colon polyp    PROCEDURE:  Colonoscopy to cecum with cold biopsy removal of polyp    SURGEON:  Cachorro Hancock MD    ANESTHESIA:  MAC    SPECIMEN(S):  • Polyp    DESCRIPTION:  In decubitus position digital rectal exam was normal. Colonoscope inserted under direct visualization of lumen to cecum confirmed by visualization of ileocecal valve and appendiceal orifice.  Scope was slowly withdrawn circumferentially examining all mucosal surfaces.  Bowel preparation was good.  There is a small transverse colon polyp removed completely with cold biopsy forceps.  Good hemostasis at the site.  No other mucosal abnormalities were noted.  Moderate sigmoid diverticulosis was present.  Tolerated well.    RECOMMENDATION FOR FUTURE SURVEILLANCE:  To be determined based on polyp pathology and issued as separate report    Cachorro Hancock M.D.

## 2022-10-12 NOTE — DISCHARGE INSTRUCTIONS
For the next 24 hours patient needs to be with a responsible adult.    For 24 hours DO NOT drive, operate machinery, appliances, drink alcohol, make important decisions or sign legal documents.    Start with a light or bland diet if you are feeling sick to your stomach otherwise advance to regular diet as tolerated.    Follow recommendations on procedure report if provided by your doctor.    Call Dr CHASE for problems [phone]    Problems may include but not limited to: large amounts of bleeding, trouble breathing, repeated vomiting, severe unrelieved pain, fever or chills.

## 2022-10-12 NOTE — ANESTHESIA POSTPROCEDURE EVALUATION
"Patient: Kunal Vargas    Procedure Summary     Date: 10/12/22 Room / Location: [facility]COPY 1 /  [facility]    Anesthesia Start: 1205 Anesthesia Stop: 1231    Procedure: COLONOSCOPY TO CECUM WITH COLD BX POLYPECTOMY Diagnosis:       History of adenomatous polyp of colon      (History of adenomatous polyp of colon [Z86.010])    Surgeons: Cachorro Hancock MD Provider: Carroll Varghese DO    Anesthesia Type: MAC ASA Status: 3          Anesthesia Type: MAC    Vitals  Vitals Value Taken Time   /65 10/12/22 1240   Temp     Pulse 49 10/12/22 1240   Resp 16 10/12/22 1240   SpO2 100 % 10/12/22 1240           Post Anesthesia Care and Evaluation    Patient location during evaluation: bedside  Patient participation: waiting for patient participation  Level of consciousness: sleepy but conscious and responsive to verbal stimuli  Pain management: adequate    Airway patency: patent  Anesthetic complications: No anesthetic complications  PONV Status: NA  Cardiovascular status: acceptable and hemodynamically stable  Respiratory status: acceptable, spontaneous ventilation and nonlabored ventilation  Hydration status: acceptable    Comments: /65 (BP Location: Left arm, Patient Position: Sitting)   Pulse (!) 49   Temp 36.7 °C (98.1 °F) (Oral)   Resp 16   Ht 175.3 cm (69\")   Wt 67.7 kg (149 lb 3.2 oz)   SpO2 100%   BMI 22.03 kg/m²       "

## 2022-10-12 NOTE — H&P
CC: Surveillance    HPI: 77-year-old gentleman with history of adenomatous polyps    PMH, PSH, MEDS AND ALLERGIES reviewed and reconciled in  EPIC    PHYSICAL EXAM:  • Constitutional:  awake, alert, no acute distress  • VS: afebrile, VSS  • Respiratory:  normal inspiratory effort  • Cardiovascular: regular rate  • Gastrointestinal: Soft    ROS:  relevant systems negative other than any presenting complaints    ASSESSMENT/PLAN:    77-year-old gentleman presents for surveillance colonoscopy    Cachorro Hancock M.D.

## 2022-10-12 NOTE — ANESTHESIA PREPROCEDURE EVALUATION
Anesthesia Evaluation     Patient summary reviewed   no history of anesthetic complications:  NPO Solid Status: > 8 hours  NPO Liquid Status: > 2 hours           Airway   Mallampati: II  TM distance: >3 FB  Neck ROM: full  No difficulty expected  Dental - normal exam         Pulmonary     breath sounds clear to auscultation  (-) shortness of breath, recent URI, not a smoker  Cardiovascular   Exercise tolerance: poor (<4 METS)    ECG reviewed  Patient on routine beta blocker and Beta blocker given within 24 hours of surgery  Rhythm: regular  Rate: normal    (+) hypertension well controlled less than 2 medications, CAD, hyperlipidemia,   (-) past MI, dysrhythmias, angina    ROS comment: Nonobstructive CAD on cath  In 2014    Neuro/Psych  (-) seizures, CVA    ROS Comment: Hard of hearing   GI/Hepatic/Renal/Endo    (+)  GERD well controlled,  renal disease (L nephrectomy 2020), thyroid problem hypothyroidism  (-)  obesity, liver disease, diabetes    ROS Comment: Renal mass; s/p [id number]    Musculoskeletal     (-) neck stiffness  Abdominal    Substance History      OB/GYN          Other      history of cancer (renal, s/p resection)                    Anesthesia Plan    ASA 3     MAC     (MAC anesthesia discussed with patient and/or patient representative. Risks (including but not limited to intra-op awareness), benefits, and alternatives were discussed. Understanding was voiced with an agreement to proceed with a MAC technique and General as a backup option. )    Anesthetic plan, risks, benefits, and alternatives have been provided, discussed and informed consent has been obtained with: patient.

## 2022-10-13 LAB
LAB AP CASE REPORT: NORMAL
PATH REPORT.FINAL DX SPEC: NORMAL
PATH REPORT.GROSS SPEC: NORMAL

## 2022-10-19 ENCOUNTER — TELEPHONE (OUTPATIENT)
Dept: SURGERY | Facility: CLINIC | Age: 77
End: 2022-10-19

## 2022-10-19 ENCOUNTER — DOCUMENTATION (OUTPATIENT)
Dept: SURGERY | Facility: CLINIC | Age: 77
End: 2022-10-19

## 2022-10-19 NOTE — TELEPHONE ENCOUNTER
----- Message from Cachorro Hancock MD sent at 10/19/2022 11:36 AM EDT -----  Please let him know that he had a single benign polyp and 5-year surveillance recommended-put in computer for reminder

## 2022-10-19 NOTE — PROGRESS NOTES
ENDOSCOPY FOLLOW UP NOTE    Colonoscopy 10/12/2022    Indication:  • Personal history of adenomatous polyps    Findings:  • Small transverse colon polyp: Pathology-tubular adenoma    Recommendations:  • 5-year surveillance    Cachorro Hancock M.D.

## 2022-11-14 ENCOUNTER — OFFICE VISIT (OUTPATIENT)
Dept: FAMILY MEDICINE CLINIC | Facility: CLINIC | Age: 77
End: 2022-11-14

## 2022-11-14 VITALS
HEIGHT: 69 IN | TEMPERATURE: 98.2 F | DIASTOLIC BLOOD PRESSURE: 60 MMHG | OXYGEN SATURATION: 99 % | BODY MASS INDEX: 22.96 KG/M2 | HEART RATE: 53 BPM | WEIGHT: 155 LBS | SYSTOLIC BLOOD PRESSURE: 124 MMHG | RESPIRATION RATE: 16 BRPM

## 2022-11-14 DIAGNOSIS — E87.5 HYPERKALEMIA: ICD-10-CM

## 2022-11-14 DIAGNOSIS — I10 ESSENTIAL HYPERTENSION: ICD-10-CM

## 2022-11-14 DIAGNOSIS — E78.5 HYPERLIPIDEMIA, UNSPECIFIED HYPERLIPIDEMIA TYPE: ICD-10-CM

## 2022-11-14 DIAGNOSIS — D63.1 ANEMIA DUE TO STAGE 3B CHRONIC KIDNEY DISEASE: ICD-10-CM

## 2022-11-14 DIAGNOSIS — E03.9 ACQUIRED HYPOTHYROIDISM: ICD-10-CM

## 2022-11-14 DIAGNOSIS — Z12.5 PROSTATE CANCER SCREENING: ICD-10-CM

## 2022-11-14 DIAGNOSIS — N18.32 ANEMIA DUE TO STAGE 3B CHRONIC KIDNEY DISEASE: ICD-10-CM

## 2022-11-14 DIAGNOSIS — N18.30 STAGE 3 CHRONIC KIDNEY DISEASE, UNSPECIFIED WHETHER STAGE 3A OR 3B CKD: Primary | ICD-10-CM

## 2022-11-14 DIAGNOSIS — I25.10 CHRONIC CORONARY ARTERY DISEASE: ICD-10-CM

## 2022-11-14 PROBLEM — N18.9 ANEMIA DUE TO CHRONIC KIDNEY DISEASE: Status: ACTIVE | Noted: 2022-11-14

## 2022-11-14 PROCEDURE — G0439 PPPS, SUBSEQ VISIT: HCPCS | Performed by: INTERNAL MEDICINE

## 2022-11-14 PROCEDURE — 1159F MED LIST DOCD IN RCRD: CPT | Performed by: INTERNAL MEDICINE

## 2022-11-14 PROCEDURE — 1170F FXNL STATUS ASSESSED: CPT | Performed by: INTERNAL MEDICINE

## 2022-11-14 NOTE — PROGRESS NOTES
The ABCs of the Annual Wellness Visit  Subsequent Medicare Wellness Visit    Chief Complaint   Patient presents with   • Medicare Wellness-subsequent      Subjective    History of Present Illness:  uKnal Vargas is a 77 y.o. male who presents for a Subsequent Medicare Wellness Visit, blood pressure check, lab follow-up.  He is hypertension, hyperlipidemia, coronary artery disease, chronic kidney disease, mild anemia, hypothyroidism, history of kidney cancer status post nephrectomy.  He feels well.  He eats healthy and exercises a few days a week.  His weight is unchanged in last 6 months.  He monitors his blood pressure and reports systolic readings in the 120s.  Immunizations are up-to-date..    The following portions of the patient's history were reviewed and   updated as appropriate: allergies, current medications, past family history, past medical history, past social history, past surgical history and problem list.    Compared to one year ago, the patient feels his physical   health is the same.    Compared to one year ago, the patient feels his mental   health is the same.    Recent Hospitalizations:  He was not admitted to the hospital during the last year.       Current Medical Providers:  Patient Care Team:  Chai Montesinos MD as PCP - General    Outpatient Medications Prior to Visit   Medication Sig Dispense Refill   • levothyroxine (SYNTHROID, LEVOTHROID) 88 MCG tablet TAKE 1 TABLET BY MOUTH EVERY DAY 90 tablet 0   • metoprolol succinate XL (TOPROL-XL) 25 MG 24 hr tablet TAKE ONE TABLET BY MOUTH DAILY 90 tablet 3   • pravastatin (PRAVACHOL) 40 MG tablet TAKE ONE TABLET BY MOUTH EVERY NIGHT AT BEDTIME 90 tablet 3   • triamcinolone (KENALOG) 0.1 % cream        No facility-administered medications prior to visit.       No opioid medication identified on active medication list. I have reviewed chart for other potential  high risk medication/s and harmful drug interactions in the elderly.          Aspirin  "is not on active medication list.  Aspirin use is not indicated based on review of current medical condition/s. Risk of harm outweighs potential benefits.  .    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  ACP discussion was held with the patient during this visit. Patient has an advance directive in EMR which is still valid.           Objective    Vitals:    11/14/22 1557   BP: 124/60   Patient Position: Sitting   Pulse: 53   Resp: 16   Temp: 98.2 °F (36.8 °C)   TempSrc: Infrared   SpO2: 99%   Weight: 70.3 kg (155 lb)   Height: 175.3 cm (69.02\")     Estimated body mass index is 22.88 kg/m² as calculated from the following:    Height as of this encounter: 175.3 cm (69.02\").    Weight as of this encounter: 70.3 kg (155 lb).    BMI is within normal parameters. No other follow-up for BMI required.      Does the patient have evidence of cognitive impairment? No    Physical Exam  Vitals reviewed.   Constitutional:       Appearance: Normal appearance.   Neck:      Vascular: No carotid bruit.   Cardiovascular:      Rate and Rhythm: Normal rate and regular rhythm.      Heart sounds: Normal heart sounds.      Comments: 132/55,128/55  Pulmonary:      Effort: Pulmonary effort is normal.      Breath sounds: Normal breath sounds.   Neurological:     "  Mental Status: He is alert.   Psychiatric:         Mood and Affect: Mood normal.         Behavior: Behavior normal.         Thought Content: Thought content normal.         Judgment: Judgment normal.       Lab Results   Component Value Date    CHLPL 155 10/19/2022    TRIG 42 10/19/2022    HDL 60 10/19/2022    LDL 86 10/19/2022    VLDL 9 10/19/2022            HEALTH RISK ASSESSMENT    Smoking Status:  Social History     Tobacco Use   Smoking Status Never   Smokeless Tobacco Never     Alcohol Consumption:  Social History     Substance and Sexual Activity   Alcohol Use Never     Fall Risk Screen:    STEADI Fall Risk Assessment was completed, and patient is at LOW risk for falls.Assessment completed on:11/14/2022    Depression Screening:  PHQ-2/PHQ-9 Depression Screening 11/14/2022   Retired PHQ-9 Total Score -   Retired Total Score -   Little Interest or Pleasure in Doing Things 0-->not at all   Feeling Down, Depressed or Hopeless 0-->not at all   PHQ-9: Brief Depression Severity Measure Score 0       Health Habits and Functional and Cognitive Screening:  Functional & Cognitive Status 11/14/2022   Do you have difficulty preparing food and eating? No   Do you have difficulty bathing yourself, getting dressed or grooming yourself? No   Do you have difficulty using the toilet? No   Do you have difficulty moving around from place to place? No   Do you have trouble with steps or getting out of a bed or a chair? No   Current Diet Well Balanced Diet   Dental Exam Up to date   Eye Exam Up to date   Exercise (times per week) 2 times per week   Current Exercises Include Other   Current Exercise Activities Include -   Do you need help using the phone?  No   Are you deaf or do you have serious difficulty hearing?  Yes   Do you need help with transportation? No   Do you need help shopping? No   Do you need help preparing meals?  No   Do you need help with housework?  No   Do you need help with laundry? No   Do you need help taking  your medications? No   Do you need help managing money? No   Do you ever drive or ride in a car without wearing a seat belt? No   Have you felt unusual stress, anger or loneliness in the last month? No   Who do you live with? Spouse   If you need help, do you have trouble finding someone available to you? No   Do you have difficulty concentrating, remembering or making decisions? No       Age-appropriate Screening Schedule:  Refer to the list below for future screening recommendations based on patient's age, sex and/or medical conditions. Orders for these recommended tests are listed in the plan section. The patient has been provided with a written plan.    Health Maintenance   Topic Date Due   • LIPID PANEL  10/19/2023   • TDAP/TD VACCINES (2 - Td or Tdap) 10/29/2028   • INFLUENZA VACCINE  Completed   • ZOSTER VACCINE  Discontinued              Assessment & Plan Blood pressure is stable.  LDL cholesterol is 85.  Goal is less than 70.  HDL cholesterol and triglycerides are normal.  Kidney functions are stable.  Serum potassium is high at 5.8.  Discussed decreasing dietary potassium intake per information sheet and importance of hydration.  We will plan on rechecking a basic metabolic panel as well as thyroid-stimulating hormone level and prostate-specific antigen in 1 week.  CMS Preventative Services Quick Reference  Risk Factors Identified During Encounter  Cardiovascular Disease  Hearing Problem  Immunizations Discussed/Encouraged (specific Immunizations; Tdap, Hepatitis A Vaccine/Series, Influenza, Pneumococcal 23, Shingrix and COVID19  The above risks/problems have been discussed with the patient.  Follow up actions/plans if indicated are seen below in the Assessment/Plan Section.  Pertinent information has been shared with the patient in the After Visit Summary.    Diagnoses and all orders for this visit:    1. Stage 3 chronic kidney disease, unspecified whether stage 3a or 3b CKD (HCC) (Primary)  -     Basic  Metabolic Panel    2. Prostate cancer screening  -     PSA Screen    3. Acquired hypothyroidism  -     TSH    4. Hyperkalemia  -     Basic Metabolic Panel    5. Chronic coronary artery disease    6. Essential hypertension    7. Hyperlipidemia, unspecified hyperlipidemia type    8. Anemia due to stage 3b chronic kidney disease (HCC)        Follow Up:   No follow-ups on file.     An After Visit Summary and PPPS were made available to the patient.    {Optional Chart Navigation Links

## 2022-11-22 LAB
BUN SERPL-MCNC: 24 MG/DL (ref 8–27)
BUN/CREAT SERPL: 15 (ref 10–24)
CALCIUM SERPL-MCNC: 8.3 MG/DL (ref 8.6–10.2)
CHLORIDE SERPL-SCNC: 104 MMOL/L (ref 96–106)
CO2 SERPL-SCNC: 18 MMOL/L (ref 20–29)
CREAT SERPL-MCNC: 1.63 MG/DL (ref 0.76–1.27)
EGFRCR SERPLBLD CKD-EPI 2021: 43 ML/MIN/1.73
GLUCOSE SERPL-MCNC: 95 MG/DL (ref 70–99)
POTASSIUM SERPL-SCNC: 5 MMOL/L (ref 3.5–5.2)
PSA SERPL-MCNC: 1.6 NG/ML (ref 0–4)
SODIUM SERPL-SCNC: 136 MMOL/L (ref 134–144)
TSH SERPL DL<=0.005 MIU/L-ACNC: 5.15 UIU/ML (ref 0.45–4.5)

## 2022-12-01 NOTE — TELEPHONE ENCOUNTER
rx sent to pharmacy     ----- Message from Diana Roblero sent at 1/18/2017 11:51 AM EST -----  Contact: PT'S WIFE SARAH [phone]  PT NEED NEW RX SENT TO HIS NEW PHARMACY    [facility]     FOR HIS LEVOTHYROXINE 50 MCG #30     PT'S WIFE SARAH [phone]    
Transferred

## 2022-12-05 RX ORDER — METOPROLOL SUCCINATE 25 MG/1
TABLET, EXTENDED RELEASE ORAL
Qty: 90 TABLET | Refills: 1 | Status: SHIPPED | OUTPATIENT
Start: 2022-12-05

## 2022-12-05 RX ORDER — PRAVASTATIN SODIUM 40 MG
TABLET ORAL
Qty: 90 TABLET | Refills: 1 | Status: SHIPPED | OUTPATIENT
Start: 2022-12-05 | End: 2023-03-18 | Stop reason: HOSPADM

## 2022-12-05 RX ORDER — LEVOTHYROXINE SODIUM 88 UG/1
TABLET ORAL
Qty: 90 TABLET | Refills: 0 | Status: SHIPPED | OUTPATIENT
Start: 2022-12-05 | End: 2023-03-02

## 2023-02-09 NOTE — PROGRESS NOTES
Subjective   Kunal Vargas is a 71 y.o. male. Patient is here today for   Chief Complaint   Patient presents with   • Hypothyroidism          Vitals:    02/22/17 1002   BP: 120/58   Pulse: 54   Resp: 18       The following portions of the patient's history were reviewed and updated as appropriate: allergies, current medications, past family history, past medical history, past social history, past surgical history and problem list.    Past Medical History   Diagnosis Date   • Disease of thyroid gland    • Hyperlipidemia    • Hyperlipidemia    • Irregular heartbeat       No Known Allergies   Social History     Social History   • Marital status:      Spouse name: N/A   • Number of children: N/A   • Years of education: N/A     Occupational History   • Not on file.     Social History Main Topics   • Smoking status: Never Smoker   • Smokeless tobacco: Not on file   • Alcohol use No   • Drug use: Not on file   • Sexual activity: Defer     Other Topics Concern   • Not on file     Social History Narrative        Current Outpatient Prescriptions:   •  levothyroxine (SYNTHROID, LEVOTHROID) 50 MCG tablet, Take 1 tablet by mouth Daily., Disp: 30 tablet, Rfl: 1  •  metoprolol succinate XL (TOPROL-XL) 25 MG 24 hr tablet, Take 1 tablet by mouth Daily., Disp: 90 tablet, Rfl: 1  •  pravastatin (PRAVACHOL) 40 MG tablet, Take 1 tablet by mouth Daily., Disp: 30 tablet, Rfl: 3     Objective   History of Present Illness Kunal is here today for lab follow-up.  He has hypothyroidism, nonobstructive coronary artery disease, and hyperlipidemia.  His T SH was elevated 3 months ago and his dose of levothyroxin was adjusted.  He feels well.  He eats healthy and exercises on a regular basis.    Review of Systems    Physical Exam   Constitutional: He appears well-developed and well-nourished.   Neck: Neck supple. No thyromegaly present.   Cardiovascular: Normal rate, regular rhythm and normal heart sounds.    Pulmonary/Chest: Effort  normal and breath sounds normal.   Psychiatric: He has a normal mood and affect.   Vitals reviewed.      ASSESSMENT     Problem List Items Addressed This Visit        Cardiovascular and Mediastinum    Hyperlipidemia - Primary    Chronic coronary artery disease       Endocrine    Acquired hypothyroidism          PLAN  Patient Instructions   Your blood pressure is excellent.  Thyroid-stimulating hormone level is mildly elevated and free T4 level is normal.  Will continue current levothyroxine dose.  Continue current diet, exercise, and medicines and follow-up in 6 months with labs.      Return in about 6 months (around 8/22/2017) for labs CBC,CMP,NMR LP,UA,TSH/FREE T4, PSA.   No

## 2023-02-20 ENCOUNTER — OFFICE VISIT (OUTPATIENT)
Dept: FAMILY MEDICINE CLINIC | Facility: CLINIC | Age: 78
End: 2023-02-20
Payer: MEDICARE

## 2023-02-20 VITALS
BODY MASS INDEX: 22.81 KG/M2 | RESPIRATION RATE: 16 BRPM | SYSTOLIC BLOOD PRESSURE: 138 MMHG | HEIGHT: 69 IN | OXYGEN SATURATION: 98 % | HEART RATE: 52 BPM | DIASTOLIC BLOOD PRESSURE: 60 MMHG | TEMPERATURE: 98.4 F | WEIGHT: 154 LBS

## 2023-02-20 DIAGNOSIS — I10 ESSENTIAL HYPERTENSION: ICD-10-CM

## 2023-02-20 DIAGNOSIS — R07.9 CHEST PAIN, UNSPECIFIED TYPE: ICD-10-CM

## 2023-02-20 DIAGNOSIS — E78.5 HYPERLIPIDEMIA, UNSPECIFIED HYPERLIPIDEMIA TYPE: Primary | ICD-10-CM

## 2023-02-20 DIAGNOSIS — I25.10 CHRONIC CORONARY ARTERY DISEASE: ICD-10-CM

## 2023-02-20 PROCEDURE — 93000 ELECTROCARDIOGRAM COMPLETE: CPT | Performed by: INTERNAL MEDICINE

## 2023-02-20 PROCEDURE — 99213 OFFICE O/P EST LOW 20 MIN: CPT | Performed by: INTERNAL MEDICINE

## 2023-02-20 NOTE — PROGRESS NOTES
Subjective   Kunal Vargas is a 77 y.o. male. Patient is here today for   Chief Complaint   Patient presents with   • Chest Pain     When working out           Vitals:    02/20/23 1028   BP: 138/60   Pulse: 52   Resp: 16   Temp: 98.4 °F (36.9 °C)   SpO2: 98%     Body mass index is 22.73 kg/m².    The following portions of the patient's history were reviewed and updated as appropriate: allergies, current medications, past family history, past medical history, past social history, past surgical history and problem list.    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)       No Known Allergies   Social History     Socioeconomic History   • Marital status:    Tobacco Use   • Smoking status: Never   • Smokeless tobacco: Never   Vaping Use   • Vaping Use: Never used   Substance and Sexual Activity   • Alcohol use: Never   • Drug use: Never   • Sexual activity: Defer        Current Outpatient Medications:   •  levothyroxine (SYNTHROID, LEVOTHROID) 88 MCG tablet, TAKE 1 TABLET BY MOUTH EVERY DAY, Disp: 90 tablet, Rfl: 0  •  metoprolol succinate XL (TOPROL-XL) 25 MG 24 hr tablet, TAKE 1 TABLET BY MOUTH EVERY DAY, Disp: 90 tablet, Rfl: 1  •  pravastatin (PRAVACHOL) 40 MG tablet, TAKE ONE TABLET BY MOUTH EVERY NIGHT AT BEDTIME, Disp: 90 tablet, Rfl: 1  •  triamcinolone (KENALOG) 0.1 % cream, , Disp: , Rfl:      Objective     History of Present Illness Kunal complains of exertional chest tightness that started a few weeks ago when he walks.  The tightness goes away when he stops.  He has a prior history of nonobstructive coronary artery disease.  He had a cardiac catheterization in 2014.  He has well-controlled  hypertension, hyperlipidemia, and hypothyroidism.    Review of Systems   Constitutional: Negative for activity change.   Respiratory: Negative for shortness of breath.    Cardiovascular: Positive for chest pain.   Neurological: Negative.    Psychiatric/Behavioral: Negative.        Physical Exam  Vitals reviewed.   Constitutional:       Appearance: Normal appearance.   Cardiovascular:      Rate and Rhythm: Normal rate and regular rhythm.      Heart sounds: Normal heart sounds.   Pulmonary:      Effort: Pulmonary effort is normal.      Breath sounds: Normal breath sounds.   Neurological:      Mental Status: He is alert.   Psychiatric:         Mood and Affect: Mood normal.         Behavior: Behavior normal.         Thought Content: Thought content normal.         Judgment: Judgment normal.         ASSESSMENT EKG shows sinus bradycardia with a rate of 51 and is normal and unchanged from EKG in 2020.  Start aspirin 81 mg daily and continue other meds.  Will refer to cardiology for appropriate evaluation.     Problems Addressed this Visit        Cardiac and Vasculature    Hyperlipidemia - Primary    Chronic coronary artery disease    Essential hypertension    Chest pain    Relevant Orders    ECG 12 Lead   Diagnoses       Codes Comments    Hyperlipidemia, unspecified hyperlipidemia type    -  Primary ICD-10-CM: E78.5  ICD-9-CM: 272.4     Essential hypertension     ICD-10-CM: I10  ICD-9-CM: 401.9     Chest pain, unspecified type     ICD-10-CM: R07.9  ICD-9-CM: 786.50     Chronic coronary artery disease     ICD-10-CM: I25.10  ICD-9-CM: 414.00           PLAN  There are no Patient Instructions on file for this visit.  No follow-ups on file.

## 2023-03-02 RX ORDER — LEVOTHYROXINE SODIUM 88 UG/1
TABLET ORAL
Qty: 90 TABLET | Refills: 0 | Status: SHIPPED | OUTPATIENT
Start: 2023-03-02

## 2023-03-03 ENCOUNTER — OFFICE VISIT (OUTPATIENT)
Dept: CARDIOLOGY | Facility: CLINIC | Age: 78
End: 2023-03-03
Payer: MEDICARE

## 2023-03-03 VITALS
BODY MASS INDEX: 22.81 KG/M2 | HEART RATE: 51 BPM | DIASTOLIC BLOOD PRESSURE: 62 MMHG | WEIGHT: 154 LBS | SYSTOLIC BLOOD PRESSURE: 136 MMHG | HEIGHT: 69 IN

## 2023-03-03 DIAGNOSIS — R07.2 PRECORDIAL PAIN: Primary | ICD-10-CM

## 2023-03-03 DIAGNOSIS — I25.10 CORONARY ARTERY DISEASE INVOLVING NATIVE CORONARY ARTERY OF NATIVE HEART WITHOUT ANGINA PECTORIS: ICD-10-CM

## 2023-03-03 PROCEDURE — 93000 ELECTROCARDIOGRAM COMPLETE: CPT | Performed by: INTERNAL MEDICINE

## 2023-03-03 PROCEDURE — 99204 OFFICE O/P NEW MOD 45 MIN: CPT | Performed by: INTERNAL MEDICINE

## 2023-03-03 RX ORDER — CHLORHEXIDINE GLUCONATE 0.12 MG/ML
RINSE ORAL
COMMUNITY
Start: 2023-03-01

## 2023-03-03 RX ORDER — AMOXICILLIN 500 MG/1
CAPSULE ORAL 3 TIMES WEEKLY
Status: ON HOLD | COMMUNITY
Start: 2023-03-01 | End: 2023-03-17

## 2023-03-03 NOTE — H&P (VIEW-ONLY)
[facility]      Patient Name: Kunal Vargas  :1945  Age: 77 y.o.  Encounter Provider:  Jeff Jones Jr, MD      Chief Complaint:   Chief Complaint   Patient presents with   • Chest Pain         HPI  Kunal Varags is a 77 y.o. male with nonobstructive coronary artery disease and dyslipidemia who presents for initial evaluation of chest discomfort.  Patient was initially seen in  for chest pain which she describes as sharp discomfort associated with activity.  He had a treadmill stress study that showed no evidence of ischemia but continued to have recurrent and frequent pain.  Cardiac catheterization performed showing mild nonobstructive disease.  He was last seen in 2017 by Dr. Butcher.  He was doing well at that point and was discharged with clindamycin as needed.  He was doing very well and continues to perform aerobics 3 times weekly but has been having exercise-induced chest tightness over the past few months.  He had an episode while just walking in his neighborhood the other day.  The chest discomfort does not stop him from activity and he states that it stops a few minutes after starting without rest.  No associated nausea, vomiting, diaphoresis or shortness of air.  He denies orthopnea, PND or edema.  No palpitations, dizziness or syncope.  He was started on aspirin recently.  Family history of ruptured AAA and he has periodic surveillance with most recent being in 2021 at which time he was noted to have mild carotid atherosclerotic plaque with normal abdominal aorta.  He is a lifelong non-smoker.  Denies alcohol or illicit drug use.  No cardiac complaints at time of interview.      The following portions of the patient's history were reviewed and updated as appropriate: allergies, current medications, past family history, past medical history, past social history, past surgical history and problem list.      Review of Systems   Constitutional: Negative  "for chills and fever.   HENT: Negative for hoarse voice and sore throat.    Eyes: Negative for double vision and photophobia.   Cardiovascular: Positive for chest pain. Negative for leg swelling, near-syncope, orthopnea, palpitations, paroxysmal nocturnal dyspnea and syncope.   Respiratory: Negative for cough and wheezing.    Skin: Negative for poor wound healing and rash.   Musculoskeletal: Negative for arthritis and joint swelling.   Gastrointestinal: Negative for bloating, abdominal pain, hematemesis and hematochezia.   Neurological: Negative for dizziness and focal weakness.   Psychiatric/Behavioral: Negative for depression and suicidal ideas.       OBJECTIVE:   Vital Signs  Vitals:    03/03/23 1317   BP: 136/62   Pulse: 51     Estimated body mass index is 22.74 kg/m² as calculated from the following:    Height as of this encounter: 175.3 cm (69\").    Weight as of this encounter: 69.9 kg (154 lb).    Vitals reviewed.   Constitutional:       Appearance: Healthy appearance. Not in distress.   Neck:      Vascular: No JVR. JVD normal.   Pulmonary:      Effort: Pulmonary effort is normal.      Breath sounds: Normal breath sounds. No wheezing. No rhonchi. No rales.   Chest:      Chest wall: Not tender to palpatation.   Cardiovascular:      PMI at left midclavicular line. Normal rate. Regular rhythm. Normal S1. Normal S2.      Murmurs: There is no murmur.      No gallop. No click. No rub.   Pulses:     Intact distal pulses.   Edema:     Peripheral edema absent.   Abdominal:      General: Bowel sounds are normal.      Palpations: Abdomen is soft.      Tenderness: There is no abdominal tenderness.   Musculoskeletal: Normal range of motion.         General: No tenderness. Skin:     General: Skin is warm and dry.   Neurological:      General: No focal deficit present.      Mental Status: Alert and oriented to person, place and time.           ECG 12 Lead    Date/Time: 3/3/2023 5:25 PM  Performed by: Jeff Jones Jr., " MD  Authorized by: Jeff Jones Jr., MD   Comparison: compared with previous ECG from 2/20/2023  Similar to previous ECG  Rhythm: sinus bradycardia  Other findings: non-specific ST-T wave changes    Clinical impression: non-specific ECG            Lipid Panel    Lipid Panel 10/19/22   Total Cholesterol 155   Triglycerides 42   HDL Cholesterol 60   VLDL Cholesterol 9   LDL Cholesterol  86   LDL/HDL Ratio 1.44      Comments are available for some flowsheets but are not being displayed.              BUN   Date Value Ref Range Status   11/21/2022 24 8 - 27 mg/dL Final   11/17/2020 24 (H) 8 - 23 mg/dL Final     Creatinine   Date Value Ref Range Status   11/21/2022 1.63 (H) 0.76 - 1.27 mg/dL Final   11/17/2020 1.54 (H) 0.76 - 1.27 mg/dL Final     Potassium   Date Value Ref Range Status   11/21/2022 5.0 3.5 - 5.2 mmol/L Final   11/17/2020 4.1 3.5 - 5.2 mmol/L Final     ALT (SGPT)   Date Value Ref Range Status   10/19/2022 13 1 - 41 U/L Final   01/21/2018 13 1 - 41 U/L Final     AST (SGOT)   Date Value Ref Range Status   10/19/2022 20 1 - 40 U/L Final   01/21/2018 17 1 - 40 U/L Final           ASSESSMENT:     77-year-old male with known coronary artery disease presents for evaluation of precordial pain    PLAN OF CARE:     1. Chest pain -typical and atypical characteristics.  Previously normal treadmill stress study we will check a treadmill nuclear stress study.  Continue aspirin and statin.  Follow diagnostic results for further treatment recommendations.  2. Coronary artery disease  3. Dyslipidemia  4. Family history of ruptured AAA    Return 6 months             Discharge Medications          Accurate as of March 3, 2023  1:17 PM. If you have any questions, ask your nurse or doctor.            Continue These Medications      Instructions Start Date   amoxicillin 500 MG capsule  Commonly known as: AMOXIL   3 Times Weekly      chlorhexidine 0.12 % solution  Commonly known as: PERIDEX   No dose, route, or frequency  recorded.      levothyroxine 88 MCG tablet  Commonly known as: SYNTHROID, LEVOTHROID   TAKE 1 TABLET BY MOUTH EVERY DAY      metoprolol succinate XL 25 MG 24 hr tablet  Commonly known as: TOPROL-XL   TAKE 1 TABLET BY MOUTH EVERY DAY      pravastatin 40 MG tablet  Commonly known as: PRAVACHOL   TAKE ONE TABLET BY MOUTH EVERY NIGHT AT BEDTIME      triamcinolone 0.1 % cream  Commonly known as: KENALOG   No dose, route, or frequency recorded.             Thank you for allowing me to participate in the care of your patient,      Sincerely,   Jeff Jones MD  [facility]  03/03/23  13:17 EST

## 2023-03-03 NOTE — PROGRESS NOTES
[facility]      Patient Name: Kunal Vargas  :1945  Age: 77 y.o.  Encounter Provider:  Jeff Jones Jr, MD      Chief Complaint:   Chief Complaint   Patient presents with   • Chest Pain         HPI  Kunal Vargas is a 77 y.o. male with nonobstructive coronary artery disease and dyslipidemia who presents for initial evaluation of chest discomfort.  Patient was initially seen in  for chest pain which she describes as sharp discomfort associated with activity.  He had a treadmill stress study that showed no evidence of ischemia but continued to have recurrent and frequent pain.  Cardiac catheterization performed showing mild nonobstructive disease.  He was last seen in 2017 by Dr. Butcher.  He was doing well at that point and was discharged with clindamycin as needed.  He was doing very well and continues to perform aerobics 3 times weekly but has been having exercise-induced chest tightness over the past few months.  He had an episode while just walking in his neighborhood the other day.  The chest discomfort does not stop him from activity and he states that it stops a few minutes after starting without rest.  No associated nausea, vomiting, diaphoresis or shortness of air.  He denies orthopnea, PND or edema.  No palpitations, dizziness or syncope.  He was started on aspirin recently.  Family history of ruptured AAA and he has periodic surveillance with most recent being in 2021 at which time he was noted to have mild carotid atherosclerotic plaque with normal abdominal aorta.  He is a lifelong non-smoker.  Denies alcohol or illicit drug use.  No cardiac complaints at time of interview.      The following portions of the patient's history were reviewed and updated as appropriate: allergies, current medications, past family history, past medical history, past social history, past surgical history and problem list.      Review of Systems   Constitutional: Negative  "for chills and fever.   HENT: Negative for hoarse voice and sore throat.    Eyes: Negative for double vision and photophobia.   Cardiovascular: Positive for chest pain. Negative for leg swelling, near-syncope, orthopnea, palpitations, paroxysmal nocturnal dyspnea and syncope.   Respiratory: Negative for cough and wheezing.    Skin: Negative for poor wound healing and rash.   Musculoskeletal: Negative for arthritis and joint swelling.   Gastrointestinal: Negative for bloating, abdominal pain, hematemesis and hematochezia.   Neurological: Negative for dizziness and focal weakness.   Psychiatric/Behavioral: Negative for depression and suicidal ideas.       OBJECTIVE:   Vital Signs  Vitals:    03/03/23 1317   BP: 136/62   Pulse: 51     Estimated body mass index is 22.74 kg/m² as calculated from the following:    Height as of this encounter: 175.3 cm (69\").    Weight as of this encounter: 69.9 kg (154 lb).    Vitals reviewed.   Constitutional:       Appearance: Healthy appearance. Not in distress.   Neck:      Vascular: No JVR. JVD normal.   Pulmonary:      Effort: Pulmonary effort is normal.      Breath sounds: Normal breath sounds. No wheezing. No rhonchi. No rales.   Chest:      Chest wall: Not tender to palpatation.   Cardiovascular:      PMI at left midclavicular line. Normal rate. Regular rhythm. Normal S1. Normal S2.      Murmurs: There is no murmur.      No gallop. No click. No rub.   Pulses:     Intact distal pulses.   Edema:     Peripheral edema absent.   Abdominal:      General: Bowel sounds are normal.      Palpations: Abdomen is soft.      Tenderness: There is no abdominal tenderness.   Musculoskeletal: Normal range of motion.         General: No tenderness. Skin:     General: Skin is warm and dry.   Neurological:      General: No focal deficit present.      Mental Status: Alert and oriented to person, place and time.           ECG 12 Lead    Date/Time: 3/3/2023 5:25 PM  Performed by: Jeff Jones Jr., " MD  Authorized by: Jeff Jones Jr., MD   Comparison: compared with previous ECG from 2/20/2023  Similar to previous ECG  Rhythm: sinus bradycardia  Other findings: non-specific ST-T wave changes    Clinical impression: non-specific ECG            Lipid Panel    Lipid Panel 10/19/22   Total Cholesterol 155   Triglycerides 42   HDL Cholesterol 60   VLDL Cholesterol 9   LDL Cholesterol  86   LDL/HDL Ratio 1.44      Comments are available for some flowsheets but are not being displayed.              BUN   Date Value Ref Range Status   11/21/2022 24 8 - 27 mg/dL Final   11/17/2020 24 (H) 8 - 23 mg/dL Final     Creatinine   Date Value Ref Range Status   11/21/2022 1.63 (H) 0.76 - 1.27 mg/dL Final   11/17/2020 1.54 (H) 0.76 - 1.27 mg/dL Final     Potassium   Date Value Ref Range Status   11/21/2022 5.0 3.5 - 5.2 mmol/L Final   11/17/2020 4.1 3.5 - 5.2 mmol/L Final     ALT (SGPT)   Date Value Ref Range Status   10/19/2022 13 1 - 41 U/L Final   01/21/2018 13 1 - 41 U/L Final     AST (SGOT)   Date Value Ref Range Status   10/19/2022 20 1 - 40 U/L Final   01/21/2018 17 1 - 40 U/L Final           ASSESSMENT:     77-year-old male with known coronary artery disease presents for evaluation of precordial pain    PLAN OF CARE:     1. Chest pain -typical and atypical characteristics.  Previously normal treadmill stress study we will check a treadmill nuclear stress study.  Continue aspirin and statin.  Follow diagnostic results for further treatment recommendations.  2. Coronary artery disease  3. Dyslipidemia  4. Family history of ruptured AAA    Return 6 months             Discharge Medications          Accurate as of March 3, 2023  1:17 PM. If you have any questions, ask your nurse or doctor.            Continue These Medications      Instructions Start Date   amoxicillin 500 MG capsule  Commonly known as: AMOXIL   3 Times Weekly      chlorhexidine 0.12 % solution  Commonly known as: PERIDEX   No dose, route, or frequency  recorded.      levothyroxine 88 MCG tablet  Commonly known as: SYNTHROID, LEVOTHROID   TAKE 1 TABLET BY MOUTH EVERY DAY      metoprolol succinate XL 25 MG 24 hr tablet  Commonly known as: TOPROL-XL   TAKE 1 TABLET BY MOUTH EVERY DAY      pravastatin 40 MG tablet  Commonly known as: PRAVACHOL   TAKE ONE TABLET BY MOUTH EVERY NIGHT AT BEDTIME      triamcinolone 0.1 % cream  Commonly known as: KENALOG   No dose, route, or frequency recorded.             Thank you for allowing me to participate in the care of your patient,      Sincerely,   Jeff Jones MD  [facility]  03/03/23  13:17 EST

## 2023-03-16 ENCOUNTER — LAB (OUTPATIENT)
Dept: LAB | Facility: HOSPITAL | Age: 78
End: 2023-03-16
Payer: MEDICARE

## 2023-03-16 ENCOUNTER — HOSPITAL ENCOUNTER (OUTPATIENT)
Dept: CARDIOLOGY | Facility: HOSPITAL | Age: 78
Discharge: HOME | End: 2023-03-16
Payer: MEDICARE

## 2023-03-16 ENCOUNTER — TRANSCRIBE ORDERS (OUTPATIENT)
Dept: CARDIOLOGY | Facility: CLINIC | Age: 78
End: 2023-03-16
Payer: MEDICARE

## 2023-03-16 VITALS — HEIGHT: 69 IN | WEIGHT: 154.32 LBS | BODY MASS INDEX: 22.86 KG/M2

## 2023-03-16 DIAGNOSIS — Z01.810 PRE-OPERATIVE CARDIOVASCULAR EXAMINATION: ICD-10-CM

## 2023-03-16 DIAGNOSIS — Z13.6 SCREENING FOR ISCHEMIC HEART DISEASE: ICD-10-CM

## 2023-03-16 DIAGNOSIS — R07.2 PRECORDIAL PAIN: ICD-10-CM

## 2023-03-16 DIAGNOSIS — Z01.810 PRE-OPERATIVE CARDIOVASCULAR EXAMINATION: Primary | ICD-10-CM

## 2023-03-16 DIAGNOSIS — R94.39 ABNORMAL NUCLEAR STRESS TEST: Primary | ICD-10-CM

## 2023-03-16 DIAGNOSIS — I25.10 CORONARY ARTERY DISEASE INVOLVING NATIVE CORONARY ARTERY OF NATIVE HEART WITHOUT ANGINA PECTORIS: ICD-10-CM

## 2023-03-16 LAB
ANION GAP SERPL CALCULATED.3IONS-SCNC: 8 MMOL/L (ref 5–15)
BASOPHILS # BLD AUTO: 0.04 10*3/MM3 (ref 0–0.2)
BASOPHILS NFR BLD AUTO: 0.5 % (ref 0–1.5)
BH CV NUCLEAR PRIOR STUDY: 2
BH CV REST NUCLEAR ISOTOPE DOSE: 10.1 MCI
BH CV STRESS BP STAGE 1: NORMAL
BH CV STRESS BP STAGE 2: NORMAL
BH CV STRESS DURATION MIN STAGE 1: 3
BH CV STRESS DURATION MIN STAGE 2: 2
BH CV STRESS DURATION SEC STAGE 1: 0
BH CV STRESS DURATION SEC STAGE 2: 35
BH CV STRESS GRADE STAGE 1: 10
BH CV STRESS GRADE STAGE 2: 12
BH CV STRESS HR STAGE 1: 94
BH CV STRESS HR STAGE 2: 135
BH CV STRESS METS STAGE 1: 5
BH CV STRESS METS STAGE 2: 7.5
BH CV STRESS NUCLEAR ISOTOPE DOSE: 35.2 MCI
BH CV STRESS PROTOCOL 1: NORMAL
BH CV STRESS RECOVERY BP: NORMAL MMHG
BH CV STRESS RECOVERY HR: 64 BPM
BH CV STRESS SPEED STAGE 1: 1.7
BH CV STRESS SPEED STAGE 2: 2.5
BH CV STRESS STAGE 1: 1
BH CV STRESS STAGE 2: 2
BUN SERPL-MCNC: 26 MG/DL (ref 8–23)
BUN/CREAT SERPL: 15.7 (ref 7–25)
CALCIUM SPEC-SCNC: 8.6 MG/DL (ref 8.6–10.5)
CHLORIDE SERPL-SCNC: 108 MMOL/L (ref 98–107)
CO2 SERPL-SCNC: 23 MMOL/L (ref 22–29)
CREAT SERPL-MCNC: 1.66 MG/DL (ref 0.76–1.27)
DEPRECATED RDW RBC AUTO: 43.5 FL (ref 37–54)
EGFRCR SERPLBLD CKD-EPI 2021: 42.2 ML/MIN/1.73
EOSINOPHIL # BLD AUTO: 0.19 10*3/MM3 (ref 0–0.4)
EOSINOPHIL NFR BLD AUTO: 2.5 % (ref 0.3–6.2)
ERYTHROCYTE [DISTWIDTH] IN BLOOD BY AUTOMATED COUNT: 12.3 % (ref 12.3–15.4)
GLUCOSE SERPL-MCNC: 116 MG/DL (ref 65–99)
HCT VFR BLD AUTO: 34.8 % (ref 37.5–51)
HGB BLD-MCNC: 11.8 G/DL (ref 13–17.7)
IMM GRANULOCYTES # BLD AUTO: 0.02 10*3/MM3 (ref 0–0.05)
IMM GRANULOCYTES NFR BLD AUTO: 0.3 % (ref 0–0.5)
LV EF NUC BP: 58 %
LYMPHOCYTES # BLD AUTO: 1.5 10*3/MM3 (ref 0.7–3.1)
LYMPHOCYTES NFR BLD AUTO: 19.6 % (ref 19.6–45.3)
MAXIMAL PREDICTED HEART RATE: 143 BPM
MCH RBC QN AUTO: 32.5 PG (ref 26.6–33)
MCHC RBC AUTO-ENTMCNC: 33.9 G/DL (ref 31.5–35.7)
MCV RBC AUTO: 95.9 FL (ref 79–97)
MONOCYTES # BLD AUTO: 0.6 10*3/MM3 (ref 0.1–0.9)
MONOCYTES NFR BLD AUTO: 7.8 % (ref 5–12)
NEUTROPHILS NFR BLD AUTO: 5.3 10*3/MM3 (ref 1.7–7)
NEUTROPHILS NFR BLD AUTO: 69.3 % (ref 42.7–76)
NRBC BLD AUTO-RTO: 0 /100 WBC (ref 0–0.2)
PERCENT MAX PREDICTED HR: 94.41 %
PLATELET # BLD AUTO: 219 10*3/MM3 (ref 140–450)
PMV BLD AUTO: 10.2 FL (ref 6–12)
POTASSIUM SERPL-SCNC: 4.9 MMOL/L (ref 3.5–5.2)
RBC # BLD AUTO: 3.63 10*6/MM3 (ref 4.14–5.8)
SODIUM SERPL-SCNC: 139 MMOL/L (ref 136–145)
STRESS BASELINE BP: NORMAL MMHG
STRESS BASELINE HR: 58 BPM
STRESS PERCENT HR: 111 %
STRESS POST ESTIMATED WORKLOAD: 7.5 METS
STRESS POST EXERCISE DUR MIN: 5 MIN
STRESS POST EXERCISE DUR SEC: 35 SEC
STRESS POST PEAK BP: NORMAL MMHG
STRESS POST PEAK HR: 135 BPM
STRESS TARGET HR: 122 BPM
WBC NRBC COR # BLD: 7.65 10*3/MM3 (ref 3.4–10.8)

## 2023-03-16 PROCEDURE — 78452 HT MUSCLE IMAGE SPECT MULT: CPT

## 2023-03-16 PROCEDURE — 93018 CV STRESS TEST I&R ONLY: CPT | Performed by: INTERNAL MEDICINE

## 2023-03-16 PROCEDURE — 93017 CV STRESS TEST TRACING ONLY: CPT

## 2023-03-16 PROCEDURE — 85025 COMPLETE CBC W/AUTO DIFF WBC: CPT

## 2023-03-16 PROCEDURE — 36415 COLL VENOUS BLD VENIPUNCTURE: CPT

## 2023-03-16 PROCEDURE — 0 TECHNETIUM TETROFOSMIN KIT: Performed by: INTERNAL MEDICINE

## 2023-03-16 PROCEDURE — 78452 HT MUSCLE IMAGE SPECT MULT: CPT | Performed by: INTERNAL MEDICINE

## 2023-03-16 PROCEDURE — A9502 TC99M TETROFOSMIN: HCPCS | Performed by: INTERNAL MEDICINE

## 2023-03-16 PROCEDURE — 93016 CV STRESS TEST SUPVJ ONLY: CPT | Performed by: INTERNAL MEDICINE

## 2023-03-16 PROCEDURE — 80048 BASIC METABOLIC PNL TOTAL CA: CPT

## 2023-03-16 RX ADMIN — TETROFOSMIN 1 DOSE: 1.38 INJECTION, POWDER, LYOPHILIZED, FOR SOLUTION INTRAVENOUS at 12:15

## 2023-03-16 RX ADMIN — TETROFOSMIN 1 DOSE: 1.38 INJECTION, POWDER, LYOPHILIZED, FOR SOLUTION INTRAVENOUS at 12:55

## 2023-03-17 ENCOUNTER — HOSPITAL ENCOUNTER (OUTPATIENT)
Facility: HOSPITAL | Age: 78
Discharge: HOME | End: 2023-03-18
Attending: INTERNAL MEDICINE | Admitting: INTERNAL MEDICINE
Payer: MEDICARE

## 2023-03-17 DIAGNOSIS — I25.110 CORONARY ARTERY DISEASE INVOLVING NATIVE CORONARY ARTERY OF NATIVE HEART WITH UNSTABLE ANGINA PECTORIS: Primary | ICD-10-CM

## 2023-03-17 PROBLEM — I20.0 UNSTABLE ANGINA: Status: ACTIVE | Noted: 2023-03-17

## 2023-03-17 LAB
ACT BLD: 245 SECONDS (ref 82–152)
ACT BLD: 293 SECONDS (ref 82–152)
QT INTERVAL: 461 MS

## 2023-03-17 PROCEDURE — 93458 L HRT ARTERY/VENTRICLE ANGIO: CPT | Performed by: INTERNAL MEDICINE

## 2023-03-17 PROCEDURE — 85347 COAGULATION TIME ACTIVATED: CPT

## 2023-03-17 PROCEDURE — 25010000002 HYDRALAZINE PER 20 MG: Performed by: INTERNAL MEDICINE

## 2023-03-17 PROCEDURE — G0378 HOSPITAL OBSERVATION PER HR: HCPCS

## 2023-03-17 PROCEDURE — 92921 PR PRQ TRLUML CORONARY ANGIOPLASTY ADDL BRANCH: CPT | Performed by: INTERNAL MEDICINE

## 2023-03-17 PROCEDURE — C1874 STENT, COATED/COV W/DEL SYS: HCPCS | Performed by: INTERNAL MEDICINE

## 2023-03-17 PROCEDURE — 63710000001 METOPROLOL SUCCINATE XL 50 MG TABLET SUSTAINED-RELEASE 24 HOUR: Performed by: INTERNAL MEDICINE

## 2023-03-17 PROCEDURE — 93005 ELECTROCARDIOGRAM TRACING: CPT | Performed by: INTERNAL MEDICINE

## 2023-03-17 PROCEDURE — 92978 ENDOLUMINL IVUS OCT C 1ST: CPT | Performed by: INTERNAL MEDICINE

## 2023-03-17 PROCEDURE — 25010000002 MIDAZOLAM PER 1 MG: Performed by: INTERNAL MEDICINE

## 2023-03-17 PROCEDURE — A9270 NON-COVERED ITEM OR SERVICE: HCPCS | Performed by: INTERNAL MEDICINE

## 2023-03-17 PROCEDURE — C1769 GUIDE WIRE: HCPCS | Performed by: INTERNAL MEDICINE

## 2023-03-17 PROCEDURE — 92921: CPT | Performed by: INTERNAL MEDICINE

## 2023-03-17 PROCEDURE — C1725 CATH, TRANSLUMIN NON-LASER: HCPCS | Performed by: INTERNAL MEDICINE

## 2023-03-17 PROCEDURE — 93010 ELECTROCARDIOGRAM REPORT: CPT | Performed by: INTERNAL MEDICINE

## 2023-03-17 PROCEDURE — C1894 INTRO/SHEATH, NON-LASER: HCPCS | Performed by: INTERNAL MEDICINE

## 2023-03-17 PROCEDURE — C1887 CATHETER, GUIDING: HCPCS | Performed by: INTERNAL MEDICINE

## 2023-03-17 PROCEDURE — C1753 CATH, INTRAVAS ULTRASOUND: HCPCS | Performed by: INTERNAL MEDICINE

## 2023-03-17 PROCEDURE — 92928 PRQ TCAT PLMT NTRAC ST 1 LES: CPT | Performed by: INTERNAL MEDICINE

## 2023-03-17 PROCEDURE — C9600 PERC DRUG-EL COR STENT SING: HCPCS | Performed by: INTERNAL MEDICINE

## 2023-03-17 PROCEDURE — 25510000001 IOPAMIDOL PER 1 ML: Performed by: INTERNAL MEDICINE

## 2023-03-17 PROCEDURE — 99152 MOD SED SAME PHYS/QHP 5/>YRS: CPT | Performed by: INTERNAL MEDICINE

## 2023-03-17 PROCEDURE — 25010000002 HEPARIN (PORCINE) PER 1000 UNITS: Performed by: INTERNAL MEDICINE

## 2023-03-17 PROCEDURE — 25010000002 FENTANYL CITRATE (PF) 50 MCG/ML SOLUTION: Performed by: INTERNAL MEDICINE

## 2023-03-17 PROCEDURE — 99153 MOD SED SAME PHYS/QHP EA: CPT | Performed by: INTERNAL MEDICINE

## 2023-03-17 DEVICE — XIENCE SKYPOINT™ EVEROLIMUS ELUTING CORONARY STENT SYSTEM 3.50 MM X 18 MM / RAPID-EXCHANGE
Type: IMPLANTABLE DEVICE | Site: HEART | Status: FUNCTIONAL
Brand: XIENCE SKYPOINT™

## 2023-03-17 RX ORDER — SODIUM CHLORIDE 9 MG/ML
50 INJECTION, SOLUTION INTRAVENOUS CONTINUOUS
Status: ACTIVE | OUTPATIENT
Start: 2023-03-17 | End: 2023-03-18

## 2023-03-17 RX ORDER — SODIUM CHLORIDE 9 MG/ML
75 INJECTION, SOLUTION INTRAVENOUS CONTINUOUS
Status: DISCONTINUED | OUTPATIENT
Start: 2023-03-17 | End: 2023-03-18 | Stop reason: HOSPADM

## 2023-03-17 RX ORDER — FENTANYL CITRATE 50 UG/ML
INJECTION, SOLUTION INTRAMUSCULAR; INTRAVENOUS
Status: DISCONTINUED | OUTPATIENT
Start: 2023-03-17 | End: 2023-03-17 | Stop reason: HOSPADM

## 2023-03-17 RX ORDER — ASPIRIN 81 MG/1
81 TABLET, CHEWABLE ORAL DAILY
Status: DISCONTINUED | OUTPATIENT
Start: 2023-03-17 | End: 2023-03-17 | Stop reason: SDUPTHER

## 2023-03-17 RX ORDER — MORPHINE SULFATE 2 MG/ML
2 INJECTION, SOLUTION INTRAMUSCULAR; INTRAVENOUS
Status: DISCONTINUED | OUTPATIENT
Start: 2023-03-17 | End: 2023-03-18 | Stop reason: HOSPADM

## 2023-03-17 RX ORDER — SODIUM CHLORIDE 0.9 % (FLUSH) 0.9 %
10 SYRINGE (ML) INJECTION AS NEEDED
Status: DISCONTINUED | OUTPATIENT
Start: 2023-03-17 | End: 2023-03-17

## 2023-03-17 RX ORDER — TEMAZEPAM 15 MG/1
15 CAPSULE ORAL NIGHTLY PRN
Status: DISCONTINUED | OUTPATIENT
Start: 2023-03-17 | End: 2023-03-18 | Stop reason: HOSPADM

## 2023-03-17 RX ORDER — CLOPIDOGREL BISULFATE 75 MG/1
75 TABLET ORAL DAILY
Status: DISCONTINUED | OUTPATIENT
Start: 2023-03-18 | End: 2023-03-18 | Stop reason: HOSPADM

## 2023-03-17 RX ORDER — ONDANSETRON 4 MG/1
4 TABLET, FILM COATED ORAL EVERY 6 HOURS PRN
Status: DISCONTINUED | OUTPATIENT
Start: 2023-03-17 | End: 2023-03-18 | Stop reason: HOSPADM

## 2023-03-17 RX ORDER — ACETAMINOPHEN 325 MG/1
650 TABLET ORAL EVERY 4 HOURS PRN
Status: DISCONTINUED | OUTPATIENT
Start: 2023-03-17 | End: 2023-03-18 | Stop reason: HOSPADM

## 2023-03-17 RX ORDER — ATORVASTATIN CALCIUM 20 MG/1
40 TABLET, FILM COATED ORAL NIGHTLY
Status: DISCONTINUED | OUTPATIENT
Start: 2023-03-17 | End: 2023-03-18 | Stop reason: HOSPADM

## 2023-03-17 RX ORDER — METOPROLOL SUCCINATE 50 MG/1
25 TABLET, EXTENDED RELEASE ORAL DAILY
Status: DISCONTINUED | OUTPATIENT
Start: 2023-03-17 | End: 2023-03-18 | Stop reason: HOSPADM

## 2023-03-17 RX ORDER — ASPIRIN 325 MG
TABLET ORAL
Status: DISCONTINUED | OUTPATIENT
Start: 2023-03-17 | End: 2023-03-17 | Stop reason: HOSPADM

## 2023-03-17 RX ORDER — PRAVASTATIN SODIUM 40 MG
40 TABLET ORAL NIGHTLY
Status: DISCONTINUED | OUTPATIENT
Start: 2023-03-17 | End: 2023-03-17

## 2023-03-17 RX ORDER — SODIUM CHLORIDE 0.9 % (FLUSH) 0.9 %
10 SYRINGE (ML) INJECTION EVERY 12 HOURS SCHEDULED
Status: DISCONTINUED | OUTPATIENT
Start: 2023-03-17 | End: 2023-03-17

## 2023-03-17 RX ORDER — ASPIRIN 81 MG/1
81 TABLET, CHEWABLE ORAL DAILY
COMMUNITY

## 2023-03-17 RX ORDER — MIDAZOLAM HYDROCHLORIDE 1 MG/ML
INJECTION INTRAMUSCULAR; INTRAVENOUS
Status: DISCONTINUED | OUTPATIENT
Start: 2023-03-17 | End: 2023-03-17 | Stop reason: HOSPADM

## 2023-03-17 RX ORDER — ONDANSETRON 2 MG/ML
4 INJECTION INTRAMUSCULAR; INTRAVENOUS EVERY 6 HOURS PRN
Status: DISCONTINUED | OUTPATIENT
Start: 2023-03-17 | End: 2023-03-18 | Stop reason: HOSPADM

## 2023-03-17 RX ORDER — CLOPIDOGREL BISULFATE 75 MG/1
TABLET ORAL
Status: DISCONTINUED | OUTPATIENT
Start: 2023-03-17 | End: 2023-03-17 | Stop reason: HOSPADM

## 2023-03-17 RX ORDER — LEVOTHYROXINE SODIUM 88 UG/1
88 TABLET ORAL
Status: DISCONTINUED | OUTPATIENT
Start: 2023-03-18 | End: 2023-03-18 | Stop reason: HOSPADM

## 2023-03-17 RX ORDER — SODIUM CHLORIDE 9 MG/ML
40 INJECTION, SOLUTION INTRAVENOUS AS NEEDED
Status: DISCONTINUED | OUTPATIENT
Start: 2023-03-17 | End: 2023-03-17

## 2023-03-17 RX ORDER — LIDOCAINE HYDROCHLORIDE 20 MG/ML
INJECTION, SOLUTION INFILTRATION; PERINEURAL
Status: DISCONTINUED | OUTPATIENT
Start: 2023-03-17 | End: 2023-03-17 | Stop reason: HOSPADM

## 2023-03-17 RX ORDER — VERAPAMIL HYDROCHLORIDE 2.5 MG/ML
INJECTION, SOLUTION INTRAVENOUS
Status: DISCONTINUED | OUTPATIENT
Start: 2023-03-17 | End: 2023-03-17 | Stop reason: HOSPADM

## 2023-03-17 RX ORDER — HYDRALAZINE HYDROCHLORIDE 20 MG/ML
INJECTION INTRAMUSCULAR; INTRAVENOUS
Status: DISCONTINUED | OUTPATIENT
Start: 2023-03-17 | End: 2023-03-17 | Stop reason: HOSPADM

## 2023-03-17 RX ORDER — HYDROCODONE BITARTRATE AND ACETAMINOPHEN 5; 325 MG/1; MG/1
1 TABLET ORAL EVERY 4 HOURS PRN
Status: DISCONTINUED | OUTPATIENT
Start: 2023-03-17 | End: 2023-03-18 | Stop reason: HOSPADM

## 2023-03-17 RX ORDER — HEPARIN SODIUM 1000 [USP'U]/ML
INJECTION, SOLUTION INTRAVENOUS; SUBCUTANEOUS
Status: DISCONTINUED | OUTPATIENT
Start: 2023-03-17 | End: 2023-03-17 | Stop reason: HOSPADM

## 2023-03-17 RX ORDER — ASPIRIN 81 MG/1
81 TABLET ORAL DAILY
Status: DISCONTINUED | OUTPATIENT
Start: 2023-03-18 | End: 2023-03-18 | Stop reason: HOSPADM

## 2023-03-17 RX ORDER — NALOXONE HCL 0.4 MG/ML
0.4 VIAL (ML) INJECTION
Status: DISCONTINUED | OUTPATIENT
Start: 2023-03-17 | End: 2023-03-18 | Stop reason: HOSPADM

## 2023-03-17 RX ADMIN — Medication 10 ML: at 15:31

## 2023-03-17 RX ADMIN — SODIUM CHLORIDE 75 ML/HR: 9 INJECTION, SOLUTION INTRAVENOUS at 09:29

## 2023-03-17 RX ADMIN — METOPROLOL SUCCINATE 25 MG: 50 TABLET, EXTENDED RELEASE ORAL at 15:33

## 2023-03-17 NOTE — CONSULTS
Met with patient and his wife, discussed benefits of cardiac rehab. Patient has an exercise routine.Provided phase II information along with the contact information for cardiac rehab here at [facility]. Patient will call to schedule after discharge.

## 2023-03-17 NOTE — CASE MANAGEMENT/SOCIAL WORK
Discharge Planning Assessment  [facility]     Patient Name: Kunal Vargas  MRN: [id number]  Today's Date: 3/17/2023    Admit Date: 3/17/2023    Plan: home w/o needs   Discharge Needs Assessment     Row Name 03/17/23 1405       Living Environment    People in Home spouse    Name(s) of People in Home wife Renetta    Current Living Arrangements home    Primary Care Provided by self    Provides Primary Care For no one    Family Caregiver if Needed spouse;child(edouard), adult    Family Caregiver Names wife Renetta, sons Jevon and Main    Quality of Family Relationships supportive       Resource/Environmental Concerns    Resource/Environmental Concerns none    Transportation Concerns none       Transition Planning    Patient/Family Anticipates Transition to home with family    Patient/Family Anticipated Services at Transition none    Transportation Anticipated car, drives self;family or friend will provide       Discharge Needs Assessment    Readmission Within the Last 30 Days no previous admission in last 30 days    Equipment Currently Used at Home none    Concerns to be Addressed no discharge needs identified;denies needs/concerns at this time;discharge planning;adjustment to diagnosis/illness;basic needs    Anticipated Changes Related to Illness none    Equipment Needed After Discharge none               Discharge Plan     Row Name 03/17/23 1400       Plan    Plan home w/o needs    Patient/Family in Agreement with Plan unable to assess  pt said his wife went to the nursing home to visit family    Provided Post Acute Provider List? Yes    Post Acute Provider List Nursing Home;Home Health    Delivered To Patient    Method of Delivery In person    Plan Comments I met with pt he confirmed the address, PCP and [facility] are correct, he said he and his wife Renetta live in a multi-level home with 6 steps to enter, there is a handrail for the steps, he said the bedroom / bathroom are on the main level of the house.   Pt said he is IADL, uses no DME, has never been to a SNF or had home health, he said  he plans to return home at discharge and he does not anticipate any needs. Pt said his wife will provide him with transportation home.              Continued Care and Services - Admitted Since 3/17/2023    Coordination has not been started for this encounter.       Expected Discharge Date and Time     Expected Discharge Date Expected Discharge Time    Mar 18, 2023          Demographic Summary     Row Name 03/17/23 1404       General Information    Admission Type observation    Arrived From home    Required Notices Provided Observation Status Notice    Referral Source admission list    Reason for Consult discharge planning    Preferred Language English       Contact Information    Permission Granted to Share Info With family/designee               Functional Status     Row Name 03/17/23 1404       Functional Status    Usual Activity Tolerance good    Current Activity Tolerance good       Functional Status, IADL    Medications independent    Meal Preparation independent    Housekeeping independent    Laundry independent    Shopping independent    IADL Comments pt said he and his wife Renetta share the housework and his wife does the cooking                  Laura Morales RN

## 2023-03-18 ENCOUNTER — READMISSION MANAGEMENT (OUTPATIENT)
Dept: CALL CENTER | Facility: HOSPITAL | Age: 78
End: 2023-03-18
Payer: MEDICARE

## 2023-03-18 VITALS
DIASTOLIC BLOOD PRESSURE: 65 MMHG | OXYGEN SATURATION: 98 % | TEMPERATURE: 98.2 F | WEIGHT: 150 LBS | BODY MASS INDEX: 22.22 KG/M2 | HEART RATE: 52 BPM | SYSTOLIC BLOOD PRESSURE: 141 MMHG | RESPIRATION RATE: 18 BRPM | HEIGHT: 69 IN

## 2023-03-18 LAB
ANION GAP SERPL CALCULATED.3IONS-SCNC: 9 MMOL/L (ref 5–15)
BUN SERPL-MCNC: 30 MG/DL (ref 8–23)
BUN/CREAT SERPL: 16.5 (ref 7–25)
CALCIUM SPEC-SCNC: 8.2 MG/DL (ref 8.6–10.5)
CHLORIDE SERPL-SCNC: 110 MMOL/L (ref 98–107)
CHOLEST SERPL-MCNC: 120 MG/DL (ref 0–200)
CO2 SERPL-SCNC: 20 MMOL/L (ref 22–29)
CREAT SERPL-MCNC: 1.82 MG/DL (ref 0.76–1.27)
DEPRECATED RDW RBC AUTO: 45.5 FL (ref 37–54)
EGFRCR SERPLBLD CKD-EPI 2021: 37.8 ML/MIN/1.73
ERYTHROCYTE [DISTWIDTH] IN BLOOD BY AUTOMATED COUNT: 12.9 % (ref 12.3–15.4)
GLUCOSE SERPL-MCNC: 88 MG/DL (ref 65–99)
HBA1C MFR BLD: 5.3 % (ref 4.8–5.6)
HCT VFR BLD AUTO: 33.3 % (ref 37.5–51)
HDLC SERPL-MCNC: 54 MG/DL (ref 40–60)
HGB BLD-MCNC: 10.8 G/DL (ref 13–17.7)
LDLC SERPL CALC-MCNC: 56 MG/DL (ref 0–100)
LDLC/HDLC SERPL: 1.07 {RATIO}
MCH RBC QN AUTO: 31.7 PG (ref 26.6–33)
MCHC RBC AUTO-ENTMCNC: 32.4 G/DL (ref 31.5–35.7)
MCV RBC AUTO: 97.7 FL (ref 79–97)
PLATELET # BLD AUTO: 216 10*3/MM3 (ref 140–450)
PMV BLD AUTO: 10.6 FL (ref 6–12)
POTASSIUM SERPL-SCNC: 5.3 MMOL/L (ref 3.5–5.2)
QT INTERVAL: 446 MS
RBC # BLD AUTO: 3.41 10*6/MM3 (ref 4.14–5.8)
SODIUM SERPL-SCNC: 139 MMOL/L (ref 136–145)
TRIGL SERPL-MCNC: 40 MG/DL (ref 0–150)
VLDLC SERPL-MCNC: 10 MG/DL (ref 5–40)
WBC NRBC COR # BLD: 8.25 10*3/MM3 (ref 3.4–10.8)

## 2023-03-18 PROCEDURE — 93010 ELECTROCARDIOGRAM REPORT: CPT | Performed by: INTERNAL MEDICINE

## 2023-03-18 PROCEDURE — 93005 ELECTROCARDIOGRAM TRACING: CPT | Performed by: INTERNAL MEDICINE

## 2023-03-18 PROCEDURE — 63710000001 LEVOTHYROXINE 88 MCG TABLET: Performed by: INTERNAL MEDICINE

## 2023-03-18 PROCEDURE — 99239 HOSP IP/OBS DSCHRG MGMT >30: CPT | Performed by: NURSE PRACTITIONER

## 2023-03-18 PROCEDURE — 80048 BASIC METABOLIC PNL TOTAL CA: CPT | Performed by: INTERNAL MEDICINE

## 2023-03-18 PROCEDURE — 80061 LIPID PANEL: CPT | Performed by: INTERNAL MEDICINE

## 2023-03-18 PROCEDURE — G0378 HOSPITAL OBSERVATION PER HR: HCPCS

## 2023-03-18 PROCEDURE — 85027 COMPLETE CBC AUTOMATED: CPT | Performed by: INTERNAL MEDICINE

## 2023-03-18 PROCEDURE — A9270 NON-COVERED ITEM OR SERVICE: HCPCS | Performed by: INTERNAL MEDICINE

## 2023-03-18 PROCEDURE — 83036 HEMOGLOBIN GLYCOSYLATED A1C: CPT | Performed by: INTERNAL MEDICINE

## 2023-03-18 RX ORDER — CLOPIDOGREL BISULFATE 75 MG/1
75 TABLET ORAL DAILY
Qty: 90 TABLET | Refills: 3 | Status: SHIPPED | OUTPATIENT
Start: 2023-03-19 | End: 2023-03-27 | Stop reason: SDUPTHER

## 2023-03-18 RX ORDER — ATORVASTATIN CALCIUM 40 MG/1
40 TABLET, FILM COATED ORAL NIGHTLY
Qty: 90 TABLET | Refills: 3 | Status: SHIPPED | OUTPATIENT
Start: 2023-03-18 | End: 2023-03-27 | Stop reason: SDUPTHER

## 2023-03-18 RX ORDER — NITROGLYCERIN 0.4 MG/1
TABLET SUBLINGUAL
Qty: 25 TABLET | Refills: 1 | Status: SHIPPED | OUTPATIENT
Start: 2023-03-18

## 2023-03-18 RX ADMIN — LEVOTHYROXINE SODIUM 88 MCG: 0.09 TABLET ORAL at 06:51

## 2023-03-18 NOTE — OUTREACH NOTE
Prep Survey    Flowsheet Row Responses   [facility] patient discharged from? [place]   Is LACE score < 7 ? Yes   Eligibility [facility]   Date of Admission 03/17/23   Date of Discharge 03/18/23   Discharge Disposition Home or Self Care   Discharge diagnosis Unstable angina   Does the patient have one of the following disease processes/diagnoses(primary or secondary)? Other   Does the patient have Home health ordered? No   Is there a DME ordered? No   Prep survey completed? Yes          Dayana AGUERO - Registered Nurse

## 2023-03-20 ENCOUNTER — TRANSITIONAL CARE MANAGEMENT TELEPHONE ENCOUNTER (OUTPATIENT)
Dept: CALL CENTER | Facility: HOSPITAL | Age: 78
End: 2023-03-20
Payer: MEDICARE

## 2023-03-20 NOTE — OUTREACH NOTE
Call Center TCM Note    Flowsheet Row Responses   [facility] patient discharged from? [place]   Does the patient have one of the following disease processes/diagnoses(primary or secondary)? Other   TCM attempt successful? Yes   Call start time 1423   Call end time 1429   Discharge diagnosis Unstable angina, heart cath/stent   Person spoke with today (if not patient) and relationship Patient   Meds reviewed with patient/caregiver? Yes  [New: atorvastatin, plavix, nitro SL.    Stopped: pravastatin]   Does the patient have all medications ordered at discharge? Yes   Is the patient taking all medications as directed (includes completed medication regime)? Yes   Comments PCP Dr Montesinos. Patient declined need for PCP appt at this time. Plans to keep his regular appt in May. Will be following up with cardiology.    Does the patient have an appointment with their PCP within 7 days of discharge? No   Nursing Interventions Patient declined scheduling/rescheduling appointment at this time, Patient desires to follow up with specialty only   Has home health visited the patient within 72 hours of discharge? N/A   Psychosocial issues? No   Comments Denies any issues at cath site.    Did the patient receive a copy of their discharge instructions? Yes   Nursing interventions Reviewed instructions with patient   What is the patient's perception of their health status since discharge? Improving   Is the patient/caregiver able to teach back signs and symptoms related to disease process for when to call PCP? Yes   Is the patient/caregiver able to teach back the hierarchy of who to call/visit for symptoms/problems? PCP, Specialist, Home health nurse, Urgent Care, ED, 911 Yes   If the patient is a current smoker, are they able to teach back resources for cessation? Not a smoker   TCM call completed? Yes   Call end time 1429   Would this patient benefit from a Referral to Amb Social Work? No   Is the patient interested in  additional calls from an ambulatory ?  NOTE:  applies to high risk patients requiring additional follow-up. No          Geraldine Florez RN    3/20/2023, 14:30 EDT

## 2023-03-27 ENCOUNTER — OFFICE VISIT (OUTPATIENT)
Dept: CARDIOLOGY | Facility: CLINIC | Age: 78
End: 2023-03-27
Payer: MEDICARE

## 2023-03-27 VITALS
HEART RATE: 55 BPM | DIASTOLIC BLOOD PRESSURE: 60 MMHG | SYSTOLIC BLOOD PRESSURE: 134 MMHG | BODY MASS INDEX: 22.81 KG/M2 | HEIGHT: 69 IN | WEIGHT: 154 LBS

## 2023-03-27 DIAGNOSIS — Z95.5 STATUS POST CORONARY ARTERY STENT PLACEMENT: ICD-10-CM

## 2023-03-27 DIAGNOSIS — I25.10 CORONARY ARTERY DISEASE INVOLVING NATIVE CORONARY ARTERY OF NATIVE HEART WITHOUT ANGINA PECTORIS: Primary | ICD-10-CM

## 2023-03-27 DIAGNOSIS — E78.5 HYPERLIPIDEMIA, UNSPECIFIED HYPERLIPIDEMIA TYPE: ICD-10-CM

## 2023-03-27 PROCEDURE — 93000 ELECTROCARDIOGRAM COMPLETE: CPT | Performed by: NURSE PRACTITIONER

## 2023-03-27 PROCEDURE — 3075F SYST BP GE 130 - 139MM HG: CPT | Performed by: NURSE PRACTITIONER

## 2023-03-27 PROCEDURE — 1160F RVW MEDS BY RX/DR IN RCRD: CPT | Performed by: NURSE PRACTITIONER

## 2023-03-27 PROCEDURE — 99214 OFFICE O/P EST MOD 30 MIN: CPT | Performed by: NURSE PRACTITIONER

## 2023-03-27 PROCEDURE — 3078F DIAST BP <80 MM HG: CPT | Performed by: NURSE PRACTITIONER

## 2023-03-27 PROCEDURE — 1159F MED LIST DOCD IN RCRD: CPT | Performed by: NURSE PRACTITIONER

## 2023-03-27 RX ORDER — ATORVASTATIN CALCIUM 40 MG/1
40 TABLET, FILM COATED ORAL NIGHTLY
Qty: 90 TABLET | Refills: 3 | Status: SHIPPED | OUTPATIENT
Start: 2023-03-27

## 2023-03-27 RX ORDER — CLOPIDOGREL BISULFATE 75 MG/1
75 TABLET ORAL DAILY
Qty: 90 TABLET | Refills: 3 | Status: SHIPPED | OUTPATIENT
Start: 2023-03-27

## 2023-03-27 NOTE — PROGRESS NOTES
"Date of Office Visit: 23  Encounter Provider: PAVAN Aguero  Place of Service: [facility]ARDIOLOG[facility]  Patient Name: Kunal Vargas  :1945    Chief Complaint   Patient presents with   • Chronic coronary artery disease   • Hyperlipidemia   • Hypertension   • Follow-up   :     HPI: Kunal Vargas is a 77 y.o. male  with hyperlipidemia, coronary artery disease s/p PCI 2023, hyperdynamic left ventricle, frequent premature ventricular contractions, carotid artery disease.  He is a former patient of Dr. Butcher. He is now followed by Dr. Jeff Jones.    He had an abnormal stress test that prompted left heart catheterization in 2014.  He was found to have mild coronary arthrosclerosis and hyperdynamic left ventricular systolic function.  Beta-blocker was started.    He then was seen in 2023 and complained of chest pain.  He had a perfusion stress test which showed hypokinetic anterior wall with ischemia.  This was felt to be a high risk study. He had Successful PCI of the proximal to mid LAD with a 3.5 x 18 mm Xience [employer] drug-eluting. He was switched to high intensity atorvastatin and started on clopidogrel.  He presents today for hospital discharge follow-up and is doing well.  1 nosebleed over the weekend which lasted about 5 minutes.  No recurrence.  No chest pain shortness of breath or palpitations or near syncope.  No blood in the urine or stool.  He mowed the lawn yesterday and felt good doing that.  He reports having information for cardiac rehab.    No Known Allergies        Family and social history reviewed.     ROS  All other systems were reviewed and are negative          Objective:     Vitals:    23 0809   BP: 134/60   BP Location: Left arm   Patient Position: Sitting   Pulse: 55   Weight: 69.9 kg (154 lb)   Height: 175.3 cm (69\")     Body mass index is 22.74 kg/m².    PHYSICAL EXAM:  Pulmonary:      Effort: Pulmonary effort is " normal.      Breath sounds: Normal breath sounds.   Cardiovascular:      Normal rate. Regular rhythm.      Comments: Right radial site- WNL          ECG 12 Lead    Date/Time: 3/27/2023 8:45 AM  Performed by: Olivia Pardo APRN  Authorized by: Olivia Pardo APRN   Comparison: compared with previous ECG   Similar to previous ECG  Rhythm: sinus rhythm  Rate: normal  QRS axis: normal                Current Outpatient Medications   Medication Sig Dispense Refill   • aspirin 81 MG chewable tablet Chew 1 tablet Daily.     • atorvastatin (LIPITOR) 40 MG tablet Take 1 tablet by mouth Every Night. 90 tablet 3   • chlorhexidine (PERIDEX) 0.12 % solution      • clopidogrel (PLAVIX) 75 MG tablet Take 1 tablet by mouth Daily. 90 tablet 3   • levothyroxine (SYNTHROID, LEVOTHROID) 88 MCG tablet TAKE 1 TABLET BY MOUTH EVERY DAY 90 tablet 0   • metoprolol succinate XL (TOPROL-XL) 25 MG 24 hr tablet TAKE 1 TABLET BY MOUTH EVERY DAY 90 tablet 1   • nitroglycerin (NITROSTAT) 0.4 MG SL tablet place 1 tablet under the tongue as needed for angina, may repeat every 5mins for up three doses 25 tablet 1   • triamcinolone (KENALOG) 0.1 % cream        No current facility-administered medications for this visit.     Assessment:       Diagnosis Plan   1. Coronary artery disease involving native coronary artery of native heart without angina pectoris             No orders of the defined types were placed in this encounter.        Plan:       1. 77 year old gentleman with coronary artery disease status post drug-eluting stent to the proximal/mid LAD March 2023. Continue aspirin and plavix and atorvastatin. He verbalized understanding to call cardiac rehab and arrange initial assessment   - he will contact us with any recurrent bleeding episodes  2.  Hyperlipidemia target LDL 70 or less.  His last LDL was at 56. He has been switched to target dose atorvastatin 40 mg and currently tolerating   3.  History of frequent PVCs- continue Toprol- XL  4.   Hypothyroidism on replacement therapy  5.  Mild bilateral carotid artery plaque without significant stenosis on vascular screening August 2021    Follow up in 1 month.Call with questions or concerns.               It has been a pleasure to participate in this patient's care.      Thank you,  PAVAN Aguero      **I used Dragon to dictate this note:**

## 2023-03-28 NOTE — DISCHARGE SUMMARY
Patient Name: Kunal Vargas  :1945  77 y.o.    Date of Admit: 3/17/2023  Date of Discharge:  3/28/2023    Discharge Diagnosis:  Problems Addressed this Visit    None  Visit Diagnoses     Coronary artery disease involving native coronary artery of native heart with unstable angina pectoris (HCC)    -  Primary    Relevant Medications    nitroglycerin (NITROSTAT) 0.4 MG SL tablet    Other Relevant Orders    Ambulatory Referral to Cardiac Rehab      Diagnoses       Codes Comments    Coronary artery disease involving native coronary artery of native heart with unstable angina pectoris (HCC)    -  Primary ICD-10-CM: I25.110  ICD-9-CM: 414.01, 411.1       1.  Abnormal stress test  2.  Coronary artery disease.  Status post drug-eluting stent to the LAD, mild disease of the circumflex and RCA, normal left main  3.  Dyslipidemia  4.  Family history of ruptured AAA  5.  Chronic kidney disease stage IIIb.    Hospital Course:   Patient is a 77-year-old male that presented to the ER with both typical and atypical features did a stress test only previously but underwent a nuclear due to recurrent pain.  He has had mild coronary disease in the past.  Stress test was abnormal we took him for cardiac cath.  He was found to have a lesion in his LAD he got a drug-eluting stent he had 20% stenosis in the circumflex and RCA with normal LV function and normal left main.  He was put on dual antiplatelet therapy.  He has been stable post procedure and is doing well and is ready for discharge.    Procedures Performed  Procedure(s):  Left Heart Cath  Coronary angiography  Stent SABRINA coronary  Optical Coherent Tomography       Consults     No orders found from 2023 to 3/18/2023.          Pertinent Test Results:         Invalid input(s): LABWILLOW PROT      @LABRCNT(bnp)@                    Condition on Discharge: stable    Discharge Medications     Discharge Medications      New Medications      Instructions Start Date    nitroglycerin 0.4 MG SL tablet  Commonly known as: NITROSTAT   place 1 tablet under the tongue as needed for angina, may repeat every 5mins for up three doses         Continue These Medications      Instructions Start Date   aspirin 81 MG chewable tablet   81 mg, Oral, Daily      chlorhexidine 0.12 % solution  Commonly known as: PERIDEX   No dose, route, or frequency recorded.      levothyroxine 88 MCG tablet  Commonly known as: SYNTHROID, LEVOTHROID   TAKE 1 TABLET BY MOUTH EVERY DAY      metoprolol succinate XL 25 MG 24 hr tablet  Commonly known as: TOPROL-XL   TAKE 1 TABLET BY MOUTH EVERY DAY      triamcinolone 0.1 % cream  Commonly known as: KENALOG   No dose, route, or frequency recorded.         Stop These Medications    pravastatin 40 MG tablet  Commonly known as: PRAVACHOL            Discharge Diet:   Diet Instructions    Cardiac diet           Activity at Discharge:   Activity Instructions    Activity as specified in this discharge packet.activity as tolerated           Discharge disposition: home    Follow-up Appointments  Future Appointments   Date Time Provider Department Center   4/21/2023 10:15 AM Jeff Jones Jr., MD [facility]   5/17/2023  8:45 AM [facility]   5/23/2023  1:00 PM Chai Montesinos MD [facility]   9/22/2023 12:45 PM Jeff Jones Jr., MD [facility]     Additional Instructions for the Follow-ups that You Need to Schedule     Ambulatory Referral to Cardiac Rehab   As directed            Test Results Pending at Discharge       PAVAN Vargas, [facility]  03/28/23  15:17 EDT    Time: Discharge 35 min  Electronically signed by PAVAN Vargas, 03/28/23, 3:17 PM EDT.

## 2023-03-30 ENCOUNTER — TRANSCRIBE ORDERS (OUTPATIENT)
Dept: CARDIAC REHAB | Facility: HOSPITAL | Age: 78
End: 2023-03-30
Payer: MEDICARE

## 2023-03-30 DIAGNOSIS — Z95.5 S/P DRUG ELUTING CORONARY STENT PLACEMENT: Primary | ICD-10-CM

## 2023-04-07 ENCOUNTER — OFFICE VISIT (OUTPATIENT)
Dept: CARDIAC REHAB | Facility: HOSPITAL | Age: 78
End: 2023-04-07
Payer: MEDICARE

## 2023-04-07 DIAGNOSIS — R94.31 ABNORMAL EKG: Primary | ICD-10-CM

## 2023-04-07 DIAGNOSIS — I49.3 FREQUENT PVCS: ICD-10-CM

## 2023-04-07 DIAGNOSIS — Z95.5 STATUS POST INSERTION OF DRUG-ELUTING STENT INTO LEFT ANTERIOR DESCENDING (LAD) ARTERY: Primary | ICD-10-CM

## 2023-04-07 PROCEDURE — 93798 PHYS/QHP OP CAR RHAB W/ECG: CPT

## 2023-04-10 ENCOUNTER — APPOINTMENT (OUTPATIENT)
Dept: CARDIAC REHAB | Facility: HOSPITAL | Age: 78
End: 2023-04-10
Payer: MEDICARE

## 2023-04-12 ENCOUNTER — APPOINTMENT (OUTPATIENT)
Dept: CARDIAC REHAB | Facility: HOSPITAL | Age: 78
End: 2023-04-12
Payer: MEDICARE

## 2023-04-12 ENCOUNTER — OFFICE VISIT (OUTPATIENT)
Dept: FAMILY MEDICINE CLINIC | Facility: CLINIC | Age: 78
End: 2023-04-12
Payer: MEDICARE

## 2023-04-12 ENCOUNTER — TELEPHONE (OUTPATIENT)
Dept: FAMILY MEDICINE CLINIC | Facility: CLINIC | Age: 78
End: 2023-04-12

## 2023-04-12 VITALS
TEMPERATURE: 98.9 F | OXYGEN SATURATION: 98 % | HEART RATE: 64 BPM | HEIGHT: 69 IN | BODY MASS INDEX: 22.81 KG/M2 | SYSTOLIC BLOOD PRESSURE: 100 MMHG | DIASTOLIC BLOOD PRESSURE: 42 MMHG | WEIGHT: 154 LBS | RESPIRATION RATE: 16 BRPM

## 2023-04-12 DIAGNOSIS — R79.89 ELEVATED LFTS: ICD-10-CM

## 2023-04-12 DIAGNOSIS — K71.9 DRUG-INDUCED LIVER INJURY: ICD-10-CM

## 2023-04-12 DIAGNOSIS — R50.9 FEVER AND CHILLS: Primary | ICD-10-CM

## 2023-04-12 LAB
EXPIRATION DATE: NORMAL
FLUAV AG UPPER RESP QL IA.RAPID: NOT DETECTED
FLUBV AG UPPER RESP QL IA.RAPID: NOT DETECTED
INTERNAL CONTROL: NORMAL
Lab: NORMAL
SARS-COV-2 AG UPPER RESP QL IA.RAPID: NOT DETECTED

## 2023-04-12 PROCEDURE — 1160F RVW MEDS BY RX/DR IN RCRD: CPT | Performed by: STUDENT IN AN ORGANIZED HEALTH CARE EDUCATION/TRAINING PROGRAM

## 2023-04-12 PROCEDURE — 3074F SYST BP LT 130 MM HG: CPT | Performed by: STUDENT IN AN ORGANIZED HEALTH CARE EDUCATION/TRAINING PROGRAM

## 2023-04-12 PROCEDURE — 3078F DIAST BP <80 MM HG: CPT | Performed by: STUDENT IN AN ORGANIZED HEALTH CARE EDUCATION/TRAINING PROGRAM

## 2023-04-12 PROCEDURE — 87428 SARSCOV & INF VIR A&B AG IA: CPT | Performed by: STUDENT IN AN ORGANIZED HEALTH CARE EDUCATION/TRAINING PROGRAM

## 2023-04-12 PROCEDURE — 1159F MED LIST DOCD IN RCRD: CPT | Performed by: STUDENT IN AN ORGANIZED HEALTH CARE EDUCATION/TRAINING PROGRAM

## 2023-04-12 NOTE — TELEPHONE ENCOUNTER
Hub staff attempted to follow warm transfer process and was unsuccessful     Caller: Kunal Vargas    Relationship to patient: Self    Best call back number: [phone]    Patient is needing: PATIENT SEEN IN URGENT TREATMENT ON Sunday, 4/9/23 AND WAS TOLD TO SEE DR. JOE ON 4/12/23 IF STILL HAVING SYMPTOMS.  PATIENT STATES THAT HE HAS A FEVER AND FATIGUE AND WANTS TO SEE DR. JOE TODAY IN OFFICE.  NO AVAILABILITY AND NOT SAME DAY SYMPTOMS.  PLEASE CALL TO SCHEDULE.    PLEASE ADVISE.

## 2023-04-14 ENCOUNTER — HOSPITAL ENCOUNTER (INPATIENT)
Facility: HOSPITAL | Age: 78
LOS: 5 days | Discharge: HOME | DRG: 853 | End: 2023-04-20
Attending: EMERGENCY MEDICINE | Admitting: INTERNAL MEDICINE
Payer: MEDICARE

## 2023-04-14 ENCOUNTER — APPOINTMENT (OUTPATIENT)
Dept: GENERAL RADIOLOGY | Facility: HOSPITAL | Age: 78
DRG: 853 | End: 2023-04-14
Payer: MEDICARE

## 2023-04-14 ENCOUNTER — TELEPHONE (OUTPATIENT)
Dept: FAMILY MEDICINE CLINIC | Facility: CLINIC | Age: 78
End: 2023-04-14

## 2023-04-14 ENCOUNTER — APPOINTMENT (OUTPATIENT)
Dept: CARDIAC REHAB | Facility: HOSPITAL | Age: 78
End: 2023-04-14
Payer: MEDICARE

## 2023-04-14 DIAGNOSIS — N28.9 ACUTE ON CHRONIC RENAL INSUFFICIENCY: ICD-10-CM

## 2023-04-14 DIAGNOSIS — K80.20 GALLSTONES: ICD-10-CM

## 2023-04-14 DIAGNOSIS — N18.9 ACUTE ON CHRONIC RENAL INSUFFICIENCY: ICD-10-CM

## 2023-04-14 DIAGNOSIS — R79.89 ELEVATED LFTS: ICD-10-CM

## 2023-04-14 DIAGNOSIS — K81.9 CHOLECYSTITIS: ICD-10-CM

## 2023-04-14 DIAGNOSIS — R50.9 FEVER AND CHILLS: Primary | ICD-10-CM

## 2023-04-14 DIAGNOSIS — R79.89 ELEVATED LFTS: Primary | ICD-10-CM

## 2023-04-14 DIAGNOSIS — D72.829 LEUKOCYTOSIS, UNSPECIFIED TYPE: ICD-10-CM

## 2023-04-14 DIAGNOSIS — N28.9 ABNORMAL KIDNEY FUNCTION: ICD-10-CM

## 2023-04-14 LAB
ALBUMIN SERPL-MCNC: 3.9 G/DL (ref 3.5–5.2)
ALBUMIN SERPL-MCNC: 4.4 G/DL (ref 3.7–4.7)
ALBUMIN/GLOB SERPL: 1.6 G/DL
ALBUMIN/GLOB SERPL: 1.8 {RATIO} (ref 1.2–2.2)
ALP SERPL-CCNC: 422 IU/L (ref 44–121)
ALP SERPL-CCNC: 634 U/L (ref 39–117)
ALT SERPL W P-5'-P-CCNC: 155 U/L (ref 1–41)
ALT SERPL-CCNC: 219 IU/L (ref 0–44)
ANION GAP SERPL CALCULATED.3IONS-SCNC: 12.3 MMOL/L (ref 5–15)
AST SERPL-CCNC: 141 IU/L (ref 0–40)
AST SERPL-CCNC: 91 U/L (ref 1–40)
B PARAPERT DNA SPEC QL NAA+PROBE: NOT DETECTED
B PERT DNA SPEC QL NAA+PROBE: NOT DETECTED
BASOPHILS # BLD AUTO: 0 X10E3/UL (ref 0–0.2)
BASOPHILS # BLD AUTO: 0.06 10*3/MM3 (ref 0–0.2)
BASOPHILS NFR BLD AUTO: 0 %
BASOPHILS NFR BLD AUTO: 0.7 % (ref 0–1.5)
BILIRUB SERPL-MCNC: 2.9 MG/DL (ref 0–1.2)
BILIRUB SERPL-MCNC: 3.7 MG/DL (ref 0–1.2)
BUN SERPL-MCNC: 30 MG/DL (ref 8–23)
BUN SERPL-MCNC: 30 MG/DL (ref 8–27)
BUN/CREAT SERPL: 14 (ref 7–25)
BUN/CREAT SERPL: 15 (ref 10–24)
C PNEUM DNA NPH QL NAA+NON-PROBE: NOT DETECTED
CALCIUM SERPL-MCNC: 9.3 MG/DL (ref 8.6–10.2)
CALCIUM SPEC-SCNC: 9 MG/DL (ref 8.6–10.5)
CHLORIDE SERPL-SCNC: 101 MMOL/L (ref 98–107)
CHLORIDE SERPL-SCNC: 99 MMOL/L (ref 96–106)
CO2 SERPL-SCNC: 20 MMOL/L (ref 20–29)
CO2 SERPL-SCNC: 20.7 MMOL/L (ref 22–29)
CREAT SERPL-MCNC: 2.02 MG/DL (ref 0.76–1.27)
CREAT SERPL-MCNC: 2.14 MG/DL (ref 0.76–1.27)
D-LACTATE SERPL-SCNC: 1 MMOL/L (ref 0.5–2)
DEPRECATED RDW RBC AUTO: 43 FL (ref 37–54)
EGFRCR SERPLBLD CKD-EPI 2021: 31.1 ML/MIN/1.73
EGFRCR SERPLBLD CKD-EPI 2021: 33 ML/MIN/1.73
EOSINOPHIL # BLD AUTO: 0.6 X10E3/UL (ref 0–0.4)
EOSINOPHIL # BLD AUTO: 0.93 10*3/MM3 (ref 0–0.4)
EOSINOPHIL NFR BLD AUTO: 11.2 % (ref 0.3–6.2)
EOSINOPHIL NFR BLD AUTO: 7 %
ERYTHROCYTE [DISTWIDTH] IN BLOOD BY AUTOMATED COUNT: 12.3 % (ref 11.6–15.4)
ERYTHROCYTE [DISTWIDTH] IN BLOOD BY AUTOMATED COUNT: 12.4 % (ref 12.3–15.4)
FLUAV SUBTYP SPEC NAA+PROBE: NOT DETECTED
FLUBV RNA ISLT QL NAA+PROBE: NOT DETECTED
GLOBULIN SER CALC-MCNC: 2.4 G/DL (ref 1.5–4.5)
GLOBULIN UR ELPH-MCNC: 2.5 GM/DL
GLUCOSE SERPL-MCNC: 107 MG/DL (ref 70–99)
GLUCOSE SERPL-MCNC: 123 MG/DL (ref 65–99)
HADV DNA SPEC NAA+PROBE: NOT DETECTED
HCOV 229E RNA SPEC QL NAA+PROBE: NOT DETECTED
HCOV HKU1 RNA SPEC QL NAA+PROBE: NOT DETECTED
HCOV NL63 RNA SPEC QL NAA+PROBE: NOT DETECTED
HCOV OC43 RNA SPEC QL NAA+PROBE: NOT DETECTED
HCT VFR BLD AUTO: 32.9 % (ref 37.5–51)
HCT VFR BLD AUTO: 38.1 % (ref 37.5–51)
HGB BLD-MCNC: 11.5 G/DL (ref 13–17.7)
HGB BLD-MCNC: 12.6 G/DL (ref 13–17.7)
HMPV RNA NPH QL NAA+NON-PROBE: NOT DETECTED
HPIV1 RNA ISLT QL NAA+PROBE: NOT DETECTED
HPIV2 RNA SPEC QL NAA+PROBE: NOT DETECTED
HPIV3 RNA NPH QL NAA+PROBE: NOT DETECTED
HPIV4 P GENE NPH QL NAA+PROBE: NOT DETECTED
IMM GRANULOCYTES # BLD AUTO: 0 X10E3/UL (ref 0–0.1)
IMM GRANULOCYTES # BLD AUTO: 0.03 10*3/MM3 (ref 0–0.05)
IMM GRANULOCYTES NFR BLD AUTO: 0.4 % (ref 0–0.5)
IMM GRANULOCYTES NFR BLD AUTO: 1 %
INR PPP: 1.14 (ref 0.9–1.1)
LACTATE SERPL-MCNC: 12.5 MG/DL (ref 4.8–25.7)
LYMPHOCYTES # BLD AUTO: 0.5 X10E3/UL (ref 0.7–3.1)
LYMPHOCYTES # BLD AUTO: 0.8 10*3/MM3 (ref 0.7–3.1)
LYMPHOCYTES NFR BLD AUTO: 6 %
LYMPHOCYTES NFR BLD AUTO: 9.6 % (ref 19.6–45.3)
M PNEUMO IGG SER IA-ACNC: NOT DETECTED
MCH RBC QN AUTO: 30.9 PG (ref 26.6–33)
MCH RBC QN AUTO: 33 PG (ref 26.6–33)
MCHC RBC AUTO-ENTMCNC: 33.1 G/DL (ref 31.5–35.7)
MCHC RBC AUTO-ENTMCNC: 35 G/DL (ref 31.5–35.7)
MCV RBC AUTO: 93 FL (ref 79–97)
MCV RBC AUTO: 94.5 FL (ref 79–97)
MONOCYTES # BLD AUTO: 0.6 X10E3/UL (ref 0.1–0.9)
MONOCYTES # BLD AUTO: 0.95 10*3/MM3 (ref 0.1–0.9)
MONOCYTES NFR BLD AUTO: 11.4 % (ref 5–12)
MONOCYTES NFR BLD AUTO: 8 %
NEUTROPHILS # BLD AUTO: 5.8 X10E3/UL (ref 1.4–7)
NEUTROPHILS NFR BLD AUTO: 5.54 10*3/MM3 (ref 1.7–7)
NEUTROPHILS NFR BLD AUTO: 66.7 % (ref 42.7–76)
NEUTROPHILS NFR BLD AUTO: 78 %
NRBC BLD AUTO-RTO: 0 /100 WBC (ref 0–0.2)
PLATELET # BLD AUTO: 184 10*3/MM3 (ref 140–450)
PLATELET # BLD AUTO: 197 X10E3/UL (ref 150–450)
PMV BLD AUTO: 10.9 FL (ref 6–12)
POTASSIUM SERPL-SCNC: 4.4 MMOL/L (ref 3.5–5.2)
POTASSIUM SERPL-SCNC: 5 MMOL/L (ref 3.5–5.2)
PROCALCITONIN SERPL-MCNC: 1.12 NG/ML (ref 0–0.08)
PROCALCITONIN SERPL-MCNC: 1.3 NG/ML (ref 0–0.25)
PROT SERPL-MCNC: 6.4 G/DL (ref 6–8.5)
PROT SERPL-MCNC: 6.8 G/DL (ref 6–8.5)
PROTHROMBIN TIME: 14.8 SECONDS (ref 11.7–14.2)
RBC # BLD AUTO: 3.48 10*6/MM3 (ref 4.14–5.8)
RBC # BLD AUTO: 4.08 X10E6/UL (ref 4.14–5.8)
RHINOVIRUS RNA SPEC NAA+PROBE: NOT DETECTED
RSV RNA NPH QL NAA+NON-PROBE: NOT DETECTED
SARS-COV-2 RNA NPH QL NAA+NON-PROBE: NOT DETECTED
SODIUM SERPL-SCNC: 134 MMOL/L (ref 134–144)
SODIUM SERPL-SCNC: 134 MMOL/L (ref 136–145)
WBC # BLD AUTO: 7.5 X10E3/UL (ref 3.4–10.8)
WBC NRBC COR # BLD: 8.31 10*3/MM3 (ref 3.4–10.8)

## 2023-04-14 PROCEDURE — 0202U NFCT DS 22 TRGT SARS-COV-2: CPT | Performed by: EMERGENCY MEDICINE

## 2023-04-14 PROCEDURE — 83605 ASSAY OF LACTIC ACID: CPT | Performed by: EMERGENCY MEDICINE

## 2023-04-14 PROCEDURE — 87040 BLOOD CULTURE FOR BACTERIA: CPT | Performed by: EMERGENCY MEDICINE

## 2023-04-14 PROCEDURE — 87147 CULTURE TYPE IMMUNOLOGIC: CPT | Performed by: EMERGENCY MEDICINE

## 2023-04-14 PROCEDURE — 84145 PROCALCITONIN (PCT): CPT | Performed by: EMERGENCY MEDICINE

## 2023-04-14 PROCEDURE — 87150 DNA/RNA AMPLIFIED PROBE: CPT | Performed by: EMERGENCY MEDICINE

## 2023-04-14 PROCEDURE — 25010000002 KETOROLAC TROMETHAMINE PER 15 MG: Performed by: EMERGENCY MEDICINE

## 2023-04-14 PROCEDURE — 85610 PROTHROMBIN TIME: CPT | Performed by: EMERGENCY MEDICINE

## 2023-04-14 PROCEDURE — 85025 COMPLETE CBC W/AUTO DIFF WBC: CPT | Performed by: EMERGENCY MEDICINE

## 2023-04-14 PROCEDURE — 71045 X-RAY EXAM CHEST 1 VIEW: CPT

## 2023-04-14 PROCEDURE — 80053 COMPREHEN METABOLIC PANEL: CPT | Performed by: EMERGENCY MEDICINE

## 2023-04-14 PROCEDURE — 99285 EMERGENCY DEPT VISIT HI MDM: CPT

## 2023-04-14 RX ORDER — ALUMINA, MAGNESIA, AND SIMETHICONE 2400; 2400; 240 MG/30ML; MG/30ML; MG/30ML
15 SUSPENSION ORAL ONCE
Status: COMPLETED | OUTPATIENT
Start: 2023-04-14 | End: 2023-04-15

## 2023-04-14 RX ORDER — ACETAMINOPHEN 500 MG
1000 TABLET ORAL ONCE
Status: DISCONTINUED | OUTPATIENT
Start: 2023-04-14 | End: 2023-04-14

## 2023-04-14 RX ORDER — KETOROLAC TROMETHAMINE 15 MG/ML
15 INJECTION, SOLUTION INTRAMUSCULAR; INTRAVENOUS ONCE
Status: COMPLETED | OUTPATIENT
Start: 2023-04-14 | End: 2023-04-14

## 2023-04-14 RX ORDER — LIDOCAINE HYDROCHLORIDE 20 MG/ML
15 SOLUTION OROPHARYNGEAL ONCE
Status: COMPLETED | OUTPATIENT
Start: 2023-04-14 | End: 2023-04-15

## 2023-04-14 RX ADMIN — KETOROLAC TROMETHAMINE 15 MG: 15 INJECTION, SOLUTION INTRAMUSCULAR; INTRAVENOUS at 22:03

## 2023-04-14 RX ADMIN — SODIUM CHLORIDE 500 ML: 9 INJECTION, SOLUTION INTRAVENOUS at 22:12

## 2023-04-14 NOTE — TELEPHONE ENCOUNTER
Caller: Kunal Vargas    Relationship: Self    Best call back number: [phone]    Caller requesting test results: PATIENT    What test was performed: LABS    When was the test performed: 4/12/23    Where was the test performed: [facility]    Additional notes: PATIENT SAYS THAT DR. LANCE DID LAB TESTS ON HIS 4/12/23 VISIT AND HE REQUESTS A CALLBACK TO DISCUSS THOSE LAB TEST RESULTS.    PLEASE ADVISE.

## 2023-04-15 ENCOUNTER — APPOINTMENT (OUTPATIENT)
Dept: ULTRASOUND IMAGING | Facility: HOSPITAL | Age: 78
DRG: 853 | End: 2023-04-15
Payer: MEDICARE

## 2023-04-15 ENCOUNTER — INPATIENT HOSPITAL (OUTPATIENT)
Dept: URBAN - METROPOLITAN AREA HOSPITAL 113 | Facility: HOSPITAL | Age: 78
End: 2023-04-15
Payer: MEDICARE

## 2023-04-15 ENCOUNTER — HOSPITAL ENCOUNTER (OUTPATIENT)
Facility: HOSPITAL | Age: 78
Setting detail: SURGERY ADMIT
DRG: 853 | End: 2023-04-15
Attending: STUDENT IN AN ORGANIZED HEALTH CARE EDUCATION/TRAINING PROGRAM | Admitting: STUDENT IN AN ORGANIZED HEALTH CARE EDUCATION/TRAINING PROGRAM
Payer: MEDICARE

## 2023-04-15 ENCOUNTER — APPOINTMENT (OUTPATIENT)
Dept: CT IMAGING | Facility: HOSPITAL | Age: 78
DRG: 853 | End: 2023-04-15
Payer: MEDICARE

## 2023-04-15 DIAGNOSIS — K82.9 DISEASE OF GALLBLADDER, UNSPECIFIED: ICD-10-CM

## 2023-04-15 DIAGNOSIS — R74.01 ELEVATION OF LEVELS OF LIVER TRANSAMINASE LEVELS: ICD-10-CM

## 2023-04-15 DIAGNOSIS — K21.9 GASTRO-ESOPHAGEAL REFLUX DISEASE WITHOUT ESOPHAGITIS: ICD-10-CM

## 2023-04-15 PROBLEM — N18.32 STAGE 3B CHRONIC KIDNEY DISEASE (CKD): Status: ACTIVE | Noted: 2021-04-07

## 2023-04-15 PROBLEM — R79.89 ELEVATED LFTS: Status: ACTIVE | Noted: 2023-04-15

## 2023-04-15 PROBLEM — R50.9 FEVER AND CHILLS: Status: ACTIVE | Noted: 2023-04-15

## 2023-04-15 PROBLEM — K80.20 CALCULUS OF GALLBLADDER: Status: ACTIVE | Noted: 2023-04-15

## 2023-04-15 LAB
ALBUMIN SERPL-MCNC: 3 G/DL (ref 3.5–5.2)
ALBUMIN/GLOB SERPL: 1.3 G/DL
ALP SERPL-CCNC: 604 U/L (ref 39–117)
ALT SERPL W P-5'-P-CCNC: 138 U/L (ref 1–41)
ANION GAP SERPL CALCULATED.3IONS-SCNC: 9.8 MMOL/L (ref 5–15)
AST SERPL-CCNC: 94 U/L (ref 1–40)
BACTERIA BLD CULT: ABNORMAL
BACTERIA UR QL AUTO: ABNORMAL /HPF
BILIRUB SERPL-MCNC: 3.1 MG/DL (ref 0–1.2)
BILIRUB UR QL STRIP: ABNORMAL
BUN SERPL-MCNC: 31 MG/DL (ref 8–23)
BUN/CREAT SERPL: 16.8 (ref 7–25)
CALCIUM SPEC-SCNC: 8.5 MG/DL (ref 8.6–10.5)
CHLORIDE SERPL-SCNC: 106 MMOL/L (ref 98–107)
CLARITY UR: CLEAR
CO2 SERPL-SCNC: 18.2 MMOL/L (ref 22–29)
COLOR UR: ABNORMAL
CREAT SERPL-MCNC: 1.85 MG/DL (ref 0.76–1.27)
CRP SERPL-MCNC: 4.25 MG/DL (ref 0–0.5)
DEPRECATED RDW RBC AUTO: 45.7 FL (ref 37–54)
EGFRCR SERPLBLD CKD-EPI 2021: 37.1 ML/MIN/1.73
ERYTHROCYTE [DISTWIDTH] IN BLOOD BY AUTOMATED COUNT: 12.8 % (ref 12.3–15.4)
ERYTHROCYTE [SEDIMENTATION RATE] IN BLOOD: 24 MM/HR (ref 0–20)
GLOBULIN UR ELPH-MCNC: 2.3 GM/DL
GLUCOSE SERPL-MCNC: 99 MG/DL (ref 65–99)
GLUCOSE UR STRIP-MCNC: NEGATIVE MG/DL
HAV IGM SERPL QL IA: NORMAL
HBV CORE IGM SERPL QL IA: NORMAL
HBV SURFACE AG SERPL QL IA: NORMAL
HCT VFR BLD AUTO: 30.7 % (ref 37.5–51)
HCV AB SER DONR QL: NORMAL
HETEROPH AB SER QL LA: NEGATIVE
HGB BLD-MCNC: 10.1 G/DL (ref 13–17.7)
HGB UR QL STRIP.AUTO: NEGATIVE
HYALINE CASTS UR QL AUTO: ABNORMAL /LPF
KETONES UR QL STRIP: ABNORMAL
LEUKOCYTE ESTERASE UR QL STRIP.AUTO: ABNORMAL
LIPASE SERPL-CCNC: 239 U/L (ref 13–60)
MCH RBC QN AUTO: 31.7 PG (ref 26.6–33)
MCHC RBC AUTO-ENTMCNC: 32.9 G/DL (ref 31.5–35.7)
MCV RBC AUTO: 96.2 FL (ref 79–97)
NITRITE UR QL STRIP: NEGATIVE
PH UR STRIP.AUTO: 5.5 [PH] (ref 5–8)
PLATELET # BLD AUTO: 154 10*3/MM3 (ref 140–450)
PMV BLD AUTO: 10.6 FL (ref 6–12)
POTASSIUM SERPL-SCNC: 4.4 MMOL/L (ref 3.5–5.2)
PROT SERPL-MCNC: 5.3 G/DL (ref 6–8.5)
PROT UR QL STRIP: ABNORMAL
RBC # BLD AUTO: 3.19 10*6/MM3 (ref 4.14–5.8)
RBC # UR STRIP: ABNORMAL /HPF
REF LAB TEST METHOD: ABNORMAL
SODIUM SERPL-SCNC: 134 MMOL/L (ref 136–145)
SP GR UR STRIP: 1.02 (ref 1–1.03)
SQUAMOUS #/AREA URNS HPF: ABNORMAL /HPF
UROBILINOGEN UR QL STRIP: ABNORMAL
WBC # UR STRIP: ABNORMAL /HPF
WBC NRBC COR # BLD: 7.63 10*3/MM3 (ref 3.4–10.8)

## 2023-04-15 PROCEDURE — 85027 COMPLETE CBC AUTOMATED: CPT | Performed by: NURSE PRACTITIONER

## 2023-04-15 PROCEDURE — 86308 HETEROPHILE ANTIBODY SCREEN: CPT | Performed by: EMERGENCY MEDICINE

## 2023-04-15 PROCEDURE — 99221 1ST HOSP IP/OBS SF/LOW 40: CPT | Performed by: INTERNAL MEDICINE

## 2023-04-15 PROCEDURE — 80074 ACUTE HEPATITIS PANEL: CPT | Performed by: EMERGENCY MEDICINE

## 2023-04-15 PROCEDURE — 99222 1ST HOSP IP/OBS MODERATE 55: CPT | Performed by: STUDENT IN AN ORGANIZED HEALTH CARE EDUCATION/TRAINING PROGRAM

## 2023-04-15 PROCEDURE — 76705 ECHO EXAM OF ABDOMEN: CPT

## 2023-04-15 PROCEDURE — 25010000002 PIPERACILLIN SOD-TAZOBACTAM PER 1 G: Performed by: EMERGENCY MEDICINE

## 2023-04-15 PROCEDURE — 99222 1ST HOSP IP/OBS MODERATE 55: CPT | Performed by: NURSE PRACTITIONER

## 2023-04-15 PROCEDURE — 85652 RBC SED RATE AUTOMATED: CPT | Performed by: INTERNAL MEDICINE

## 2023-04-15 PROCEDURE — 81001 URINALYSIS AUTO W/SCOPE: CPT | Performed by: EMERGENCY MEDICINE

## 2023-04-15 PROCEDURE — 25010000002 KETOROLAC TROMETHAMINE PER 15 MG: Performed by: INTERNAL MEDICINE

## 2023-04-15 PROCEDURE — 83690 ASSAY OF LIPASE: CPT | Performed by: EMERGENCY MEDICINE

## 2023-04-15 PROCEDURE — 80053 COMPREHEN METABOLIC PANEL: CPT | Performed by: NURSE PRACTITIONER

## 2023-04-15 PROCEDURE — 25010000002 PIPERACILLIN SOD-TAZOBACTAM PER 1 G: Performed by: NURSE PRACTITIONER

## 2023-04-15 PROCEDURE — 71250 CT THORAX DX C-: CPT

## 2023-04-15 PROCEDURE — 36415 COLL VENOUS BLD VENIPUNCTURE: CPT | Performed by: NURSE PRACTITIONER

## 2023-04-15 PROCEDURE — 25010000002 MORPHINE PER 10 MG: Performed by: NURSE PRACTITIONER

## 2023-04-15 PROCEDURE — 86140 C-REACTIVE PROTEIN: CPT | Performed by: INTERNAL MEDICINE

## 2023-04-15 PROCEDURE — 74176 CT ABD & PELVIS W/O CONTRAST: CPT

## 2023-04-15 RX ORDER — METOPROLOL SUCCINATE 25 MG/1
25 TABLET, EXTENDED RELEASE ORAL DAILY
Status: DISCONTINUED | OUTPATIENT
Start: 2023-04-15 | End: 2023-04-20 | Stop reason: HOSPADM

## 2023-04-15 RX ORDER — ONDANSETRON 4 MG/1
4 TABLET, FILM COATED ORAL EVERY 6 HOURS PRN
Status: DISCONTINUED | OUTPATIENT
Start: 2023-04-15 | End: 2023-04-20 | Stop reason: HOSPADM

## 2023-04-15 RX ORDER — SODIUM CHLORIDE 9 MG/ML
100 INJECTION, SOLUTION INTRAVENOUS CONTINUOUS
Status: DISCONTINUED | OUTPATIENT
Start: 2023-04-15 | End: 2023-04-19

## 2023-04-15 RX ORDER — MORPHINE SULFATE 2 MG/ML
2 INJECTION, SOLUTION INTRAMUSCULAR; INTRAVENOUS
Status: DISPENSED | OUTPATIENT
Start: 2023-04-15 | End: 2023-04-20

## 2023-04-15 RX ORDER — SODIUM CHLORIDE 9 MG/ML
40 INJECTION, SOLUTION INTRAVENOUS AS NEEDED
Status: DISCONTINUED | OUTPATIENT
Start: 2023-04-15 | End: 2023-04-20 | Stop reason: HOSPADM

## 2023-04-15 RX ORDER — CALCIUM CARBONATE 200(500)MG
2 TABLET,CHEWABLE ORAL 2 TIMES DAILY PRN
Status: DISCONTINUED | OUTPATIENT
Start: 2023-04-15 | End: 2023-04-20 | Stop reason: HOSPADM

## 2023-04-15 RX ORDER — ASPIRIN 81 MG/1
81 TABLET, CHEWABLE ORAL DAILY
Status: DISCONTINUED | OUTPATIENT
Start: 2023-04-15 | End: 2023-04-20 | Stop reason: HOSPADM

## 2023-04-15 RX ORDER — LEVOTHYROXINE SODIUM 88 UG/1
88 TABLET ORAL DAILY
Status: DISCONTINUED | OUTPATIENT
Start: 2023-04-15 | End: 2023-04-20 | Stop reason: HOSPADM

## 2023-04-15 RX ORDER — ONDANSETRON 2 MG/ML
4 INJECTION INTRAMUSCULAR; INTRAVENOUS EVERY 6 HOURS PRN
Status: DISCONTINUED | OUTPATIENT
Start: 2023-04-15 | End: 2023-04-20 | Stop reason: HOSPADM

## 2023-04-15 RX ORDER — CLOPIDOGREL BISULFATE 75 MG/1
75 TABLET ORAL DAILY
Status: DISCONTINUED | OUTPATIENT
Start: 2023-04-15 | End: 2023-04-20 | Stop reason: HOSPADM

## 2023-04-15 RX ORDER — SODIUM CHLORIDE 0.9 % (FLUSH) 0.9 %
10 SYRINGE (ML) INJECTION AS NEEDED
Status: DISCONTINUED | OUTPATIENT
Start: 2023-04-15 | End: 2023-04-20 | Stop reason: HOSPADM

## 2023-04-15 RX ORDER — SODIUM CHLORIDE 0.9 % (FLUSH) 0.9 %
10 SYRINGE (ML) INJECTION EVERY 12 HOURS SCHEDULED
Status: DISCONTINUED | OUTPATIENT
Start: 2023-04-15 | End: 2023-04-20 | Stop reason: HOSPADM

## 2023-04-15 RX ORDER — KETOROLAC TROMETHAMINE 15 MG/ML
15 INJECTION, SOLUTION INTRAMUSCULAR; INTRAVENOUS EVERY 8 HOURS PRN
Status: DISCONTINUED | OUTPATIENT
Start: 2023-04-15 | End: 2023-04-16

## 2023-04-15 RX ORDER — POLYETHYLENE GLYCOL 3350 17 G/17G
17 POWDER, FOR SOLUTION ORAL DAILY
Status: DISCONTINUED | OUTPATIENT
Start: 2023-04-15 | End: 2023-04-20 | Stop reason: HOSPADM

## 2023-04-15 RX ADMIN — SODIUM CHLORIDE 100 ML/HR: 9 INJECTION, SOLUTION INTRAVENOUS at 06:10

## 2023-04-15 RX ADMIN — TAZOBACTAM SODIUM AND PIPERACILLIN SODIUM 3.38 G: 375; 3 INJECTION, SOLUTION INTRAVENOUS at 02:35

## 2023-04-15 RX ADMIN — POLYETHYLENE GLYCOL 3350 17 G: 17 POWDER, FOR SOLUTION ORAL at 09:37

## 2023-04-15 RX ADMIN — KETOROLAC TROMETHAMINE 15 MG: 15 INJECTION, SOLUTION INTRAMUSCULAR; INTRAVENOUS at 19:07

## 2023-04-15 RX ADMIN — SODIUM CHLORIDE 500 ML: 9 INJECTION, SOLUTION INTRAVENOUS at 00:18

## 2023-04-15 RX ADMIN — MORPHINE SULFATE 2 MG: 2 INJECTION, SOLUTION INTRAMUSCULAR; INTRAVENOUS at 09:43

## 2023-04-15 RX ADMIN — TAZOBACTAM SODIUM AND PIPERACILLIN SODIUM 3.38 G: 375; 3 INJECTION, SOLUTION INTRAVENOUS at 09:37

## 2023-04-15 RX ADMIN — Medication 10 ML: at 20:00

## 2023-04-15 RX ADMIN — ALUMINUM HYDROXIDE, MAGNESIUM HYDROXIDE, AND DIMETHICONE 15 ML: 400; 400; 40 SUSPENSION ORAL at 00:25

## 2023-04-15 RX ADMIN — ASPIRIN 81 MG: 81 TABLET, CHEWABLE ORAL at 09:37

## 2023-04-15 RX ADMIN — LEVOTHYROXINE SODIUM 88 MCG: 0.09 TABLET ORAL at 09:37

## 2023-04-15 RX ADMIN — METOPROLOL SUCCINATE 25 MG: 25 TABLET, EXTENDED RELEASE ORAL at 09:37

## 2023-04-15 RX ADMIN — LIDOCAINE HYDROCHLORIDE 15 ML: 20 SOLUTION ORAL at 00:26

## 2023-04-15 RX ADMIN — TAZOBACTAM SODIUM AND PIPERACILLIN SODIUM 3.38 G: 375; 3 INJECTION, SOLUTION INTRAVENOUS at 16:40

## 2023-04-15 RX ADMIN — Medication 10 ML: at 09:39

## 2023-04-15 RX ADMIN — CLOPIDOGREL BISULFATE 75 MG: 75 TABLET, FILM COATED ORAL at 09:37

## 2023-04-15 NOTE — ED NOTES
"Nursing report ED to floor  Kunal Vargas  77 y.o.  male    HPI :   Chief Complaint   Patient presents with    Fever     Pt c/o fever x1 week. Pt recently tested - for covid and flu. Pt reports cardiac stent placed 1 month ago.    Shoulder Pain     Pt c/o christie shoulder pain.       Admitting doctor:   Tanner Myrick MD    Admitting diagnosis:   The primary encounter diagnosis was Fever and chills. Diagnoses of Leukocytosis, unspecified type, Elevated LFTs, Gallstones, and Acute on chronic renal insufficiency were also pertinent to this visit.    Code status:   Current Code Status       Date Active Code Status Order ID Comments User Context       4/15/2023 0305 CPR (Attempt to Resuscitate) [id number]  Spring Sharma APRN ED        Question Answer    Code Status (Patient has no pulse and is not breathing) CPR (Attempt to Resuscitate)    Medical Interventions (Patient has pulse or is breathing) Full Support                    Allergies:   Patient has no known allergies.    Isolation:   No active isolations    Intake and Output    Intake/Output Summary (Last 24 hours) at 4/15/2023 0441  Last data filed at 4/15/2023 0133  Gross per 24 hour   Intake 1000 ml   Output --   Net 1000 ml       Weight:       04/14/23 2106   Weight: 70.3 kg (155 lb)       Most recent vitals:   Vitals:    04/14/23 2100 04/14/23 2106   BP:  149/70   Pulse: 95    Resp: 18    Temp: (!) 101.3 °F (38.5 °C)    TempSrc: Tympanic    SpO2: 97%    Weight:  70.3 kg (155 lb)   Height:  175.3 cm (69\")       Active LDAs/IV Access:   Lines, Drains & Airways       Active LDAs       Name Placement date Placement time Site Days    Peripheral IV 04/14/23 2157 Right Antecubital 04/14/23 2157  Antecubital  less than 1                    Labs (abnormal labs have a star):   Labs Reviewed   COMPREHENSIVE METABOLIC PANEL - Abnormal; Notable for the following components:       Result Value    Glucose 123 (*)     BUN 30 (*)     Creatinine 2.14 (*)     Sodium " "134 (*)     CO2 20.7 (*)     ALT (SGPT) 155 (*)     AST (SGOT) 91 (*)     Alkaline Phosphatase 634 (*)     Total Bilirubin 3.7 (*)     eGFR 31.1 (*)     All other components within normal limits    Narrative:     GFR Normal >60  Chronic Kidney Disease <60  Kidney Failure <15    The GFR formula is only valid for adults with stable renal function between ages 18 and 70.   PROTIME-INR - Abnormal; Notable for the following components:    Protime 14.8 (*)     INR 1.14 (*)     All other components within normal limits   URINALYSIS W/ CULTURE IF INDICATED - Abnormal; Notable for the following components:    Color, UA Dark Yellow (*)     Ketones, UA Trace (*)     Bilirubin, UA Moderate (2+) (*)     Protein, UA 30 mg/dL (1+) (*)     Leuk Esterase, UA Trace (*)     All other components within normal limits    Narrative:     In absence of clinical symptoms, the presence of pyuria, bacteria, and/or nitrites on the urinalysis result does not correlate with infection.   PROCALCITONIN - Abnormal; Notable for the following components:    Procalcitonin 1.30 (*)     All other components within normal limits    Narrative:     As a Marker for Sepsis (Non-Neonates):    1. <0.5 ng/mL represents a low risk of severe sepsis and/or septic shock.  2. >2 ng/mL represents a high risk of severe sepsis and/or septic shock.    As a Marker for Lower Respiratory Tract Infections that require antibiotic therapy:    PCT on Admission    Antibiotic Therapy       6-12 Hrs later    >0.5                Strongly Recommended  >0.25 - <0.5        Recommended   0.1 - 0.25          Discouraged              Remeasure/reassess PCT  <0.1                Strongly Discouraged     Remeasure/reassess PCT    As 28 day mortality risk marker: \"Change in Procalcitonin Result\" (>80% or <=80%) if Day 0 (or Day 1) and Day 4 values are available. Refer to http://www.[facility]-pct-calculator.com    Change in PCT <=80%  A decrease of PCT levels below or equal to 80% defines a " positive change in PCT test result representing a higher risk for 28-day all-cause mortality of patients diagnosed with severe sepsis for septic shock.    Change in PCT >80%  A decrease of PCT levels of more than 80% defines a negative change in PCT result representing a lower risk for 28-day all-cause mortality of patients diagnosed with severe sepsis or septic shock.      CBC WITH AUTO DIFFERENTIAL - Abnormal; Notable for the following components:    RBC 3.48 (*)     Hemoglobin 11.5 (*)     Hematocrit 32.9 (*)     Lymphocyte % 9.6 (*)     Eosinophil % 11.2 (*)     Monocytes, Absolute 0.95 (*)     Eosinophils, Absolute 0.93 (*)     All other components within normal limits   URINALYSIS, MICROSCOPIC ONLY - Abnormal; Notable for the following components:    Bacteria, UA 1+ (*)     All other components within normal limits   LIPASE - Abnormal; Notable for the following components:    Lipase 239 (*)     All other components within normal limits   RESPIRATORY PANEL PCR W/ COVID-19 (SARS-COV-2) [facility] IN-HOUSE, NP [facility] IN [facility], 3-4 HR TAT - Normal    Narrative:     In the setting of a positive respiratory panel with a viral infection PLUS a negative procalcitonin without other underlying concern for bacterial infection, consider observing off antibiotics or discontinuation of antibiotics and continue supportive care. If the respiratory panel is positive for atypical bacterial infection (Bordetella pertussis, Chlamydophila pneumoniae, or Mycoplasma pneumoniae), consider antibiotic de-escalation to target atypical bacterial infection.   LACTIC ACID, PLASMA - Normal   MONONUCLEOSIS SCREEN - Normal   HEPATITIS PANEL, ACUTE - Normal    Narrative:     Results may be falsely decreased if patient taking Biotin.    BLOOD CULTURE   BLOOD CULTURE   CBC (NO DIFF)   COMPREHENSIVE METABOLIC PANEL   CBC AND DIFFERENTIAL    Narrative:     The following orders were created for panel order CBC &  Differential.  Procedure                               Abnormality         Status                     ---------                               -----------         ------                     CBC Auto Differential[[id number]]        Abnormal            Final result                 Please view results for these tests on the individual orders.       EKG:   No orders to display       Meds given in ED:   Medications   sodium chloride 0.9 % flush 10 mL (has no administration in time range)   sodium chloride 0.9 % flush 10 mL (has no administration in time range)   sodium chloride 0.9 % infusion 40 mL (has no administration in time range)   sodium chloride 0.9 % infusion (has no administration in time range)   ondansetron (ZOFRAN) tablet 4 mg (has no administration in time range)     Or   ondansetron (ZOFRAN) injection 4 mg (has no administration in time range)   calcium carbonate (TUMS) chewable tablet 500 mg (200 mg elemental) (has no administration in time range)   Pharmacy to Dose Zosyn (has no administration in time range)   morphine injection 2 mg (has no administration in time range)   piperacillin-tazobactam (ZOSYN) 3.375 g in iso-osmotic dextrose 50 ml (premix) (has no administration in time range)   sodium chloride 0.9 % bolus 500 mL (0 mL Intravenous Stopped 4/14/23 2242)   ketorolac (TORADOL) injection 15 mg (15 mg Intravenous Given 4/14/23 2203)   sodium chloride 0.9 % bolus 500 mL (0 mL Intravenous Stopped 4/15/23 0133)   aluminum-magnesium hydroxide-simethicone (MAALOX MAX) 400-400-40 MG/5ML suspension 15 mL (15 mL Oral Given 4/15/23 0025)   Lidocaine Viscous HCl (XYLOCAINE) 2 % solution 15 mL (15 mL Mouth/Throat Given 4/15/23 0026)   piperacillin-tazobactam (ZOSYN) 3.375 g in iso-osmotic dextrose 50 ml (premix) (0 g Intravenous Stopped 4/15/23 0324)       Imaging results:  CT Abdomen Pelvis Without Contrast    Result Date: 4/15/2023   1. No acute findings identified within the thorax. 2. Cholelithiasis.  Patient does appear to have a stone within the gallbladder neck. Ultrasound could be considered for further assessment. 3. Increased stool burden could reflect some constipation.  Radiation dose reduction techniques were utilized, including automated exposure control and exposure modulation based on body size.  This report was finalized on 4/15/2023 1:29 AM by Dr. Norah Fleming M.D.      CT Chest Without Contrast Diagnostic    Result Date: 4/15/2023   1. No acute findings identified within the thorax. 2. Cholelithiasis. Patient does appear to have a stone within the gallbladder neck. Ultrasound could be considered for further assessment. 3. Increased stool burden could reflect some constipation.  Radiation dose reduction techniques were utilized, including automated exposure control and exposure modulation based on body size.  This report was finalized on 4/15/2023 1:29 AM by Dr. Norah Fleming M.D.      US Gallbladder    Result Date: 4/15/2023  Gallbladder stones and sludge.  This report was finalized on 4/15/2023 4:27 AM by Dr. Norah Fleming M.D.      XR Chest 1 View    Result Date: 4/14/2023  No acute findings.  This report was finalized on 4/14/2023 10:19 PM by Dr. Norah Fleming M.D.       Ambulatory status:   - up with assist x 1     Social issues:   Social History     Socioeconomic History    Marital status:    Tobacco Use    Smoking status: Never     Passive exposure: Never    Smokeless tobacco: Never   Vaping Use    Vaping Use: Never used   Substance and Sexual Activity    Alcohol use: Never    Drug use: Never    Sexual activity: Defer       NIH Stroke Scale:         Ar Appiah RN  04/15/23 04:41 EDT

## 2023-04-15 NOTE — H&P
[facility] Admission H&P    Patient Care Team:  Chai Montesinos MD as PCP - General    Chief complaint: Fever, chills, bilateral shoulder pain    History of Present Illness    This is a 77-year-old male with history of CAD status post drug-eluting stent placement 1 month ago who presented to the emergency room with a 5-day history of fevers and chills along with generalized weakness, decreased appetite, and fatigue.  This had been looked at a couple of times this week via urgent care and his primary care physician with unremarkable work-up.  Last night he began having intense bilateral shoulder pain which prompted his visit to the emergency room.  Patient states his shoulders are feeling better at present.  He is still feeling intermittently feverish and chilled.  He denies any chest pain, shortness of breath, palpitations, cough, nausea, vomiting, diarrhea, rash.  No neck pain or stiffness.  Recent medication changes include the addition of Plavix and changing to atorvastatin 1 month ago at the time of his heart cath with stent placement.  He denies myalgias.  No dysuria.  Work-up in the emergency room showed elevated LFTs and gallstones with possible stone at the gallbladder neck.  No CBD dilatation.  Patient denies any right upper quadrant pain this morning.  He states he did have some discomfort when receiving the right upper quadrant ultrasound.  He was started on Zosyn last night and admitted for further evaluation.    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      Past Surgical History:   Procedure Laterality Date   • CARDIAC CATHETERIZATION     • CARDIAC CATHETERIZATION N/A 3/17/2023    Procedure: Left Heart Cath;   Surgeon: Lenny Martines MD;  [facility];  Service: Cardiology;  Laterality: N/A;   • CARDIAC CATHETERIZATION N/A 3/17/2023    Procedure: Coronary angiography;  Surgeon: Lenny Martines MD;  [facility];  Service: Cardiology;  Laterality: N/A;   • CARDIAC CATHETERIZATION N/A 3/17/2023    Procedure: Stent SABRINA coronary;  Surgeon: Lenny Martines MD;  [facility];  Service: Cardiology;  Laterality: N/A;   • CARDIAC CATHETERIZATION N/A 3/17/2023    Procedure: Optical Coherent Tomography;  Surgeon: Lenny Martines MD;  Location:  [facility] CATH INVASIVE LOCATION;  Service: Cardiology;  Laterality: N/A;   • COLONOSCOPY N/A 01/25/2017    Procedure: COLONOSCOPY TO CECUM AND TI WITH POLYPECTOMY ;  Surgeon: Cachorro Hancock MD;  [facility] END[facility];  Service:    • COLONOSCOPY N/A 10/12/2022    Procedure: COLONOSCOPY TO CECUM WITH COLD BX POLYPECTOMY;  Surgeon: Cachorro Hancock MD;  Location: [facility] ENDOSCOPY;  Service: General;  Laterality: N/A;  SURVEILLANCE, HX POLYPS   --POLYP, DIVERTICULOSIS   • EYE SURGERY  Jan - Feb 2021    cataract surgery both eyes   • NEPHROURETERECTOMY Left 11/12/2020    Procedure: LEFT LAPAROSCOPIC NEPHROURETERECTOMY;  Surgeon: Ángel Florentino MD;  Location: [facility] MAIN OR;  Service: Urology;  Laterality: Left;   • TRANSURETHRAL RESECTION OF BLADDER TUMOR N/A 09/10/2020    Procedure: CYSTOSCOPY, LEFT URETEROSCOPY W/BIOPSY AND STENT PLACEMENT, TRANSURETHRAL RESECTION OF BLADDER TUMOR;  Surgeon: Ángel Florentino MD;  Location: [facility] MAIN OR;  Service: Urology;  Laterality: N/A;     Family History   Problem Relation Age of Onset   • Rheum arthritis Mother    • Arthritis Mother         RA   • Aortic aneurysm Father    • Aortic aneurysm Cousin    • Malig Hyperthermia Neg Hx      Social History     Tobacco Use   • Smoking status: Never     Passive exposure: Never   • Smokeless  tobacco: Never   Vaping Use   • Vaping Use: Never used   Substance Use Topics   • Alcohol use: Never   • Drug use: Never     Medications Prior to Admission   Medication Sig Dispense Refill Last Dose   • aspirin 81 MG chewable tablet Chew 1 tablet Daily.   2023   • clopidogrel (PLAVIX) 75 MG tablet Take 1 tablet by mouth Daily. 90 tablet 3 Past Week   • levothyroxine (SYNTHROID, LEVOTHROID) 88 MCG tablet TAKE 1 TABLET BY MOUTH EVERY DAY 90 tablet 0 2023   • metoprolol succinate XL (TOPROL-XL) 25 MG 24 hr tablet TAKE 1 TABLET BY MOUTH EVERY DAY 90 tablet 1 2023   • atorvastatin (LIPITOR) 40 MG tablet Take 1 tablet by mouth Every Night. 90 tablet 3    • chlorhexidine (PERIDEX) 0.12 % solution       • nitroglycerin (NITROSTAT) 0.4 MG SL tablet place 1 tablet under the tongue as needed for angina, may repeat every 5mins for up three doses 25 tablet 1    • triamcinolone (KENALOG) 0.1 % cream         Allergies:  Patient has no known allergies.    Review of Systems   Constitutional: Positive for chills, fatigue and fever.   HENT: Negative for congestion and sore throat.    Eyes: Negative for visual disturbance.   Respiratory: Negative for cough, chest tightness, shortness of breath and wheezing.    Cardiovascular: Negative for chest pain, palpitations and leg swelling.   Gastrointestinal: Negative for abdominal distention, abdominal pain, diarrhea, nausea and vomiting.   Endocrine: Negative for polydipsia and polyuria.   Genitourinary: Negative for difficulty urinating, dysuria, frequency and urgency.   Musculoskeletal: Positive for arthralgias. Negative for myalgias.        Pain in the bilateral shoulders with decreased range of motion secondary to pain   Skin: Negative for color change and rash.   Neurological: Positive for weakness. Negative for dizziness and light-headedness.        Generalized weakness        PHYSICAL EXAM    Vital Signs  tMax 24 hrs:  Temp (24hrs), Av.8 °F (37.7 °C), Min:98.3 °F  (36.8 °C), Max:101.3 °F (38.5 °C)    Vitals Ranges:  Temp:  [98.3 °F (36.8 °C)-101.3 °F (38.5 °C)] 98.3 °F (36.8 °C)  Heart Rate:  [60-95] 62  Resp:  [18] 18  BP: (138-156)/(66-78) 144/69    Physical Exam  Vitals and nursing note reviewed.   Constitutional:       General: He is not in acute distress.     Appearance: He is well-developed. He is not ill-appearing.   HENT:      Head: Normocephalic and atraumatic.      Nose: Nose normal.      Mouth/Throat:      Mouth: Mucous membranes are not dry.   Eyes:      Conjunctiva/sclera: Conjunctivae normal.      Pupils: Pupils are equal, round, and reactive to light.   Neck:      Vascular: No JVD.   Cardiovascular:      Rate and Rhythm: Normal rate and regular rhythm.      Heart sounds: No murmur heard.    No gallop.   Pulmonary:      Effort: Pulmonary effort is normal. No accessory muscle usage or respiratory distress.      Breath sounds: No decreased breath sounds or wheezing.   Abdominal:      General: Bowel sounds are normal. There is no distension.      Palpations: Abdomen is soft.      Tenderness: There is no abdominal tenderness. There is no guarding or rebound.   Musculoskeletal:         General: No deformity. Normal range of motion.      Cervical back: Normal range of motion and neck supple.      Comments: He has full range of motion of the bilateral shoulders with passive movement.  No pain when I passively manipulate the shoulders.  On active movement he demonstrates pain and decreased ROM.  No obvious external deformity or overlying skin changes/swelling   Lymphadenopathy:      Cervical: No cervical adenopathy.   Skin:     General: Skin is warm and dry.      Findings: No erythema or rash.      Comments: He has a slightly jaundiced appearance   Neurological:      Mental Status: He is alert and oriented to person, place, and time.      Cranial Nerves: No cranial nerve deficit.         Results Review:  Results from last 7 days   Lab Units 04/15/23  0757   WBC 10*3/mm3  7.63   HEMOGLOBIN g/dL 10.1*   HEMATOCRIT % 30.7*   PLATELETS 10*3/mm3 154     Results from last 7 days   Lab Units 04/15/23  0757   SODIUM mmol/L 134*   POTASSIUM mmol/L 4.4   CHLORIDE mmol/L 106   CO2 mmol/L 18.2*   BUN mg/dL 31*   CREATININE mg/dL 1.85*   CALCIUM mg/dL 8.5*   BILIRUBIN mg/dL 3.1*   ALK PHOS U/L 604*   ALT (SGPT) U/L 138*   AST (SGOT) U/L 94*   GLUCOSE mg/dL 99        I reviewed the patient's new clinical results.  I reviewed the patient's new imaging results and agree with the interpretation.        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       Assessment & Plan    The patient was admitted overnight last night for fever/chills along with elevated LFTs and evidence of gallstones and possible stone sitting in the gallbladder neck.  We will continue antibiotics as presently prescribed.  Await blood culture results.  His abdominal exam is entirely benign.  Gastroenterology has been consulted.  Acute hepatitis panel is negative.  Will ask general surgery to evaluate as well.  He had acute kidney injury on CKD with improvement of creatinine after IV fluids overnight last night.  Continue normal saline.  He will need to remain on aspirin and Plavix given his recent SABRINA placement 1 month ago.  Stop his statin for now.  I am not yet sure what to make of the bilateral shoulder pain which is only present upon active motion.  Will obtain inflammatory markers.  In light of his hepatobiliary findings, suspicion for bilateral septic arthritis is low.  He  reports improvement of his pain over the morning hours and we will see how this evolves.  Additional plans based on his clinical course.    I discussed the patients findings and my recommendations with patient and family      Ángel Byrd MD  04/15/23  09:41 EDT

## 2023-04-15 NOTE — PLAN OF CARE
Problem: Adult Inpatient Plan of Care  Goal: Plan of Care Review  Outcome: Ongoing, Progressing  Flowsheets (Taken 4/15/2023 2023)  Progress: improving  Plan of Care Reviewed With: patient  Goal: Patient-Specific Goal (Individualized)  Outcome: Ongoing, Progressing  Goal: Absence of Hospital-Acquired Illness or Injury  Outcome: Ongoing, Progressing  Goal: Optimal Comfort and Wellbeing  Outcome: Ongoing, Progressing  Goal: Readiness for Transition of Care  Outcome: Ongoing, Progressing   Goal Outcome Evaluation:  Plan of Care Reviewed With: patient        Progress: improving

## 2023-04-15 NOTE — ED PROVIDER NOTES
EMERGENCY DEPARTMENT ENCOUNTER    Room Number:  18/18  Date seen:  4/15/2023  PCP: Chai Montesinos MD  Historian: Patient      HPI:  Chief Complaint: Fevers, chills and body aches     Context: Kunal Vargas is a 77 y.o. male who presents to the ED c/o fevers, chills and body aches.  He states this started 5 days ago.  He states that since this time he had markedly decreased appetite but denies cough, congestion, shortness of breath, chest pain, abdominal pain, vomiting, diarrhea, dysuria or rash.  He was seen by urgent care on Tuesday with a negative COVID, negative flu and negative urine.  He was seen by his PCP yesterday and had blood work drawn.  He was told tonight that his LFTs were elevated.  Tonight the patient's fever returned and now he has bilateral shoulder aching, hand and thigh aching.      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    • Status post nephrectomy - left nephrouretrectomy -11/12/2020    • Acute kidney failure 11/15/2020   • Ileus 11/16/2020   • Stage 3 chronic kidney disease 04/07/2021   • History of adenomatous polyp of colon 06/08/2022   • Anemia due to chronic kidney disease 11/14/2022   • Chest pain 02/20/2023   • Unstable angina 03/17/2023     Resolved Ambulatory Problems     Diagnosis Date Noted   • Bronchitis 04/03/2017   • Hospital discharge follow-up 02/01/2018   • Anemia 03/08/2018     Past Medical History:   Diagnosis  Date   • Arrhythmia    • Cataract Jan 2021   • Eczema    • GERD (gastroesophageal reflux disease)    • Hematuria    • History of acute hepatitis    • Hypothyroid          REVIEW OF SYSTEMS  All systems reviewed and negative except for those discussed in HPI.       PAST SURGICAL HISTORY  Past Surgical History:   Procedure Laterality Date   • CARDIAC CATHETERIZATION     • CARDIAC CATHETERIZATION N/A 3/17/2023    Procedure: Left Heart Cath;  Surgeon: Lenny Martines MD;  Location: [facility] CATH INVASIVE LOCATION;  Service: Cardiology;  Laterality: N/A;   • CARDIAC CATHETERIZATION N/A 3/17/2023    Procedure: Coronary angiography;  Surgeon: Lenny Martines MD;  Location: [facility] CATH INVASIVE LOCATION;  Service: Cardiology;  Laterality: N/A;   • CARDIAC CATHETERIZATION N/A 3/17/2023    Procedure: Stent SABRINA coronary;  Surgeon: Lenny Martines MD;  Location: [facility] CATH INVASIVE LOCATION;  Service: Cardiology;  Laterality: N/A;   • CARDIAC CATHETERIZATION N/A 3/17/2023    Procedure: Optical Coherent Tomography;  Surgeon: Lenny Martines MD;  Location: [facility] CATH INVASIVE LOCATION;  Service: Cardiology;  Laterality: N/A;   • COLONOSCOPY N/A 01/25/2017    Procedure: COLONOSCOPY TO CECUM AND TI WITH POLYPECTOMY ;  Surgeon: Cachorro Hancock MD;  Location: [facility] ENDOSCOPY;  Service:    • COLONOSCOPY N/A 10/12/2022    Procedure: COLONOSCOPY TO CECUM WITH COLD BX POLYPECTOMY;  Surgeon: Cachorro Hancock MD;  Location: [facility] ENDOSCOPY;  Service: General;  Laterality: N/A;  SURVEILLANCE, HX POLYPS   --POLYP, DIVERTICULOSIS   • EYE SURGERY  Jan - Feb 2021    cataract surgery both eyes   • NEPHROURETERECTOMY Left 11/12/2020    Procedure: LEFT LAPAROSCOPIC NEPHROURETERECTOMY;  Surgeon: Ángel Florentino MD;  Location: [facility] MAIN OR;  Service: Urology;  Laterality: Left;   • TRANSURETHRAL RESECTION OF BLADDER TUMOR N/A 09/10/2020    Procedure: CYSTOSCOPY, LEFT URETEROSCOPY W/BIOPSY AND STENT PLACEMENT,  TRANSURETHRAL RESECTION OF BLADDER TUMOR;  Surgeon: Ángel Florentino MD;  Location: [facility] OR;  Service: Urology;  Laterality: N/A;         FAMILY HISTORY  Family History   Problem Relation Age of Onset   • Rheum arthritis Mother    • Arthritis Mother         RA   • Aortic aneurysm Father    • Aortic aneurysm Cousin    • Malig Hyperthermia Neg Hx          SOCIAL HISTORY  Social History     Socioeconomic History   • Marital status:    Tobacco Use   • Smoking status: Never     Passive exposure: Never   • Smokeless tobacco: Never   Vaping Use   • Vaping Use: Never used   Substance and Sexual Activity   • Alcohol use: Never   • Drug use: Never   • Sexual activity: Defer         ALLERGIES  Patient has no known allergies.      PHYSICAL EXAM  ED Triage Vitals   Temp Heart Rate Resp BP SpO2   04/14/23 2100 04/14/23 2100 04/14/23 2100 04/14/23 2106 04/14/23 2100   (!) 101.3 °F (38.5 °C) 95 18 149/70 97 %      Temp src Heart Rate Source Patient Position BP Location FiO2 (%)   04/14/23 2100 04/14/23 2100 -- -- --   Tympanic Monitor          Physical Exam      GENERAL: 77-year-old who appears in no acute distress  HENT: NCAT: nares patent: Neck supple  EYES: no scleral icterus  CV: regular rhythm, normal rate  RESPIRATORY: normal effort  ABDOMEN: soft, NTND: Bowel sounds positive  MUSCULOSKELETAL: no deformity  NEURO: alert with nonfocal neuro exam  PSYCH:  calm, cooperative  SKIN: warm, dry    Vital signs and nursing notes reviewed.    PPE patient was placed in face mask in first look. Patient was wearing facemask when I entered the room and throughout our encounter. I wore full protective equipment throughout this patient encounter including a face mask, eye shield, gown and gloves. Hand hygiene was performed before donning protective equipment and after removal when leaving the room.    LAB RESULTS  Recent Results (from the past 24 hour(s))   Holter Monitor - 48 Hour    Collection Time: 04/14/23 10:36 AM    Result Value Ref Range    Target HR (85%) 122 bpm    Max. Pred. HR (100%) 143 bpm   Comprehensive Metabolic Panel    Collection Time: 04/14/23  9:51 PM    Specimen: Blood   Result Value Ref Range    Glucose 123 (H) 65 - 99 mg/dL    BUN 30 (H) 8 - 23 mg/dL    Creatinine 2.14 (H) 0.76 - 1.27 mg/dL    Sodium 134 (L) 136 - 145 mmol/L    Potassium 4.4 3.5 - 5.2 mmol/L    Chloride 101 98 - 107 mmol/L    CO2 20.7 (L) 22.0 - 29.0 mmol/L    Calcium 9.0 8.6 - 10.5 mg/dL    Total Protein 6.4 6.0 - 8.5 g/dL    Albumin 3.9 3.5 - 5.2 g/dL    ALT (SGPT) 155 (H) 1 - 41 U/L    AST (SGOT) 91 (H) 1 - 40 U/L    Alkaline Phosphatase 634 (H) 39 - 117 U/L    Total Bilirubin 3.7 (H) 0.0 - 1.2 mg/dL    Globulin 2.5 gm/dL    A/G Ratio 1.6 g/dL    BUN/Creatinine Ratio 14.0 7.0 - 25.0    Anion Gap 12.3 5.0 - 15.0 mmol/L    eGFR 31.1 (L) >60.0 mL/min/1.73   Protime-INR    Collection Time: 04/14/23  9:51 PM    Specimen: Blood   Result Value Ref Range    Protime 14.8 (H) 11.7 - 14.2 Seconds    INR 1.14 (H) 0.90 - 1.10   Lactic Acid, Plasma    Collection Time: 04/14/23  9:51 PM    Specimen: Blood   Result Value Ref Range    Lactate 1.0 0.5 - 2.0 mmol/L   Procalcitonin    Collection Time: 04/14/23  9:51 PM    Specimen: Blood   Result Value Ref Range    Procalcitonin 1.30 (H) 0.00 - 0.25 ng/mL   CBC Auto Differential    Collection Time: 04/14/23  9:51 PM    Specimen: Blood   Result Value Ref Range    WBC 8.31 3.40 - 10.80 10*3/mm3    RBC 3.48 (L) 4.14 - 5.80 10*6/mm3    Hemoglobin 11.5 (L) 13.0 - 17.7 g/dL    Hematocrit 32.9 (L) 37.5 - 51.0 %    MCV 94.5 79.0 - 97.0 fL    MCH 33.0 26.6 - 33.0 pg    MCHC 35.0 31.5 - 35.7 g/dL    RDW 12.4 12.3 - 15.4 %    RDW-SD 43.0 37.0 - 54.0 fl    MPV 10.9 6.0 - 12.0 fL    Platelets 184 140 - 450 10*3/mm3    Neutrophil % 66.7 42.7 - 76.0 %    Lymphocyte % 9.6 (L) 19.6 - 45.3 %    Monocyte % 11.4 5.0 - 12.0 %    Eosinophil % 11.2 (H) 0.3 - 6.2 %    Basophil % 0.7 0.0 - 1.5 %    Immature Grans % 0.4 0.0 - 0.5 %     Neutrophils, Absolute 5.54 1.70 - 7.00 10*3/mm3    Lymphocytes, Absolute 0.80 0.70 - 3.10 10*3/mm3    Monocytes, Absolute 0.95 (H) 0.10 - 0.90 10*3/mm3    Eosinophils, Absolute 0.93 (H) 0.00 - 0.40 10*3/mm3    Basophils, Absolute 0.06 0.00 - 0.20 10*3/mm3    Immature Grans, Absolute 0.03 0.00 - 0.05 10*3/mm3    nRBC 0.0 0.0 - 0.2 /100 WBC   Respiratory Panel PCR w/COVID-19(SARS-CoV-2) MARYAM/NEL/ROSANA/PAD/COR/MAD/JONATHAN In-House, NP Swab in UTM/VTM, 3-4 HR TAT - Swab, Nasopharynx    Collection Time: 04/14/23 10:06 PM    Specimen: Nasopharynx; Swab   Result Value Ref Range    ADENOVIRUS, PCR Not Detected Not Detected    Coronavirus 229E Not Detected Not Detected    Coronavirus HKU1 Not Detected Not Detected    Coronavirus NL63 Not Detected Not Detected    Coronavirus OC43 Not Detected Not Detected    COVID19 Not Detected Not Detected - Ref. Range    Human Metapneumovirus Not Detected Not Detected    Human Rhinovirus/Enterovirus Not Detected Not Detected    Influenza A PCR Not Detected Not Detected    Influenza B PCR Not Detected Not Detected    Parainfluenza Virus 1 Not Detected Not Detected    Parainfluenza Virus 2 Not Detected Not Detected    Parainfluenza Virus 3 Not Detected Not Detected    Parainfluenza Virus 4 Not Detected Not Detected    RSV, PCR Not Detected Not Detected    Bordetella pertussis pcr Not Detected Not Detected    Bordetella parapertussis PCR Not Detected Not Detected    Chlamydophila pneumoniae PCR Not Detected Not Detected    Mycoplasma pneumo by PCR Not Detected Not Detected   Mononucleosis Screen    Collection Time: 04/15/23 12:17 AM    Specimen: Blood   Result Value Ref Range    Monospot Negative Negative   Hepatitis Panel, Acute    Collection Time: 04/15/23 12:17 AM    Specimen: Blood   Result Value Ref Range    Hepatitis B Surface Ag Non-Reactive Non-Reactive    Hep A IgM Non-Reactive Non-Reactive    Hep B C IgM Non-Reactive Non-Reactive    Hepatitis C Ab Non-Reactive Non-Reactive   Lipase     Collection Time: 04/15/23 12:17 AM    Specimen: Blood   Result Value Ref Range    Lipase 239 (H) 13 - 60 U/L   Urinalysis With Culture If Indicated - Urine, Clean Catch    Collection Time: 04/15/23  1:33 AM    Specimen: Urine, Clean Catch   Result Value Ref Range    Color, UA Dark Yellow (A) Yellow, Straw    Appearance, UA Clear Clear    pH, UA 5.5 5.0 - 8.0    Specific Gravity, UA 1.021 1.005 - 1.030    Glucose, UA Negative Negative    Ketones, UA Trace (A) Negative    Bilirubin, UA Moderate (2+) (A) Negative    Blood, UA Negative Negative    Protein, UA 30 mg/dL (1+) (A) Negative    Leuk Esterase, UA Trace (A) Negative    Nitrite, UA Negative Negative    Urobilinogen, UA 1.0 E.U./dL 0.2 - 1.0 E.U./dL   Urinalysis, Microscopic Only - Urine, Clean Catch    Collection Time: 04/15/23  1:33 AM    Specimen: Urine, Clean Catch   Result Value Ref Range    RBC, UA 0-2 None Seen, 0-2 /HPF    WBC, UA 0-2 None Seen, 0-2 /HPF    Bacteria, UA 1+ (A) None Seen /HPF    Squamous Epithelial Cells, UA 0-2 None Seen, 0-2 /HPF    Hyaline Casts, UA None Seen None Seen /LPF    Methodology Manual Light Microscopy        Ordered the above labs and reviewed the results.        RADIOLOGY  CT Chest Without Contrast Diagnostic, CT Abdomen Pelvis Without Contrast    Result Date: 4/15/2023  CT OF THE CHEST WITHOUT CONTRAST; CT OF THE ABDOMEN AND PELVIS WITHOUT CONTRAST  HISTORY: Fever and shortness of breath  COMPARISON: 11/16/2020  TECHNIQUE: Axial CT imaging was obtained from the thoracic inlet through the symphysis pubis. No IV contrast was administered.  FINDINGS: CT CHEST: No acute infiltrates are seen. The thyroid gland, trachea, and esophagus appear unremarkable. There are coronary artery calcifications. There is no pleural or pericardial effusion. Mediastinal lymph nodes do not appear pathologically enlarged. No acute osseous abnormalities are seen.  CT OF THE ABDOMEN AND PELVIS: No suspicious hepatic lesions are seen. The stomach,  duodenum, adrenal glands, and pancreas are normal. There is cholelithiasis. One of the stents does appear to be within the neck of the gallbladder. Left kidney is absent. No hydronephrosis is seen on the right. Calcified granulomata are seen within the spleen. There is calcification of the aorta. No distal ureteral or bladder stones are seen. Prostate gland protrudes into the base of the bladder. There is colonic diverticulosis. Stool burden appears mildly increased, which could reflect some constipation. The appendix is normal. There is tiny fat-containing umbilical hernia. No acute osseous abnormalities are seen.       1. No acute findings identified within the thorax. 2. Cholelithiasis. Patient does appear to have a stone within the gallbladder neck. Ultrasound could be considered for further assessment. 3. Increased stool burden could reflect some constipation.  Radiation dose reduction techniques were utilized, including automated exposure control and exposure modulation based on body size.  This report was finalized on 4/15/2023 1:29 AM by Dr. Norah Fleming M.D.      XR Chest 1 View    Result Date: 4/14/2023  SINGLE VIEW OF THE CHEST  HISTORY: Fever and cough  COMPARISON: None available.  FINDINGS: Heart size is within normal limits. No pneumothorax, pleural effusion, or acute infiltrate is seen.      No acute findings.  This report was finalized on 4/14/2023 10:19 PM by Dr. Norah Fleming M.D.        Ordered the above noted radiological studies. Reviewed by me in PACS.            PROCEDURES  Procedures          MEDICATIONS GIVEN IN ER  Medications   piperacillin-tazobactam (ZOSYN) 3.375 g in iso-osmotic dextrose 50 ml (premix) (3.375 g Intravenous Incomplete 4/15/23 0235)   sodium chloride 0.9 % flush 10 mL (has no administration in time range)   sodium chloride 0.9 % flush 10 mL (has no administration in time range)   sodium chloride 0.9 % infusion 40 mL (has no administration in time range)   sodium  chloride 0.9 % infusion (has no administration in time range)   ondansetron (ZOFRAN) tablet 4 mg (has no administration in time range)     Or   ondansetron (ZOFRAN) injection 4 mg (has no administration in time range)   calcium carbonate (TUMS) chewable tablet 500 mg (200 mg elemental) (has no administration in time range)   Pharmacy to Dose Zosyn (has no administration in time range)   morphine injection 2 mg (has no administration in time range)   sodium chloride 0.9 % bolus 500 mL (0 mL Intravenous Stopped 4/14/23 2242)   ketorolac (TORADOL) injection 15 mg (15 mg Intravenous Given 4/14/23 2203)   sodium chloride 0.9 % bolus 500 mL (0 mL Intravenous Stopped 4/15/23 0133)   aluminum-magnesium hydroxide-simethicone (MAALOX MAX) 400-400-40 MG/5ML suspension 15 mL (15 mL Oral Given 4/15/23 0025)   Lidocaine Viscous HCl (XYLOCAINE) 2 % solution 15 mL (15 mL Mouth/Throat Given 4/15/23 0026)             MEDICAL DECISION MAKING, PROGRESS, and CONSULTS    All labs have been independently reviewed by me.  All radiology studies have been reviewed by me and I have also reviewed the radiology report.   EKG's independently viewed and interpreted by me.  Discussion below represents my analysis of pertinent findings related to patient's condition, differential diagnosis, treatment plan and final disposition.      Additional sources:  - Discussed/ obtained information from independent historians: The patient's wife is here who states that he has been very weak for the past several days    - External (non-ED) record review: I reviewed the patient's office note from yesterday which showed he did have elevated creatinine and LFTs    - Chronic or social conditions impacting care: The patient lives at home with his wife    - Shared decision making: After shared decision-making discussion between myself, the patient and his wife we agree he needs to be admitted to the hospital for further evaluation and care.      Orders placed during  this visit:  Orders Placed This Encounter   Procedures   • Respiratory Panel PCR w/COVID-19(SARS-CoV-2) MARYAM/NEL/ROSANA/PAD/COR/MAD/JONATHAN In-House, NP Swab in UTM/VTM, 3-4 HR TAT - Swab, Nasopharynx   • Blood Culture - Blood,   • Blood Culture - Blood,   • XR Chest 1 View   • CT Chest Without Contrast Diagnostic   • CT Abdomen Pelvis Without Contrast   • US Gallbladder   • Comprehensive Metabolic Panel   • Protime-INR   • Urinalysis With Culture If Indicated - Urine, Clean Catch   • Lactic Acid, Plasma   • Procalcitonin   • CBC Auto Differential   • Mononucleosis Screen   • Hepatitis Panel, Acute   • Urinalysis, Microscopic Only - Urine, Clean Catch   • Lipase   • CBC (No Diff)   • Comprehensive Metabolic Panel   • NPO Diet NPO Type: Strict NPO   • Monitor Blood Pressure   • Cardiac Monitoring   • Pulse Oximetry, Continuous   • Vital Signs   • Intake & Output   • Weigh Patient   • Oral Care   • Saline Lock & Maintain IV Access   • Place Sequential Compression Device   • Maintain Sequential Compression Device   • Code Status and Medical Interventions:   • LHA (on-call MD unless specified) Details   • Inpatient Gastroenterology Consult   • Oxygen Therapy- Nasal Cannula; Titrate for SPO2: 90% - 95%   • Insert Peripheral IV   • Inpatient Admission   • CBC & Differential         Differential diagnosis:  My differential diagnosis includes but is not limited to COVID, viral syndrome, mono, hepatitis, UTI, pneumonia, cholecystitis or cholangitis      Independent interpretation of labs, radiology studies, and discussions with consultants:  ED Course as of 04/15/23 0306   Fri Apr 14, 2023   2140 I will treat the patient with IV fluids and Toradol while obtaining labs, RVP, urinalysis and chest x-ray for further evaluation [GP]   Sat Apr 15, 2023   0114 The patient's Monospot and hepatitis panel are negative [GP]   0213 The patient's CTA chest is negative.  The patient CT abdomen pelvis does show gallstones with a stone in his  gallbladder neck. [GP]   0214 With the patient's fever, elevated LFTs and gallstones I will treat him with IV Zosyn and admit him to the hospital for further evaluation and care.  [GP]   0215 Of note is the patient does have acute on chronic renal failure   [GP]   0256 I discussed the case with Spring Sharma from [facility].  She is aware of the patient's fever, elevated LFTs and gallstones.  She is also aware of his acute on chronic renal insufficiency.  She will admit the patient to the hospital for further evaluation and care and surgical consultation in the morning. [GP]      ED Course User Index  [GP] Abraham Nieto MD               DIAGNOSIS  Final diagnoses:   Fever and chills   Leukocytosis, unspecified type   Elevated LFTs   Gallstones   Acute on chronic renal insufficiency         DISPOSITION  ADMISSION    Discussed treatment plan and reason for admission with pt/family and admitting physician.  Pt/family voiced understanding of the plan for admission for further testing/treatment as needed.            Latest Documented Vital Signs:  As of 03:06 EDT  BP- 149/70 HR- 95 Temp- (!) 101.3 °F (38.5 °C) (Tympanic) O2 sat- 97%--      --------------------  Please note that portions of this were completed with a voice recognition program.       Note Disclaimer: At [facility], we believe that sharing information builds trust and better relationships. You are receiving this note because you are receiving care at [facility] or recently visited. It is possible you will see health information before a provider has talked with you about it. This kind of information can be easy to misunderstand. To help you fully understand what it means for your health, we urge you to discuss this note with your provider.           Abraham Nieto MD  04/15/23 0931

## 2023-04-15 NOTE — PROGRESS NOTES
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    Lab Results   Component Value Date    WBC 8.31 2023     Temp Readings from Last 3 Encounters:   23 (!) 101.3 °F (38.5 °C) (Tympanic)   23 98.9 °F (37.2 °C)   23 100.5 °F (38.1 °C)      Assessment/Plan  Estimated CrCl >20 mL/min at this time; BMI 22.89 kg/m2  Will start piperacillin-tazobactam 3.375 g IV every 8 hours     Pharmacy will continue to follow daily while on piperacillin-tazobactam and adjust as needed. Thank you for this consult.    Naren James III, [facility]  Clinical Pharmacist

## 2023-04-15 NOTE — CONSULTS
GI CONSULT  NOTE:    Referring Provider: Dr. Byrd    Chief complaint: Fever, pain    Subjective .     History of present illness: Kunal Vargas is a 77 y.o. male with history of CAD s/p stent placement 1 month ago on Plavix, CKD stage III, left nephrectomy, and GERD who presents with complaints of weakness, decreased appetite, and fatigue over the past couple weeks.  He had acute worsening of symptoms in the past couple days with bilateral shoulder pain, fever, and chills. He went 4 days without BM but had large BM yesterday. Urine has been brown, almost color of tea. Abdomen is non tender with negative rosenbaum sign. No alcohol or tobacco use.        Labs -, CR 1.85, BUN 31, , AST 94, TB 3.1, alk phos 604, Hgb 10.1, lipase 239, hepatitis panel negative    4/15 US GB -gallstones and sludge, no bile duct dilation  4/15 CT AP W/O -gallstones, constipation        Endo History:  10/2022 colonoscopy (Dr. 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        Past Surgical History:  Past Surgical History:   Procedure Laterality Date   • CARDIAC CATHETERIZATION     • CARDIAC CATHETERIZATION N/A 3/17/2023    Procedure: Left Heart Cath;  Surgeon: Lenny Martines MD;  Location: [facility] CATH INVASIVE LOCATION;  Service: Cardiology;  Laterality: N/A;   • CARDIAC CATHETERIZATION N/A 3/17/2023    Procedure: Coronary angiography;  Surgeon: Lenny Martines MD;  Location:  [facility] CATH INVASIVE LOCATION;  Service: Cardiology;  Laterality: N/A;   • CARDIAC CATHETERIZATION N/A 3/17/2023    Procedure: Stent SABRINA coronary;  Surgeon: Lenny Martines MD;   Location:  [facility] CATH INVASIVE LOCATION;  Service: Cardiology;  Laterality: N/A;   • CARDIAC CATHETERIZATION N/A 3/17/2023    Procedure: Optical Coherent Tomography;  Surgeon: Lenny Martines MD;  Location:  [facility] CATH INVASIVE LOCATION;  Service: Cardiology;  Laterality: N/A;   • COLONOSCOPY N/A 01/25/2017    Procedure: COLONOSCOPY TO CECUM AND TI WITH POLYPECTOMY ;  Surgeon: Cachorro Hancock MD;  [facility];  Service:    • COLONOSCOPY N/A 10/12/2022    Procedure: COLONOSCOPY TO CECUM WITH COLD BX POLYPECTOMY;  Surgeon: Cachorro Hancock MD;  [facility];  Service: General;  Laterality: N/A;  SURVEILLANCE, HX POLYPS   --POLYP, DIVERTICULOSIS   • EYE SURGERY  Jan - Feb 2021    cataract surgery both eyes   • NEPHROURETERECTOMY Left 11/12/2020    Procedure: LEFT LAPAROSCOPIC NEPHROURETERECTOMY;  Surgeon: Ángel Florentino MD;  Location: [facility] MAIN OR;  Service: Urology;  Laterality: Left;   • TRANSURETHRAL RESECTION OF BLADDER TUMOR N/A 09/10/2020    Procedure: CYSTOSCOPY, LEFT URETEROSCOPY W/BIOPSY AND STENT PLACEMENT, TRANSURETHRAL RESECTION OF BLADDER TUMOR;  Surgeon: Ángel Florentino MD;  Location: [facility] MAIN OR;  Service: Urology;  Laterality: N/A;       Social History:  Social History     Tobacco Use   • Smoking status: Never     Passive exposure: Never   • Smokeless tobacco: Never   Vaping Use   • Vaping Use: Never used   Substance Use Topics   • Alcohol use: Never   • Drug use: Never       Family History:  Family History   Problem Relation Age of Onset   • Rheum arthritis Mother    • Arthritis Mother         RA   • Aortic aneurysm Father    • Aortic aneurysm Cousin    • Malig Hyperthermia Neg Hx        Medications:  Medications Prior to Admission   Medication Sig Dispense Refill Last Dose   • aspirin 81 MG chewable tablet Chew 1 tablet Daily.   4/14/2023   • clopidogrel (PLAVIX) 75 MG tablet Take 1 tablet by mouth Daily. 90 tablet 3 Past Week   • levothyroxine  (SYNTHROID, LEVOTHROID) 88 MCG tablet TAKE 1 TABLET BY MOUTH EVERY DAY 90 tablet 0 4/14/2023   • metoprolol succinate XL (TOPROL-XL) 25 MG 24 hr tablet TAKE 1 TABLET BY MOUTH EVERY DAY 90 tablet 1 4/14/2023   • atorvastatin (LIPITOR) 40 MG tablet Take 1 tablet by mouth Every Night. 90 tablet 3    • chlorhexidine (PERIDEX) 0.12 % solution       • nitroglycerin (NITROSTAT) 0.4 MG SL tablet place 1 tablet under the tongue as needed for angina, may repeat every 5mins for up three doses 25 tablet 1    • triamcinolone (KENALOG) 0.1 % cream           Scheduled Meds:aspirin, 81 mg, Oral, Daily  clopidogrel, 75 mg, Oral, Daily  levothyroxine, 88 mcg, Oral, Daily  metoprolol succinate XL, 25 mg, Oral, Daily  piperacillin-tazobactam, 3.375 g, Intravenous, Q8H  polyethylene glycol, 17 g, Oral, Daily  sodium chloride, 10 mL, Intravenous, Q12H      Continuous Infusions:Pharmacy to Dose Zosyn,   sodium chloride, 100 mL/hr, Last Rate: 100 mL/hr (04/15/23 0610)      PRN Meds:.•  calcium carbonate  •  Morphine  •  ondansetron **OR** ondansetron  •  Pharmacy to Dose Zosyn  •  sodium chloride  •  sodium chloride    ALLERGIES:  Patient has no known allergies.    Review of Systems:  Review of Systems   Constitutional: Positive for activity change, appetite change, chills, fatigue and fever.   HENT: Negative.    Respiratory: Negative.    Cardiovascular: Negative.    Gastrointestinal: Negative for abdominal pain, constipation, diarrhea, nausea and vomiting.   Genitourinary: Negative.    Musculoskeletal: Positive for back pain.   Skin: Negative.    Neurological: Positive for weakness.   Psychiatric/Behavioral: Negative.          Objective     Vital Signs:   Vitals:    04/15/23 0421 04/15/23 0451 04/15/23 0521 04/15/23 0930   BP: 138/78 155/76 156/77 144/69   BP Location:    Left arm   Patient Position:    Lying   Pulse: 60 61 62    Resp:    18   Temp:    98.3 °F (36.8 °C)   TempSrc:    Oral   SpO2: 96% 97% 98% 100%   Weight:       Height:            Physical Exam: resting in bed, wife present    General Appearance:    Awake and alert, in no acute distress   Head:    Normocephalic, without obvious abnormality   Throat:   No oral lesions, no thrush, oral mucosa moist   Lungs:     Respirations regular, even and unlabored   Chest Wall:    No abnormalities observed   Abdomen:     Soft, non-tender, non-distended, no rebound or guarding, no hepatosplenomegaly   Rectal:     Deferred   Extremities:   Moves all extremities, no edema, no cyanosis   Pulses:   Pulses palpable and equal bilaterally   Skin:   No bruising, no rash, no jaundice, normal palpation   Lymph nodes:   No cervical, supraclavicular or submandibular palpable adenopathy   Neurologic:   No asterixis       Results Review:  I reviewed the patient's labs and imaging.     CBC  Results from last 7 days   Lab Units 04/15/23  0757 04/14/23  2151 04/12/23  1542   RBC 10*6/mm3 3.19* 3.48* 4.08*   WBC 10*3/mm3 7.63 8.31 7.5   HEMOGLOBIN g/dL 10.1* 11.5* 12.6*   PLATELETS 10*3/mm3 154 184 197       CMP  Results from last 7 days   Lab Units 04/15/23  0757 04/14/23 2151 04/12/23  1542   SODIUM mmol/L 134* 134* 134   POTASSIUM mmol/L 4.4 4.4 5.0   CHLORIDE mmol/L 106 101 99   TOTAL CO2 mmol/L  --   --  20   CO2 mmol/L 18.2* 20.7*  --    BUN mg/dL 31* 30* 30*   CREATININE mg/dL 1.85* 2.14* 2.02*   GLUCOSE mg/dL 99 123* 107*   ALBUMIN g/dL 3.0* 3.9 4.4   BILIRUBIN mg/dL 3.1* 3.7* 2.9*   ALK PHOS U/L 604* 634* 422*   AST (SGOT) U/L 94* 91* 141*   ALT (SGPT) U/L 138* 155* 219*       Amylase and Lipase  Results from last 7 days   Lab Units 04/15/23  0017   LIPASE U/L 239*       CRP     Results from last 7 days   Lab Units 04/15/23  0757   CRP mg/dL 4.25*       Imaging Results (Last 24 Hours)     Procedure Component Value Units Date/Time     Gallbladder [[id number]] Collected: 04/15/23 0424     Updated: 04/15/23 0430    Narrative:      GALLBLADDER ULTRASOUND     HISTORY: Cholelithiasis     COMPARISON: 04/15/2023      TECHNIQUE: Grayscale and color Doppler sonographic images were obtained  through the right upper quadrant.     FINDINGS:  Visualized portions of the pancreas are unremarkable. There is no intra  or extrahepatic biliary patient. Common bile duct measures up to 5 mm.  Patient is noted to have stones and sludge within the gallbladder, but  there is no gallbladder wall thickening or pericholecystic fluid.  Gallbladder wall measures 2 mm in thickness. No focal hepatic lesions  are seen. The right kidney measures 10.4 x 4.7 cm. There is no  hydronephrosis. Renal echotexture is normal.       Impression:      Gallbladder stones and sludge.     This report was finalized on 4/15/2023 4:27 AM by Dr. Norah Fleming M.D.       CT Chest Without Contrast Diagnostic [[id number]] Collected: 04/15/23 0122     Updated: 04/15/23 0132    Narrative:      CT OF THE CHEST WITHOUT CONTRAST; CT OF THE ABDOMEN AND PELVIS WITHOUT  CONTRAST     HISTORY: Fever and shortness of breath     COMPARISON: 11/16/2020     TECHNIQUE: Axial CT imaging was obtained from the thoracic inlet through  the symphysis pubis. No IV contrast was administered.     FINDINGS:  CT CHEST: No acute infiltrates are seen. The thyroid gland, trachea, and  esophagus appear unremarkable. There are coronary artery calcifications.  There is no pleural or pericardial effusion. Mediastinal lymph nodes do  not appear pathologically enlarged. No acute osseous abnormalities are  seen.     CT OF THE ABDOMEN AND PELVIS: No suspicious hepatic lesions are seen.  The stomach, duodenum, adrenal glands, and pancreas are normal. There is  cholelithiasis. One of the stents does appear to be within the neck of  the gallbladder. Left kidney is absent. No hydronephrosis is seen on the  right. Calcified granulomata are seen within the spleen. There is  calcification of the aorta. No distal ureteral or bladder stones are  seen. Prostate gland protrudes into the base of the bladder. There  is  colonic diverticulosis. Stool burden appears mildly increased, which  could reflect some constipation. The appendix is normal. There is tiny  fat-containing umbilical hernia. No acute osseous abnormalities are  seen.       Impression:         1. No acute findings identified within the thorax.  2. Cholelithiasis. Patient does appear to have a stone within the  gallbladder neck. Ultrasound could be considered for further assessment.  3. Increased stool burden could reflect some constipation.     Radiation dose reduction techniques were utilized, including automated  exposure control and exposure modulation based on body size.     This report was finalized on 4/15/2023 1:29 AM by Dr. Norah Fleming M.D.       CT Abdomen Pelvis Without Contrast [[id number]] Collected: 04/15/23 0122     Updated: 04/15/23 0132    Narrative:      CT OF THE CHEST WITHOUT CONTRAST; CT OF THE ABDOMEN AND PELVIS WITHOUT  CONTRAST     HISTORY: Fever and shortness of breath     COMPARISON: 11/16/2020     TECHNIQUE: Axial CT imaging was obtained from the thoracic inlet through  the symphysis pubis. No IV contrast was administered.     FINDINGS:  CT CHEST: No acute infiltrates are seen. The thyroid gland, trachea, and  esophagus appear unremarkable. There are coronary artery calcifications.  There is no pleural or pericardial effusion. Mediastinal lymph nodes do  not appear pathologically enlarged. No acute osseous abnormalities are  seen.     CT OF THE ABDOMEN AND PELVIS: No suspicious hepatic lesions are seen.  The stomach, duodenum, adrenal glands, and pancreas are normal. There is  cholelithiasis. One of the stents does appear to be within the neck of  the gallbladder. Left kidney is absent. No hydronephrosis is seen on the  right. Calcified granulomata are seen within the spleen. There is  calcification of the aorta. No distal ureteral or bladder stones are  seen. Prostate gland protrudes into the base of the bladder. There is  colonic  diverticulosis. Stool burden appears mildly increased, which  could reflect some constipation. The appendix is normal. There is tiny  fat-containing umbilical hernia. No acute osseous abnormalities are  seen.       Impression:         1. No acute findings identified within the thorax.  2. Cholelithiasis. Patient does appear to have a stone within the  gallbladder neck. Ultrasound could be considered for further assessment.  3. Increased stool burden could reflect some constipation.     Radiation dose reduction techniques were utilized, including automated  exposure control and exposure modulation based on body size.     This report was finalized on 4/15/2023 1:29 AM by Dr. Norah Fleming M.D.       XR Chest 1 View [[id number]] Collected: 04/14/23 2219     Updated: 04/14/23 2222    Narrative:      SINGLE VIEW OF THE CHEST     HISTORY: Fever and cough     COMPARISON: None available.     FINDINGS:  Heart size is within normal limits. No pneumothorax, pleural effusion,  or acute infiltrate is seen.       Impression:      No acute findings.     This report was finalized on 4/14/2023 10:19 PM by Dr. Norah Fleming M.D.               ASSESSMENT:  77 y.o. male with history of CAD s/p stent placement 1 month ago on Plavix, CKD stage III, left nephrectomy, and GERD. C/o weakness, shoulder pain, fever, loss of appetite.  -Gallstones/sludge -no bile duct dilation  -Fever/chills, shoulder pain- r/t above  -Elevated LFTs and lipase- r/t above  -CAD -s/p stent 3/2023 on Plavix  -SHIV, stage III CKD, s/p nephrectomy 2020      PLAN:  Case reviewed by Dr. Tipton, he recommends surgical evaluation.  Monitor LFTs.  Continue antibiotics.   GI will follow.       We appreciate the referral.    Martha Pedraza, PAVAN  04/15/23  09:50 EDT

## 2023-04-15 NOTE — CONSULTS
General Surgery Consult    Summary:    Mr. Kunal Vargas is a 77 y.o. year old gentleman with elevated LFT's and cholelithiasis with possible cholecystitis and possible choledocholithiasis. Discussed options with the patient and his wife. Recommended proceeding with laparoscopic cholecystectomy with cholangiogram tomorrow morning in the OR. Discussed increased bleeding risk due to aspirin and plavix. Discussed that he may require a post-operative ERCP if he has a retained CBD stone. Cardiology consulted for clearance. NPO after midnight.     Chief Complaint:    Abdominal pain    History of Present Illness:    Mr. Kunal Vargas is a 77 y.o. year old gentleman who presented to the ER with a 5 day history of fever, chills, weakness, fatigue. He has had an unremarkable outpatient workup. His LFT's were found to be elevated on admission. He was admitted last month for chest pain and had a SABRINA to the LAD placed.     Past Medical History:   • CAD   • GERD  • HLD    Past Surgical History:    Past Surgical History:   Procedure Laterality Date   • CARDIAC CATHETERIZATION     • CARDIAC CATHETERIZATION N/A 3/17/2023    Procedure: Left Heart Cath;  Surgeon: Lenny Martines MD;  Location:  [facility] CATH INVASIVE LOCATION;  Service: Cardiology;  Laterality: N/A;   • CARDIAC CATHETERIZATION N/A 3/17/2023    Procedure: Coronary angiography;  Surgeon: Lenny Martines MD;  Location:  [facility] CATH INVASIVE LOCATION;  Service: Cardiology;  Laterality: N/A;   • CARDIAC CATHETERIZATION N/A 3/17/2023    Procedure: Stent SABRINA coronary;  Surgeon: Lenny Martines MD;  [facility];  Service: Cardiology;  Laterality: N/A;   • CARDIAC CATHETERIZATION N/A 3/17/2023    Procedure: Optical Coherent Tomography;  Surgeon: Lenny Martines MD;  Location:  [facility] CATH INVASIVE LOCATION;  Service: Cardiology;  Laterality: N/A;   • COLONOSCOPY N/A 01/25/2017    Procedure: COLONOSCOPY TO CECUM AND TI WITH  POLYPECTOMY ;  Surgeon: Cachorro Hancock MD;  [facility];  Service:    • COLONOSCOPY N/A 10/12/2022    Procedure: COLONOSCOPY TO CECUM WITH COLD BX POLYPECTOMY;  Surgeon: Cachorro Hancock MD;  [facility];  Service: General;  Laterality: N/A;  SURVEILLANCE, HX POLYPS   --POLYP, DIVERTICULOSIS   • EYE SURGERY  Jan - Feb 2021    cataract surgery both eyes   • NEPHROURETERECTOMY Left 11/12/2020    Procedure: LEFT LAPAROSCOPIC NEPHROURETERECTOMY;  Surgeon: Ángel Florentino MD;  Location: [facility] MAIN OR;  Service: Urology;  Laterality: Left;   • TRANSURETHRAL RESECTION OF BLADDER TUMOR N/A 09/10/2020    Procedure: CYSTOSCOPY, LEFT URETEROSCOPY W/BIOPSY AND STENT PLACEMENT, TRANSURETHRAL RESECTION OF BLADDER TUMOR;  Surgeon: Ángel Folrentino MD;  Location: [facility] MAIN OR;  Service: Urology;  Laterality: N/A;     Family History:    Family History   Problem Relation Age of Onset   • Rheum arthritis Mother    • Arthritis Mother         RA   • Aortic aneurysm Father    • Aortic aneurysm Cousin    • Malig Hyperthermia Neg Hx      Social History:    Social History     Socioeconomic History   • Marital status:    Tobacco Use   • Smoking status: Never     Passive exposure: Never   • Smokeless tobacco: Never   Vaping Use   • Vaping Use: Never used   Substance and Sexual Activity   • Alcohol use: Never   • Drug use: Never   • Sexual activity: Defer     Allergies:   No Known Allergies    Medications:     Current Facility-Administered Medications:   •  aspirin chewable tablet 81 mg, 81 mg, Oral, Daily, Ángel Byrd MD, 81 mg at 04/15/23 0937  •  calcium carbonate (TUMS) chewable tablet 500 mg (200 mg elemental), 2 tablet, Oral, BID PRN, Spring Sharma APRN  •  clopidogrel (PLAVIX) tablet 75 mg, 75 mg, Oral, Daily, Ángel Byrd MD, 75 mg at 04/15/23 0937  •  levothyroxine (SYNTHROID, LEVOTHROID) tablet 88 mcg, 88 mcg, Oral, Daily, Ángel Byrd,  MD, 88 mcg at 04/15/23 0937  •  metoprolol succinate XL (TOPROL-XL) 24 hr tablet 25 mg, 25 mg, Oral, Daily, Ángel Byrd MD, 25 mg at 04/15/23 0937  •  morphine injection 2 mg, 2 mg, Intravenous, Q3H PRN, Spring Sharma APRN, 2 mg at 04/15/23 0943  •  ondansetron (ZOFRAN) tablet 4 mg, 4 mg, Oral, Q6H PRN **OR** ondansetron (ZOFRAN) injection 4 mg, 4 mg, Intravenous, Q6H PRN, Spring Sharma APRN  •  Pharmacy to Dose Zosyn, , Does not apply, Continuous PRN, Spring Sharma APRN  •  piperacillin-tazobactam (ZOSYN) 3.375 g in iso-osmotic dextrose 50 ml (premix), 3.375 g, Intravenous, Q8H, Spring Sharma APRN, 3.375 g at 04/15/23 0937  •  polyethylene glycol (MIRALAX) packet 17 g, 17 g, Oral, Daily, Ángel Byrd MD, 17 g at 04/15/23 0937  •  sodium chloride 0.9 % flush 10 mL, 10 mL, Intravenous, Q12H, Spring Sharma APRN, 10 mL at 04/15/23 0939  •  sodium chloride 0.9 % flush 10 mL, 10 mL, Intravenous, PRN, Spring Sharma APRN  •  sodium chloride 0.9 % infusion 40 mL, 40 mL, Intravenous, PRN, Spring Sharma APRN  •  sodium chloride 0.9 % infusion, 100 mL/hr, Intravenous, Continuous, Spring Sharma APRN, Last Rate: 100 mL/hr at 04/15/23 0610, 100 mL/hr at 04/15/23 0610    Radiology/Endoscopy:    • CT abdomen pelvis 4/14/23: cholelithiasis with gallstone stuck in the neck of the gallbladder   • 4/15/2023 RUQ US: gallstones and sludge     Labs:    • WBC 7 Hgb 10 platelets 154 Cr 1.8 AST 94  alk phos 604 tbili 3.1     Review of Systems:   Influenza-like illness: no fever, no  cough, no  sore throat, no  body aches, no loss of sense of taste or smell, no known exposure to person with Covid-19.  Constitutional: Negative for fevers or chills  HENT: Negative for hearing loss or runny nose  Eyes: Negative for vision changes or scleral icterus  Respiratory: Negative for cough or shortness of breath  Cardiovascular: Negative for chest pain or  heart palpitations  Gastrointestinal: + for abdominal pain, nausea, Negative for vomiting, constipation, melena, or hematochezia  Genitourinary: Negative for hematuria or dysuria  Musculoskeletal: Negative for joint swelling or gait instability  Neurologic: Negative for tremors or seizures  Psychiatric: Negative for suicidal ideations or depression  All other systems reviewed and negative    Physical Exam:   • Constitutional: Well-developed well-nourished, no acute distress  • Eyes:  Conjunctivae normal, sclerae nonicteric  • ENMT: Hearing grossly normal, oral mucosa moist  • Neck: Supple, trachea midline  • Respiratory: Clear to auscultation, normal inspiratory effort  • Cardiovascular: Regular rate, no peripheral edema, no jugular venous distention  • Gastrointestinal: Soft, right upper quadrant abdominal pain  • Skin:  Warm, dry, no rash on visualized skin surfaces  • Musculoskeletal: Symmetric strength, normal gait  • Psychiatric: Alert and oriented ×3, normal affect     THERESE ROSSI M.D.  General and Endoscopic Surgery  [facility]    [address], [address]  [place], [place], [address]  P: [phone]  F: [phone]

## 2023-04-15 NOTE — CONSULTS
Patient Name: Kunal Vargas  Age/Sex: 77 y.o. male  : 1945  MRN: [id number]    Date of Admission: 2023  Date of Encounter Visit: 04/15/23  Encounter Provider: PAVAN Quigley  Place of Service: [facility]      Referring Provider: Tanner Myrick MD  Patient Care Team:  Chai Montesinos MD as PCP - General    Subjective:       Chief Complaint: fever, chills    History of Present Illness:  Kunal Vargas is a 77 y.o. male who follows with Dr. Jones. He has a history of CAD--s/p PCI-SABRINA to LAD (3/17/2023).    Patient has history of non-obstructive CAD. He presented to ED last month with typical/atypical chest pain. Had abnormal stress. Subsequently had PCI to LAD on 3/17/2023.    Presented to ED with complaints of generalized weakness, chills, and fever for 5 days. Found to have elevated LFTS and evidence of gallstones in gallbladder. GI saw and recommended surgical evaluation.     Cardiology has been asked to see for preop clearance.              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        Past Surgical History:   Procedure Laterality Date   • CARDIAC CATHETERIZATION     • CARDIAC CATHETERIZATION N/A 3/17/2023    Procedure: Left Heart Cath;  Surgeon: Lenny Martines MD;  Location: [facility] CATH INVASIVE LOCATION;  Service: Cardiology;  Laterality: N/A;   • CARDIAC CATHETERIZATION N/A 3/17/2023    Procedure: Coronary angiography;  Surgeon: Lenny Martines MD;  Location:  [facility] CATH INVASIVE LOCATION;  Service: Cardiology;  Laterality: N/A;   • CARDIAC CATHETERIZATION N/A 3/17/2023    Procedure: Stent SABRINA  coronary;  Surgeon: Lenny Martines MD;  Location:  [facility] CATH INVASIVE LOCATION;  Service: Cardiology;  Laterality: N/A;   • CARDIAC CATHETERIZATION N/A 3/17/2023    Procedure: Optical Coherent Tomography;  Surgeon: Lenny Martines MD;  Location:  [facility] CATH INVASIVE LOCATION;  Service: Cardiology;  Laterality: N/A;   • COLONOSCOPY N/A 01/25/2017    Procedure: COLONOSCOPY TO CECUM AND TI WITH POLYPECTOMY ;  Surgeon: Cachorro Hancock MD;  [facility] END[facility]COPY;  Service:    • COLONOSCOPY N/A 10/12/2022    Procedure: COLONOSCOPY TO CECUM WITH COLD BX POLYPECTOMY;  Surgeon: Cachorro Hancock MD;  [facility] END[facility];  Service: General;  Laterality: N/A;  SURVEILLANCE, HX POLYPS   --POLYP, DIVERTICULOSIS   • EYE SURGERY  Jan - Feb 2021    cataract surgery both eyes   • NEPHROURETERECTOMY Left 11/12/2020    Procedure: LEFT LAPAROSCOPIC NEPHROURETERECTOMY;  Surgeon: Ángel Florentino MD;  Location: [facility] MAIN OR;  Service: Urology;  Laterality: Left;   • TRANSURETHRAL RESECTION OF BLADDER TUMOR N/A 09/10/2020    Procedure: CYSTOSCOPY, LEFT URETEROSCOPY W/BIOPSY AND STENT PLACEMENT, TRANSURETHRAL RESECTION OF BLADDER TUMOR;  Surgeon: Ángel Florentino MD;  Location: [facility] MAIN OR;  Service: Urology;  Laterality: N/A;       Home Medications:   Medications Prior to Admission   Medication Sig Dispense Refill Last Dose   • aspirin 81 MG chewable tablet Chew 1 tablet Daily.   4/14/2023   • clopidogrel (PLAVIX) 75 MG tablet Take 1 tablet by mouth Daily. 90 tablet 3 Past Week   • levothyroxine (SYNTHROID, LEVOTHROID) 88 MCG tablet TAKE 1 TABLET BY MOUTH EVERY DAY 90 tablet 0 4/14/2023   • metoprolol succinate XL (TOPROL-XL) 25 MG 24 hr tablet TAKE 1 TABLET BY MOUTH EVERY DAY 90 tablet 1 4/14/2023   • atorvastatin (LIPITOR) 40 MG tablet Take 1 tablet by mouth Every Night. 90 tablet 3    • chlorhexidine (PERIDEX) 0.12 % solution       • nitroglycerin (NITROSTAT) 0.4 MG SL tablet place 1 tablet under  the tongue as needed for angina, may repeat every 5mins for up three doses 25 tablet 1    • triamcinolone (KENALOG) 0.1 % cream           Allergies:  No Known Allergies    Past Social History:  Social History     Socioeconomic History   • Marital status:    Tobacco Use   • Smoking status: Never     Passive exposure: Never   • Smokeless tobacco: Never   Vaping Use   • Vaping Use: Never used   Substance and Sexual Activity   • Alcohol use: Never   • Drug use: Never   • Sexual activity: Defer        Past Family History:  Family History   Problem Relation Age of Onset   • Rheum arthritis Mother    • Arthritis Mother         RA   • Aortic aneurysm Father    • Aortic aneurysm Cousin    • Malig Hyperthermia Neg Hx        Review of Systems: All systems reviewed. Pertinent positives identified in HPI. All other systems are negative.     Review of Systems   Constitutional: Negative.   Cardiovascular: Negative.    Respiratory: Negative.    Musculoskeletal: Positive for back pain.   Gastrointestinal: Negative.          Objective:     Objective:  Temp:  [98.3 °F (36.8 °C)-101.3 °F (38.5 °C)] 98.3 °F (36.8 °C)  Heart Rate:  [60-95] 62  Resp:  [18] 18  BP: (138-156)/(66-78) 144/69    Intake/Output Summary (Last 24 hours) at 4/15/2023 1331  Last data filed at 4/15/2023 0133  Gross per 24 hour   Intake 1000 ml   Output --   Net 1000 ml     Body mass index is 22.89 kg/m².      04/14/23 2106   Weight: 70.3 kg (155 lb)           Physical Exam:   Vitals and nursing note reviewed.   Constitutional:       Appearance: Healthy appearance.   Cardiovascular:      Normal rate. Regular rhythm. Normal S1. Normal S2.      Murmurs: There is no murmur.   Edema:     Peripheral edema absent.   Psychiatric:         Attention and Perception: Attention and perception normal.         Mood and Affect: Mood and affect normal.           Lab Review:     Results from last 7 days   Lab Units 04/15/23  0757 04/14/23  2151 04/14/23  2151 04/12/23  1542    SODIUM mmol/L 134*  --  134* 134   POTASSIUM mmol/L 4.4  --  4.4 5.0   CHLORIDE mmol/L 106  --  101 99   TOTAL CO2 mmol/L  --   --   --  20   CO2 mmol/L 18.2*  --  20.7*  --    BUN mg/dL 31*  --  30* 30*   CREATININE mg/dL 1.85*  --  2.14* 2.02*   GLUCOSE mg/dL 99   < > 123* 107*   CALCIUM mg/dL 8.5*  --  9.0 9.3    < > = values in this interval not displayed.           Results from last 7 days   Lab Units 04/15/23  0757   WBC 10*3/mm3 7.63   HEMOGLOBIN g/dL 10.1*   HEMATOCRIT % 30.7*   PLATELETS 10*3/mm3 154     Results from last 7 days   Lab Units 04/14/23  2151   INR  1.14*                               Imaging:    Imaging Results (Most Recent)     Procedure Component Value Units Date/Time    US Gallbladder [[id number]] Collected: 04/15/23 0424     Updated: 04/15/23 0430    Narrative:      GALLBLADDER ULTRASOUND     HISTORY: Cholelithiasis     COMPARISON: 04/15/2023     TECHNIQUE: Grayscale and color Doppler sonographic images were obtained  through the right upper quadrant.     FINDINGS:  Visualized portions of the pancreas are unremarkable. There is no intra  or extrahepatic biliary patient. Common bile duct measures up to 5 mm.  Patient is noted to have stones and sludge within the gallbladder, but  there is no gallbladder wall thickening or pericholecystic fluid.  Gallbladder wall measures 2 mm in thickness. No focal hepatic lesions  are seen. The right kidney measures 10.4 x 4.7 cm. There is no  hydronephrosis. Renal echotexture is normal.       Impression:      Gallbladder stones and sludge.     This report was finalized on 4/15/2023 4:27 AM by Dr. Norah Fleming M.D.       CT Chest Without Contrast Diagnostic [[id number]] Collected: 04/15/23 0122     Updated: 04/15/23 0132    Narrative:      CT OF THE CHEST WITHOUT CONTRAST; CT OF THE ABDOMEN AND PELVIS WITHOUT  CONTRAST     HISTORY: Fever and shortness of breath     COMPARISON: 11/16/2020     TECHNIQUE: Axial CT imaging was obtained from the thoracic  inlet through  the symphysis pubis. No IV contrast was administered.     FINDINGS:  CT CHEST: No acute infiltrates are seen. The thyroid gland, trachea, and  esophagus appear unremarkable. There are coronary artery calcifications.  There is no pleural or pericardial effusion. Mediastinal lymph nodes do  not appear pathologically enlarged. No acute osseous abnormalities are  seen.     CT OF THE ABDOMEN AND PELVIS: No suspicious hepatic lesions are seen.  The stomach, duodenum, adrenal glands, and pancreas are normal. There is  cholelithiasis. One of the stents does appear to be within the neck of  the gallbladder. Left kidney is absent. No hydronephrosis is seen on the  right. Calcified granulomata are seen within the spleen. There is  calcification of the aorta. No distal ureteral or bladder stones are  seen. Prostate gland protrudes into the base of the bladder. There is  colonic diverticulosis. Stool burden appears mildly increased, which  could reflect some constipation. The appendix is normal. There is tiny  fat-containing umbilical hernia. No acute osseous abnormalities are  seen.       Impression:         1. No acute findings identified within the thorax.  2. Cholelithiasis. Patient does appear to have a stone within the  gallbladder neck. Ultrasound could be considered for further assessment.  3. Increased stool burden could reflect some constipation.     Radiation dose reduction techniques were utilized, including automated  exposure control and exposure modulation based on body size.     This report was finalized on 4/15/2023 1:29 AM by Dr. Norah Fleming M.D.       CT Abdomen Pelvis Without Contrast [[id number]] Collected: 04/15/23 0122     Updated: 04/15/23 0132    Narrative:      CT OF THE CHEST WITHOUT CONTRAST; CT OF THE ABDOMEN AND PELVIS WITHOUT  CONTRAST     HISTORY: Fever and shortness of breath     COMPARISON: 11/16/2020     TECHNIQUE: Axial CT imaging was obtained from the thoracic inlet  through  the symphysis pubis. No IV contrast was administered.     FINDINGS:  CT CHEST: No acute infiltrates are seen. The thyroid gland, trachea, and  esophagus appear unremarkable. There are coronary artery calcifications.  There is no pleural or pericardial effusion. Mediastinal lymph nodes do  not appear pathologically enlarged. No acute osseous abnormalities are  seen.     CT OF THE ABDOMEN AND PELVIS: No suspicious hepatic lesions are seen.  The stomach, duodenum, adrenal glands, and pancreas are normal. There is  cholelithiasis. One of the stents does appear to be within the neck of  the gallbladder. Left kidney is absent. No hydronephrosis is seen on the  right. Calcified granulomata are seen within the spleen. There is  calcification of the aorta. No distal ureteral or bladder stones are  seen. Prostate gland protrudes into the base of the bladder. There is  colonic diverticulosis. Stool burden appears mildly increased, which  could reflect some constipation. The appendix is normal. There is tiny  fat-containing umbilical hernia. No acute osseous abnormalities are  seen.       Impression:         1. No acute findings identified within the thorax.  2. Cholelithiasis. Patient does appear to have a stone within the  gallbladder neck. Ultrasound could be considered for further assessment.  3. Increased stool burden could reflect some constipation.     Radiation dose reduction techniques were utilized, including automated  exposure control and exposure modulation based on body size.     This report was finalized on 4/15/2023 1:29 AM by Dr. Norah Fleming M.D.       XR Chest 1 View [[id number]] Collected: 04/14/23 2219     Updated: 04/14/23 2222    Narrative:      SINGLE VIEW OF THE CHEST     HISTORY: Fever and cough     COMPARISON: None available.     FINDINGS:  Heart size is within normal limits. No pneumothorax, pleural effusion,  or acute infiltrate is seen.       Impression:      No acute findings.      This report was finalized on 4/14/2023 10:19 PM by Dr. Norah Fleming M.D.             EKG:         Baseline:     I personally viewed and interpreted the patient's EKG/Telemetry tracings.    Assessment:   Assessment & Plan       1. Stable Coronary Artery Disease  2. Recent PCI with stent to LAD  3. Chronic Kidney Disease  4. Cholecystitis      Plan:     I discussed with Dr. Miller and Dr. Lyle.      Plan will be on surgery on DAPT.      Otherwise he is stable for surgery, and we will follow.     Thank you for allowing me to participate in the care of Kunal Vargas. Feel free to contact me directly with any further questions or concerns.    PAVAN Quigley  04/15/23  13:31 EDT

## 2023-04-16 ENCOUNTER — APPOINTMENT (OUTPATIENT)
Dept: GENERAL RADIOLOGY | Facility: HOSPITAL | Age: 78
DRG: 853 | End: 2023-04-16
Payer: MEDICARE

## 2023-04-16 ENCOUNTER — ANESTHESIA EVENT (OUTPATIENT)
Dept: PERIOP | Facility: HOSPITAL | Age: 78
DRG: 853 | End: 2023-04-16
Payer: MEDICARE

## 2023-04-16 ENCOUNTER — ANESTHESIA (OUTPATIENT)
Dept: PERIOP | Facility: HOSPITAL | Age: 78
DRG: 853 | End: 2023-04-16
Payer: MEDICARE

## 2023-04-16 LAB
ALBUMIN SERPL-MCNC: 3 G/DL (ref 3.5–5.2)
ALBUMIN SERPL-MCNC: 3 G/DL (ref 3.5–5.2)
ALBUMIN/GLOB SERPL: 1.3 G/DL
ALBUMIN/GLOB SERPL: 1.3 G/DL
ALP SERPL-CCNC: 554 U/L (ref 39–117)
ALP SERPL-CCNC: 556 U/L (ref 39–117)
ALT SERPL W P-5'-P-CCNC: 132 U/L (ref 1–41)
ALT SERPL W P-5'-P-CCNC: 140 U/L (ref 1–41)
ANION GAP SERPL CALCULATED.3IONS-SCNC: 11.2 MMOL/L (ref 5–15)
ANION GAP SERPL CALCULATED.3IONS-SCNC: 12.8 MMOL/L (ref 5–15)
ANION GAP SERPL CALCULATED.3IONS-SCNC: 9 MMOL/L (ref 5–15)
ARTERIAL PATENCY WRIST A: POSITIVE
ARTERIAL PATENCY WRIST A: POSITIVE
AST SERPL-CCNC: 107 U/L (ref 1–40)
AST SERPL-CCNC: 91 U/L (ref 1–40)
ATMOSPHERIC PRESS: 742.2 MMHG
ATMOSPHERIC PRESS: 742.6 MMHG
BACTERIA SPEC AEROBE CULT: ABNORMAL
BASE EXCESS BLDA CALC-SCNC: -6.3 MMOL/L (ref 0–2)
BASE EXCESS BLDA CALC-SCNC: -7.1 MMOL/L (ref 0–2)
BASOPHILS # BLD AUTO: 0.05 10*3/MM3 (ref 0–0.2)
BASOPHILS NFR BLD AUTO: 0.5 % (ref 0–1.5)
BDY SITE: ABNORMAL
BDY SITE: ABNORMAL
BILIRUB SERPL-MCNC: 2.2 MG/DL (ref 0–1.2)
BILIRUB SERPL-MCNC: 2.5 MG/DL (ref 0–1.2)
BUN SERPL-MCNC: 35 MG/DL (ref 8–23)
BUN SERPL-MCNC: 40 MG/DL (ref 8–23)
BUN SERPL-MCNC: 40 MG/DL (ref 8–23)
BUN/CREAT SERPL: 15 (ref 7–25)
BUN/CREAT SERPL: 17.7 (ref 7–25)
BUN/CREAT SERPL: 17.9 (ref 7–25)
CALCIUM SPEC-SCNC: 8 MG/DL (ref 8.6–10.5)
CALCIUM SPEC-SCNC: 8.1 MG/DL (ref 8.6–10.5)
CALCIUM SPEC-SCNC: 8.6 MG/DL (ref 8.6–10.5)
CHLORIDE SERPL-SCNC: 105 MMOL/L (ref 98–107)
CHLORIDE SERPL-SCNC: 105 MMOL/L (ref 98–107)
CHLORIDE SERPL-SCNC: 108 MMOL/L (ref 98–107)
CO2 SERPL-SCNC: 16.8 MMOL/L (ref 22–29)
CO2 SERPL-SCNC: 17.2 MMOL/L (ref 22–29)
CO2 SERPL-SCNC: 20 MMOL/L (ref 22–29)
CREAT SERPL-MCNC: 2.23 MG/DL (ref 0.76–1.27)
CREAT SERPL-MCNC: 2.26 MG/DL (ref 0.76–1.27)
CREAT SERPL-MCNC: 2.34 MG/DL (ref 0.76–1.27)
DEPRECATED RDW RBC AUTO: 43.2 FL (ref 37–54)
DEPRECATED RDW RBC AUTO: 43.7 FL (ref 37–54)
EGFRCR SERPLBLD CKD-EPI 2021: 27.9 ML/MIN/1.73
EGFRCR SERPLBLD CKD-EPI 2021: 29.1 ML/MIN/1.73
EGFRCR SERPLBLD CKD-EPI 2021: 29.6 ML/MIN/1.73
EOSINOPHIL # BLD AUTO: 0.38 10*3/MM3 (ref 0–0.4)
EOSINOPHIL NFR BLD AUTO: 4.1 % (ref 0.3–6.2)
ERYTHROCYTE [DISTWIDTH] IN BLOOD BY AUTOMATED COUNT: 12.3 % (ref 12.3–15.4)
ERYTHROCYTE [DISTWIDTH] IN BLOOD BY AUTOMATED COUNT: 12.6 % (ref 12.3–15.4)
GAS FLOW AIRWAY: 8 LPM
GLOBULIN UR ELPH-MCNC: 2.3 GM/DL
GLOBULIN UR ELPH-MCNC: 2.4 GM/DL
GLUCOSE BLDC GLUCOMTR-MCNC: 119 MG/DL (ref 70–130)
GLUCOSE BLDC GLUCOMTR-MCNC: 145 MG/DL (ref 70–130)
GLUCOSE SERPL-MCNC: 149 MG/DL (ref 65–99)
GLUCOSE SERPL-MCNC: 207 MG/DL (ref 65–99)
GLUCOSE SERPL-MCNC: 90 MG/DL (ref 65–99)
GRAM STN SPEC: ABNORMAL
HCO3 BLDA-SCNC: 18.6 MMOL/L (ref 22–28)
HCO3 BLDA-SCNC: 23.1 MMOL/L (ref 22–28)
HCT VFR BLD AUTO: 28.9 % (ref 37.5–51)
HCT VFR BLD AUTO: 32.5 % (ref 37.5–51)
HGB BLD-MCNC: 10.6 G/DL (ref 13–17.7)
HGB BLD-MCNC: 9.7 G/DL (ref 13–17.7)
IMM GRANULOCYTES # BLD AUTO: 0.05 10*3/MM3 (ref 0–0.05)
IMM GRANULOCYTES NFR BLD AUTO: 0.5 % (ref 0–0.5)
INHALED O2 CONCENTRATION: 40 %
ISOLATED FROM: ABNORMAL
LYMPHOCYTES # BLD AUTO: 0.6 10*3/MM3 (ref 0.7–3.1)
LYMPHOCYTES NFR BLD AUTO: 6.4 % (ref 19.6–45.3)
MCH RBC QN AUTO: 31.7 PG (ref 26.6–33)
MCH RBC QN AUTO: 32 PG (ref 26.6–33)
MCHC RBC AUTO-ENTMCNC: 32.6 G/DL (ref 31.5–35.7)
MCHC RBC AUTO-ENTMCNC: 33.6 G/DL (ref 31.5–35.7)
MCV RBC AUTO: 95.4 FL (ref 79–97)
MCV RBC AUTO: 97.3 FL (ref 79–97)
MODALITY: ABNORMAL
MODALITY: ABNORMAL
MONOCYTES # BLD AUTO: 0.22 10*3/MM3 (ref 0.1–0.9)
MONOCYTES NFR BLD AUTO: 2.4 % (ref 5–12)
NEUTROPHILS NFR BLD AUTO: 8.04 10*3/MM3 (ref 1.7–7)
NEUTROPHILS NFR BLD AUTO: 86.1 % (ref 42.7–76)
NRBC BLD AUTO-RTO: 0 /100 WBC (ref 0–0.2)
O2 A-A PPRESDIFF RESPIRATORY: 0.6 MMHG
PCO2 BLDA: 33.9 MM HG (ref 35–45)
PCO2 BLDA: 71.4 MM HG (ref 35–45)
PEEP RESPIRATORY: 5 CM[H2O]
PH BLDA: 7.12 PH UNITS (ref 7.35–7.45)
PH BLDA: 7.35 PH UNITS (ref 7.35–7.45)
PLATELET # BLD AUTO: 151 10*3/MM3 (ref 140–450)
PLATELET # BLD AUTO: 219 10*3/MM3 (ref 140–450)
PMV BLD AUTO: 10.7 FL (ref 6–12)
PMV BLD AUTO: 11.1 FL (ref 6–12)
PO2 BLDA: 145.7 MM HG (ref 80–100)
PO2 BLDA: 210.3 MM HG (ref 80–100)
POTASSIUM SERPL-SCNC: 4.6 MMOL/L (ref 3.5–5.2)
POTASSIUM SERPL-SCNC: 4.7 MMOL/L (ref 3.5–5.2)
POTASSIUM SERPL-SCNC: 6.2 MMOL/L (ref 3.5–5.2)
PROT SERPL-MCNC: 5.3 G/DL (ref 6–8.5)
PROT SERPL-MCNC: 5.4 G/DL (ref 6–8.5)
QT INTERVAL: 459 MS
RBC # BLD AUTO: 3.03 10*6/MM3 (ref 4.14–5.8)
RBC # BLD AUTO: 3.34 10*6/MM3 (ref 4.14–5.8)
SAO2 % BLDCOA: 99.2 % (ref 92–99)
SAO2 % BLDCOA: 99.4 % (ref 92–99)
SET MECH RESP RATE: 18
SET MECH RESP RATE: 18
SODIUM SERPL-SCNC: 133 MMOL/L (ref 136–145)
SODIUM SERPL-SCNC: 134 MMOL/L (ref 136–145)
SODIUM SERPL-SCNC: 138 MMOL/L (ref 136–145)
TOTAL RATE: 23 BREATHS/MINUTE
VENTILATOR MODE: ABNORMAL
VT ON VENT VENT: 600 ML
WBC NRBC COR # BLD: 7.8 10*3/MM3 (ref 3.4–10.8)
WBC NRBC COR # BLD: 9.34 10*3/MM3 (ref 3.4–10.8)

## 2023-04-16 PROCEDURE — 25010000002 DEXAMETHASONE SODIUM PHOSPHATE 20 MG/5ML SOLUTION: Performed by: NURSE ANESTHETIST, CERTIFIED REGISTERED

## 2023-04-16 PROCEDURE — 47563 LAPARO CHOLECYSTECTOMY/GRAPH: CPT | Performed by: SPECIALIST/TECHNOLOGIST, OTHER

## 2023-04-16 PROCEDURE — 85025 COMPLETE CBC W/AUTO DIFF WBC: CPT | Performed by: ANESTHESIOLOGY

## 2023-04-16 PROCEDURE — 25010000002 FENTANYL CITRATE (PF) 50 MCG/ML SOLUTION: Performed by: INTERNAL MEDICINE

## 2023-04-16 PROCEDURE — 94799 UNLISTED PULMONARY SVC/PX: CPT

## 2023-04-16 PROCEDURE — 93005 ELECTROCARDIOGRAM TRACING: CPT | Performed by: ANESTHESIOLOGY

## 2023-04-16 PROCEDURE — 25010000002 HYDRALAZINE PER 20 MG: Performed by: NURSE ANESTHETIST, CERTIFIED REGISTERED

## 2023-04-16 PROCEDURE — 25010000002 ONDANSETRON PER 1 MG: Performed by: NURSE ANESTHETIST, CERTIFIED REGISTERED

## 2023-04-16 PROCEDURE — 82803 BLOOD GASES ANY COMBINATION: CPT

## 2023-04-16 PROCEDURE — 82962 GLUCOSE BLOOD TEST: CPT

## 2023-04-16 PROCEDURE — 36600 WITHDRAWAL OF ARTERIAL BLOOD: CPT

## 2023-04-16 PROCEDURE — 0FT44ZZ RESECTION OF GALLBLADDER, PERCUTANEOUS ENDOSCOPIC APPROACH: ICD-10-PCS | Performed by: STUDENT IN AN ORGANIZED HEALTH CARE EDUCATION/TRAINING PROGRAM

## 2023-04-16 PROCEDURE — 80053 COMPREHEN METABOLIC PANEL: CPT | Performed by: INTERNAL MEDICINE

## 2023-04-16 PROCEDURE — 25010000002 PIPERACILLIN SOD-TAZOBACTAM PER 1 G: Performed by: NURSE PRACTITIONER

## 2023-04-16 PROCEDURE — 93010 ELECTROCARDIOGRAM REPORT: CPT | Performed by: INTERNAL MEDICINE

## 2023-04-16 PROCEDURE — 25010000002 KETOROLAC TROMETHAMINE PER 15 MG: Performed by: INTERNAL MEDICINE

## 2023-04-16 PROCEDURE — 85027 COMPLETE CBC AUTOMATED: CPT | Performed by: INTERNAL MEDICINE

## 2023-04-16 PROCEDURE — 47563 LAPARO CHOLECYSTECTOMY/GRAPH: CPT | Performed by: STUDENT IN AN ORGANIZED HEALTH CARE EDUCATION/TRAINING PROGRAM

## 2023-04-16 PROCEDURE — 0 IOTHALAMATE 60 % SOLUTION: Performed by: STUDENT IN AN ORGANIZED HEALTH CARE EDUCATION/TRAINING PROGRAM

## 2023-04-16 PROCEDURE — 25010000002 PROPOFOL 10 MG/ML EMULSION: Performed by: ANESTHESIOLOGY

## 2023-04-16 PROCEDURE — 94002 VENT MGMT INPAT INIT DAY: CPT

## 2023-04-16 PROCEDURE — 63710000001 INSULIN REGULAR HUMAN PER 5 UNITS: Performed by: INTERNAL MEDICINE

## 2023-04-16 PROCEDURE — 25010000002 PROPOFOL 10 MG/ML EMULSION: Performed by: NURSE ANESTHETIST, CERTIFIED REGISTERED

## 2023-04-16 PROCEDURE — 71045 X-RAY EXAM CHEST 1 VIEW: CPT

## 2023-04-16 PROCEDURE — 5A1935Z RESPIRATORY VENTILATION, LESS THAN 24 CONSECUTIVE HOURS: ICD-10-PCS | Performed by: STUDENT IN AN ORGANIZED HEALTH CARE EDUCATION/TRAINING PROGRAM

## 2023-04-16 PROCEDURE — 74300 X-RAY BILE DUCTS/PANCREAS: CPT

## 2023-04-16 PROCEDURE — 0BH17EZ INSERTION OF ENDOTRACHEAL AIRWAY INTO TRACHEA, VIA NATURAL OR ARTIFICIAL OPENING: ICD-10-PCS | Performed by: STUDENT IN AN ORGANIZED HEALTH CARE EDUCATION/TRAINING PROGRAM

## 2023-04-16 PROCEDURE — BF101ZZ FLUOROSCOPY OF BILE DUCTS USING LOW OSMOLAR CONTRAST: ICD-10-PCS | Performed by: STUDENT IN AN ORGANIZED HEALTH CARE EDUCATION/TRAINING PROGRAM

## 2023-04-16 PROCEDURE — 25010000002 NALOXONE PER 1 MG: Performed by: NURSE ANESTHETIST, CERTIFIED REGISTERED

## 2023-04-16 PROCEDURE — 25010000002 HYDROMORPHONE 1 MG/ML SOLUTION: Performed by: NURSE ANESTHETIST, CERTIFIED REGISTERED

## 2023-04-16 PROCEDURE — 25010000002 NEOSTIGMINE 5 MG/10ML SOLUTION: Performed by: NURSE ANESTHETIST, CERTIFIED REGISTERED

## 2023-04-16 PROCEDURE — 88304 TISSUE EXAM BY PATHOLOGIST: CPT | Performed by: STUDENT IN AN ORGANIZED HEALTH CARE EDUCATION/TRAINING PROGRAM

## 2023-04-16 DEVICE — ARISTA AH ABSORBABLE HEMOSTATIC PARTICLES
Type: IMPLANTABLE DEVICE | Site: ABDOMEN | Status: FUNCTIONAL
Brand: ARISTA™ AH

## 2023-04-16 DEVICE — HORIZON TI ML 6 CLIPS/CART
Type: IMPLANTABLE DEVICE | Site: ABDOMEN | Status: FUNCTIONAL
Brand: WECK

## 2023-04-16 RX ORDER — ROCURONIUM BROMIDE 10 MG/ML
INJECTION, SOLUTION INTRAVENOUS AS NEEDED
Status: DISCONTINUED | OUTPATIENT
Start: 2023-04-16 | End: 2023-04-16 | Stop reason: SURG

## 2023-04-16 RX ORDER — NALOXONE HCL 0.4 MG/ML
0.2 VIAL (ML) INJECTION AS NEEDED
Status: DISCONTINUED | OUTPATIENT
Start: 2023-04-16 | End: 2023-04-16 | Stop reason: HOSPADM

## 2023-04-16 RX ORDER — BUPIVACAINE HYDROCHLORIDE AND EPINEPHRINE 5; 5 MG/ML; UG/ML
INJECTION, SOLUTION EPIDURAL; INTRACAUDAL; PERINEURAL AS NEEDED
Status: DISCONTINUED | OUTPATIENT
Start: 2023-04-16 | End: 2023-04-16 | Stop reason: HOSPADM

## 2023-04-16 RX ORDER — NEOSTIGMINE METHYLSULFATE 0.5 MG/ML
INJECTION, SOLUTION INTRAVENOUS AS NEEDED
Status: DISCONTINUED | OUTPATIENT
Start: 2023-04-16 | End: 2023-04-16 | Stop reason: SURG

## 2023-04-16 RX ORDER — FLUMAZENIL 0.1 MG/ML
0.2 INJECTION INTRAVENOUS AS NEEDED
Status: DISCONTINUED | OUTPATIENT
Start: 2023-04-16 | End: 2023-04-16 | Stop reason: HOSPADM

## 2023-04-16 RX ORDER — SODIUM CHLORIDE, SODIUM LACTATE, POTASSIUM CHLORIDE, CALCIUM CHLORIDE 600; 310; 30; 20 MG/100ML; MG/100ML; MG/100ML; MG/100ML
9 INJECTION, SOLUTION INTRAVENOUS CONTINUOUS
Status: DISCONTINUED | OUTPATIENT
Start: 2023-04-16 | End: 2023-04-19

## 2023-04-16 RX ORDER — MAGNESIUM HYDROXIDE 1200 MG/15ML
LIQUID ORAL AS NEEDED
Status: DISCONTINUED | OUTPATIENT
Start: 2023-04-16 | End: 2023-04-16 | Stop reason: HOSPADM

## 2023-04-16 RX ORDER — MIDAZOLAM HYDROCHLORIDE 1 MG/ML
0.5 INJECTION INTRAMUSCULAR; INTRAVENOUS
Status: DISCONTINUED | OUTPATIENT
Start: 2023-04-16 | End: 2023-04-16 | Stop reason: HOSPADM

## 2023-04-16 RX ORDER — HYDROCODONE BITARTRATE AND ACETAMINOPHEN 7.5; 325 MG/1; MG/1
1 TABLET ORAL EVERY 4 HOURS PRN
Status: DISCONTINUED | OUTPATIENT
Start: 2023-04-16 | End: 2023-04-16 | Stop reason: HOSPADM

## 2023-04-16 RX ORDER — ONDANSETRON 2 MG/ML
INJECTION INTRAMUSCULAR; INTRAVENOUS AS NEEDED
Status: DISCONTINUED | OUTPATIENT
Start: 2023-04-16 | End: 2023-04-16 | Stop reason: SURG

## 2023-04-16 RX ORDER — DEXTROSE MONOHYDRATE 25 G/50ML
50 INJECTION, SOLUTION INTRAVENOUS ONCE
Status: COMPLETED | OUTPATIENT
Start: 2023-04-16 | End: 2023-04-16

## 2023-04-16 RX ORDER — FENTANYL CITRATE 50 UG/ML
25 INJECTION, SOLUTION INTRAMUSCULAR; INTRAVENOUS
Status: DISCONTINUED | OUTPATIENT
Start: 2023-04-16 | End: 2023-04-16 | Stop reason: HOSPADM

## 2023-04-16 RX ORDER — HYDROCODONE BITARTRATE AND ACETAMINOPHEN 5; 325 MG/1; MG/1
1 TABLET ORAL ONCE AS NEEDED
Status: DISCONTINUED | OUTPATIENT
Start: 2023-04-16 | End: 2023-04-16 | Stop reason: HOSPADM

## 2023-04-16 RX ORDER — NOREPINEPHRINE BITARTRATE 0.03 MG/ML
.02-.3 INJECTION, SOLUTION INTRAVENOUS
Status: DISCONTINUED | OUTPATIENT
Start: 2023-04-16 | End: 2023-04-18

## 2023-04-16 RX ORDER — EPHEDRINE SULFATE 50 MG/ML
5 INJECTION, SOLUTION INTRAVENOUS ONCE AS NEEDED
Status: DISCONTINUED | OUTPATIENT
Start: 2023-04-16 | End: 2023-04-16 | Stop reason: HOSPADM

## 2023-04-16 RX ORDER — SODIUM CHLORIDE 0.9 % (FLUSH) 0.9 %
3-10 SYRINGE (ML) INJECTION AS NEEDED
Status: DISCONTINUED | OUTPATIENT
Start: 2023-04-16 | End: 2023-04-16 | Stop reason: HOSPADM

## 2023-04-16 RX ORDER — PROPOFOL 10 MG/ML
VIAL (ML) INTRAVENOUS AS NEEDED
Status: DISCONTINUED | OUTPATIENT
Start: 2023-04-16 | End: 2023-04-16 | Stop reason: SURG

## 2023-04-16 RX ORDER — PROMETHAZINE HYDROCHLORIDE 25 MG/1
25 SUPPOSITORY RECTAL ONCE AS NEEDED
Status: DISCONTINUED | OUTPATIENT
Start: 2023-04-16 | End: 2023-04-16 | Stop reason: HOSPADM

## 2023-04-16 RX ORDER — DROPERIDOL 2.5 MG/ML
0.62 INJECTION, SOLUTION INTRAMUSCULAR; INTRAVENOUS
Status: DISCONTINUED | OUTPATIENT
Start: 2023-04-16 | End: 2023-04-16 | Stop reason: HOSPADM

## 2023-04-16 RX ORDER — DEXAMETHASONE SODIUM PHOSPHATE 4 MG/ML
INJECTION, SOLUTION INTRA-ARTICULAR; INTRALESIONAL; INTRAMUSCULAR; INTRAVENOUS; SOFT TISSUE AS NEEDED
Status: DISCONTINUED | OUTPATIENT
Start: 2023-04-16 | End: 2023-04-16 | Stop reason: SURG

## 2023-04-16 RX ORDER — FENTANYL CITRATE 50 UG/ML
50 INJECTION, SOLUTION INTRAMUSCULAR; INTRAVENOUS
Status: DISCONTINUED | OUTPATIENT
Start: 2023-04-16 | End: 2023-04-16 | Stop reason: HOSPADM

## 2023-04-16 RX ORDER — SODIUM CHLORIDE 9 MG/ML
INJECTION, SOLUTION INTRAVENOUS AS NEEDED
Status: DISCONTINUED | OUTPATIENT
Start: 2023-04-16 | End: 2023-04-16 | Stop reason: HOSPADM

## 2023-04-16 RX ORDER — ONDANSETRON 2 MG/ML
4 INJECTION INTRAMUSCULAR; INTRAVENOUS ONCE AS NEEDED
Status: DISCONTINUED | OUTPATIENT
Start: 2023-04-16 | End: 2023-04-16 | Stop reason: HOSPADM

## 2023-04-16 RX ORDER — FAMOTIDINE 10 MG/ML
20 INJECTION, SOLUTION INTRAVENOUS ONCE
Status: COMPLETED | OUTPATIENT
Start: 2023-04-16 | End: 2023-04-16

## 2023-04-16 RX ORDER — LIDOCAINE HYDROCHLORIDE 20 MG/ML
INJECTION, SOLUTION INFILTRATION; PERINEURAL AS NEEDED
Status: DISCONTINUED | OUTPATIENT
Start: 2023-04-16 | End: 2023-04-16 | Stop reason: SURG

## 2023-04-16 RX ORDER — LABETALOL HYDROCHLORIDE 5 MG/ML
INJECTION, SOLUTION INTRAVENOUS AS NEEDED
Status: DISCONTINUED | OUTPATIENT
Start: 2023-04-16 | End: 2023-04-16 | Stop reason: SURG

## 2023-04-16 RX ORDER — LABETALOL HYDROCHLORIDE 5 MG/ML
5 INJECTION, SOLUTION INTRAVENOUS
Status: DISCONTINUED | OUTPATIENT
Start: 2023-04-16 | End: 2023-04-16 | Stop reason: HOSPADM

## 2023-04-16 RX ORDER — HYDRALAZINE HYDROCHLORIDE 20 MG/ML
5 INJECTION INTRAMUSCULAR; INTRAVENOUS
Status: DISCONTINUED | OUTPATIENT
Start: 2023-04-16 | End: 2023-04-16 | Stop reason: HOSPADM

## 2023-04-16 RX ORDER — DIPHENHYDRAMINE HYDROCHLORIDE 50 MG/ML
12.5 INJECTION INTRAMUSCULAR; INTRAVENOUS
Status: DISCONTINUED | OUTPATIENT
Start: 2023-04-16 | End: 2023-04-16 | Stop reason: HOSPADM

## 2023-04-16 RX ORDER — SODIUM CHLORIDE 0.9 % (FLUSH) 0.9 %
3 SYRINGE (ML) INJECTION EVERY 12 HOURS SCHEDULED
Status: DISCONTINUED | OUTPATIENT
Start: 2023-04-16 | End: 2023-04-16 | Stop reason: HOSPADM

## 2023-04-16 RX ORDER — FENTANYL CITRATE 50 UG/ML
50 INJECTION, SOLUTION INTRAMUSCULAR; INTRAVENOUS
Status: DISCONTINUED | OUTPATIENT
Start: 2023-04-16 | End: 2023-04-18

## 2023-04-16 RX ORDER — LIDOCAINE HYDROCHLORIDE 10 MG/ML
0.5 INJECTION, SOLUTION EPIDURAL; INFILTRATION; INTRACAUDAL; PERINEURAL ONCE AS NEEDED
Status: DISCONTINUED | OUTPATIENT
Start: 2023-04-16 | End: 2023-04-16 | Stop reason: HOSPADM

## 2023-04-16 RX ORDER — PROMETHAZINE HYDROCHLORIDE 25 MG/1
25 TABLET ORAL ONCE AS NEEDED
Status: DISCONTINUED | OUTPATIENT
Start: 2023-04-16 | End: 2023-04-16 | Stop reason: HOSPADM

## 2023-04-16 RX ORDER — CHLORHEXIDINE GLUCONATE 0.12 MG/ML
15 RINSE ORAL EVERY 12 HOURS SCHEDULED
Status: DISCONTINUED | OUTPATIENT
Start: 2023-04-16 | End: 2023-04-17

## 2023-04-16 RX ORDER — GLYCOPYRROLATE 0.2 MG/ML
INJECTION INTRAMUSCULAR; INTRAVENOUS AS NEEDED
Status: DISCONTINUED | OUTPATIENT
Start: 2023-04-16 | End: 2023-04-16 | Stop reason: SURG

## 2023-04-16 RX ORDER — IPRATROPIUM BROMIDE AND ALBUTEROL SULFATE 2.5; .5 MG/3ML; MG/3ML
3 SOLUTION RESPIRATORY (INHALATION) ONCE AS NEEDED
Status: DISCONTINUED | OUTPATIENT
Start: 2023-04-16 | End: 2023-04-16 | Stop reason: HOSPADM

## 2023-04-16 RX ORDER — HYDROMORPHONE HYDROCHLORIDE 1 MG/ML
0.25 INJECTION, SOLUTION INTRAMUSCULAR; INTRAVENOUS; SUBCUTANEOUS
Status: DISCONTINUED | OUTPATIENT
Start: 2023-04-16 | End: 2023-04-16 | Stop reason: HOSPADM

## 2023-04-16 RX ADMIN — DEXAMETHASONE SODIUM PHOSPHATE 8 MG: 4 INJECTION, SOLUTION INTRAMUSCULAR; INTRAVENOUS at 09:30

## 2023-04-16 RX ADMIN — GLYCOPYRROLATE 0.4 MG: 1 INJECTION INTRAMUSCULAR; INTRAVENOUS at 10:16

## 2023-04-16 RX ADMIN — ROCURONIUM BROMIDE 50 MG: 50 INJECTION INTRAVENOUS at 09:23

## 2023-04-16 RX ADMIN — ONDANSETRON 4 MG: 2 INJECTION INTRAMUSCULAR; INTRAVENOUS at 10:16

## 2023-04-16 RX ADMIN — TAZOBACTAM SODIUM AND PIPERACILLIN SODIUM 3.38 G: 375; 3 INJECTION, SOLUTION INTRAVENOUS at 07:31

## 2023-04-16 RX ADMIN — SODIUM CHLORIDE, POTASSIUM CHLORIDE, SODIUM LACTATE AND CALCIUM CHLORIDE: 600; 310; 30; 20 INJECTION, SOLUTION INTRAVENOUS at 09:16

## 2023-04-16 RX ADMIN — KETOROLAC TROMETHAMINE 15 MG: 15 INJECTION, SOLUTION INTRAMUSCULAR; INTRAVENOUS at 03:57

## 2023-04-16 RX ADMIN — LABETALOL HYDROCHLORIDE 5 MG: 5 INJECTION, SOLUTION INTRAVENOUS at 10:50

## 2023-04-16 RX ADMIN — PROPOFOL 20 MCG/KG/MIN: 10 INJECTION, EMULSION INTRAVENOUS at 16:48

## 2023-04-16 RX ADMIN — LABETALOL HYDROCHLORIDE 2.5 MG: 5 INJECTION, SOLUTION INTRAVENOUS at 10:16

## 2023-04-16 RX ADMIN — INSULIN HUMAN 5 UNITS: 100 INJECTION, SOLUTION PARENTERAL at 17:03

## 2023-04-16 RX ADMIN — PROPOFOL 150 MG: 10 INJECTION, EMULSION INTRAVENOUS at 09:23

## 2023-04-16 RX ADMIN — FENTANYL CITRATE 50 MCG: 50 INJECTION, SOLUTION INTRAMUSCULAR; INTRAVENOUS at 18:04

## 2023-04-16 RX ADMIN — HYDRALAZINE HYDROCHLORIDE 5 MG: 20 INJECTION INTRAMUSCULAR; INTRAVENOUS at 12:03

## 2023-04-16 RX ADMIN — PROPOFOL 25 MCG/KG/MIN: 10 INJECTION, EMULSION INTRAVENOUS at 22:37

## 2023-04-16 RX ADMIN — TAZOBACTAM SODIUM AND PIPERACILLIN SODIUM 3.38 G: 375; 3 INJECTION, SOLUTION INTRAVENOUS at 17:18

## 2023-04-16 RX ADMIN — HYDROMORPHONE HYDROCHLORIDE 0.5 MG: 1 INJECTION, SOLUTION INTRAMUSCULAR; INTRAVENOUS; SUBCUTANEOUS at 10:02

## 2023-04-16 RX ADMIN — SODIUM CHLORIDE 100 ML/HR: 9 INJECTION, SOLUTION INTRAVENOUS at 18:08

## 2023-04-16 RX ADMIN — CHLORHEXIDINE GLUCONATE 15 ML: 1.2 RINSE ORAL at 21:15

## 2023-04-16 RX ADMIN — HYDRALAZINE HYDROCHLORIDE 5 MG: 20 INJECTION INTRAMUSCULAR; INTRAVENOUS at 11:33

## 2023-04-16 RX ADMIN — PROPOFOL 20 MCG/KG/MIN: 10 INJECTION, EMULSION INTRAVENOUS at 13:58

## 2023-04-16 RX ADMIN — DEXTROSE MONOHYDRATE 50 ML: 25 INJECTION, SOLUTION INTRAVENOUS at 17:02

## 2023-04-16 RX ADMIN — SUGAMMADEX 140 MG: 100 INJECTION, SOLUTION INTRAVENOUS at 12:22

## 2023-04-16 RX ADMIN — LABETALOL HYDROCHLORIDE 2.5 MG: 5 INJECTION, SOLUTION INTRAVENOUS at 09:56

## 2023-04-16 RX ADMIN — SODIUM CHLORIDE 100 ML/HR: 9 INJECTION, SOLUTION INTRAVENOUS at 07:31

## 2023-04-16 RX ADMIN — HYDRALAZINE HYDROCHLORIDE 5 MG: 20 INJECTION INTRAMUSCULAR; INTRAVENOUS at 11:53

## 2023-04-16 RX ADMIN — FAMOTIDINE 20 MG: 10 INJECTION, SOLUTION INTRAVENOUS at 09:02

## 2023-04-16 RX ADMIN — LIDOCAINE HYDROCHLORIDE 100 MG: 20 INJECTION, SOLUTION INFILTRATION; PERINEURAL at 09:23

## 2023-04-16 RX ADMIN — LABETALOL HYDROCHLORIDE 5 MG: 5 INJECTION, SOLUTION INTRAVENOUS at 11:19

## 2023-04-16 RX ADMIN — NEOSTIGMINE METHYLSULFATE 4 MG: 0.5 INJECTION INTRAVENOUS at 10:16

## 2023-04-16 RX ADMIN — NALXONE HYDROCHLORIDE 0.2 MG: 0.4 INJECTION INTRAMUSCULAR; INTRAVENOUS; SUBCUTANEOUS at 12:21

## 2023-04-16 RX ADMIN — TAZOBACTAM SODIUM AND PIPERACILLIN SODIUM 3.38 G: 375; 3 INJECTION, SOLUTION INTRAVENOUS at 00:26

## 2023-04-16 NOTE — ANESTHESIA PREPROCEDURE EVALUATION
Anesthesia Evaluation     Patient summary reviewed   no history of anesthetic complications:  NPO Solid Status: > 8 hours  NPO Liquid Status: > 2 hours           Airway   Mallampati: II  TM distance: >3 FB  Neck ROM: full  No difficulty expected  Dental - normal exam         Pulmonary     breath sounds clear to auscultation  (-) shortness of breath, recent URI, not a smoker  Cardiovascular   Exercise tolerance: poor (<4 METS)    ECG reviewed  Patient on routine beta blocker and Beta blocker given within 24 hours of surgery  Rhythm: regular  Rate: normal    (+) hypertension well controlled less than 2 medications, CAD, cardiac stents (s/p PCI-SABRINA to LAD (3/17/2023).) Drug eluting stent within the past 12 months dysrhythmias (being evaluated for with 48hr holter, no subj palpations or syncople, lightheaded..) Paroxysmal Atrial Fib, hyperlipidemia,   (-) past MI, angina    ROS comment: Nonobstructive CAD on cath  In 2014    Neuro/Psych  (-) seizures, CVA, dizziness/light headedness, syncope    ROS Comment: Hard of hearing   GI/Hepatic/Renal/Endo    (+)  GERD well controlled,  renal disease (L nephrectomy 2020; SHIV w/ Cr = 2.3 ) CRI, thyroid problem hypothyroidism  (-)  obesity, liver disease, diabetes    ROS Comment: Renal mass; s/p [id number]    Musculoskeletal     (-) neck stiffness  Abdominal    Substance History      OB/GYN          Other   blood dyscrasia (Hgb 9.7) anemia,   history of cancer (renal, s/p resection)                    Anesthesia Plan    ASA 3 - emergent     general     intravenous induction     Anesthetic plan, risks, benefits, and alternatives have been provided, discussed and informed consent has been obtained with: patient.    Use of blood products discussed with patient .   Plan discussed with CRNA.

## 2023-04-16 NOTE — CONSULTS
Referring Provider: Dr. Lyle  Reason for Consultation: Critical care management    Patient Care Team:  Chai Montesinos MD as PCP - General    Chief complaint:   Consulted for critical care management    History of present illness:    Subjective     This is a 77-year-old male patient, lifelong non-smoker with no history of lung disease.  He has CAD s/p SABRINA stent to proximal LAD 3/17.  He presented to the hospital yesterday for shoulder pain and abdominal pain and ultimately diagnosed with acute cholecystitis.  He underwent lap vasquez today.    Postop, he was extubated.  He was noted to be obtunded/lethargic and had shallow breathing.  There was no significant respiratory distress or use of accessory muscles.  Stat ABG showed 7.1/71/210 on NRB 8 liters/min.  He received Narcan and sugammadex without benefit.  He was intubated by anesthesia using propofol and succinylcholine.    Repeat ABG on AC/VC 18/600/40 %/5 was: 7.34/34/145.    CXR done postintubation showed clear lung fields and ET tube in good position.  I reviewed the images independently.    I reviewed the CT chest done on 4/14: clear.     After intubation, patient was able to arouse intermittently when off sedation and moves his extremities to commands.    Review of Systems  Cannot obtain due to mechanical ventilation.    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    ,   Past Surgical History:   Procedure Laterality Date   • CARDIAC CATHETERIZATION     • CARDIAC CATHETERIZATION N/A 3/17/2023    Procedure: Left Heart Cath;  Surgeon: Lenny Martines MD;  Location: [facility] CATH INVASIVE LOCATION;  Service: Cardiology;  Laterality:  N/A;   • CARDIAC CATHETERIZATION N/A 3/17/2023    Procedure: Coronary angiography;  Surgeon: Lenny Martines MD;  Location: [facility] CATH INVASIVE LOCATION;  Service: Cardiology;  Laterality: N/A;   • CARDIAC CATHETERIZATION N/A 3/17/2023    Procedure: Stent SABRINA coronary;  Surgeon: Lenny Martines MD;  Location: [facility] CATH INVASIVE LOCATION;  Service: Cardiology;  Laterality: N/A;   • CARDIAC CATHETERIZATION N/A 3/17/2023    Procedure: Optical Coherent Tomography;  Surgeon: Lenny Martines MD;  Location: [facility] CATH INVASIVE LOCATION;  Service: Cardiology;  Laterality: N/A;   • COLONOSCOPY N/A 01/25/2017    Procedure: COLONOSCOPY TO CECUM AND TI WITH POLYPECTOMY ;  Surgeon: Cachorro Hancock MD;  Location: [facility] ENDOSCOPY;  Service:    • COLONOSCOPY N/A 10/12/2022    Procedure: COLONOSCOPY TO CECUM WITH COLD BX POLYPECTOMY;  Surgeon: Cachorro Hancock MD;  Location: [facility] ENDOSCOPY;  Service: General;  Laterality: N/A;  SURVEILLANCE, HX POLYPS   --POLYP, DIVERTICULOSIS   • EYE SURGERY  Jan - Feb 2021    cataract surgery both eyes   • NEPHROURETERECTOMY Left 11/12/2020    Procedure: LEFT LAPAROSCOPIC NEPHROURETERECTOMY;  Surgeon: Ángel Florentino MD;  Location: [facility] OR;  Service: Urology;  Laterality: Left;   • TRANSURETHRAL RESECTION OF BLADDER TUMOR N/A 09/10/2020    Procedure: CYSTOSCOPY, LEFT URETEROSCOPY W/BIOPSY AND STENT PLACEMENT, TRANSURETHRAL RESECTION OF BLADDER TUMOR;  Surgeon: Ángel Florentino MD;  Location: [facility] OR;  Service: Urology;  Laterality: N/A;   ,   Family History   Problem Relation Age of Onset   • Rheum arthritis Mother    • Arthritis Mother         RA   • Aortic aneurysm Father    • Aortic aneurysm Cousin    • Malig Hyperthermia Neg Hx    ,   Social History     Socioeconomic History   • Marital status:    Tobacco Use   • Smoking status: Never     Passive exposure: Never   • Smokeless tobacco: Never   Vaping Use   • Vaping Use: Never used    Substance and Sexual Activity   • Alcohol use: Never   • Drug use: Never   • Sexual activity: Defer     E-cigarette/Vaping   • E-cigarette/Vaping Use Never User    • Passive Exposure No    • Counseling Given No      E-cigarette/Vaping Substances   • Nicotine No    • THC No    • CBD No    • Flavoring No      E-cigarette/Vaping Devices       ,   Medications Prior to Admission   Medication Sig Dispense Refill Last Dose   • aspirin 81 MG chewable tablet Chew 1 tablet Daily.   4/14/2023   • clopidogrel (PLAVIX) 75 MG tablet Take 1 tablet by mouth Daily. 90 tablet 3 4/15/2023 at 0930   • levothyroxine (SYNTHROID, LEVOTHROID) 88 MCG tablet TAKE 1 TABLET BY MOUTH EVERY DAY 90 tablet 0 4/14/2023   • metoprolol succinate XL (TOPROL-XL) 25 MG 24 hr tablet TAKE 1 TABLET BY MOUTH EVERY DAY 90 tablet 1 4/14/2023   • atorvastatin (LIPITOR) 40 MG tablet Take 1 tablet by mouth Every Night. 90 tablet 3    • chlorhexidine (PERIDEX) 0.12 % solution       • nitroglycerin (NITROSTAT) 0.4 MG SL tablet place 1 tablet under the tongue as needed for angina, may repeat every 5mins for up three doses 25 tablet 1    • triamcinolone (KENALOG) 0.1 % cream       , Scheduled Meds:  [MAR Hold] aspirin, 81 mg, Oral, Daily  [MAR Hold] clopidogrel, 75 mg, Oral, Daily  [MAR Hold] levothyroxine, 88 mcg, Oral, Daily  metoprolol succinate XL, 25 mg, Oral, Daily  piperacillin-tazobactam, 3.375 g, Intravenous, Q8H  [MAR Hold] polyethylene glycol, 17 g, Oral, Daily  [MAR Hold] sodium chloride, 10 mL, Intravenous, Q12H    , Continuous Infusions:  lactated ringers, 9 mL/hr  norepinephrine, 0.02-0.3 mcg/kg/min  propofol, 5-50 mcg/kg/min, Last Rate: 20 mcg/kg/min (04/16/23 1358)  sodium chloride, 100 mL/hr, Last Rate: 100 mL/hr (04/16/23 0731)    , PRN Meds:  •  [MAR Hold] calcium carbonate  •  diphenhydrAMINE  •  droperidol **OR** droperidol  •  ePHEDrine  •  fentanyl  •  flumazenil  •  hydrALAZINE  •  HYDROcodone-acetaminophen  •  HYDROcodone-acetaminophen  •   HYDROmorphone  •  ipratropium-albuterol  •  labetalol  •  [MAR Hold] Morphine  •  naloxone  •  [MAR Hold] ondansetron **OR** [MAR Hold] ondansetron  •  ondansetron  •  promethazine **OR** promethazine  •  [MAR Hold] sodium chloride  •  [MAR Hold] sodium chloride and Allergies:  Patient has no known allergies.    Objective     Vital Signs   Temp:  [97.5 °F (36.4 °C)-99.6 °F (37.6 °C)] 97.5 °F (36.4 °C)  Heart Rate:  [49-84] 58  Resp:  [12-22] 16  BP: ()/(34-81) 96/45  FiO2 (%):  [40 %] 40 %    PPE used per hospital policy    Physical Exam:  Constitutional: On the ventilator.  NAD.  Eyes: Injected conjunctivae, EOMI. pupils equal reactive to light.  ENMT: ET tube noted.  External ears normal.  Neck:  Trachea midline. No thyromegaly  Heart: RRR, no murmur  Lungs: Good and equal air entry bilaterally.  Non labored breathing.   Abdomen:  Soft. No tenderness or dullness. No HSM.  Extremities: No cyanosis, clubbing or pitting edema.  Warm extremities and well-perfused.  Neuro: Sedated with propofol.  Withdraws extremities to pain.  Psych: Cannot assess   Integumentary: No rash.  Normal skin turgor  Lymphatic: No palpable cervical or supraclavicular lymph nodes.      Diagnostic imaging:  I personally and independently reviewed the following images:   CXR 4/16/2023: Clear    Laboratory workup:    Results from last 7 days   Lab Units 04/16/23  0546 04/15/23  0757 04/14/23  2151   SODIUM mmol/L 134* 134* 134*   POTASSIUM mmol/L 4.7 4.4 4.4   CHLORIDE mmol/L 105 106 101   CO2 mmol/L 20.0* 18.2* 20.7*   BUN mg/dL 35* 31* 30*   CREATININE mg/dL 2.34* 1.85* 2.14*   GLUCOSE mg/dL 90 99 123*   CALCIUM mg/dL 8.6 8.5* 9.0         Results from last 7 days   Lab Units 04/16/23  1351 04/16/23  0546 04/15/23  0757   WBC 10*3/mm3 9.34 7.80 7.63   HEMOGLOBIN g/dL 10.6* 9.7* 10.1*   HEMATOCRIT % 32.5* 28.9* 30.7*   PLATELETS 10*3/mm3 219 151 154     Results from last 7 days   Lab Units 04/14/23  2151   INR  1.14*           Assessment    1. Acute hypercapnic respiratory failure, on mechanical ventilation: Suspect secondary to anesthesia and poor renal clearance in setting of CKD.  There is no evidence of lung disease on imaging.  2. SHIV on CKD  3. Acute hyperkalemia, possibly secondary to paralytics and SHIV.  No ST-T changes on the monitor  4. Cholecystitis s/p lap vasquez 4/16  5. Sepsis, secondary to cholecystitis.  Improving  6. Elevated LFTs, secondary  7. CAD s/p SABRINA stent to proximal LAD 3/17  8. Elevated LFTs secondary to cholecystitis  9. Metabolic acidosis, secondary to renal failure    Recommendations:    · Mechanical ventilation management: Settings reviewed and adjusted.  RR reduced to 15.  Continue .  Titrate FiO2 down.  PEEP 5.  Start weaning trials tomorrow.  Suspect patient can probably be extubated tomorrow.  · Ventilator bundle  · Sedation with propofol  · Give 1 dose of regular insulin 5 units IV and 1 amp of D50 for hyperkalemia.  Repeat BMP in 2 hours.  Avoid aggressive therapy as it will probably improve gradually on its own (suspect could be related to paralytics)  · IV hydration  · Antibiotics with Zosyn for cholecystitis.  · IV hydration  · Continue Plavix  · DVT prophylaxis with SCD.  Consider pharmacological prophylaxis when okay with surgery    Discussed with nursing staff    Giovanni Flores MD  04/16/23  15:27 EDT    Time: Critical care 40 min

## 2023-04-16 NOTE — PROGRESS NOTES
[facility]    Called by PACU nurse to inform me that patient had to be reintubated and is now going to ICU post-op.  Will follow peripherally and await transfer back out to the floor.    Ángel Byrd MD

## 2023-04-16 NOTE — OP NOTE
OPERATIVE REPORT     DATE OF OPERATION: 4/16/2023    SURGEON:   Lucrecia Lyle MD    ASSISTANT:  Flory Andersen, who was present for necessary retraction, suctioning, camera holding, and suturing throughout the procedure     PREOPERATIVE DIAGNOSIS: acute cholecystitis, possible choledocholithiasis     POSTOPERATIVE DIAGNOSIS: acute cholecystitis     PROCEDURE PERFORMED: Laparoscopic cholecystectomy with intraoperative cholangiogram    ANESTHESIA: General    SPECIMEN: Gallbladder    DRAINS: None    BLOOD LOSS: Minimal     INDICATIONS FOR OPERATION: Mr. Kunal Vargas is a 77 y.o. year old gentleman who presented to the ER with clinical and radiographic findings consistent with acute cholecystitis. Laparoscopic cholecystectomy with cholangiogram was recommended. All risks (including bleeding, infection, damage to surrounding structures including the bile duct), benefits, and alternatives were explained to the patient and he agreed and wished to proceed.  Informed consent was obtained.    OPERATIVE REPORT: The patient was taken to the operating room, transferred onto the operating room table, and underwent general endotracheal anesthesia without incident. The patient was prepped and draped in the usual sterile fashion.  Preoperative antibiotics were given, and a timeout was performed.  Half percent Marcaine with epinephrine was injected into the skin and subcutaneous tissues.  Incision was made in the right upper quadrant.  An Optiview trocar with a 5 0 scope was inserted through each layer of the abdominal wall individually and into the abdomen.  The abdomen was insufflated and the area under insertion was inspected and no injuries were noted.  A 5 mm periumbilical and right lateral trocar were placed.  An 11 mm subxiphoid trocar was placed. The gallbladder was edematous. The gallbladder was retracted cranially and laterally to allow for adequate visualization.  The area around the infundibulum was dissected until the  cystic duct and artery were identified. The critical view was obtained. A cholangiogram was done by placing a clip on the cystic duct and incising it just distal to this.  A cholangiogram catheter was introduced with an Angiocath needle and directed into the cystic duct.  A clip was applied.  With fluoroscopy contrast was injected and confirmed the anatomy and that there were no stones present.  Contrast was seen going into the right and left hepatic ducts as well as the duodenum.  The cholangiogram catheter was then removed.  The cystic duct had 2 clips placed on the bile duct side and was transected.  The same was done to the cystic artery.  Bovie electrocautery was then used to remove the gallbladder from the liver bed.  The gallbladder was placed into a bag.  The area was then irrigated and inspected for hemostasis.  The liver bed was cauterized. 3g of Ben was placed in the wound bed due to his plavix history. There was no bleeding or bile noted.  The gallbladder was then removed through the subxiphoid port.  The ports were removed under direct visualization. The fascial of the subxiphoid port was closed with 0 Vicryl suture. The incisions were then closed with 4-0 Vicryl sutures and Dermabond.  All needle and lap counts were correct at the end of the case.  Was awoken from general endotracheal anesthesia and taken to the recovery area for further monitoring.    THERESE ROSSI M.D.  General and Endoscopic Surgery  [facility]    [address] [place] [address], [address]  [place], [place], [address]  P: [phone]  F: [phone]

## 2023-04-16 NOTE — PLAN OF CARE
Goal Outcome Evaluation:         Pent did well overnight with the pain to his shoulders and left lower quadrant of abdomin. NPO as of midnight. Vss. Will continue to monitor for any changes

## 2023-04-16 NOTE — CODE DOCUMENTATION
Patient Name:  Kunal Vargas  YOB: 1945  MRN:  [id number]  Admit Date:  4/14/2023    Visit Diagnoses:     ICD-10-CM ICD-9-CM   1. Fever and chills  R50.9 780.60   2. Leukocytosis, unspecified type  D72.829 288.60   3. Elevated LFTs  R79.89 790.6   4. Gallstones  K80.20 574.20   5. Acute on chronic renal insufficiency  N28.9 593.9    N18.9 585.9   6. Cholecystitis  K81.9 575.10       Reason For Rapid:   lethargy  RN Communicated With:  Spoke with Anesthesiologist in regards to ABG results     Rapid Outcome:  Re intubate in PACU and transfer to ICU   Communication From Rapid Team:   Spoke to House to book an ICU bed     Most Recent Vital Signs  Temp:  [97.5 °F (36.4 °C)-99.6 °F (37.6 °C)] 97.6 °F (36.4 °C)  Heart Rate:  [49-84] 65  Resp:  [12-18] 16  BP: (117-190)/(50-81) 148/64  SpO2:  [85 %-100 %] 97 %  on  Flow (L/min):  [10-15] 10;   Device (Oxygen Therapy): nonrebreather mask    Labs:  Results from last 7 days   Lab Units 04/14/23  2206   COVID19  Not Detected     Glucose   Date/Time Value Ref Range Status   04/16/2023 1206 119 70 - 130 mg/dL Final     Comment:     Meter: [id number] : [id number] Lalita Gutiérrez RN     No results found for: SITE, ALLENTEST, PHART, IIT1ZUM, PO2ART, YFB0KXU, BASEEXCESS, W1DWARGB, HGBBG, HCTABG, OXYHEMOGLOBI, METHHGBN, CARBOXYHGB, CO2CT, BAROMETRIC, MODALITY, FIO2  Results from last 7 days   Lab Units 04/16/23  0546 04/15/23  0757 04/14/23  2151 04/12/23  1542   WBC 10*3/mm3 7.80 7.63 8.31 7.5   HEMOGLOBIN g/dL 9.7* 10.1* 11.5* 12.6*   PLATELETS 10*3/mm3 151 154 184 197     Results from last 7 days   Lab Units 04/16/23  0546 04/15/23  0757 04/14/23  2151   SODIUM mmol/L 134* 134* 134*   POTASSIUM mmol/L 4.7 4.4 4.4   CHLORIDE mmol/L 105 106 101   CO2 mmol/L 20.0* 18.2* 20.7*   BUN mg/dL 35* 31* 30*   CREATININE mg/dL 2.34* 1.85* 2.14*   GLUCOSE mg/dL 90 99 123*   ALBUMIN g/dL 3.0* 3.0* 3.9   BILIRUBIN mg/dL 2.2* 3.1* 3.7*   ALK PHOS U/L 556* 604* 634*   AST  (SGOT) U/L 91* 94* 91*   ALT (SGPT) U/L 132* 138* 155*   Estimated Creatinine Clearance: 26.3 mL/min (A) (by C-G formula based on SCr of 2.34 mg/dL (H)).      Results from last 7 days   Lab Units 04/14/23  2151 04/12/23  1542   PROCALCITONIN ng/mL 1.30* 1.12*   LACTATE mmol/L 1.0  --      No results found for: STREPPNEUAG, LEGANTIGENUR  Results from last 7 days   Lab Units 04/14/23  2305   BLOODCX  No growth at 24 hours     Results from last 7 days   Lab Units 04/14/23  2206   ADENOVIRUS DETECTION BY PCR  Not Detected   CORONAVIRUS 229E  Not Detected   CORONAVIRUS HKU1  Not Detected   CORONAVIRUS NL63  Not Detected   CORONAVIRUS OC43  Not Detected   HUMAN METAPNEUMOVIRUS  Not Detected   HUMAN RHINOVIRUS/ENTEROVIRUS  Not Detected   INFLUENZA B PCR  Not Detected   PARAINFLUENZA 1  Not Detected   PARAINFLUENZA VIRUS 2  Not Detected   PARAINFLUENZA VIRUS 3  Not Detected   PARAINFLUENZA VIRUS 4  Not Detected   BORDETELLA PERTUSSIS PCR  Not Detected   CHLAMYDOPHILA PNEUMONIAE PCR  Not Detected   MYCOPLAMA PNEUMO PCR  Not Detected   INFLUENZA A PCR  Not Detected   RSV, PCR  Not Detected       NIH Stroke Scale:                                                         Please refer to full rapid documentation on summary page under Index / Code Timeline

## 2023-04-16 NOTE — NURSING NOTE
Pt still not responding neurologically, and on non rebreather.  Dr. Varghese at bedside.  Narcan given with no change in condition.  Dr. Varghese gave pt dose of Suggamadex without change in condition.  Abg and EKG order. Rapid Response called to evaluate.  Pt placed on the vent per Dr. Varghese..  Dr. Lyle and Dr. Byrd notified of patient condition..

## 2023-04-16 NOTE — ANESTHESIA PROCEDURE NOTES
Airway  Urgency: elective    Date/Time: 4/16/2023 9:25 AM  Airway not difficult    General Information and Staff    Patient location during procedure: OR  Anesthesiologist: Carroll Varghese DO  CRNA/CAA: Antonia Medina CRNA    Indications and Patient Condition  Indications for airway management: airway protection    Preoxygenated: yes  MILS not maintained throughout  Mask difficulty assessment: 1 - vent by mask    Final Airway Details  Final airway type: endotracheal airway      Successful airway: ETT  Cuffed: yes   Successful intubation technique: direct laryngoscopy  Facilitating devices/methods: anterior pressure/BURP  Endotracheal tube insertion site: oral  Blade: Jose Carlos  Blade size: 3  ETT size (mm): 7.5  Cormack-Lehane Classification: grade IIa - partial view of glottis  Placement verified by: chest auscultation and capnometry   Cuff volume (mL): 7  Measured from: lips  ETT/EBT  to lips (cm): 22  Number of attempts at approach: 1  Assessment: lips, teeth, and gum same as pre-op and atraumatic intubation    Additional Comments  Pt preoxygenated prior to induction, easy mask airway, atraumatic intubation,+ ETCO2, + bs bilat,  ETT secured and connected to ventilator.

## 2023-04-16 NOTE — ANESTHESIA POSTPROCEDURE EVALUATION
"Patient: Kunal Vargas    Procedure Summary     Date: 04/16/23 Room / Location: [facility] OR  / [facility] MAIN OR    Anesthesia Start: 0916 Anesthesia Stop: 1036    Procedure: CHOLECYSTECTOMY LAPAROSCOPIC INTRAOPERATIVE CHOLANGIOGRAM (Abdomen) Diagnosis:     Surgeons: Lucrecia Lyle MD Provider: Carroll Varghese DO    Anesthesia Type: general ASA Status: 3 - Emergent          Anesthesia Type: general    Vitals  Vitals Value Taken Time   /53 04/16/23 1516   Temp 36.4 °C (97.5 °F) 04/16/23 1415   Pulse 62 04/16/23 1528   Resp 16 04/16/23 1445   SpO2 98 % 04/16/23 1528   Vitals shown include unvalidated device data.        Post Anesthesia Care and Evaluation    Patient location during evaluation: bedside  Patient participation: complete - patient cannot participate  Level of consciousness: responsive to verbal stimuli (light sedation for tube toleration; follows commands)  Pain management: adequate    Airway patency: Intubated.  Anesthetic complications: anesthesia complication (pt reintubated for inadequate ventilation despite full reversal and narcan; improved with venitlation assistance. Dispo ICU)  PONV Status: NA  Cardiovascular status: acceptable and hemodynamically stable  Respiratory status: ETT  Hydration status: acceptable    Comments: /55 (BP Location: Right arm, Patient Position: Lying)   Pulse 58   Temp 36.4 °C (97.5 °F) (Oral)   Resp 16   Ht 175.3 cm (69\")   Wt 70.3 kg (155 lb)   SpO2 97%   BMI 22.89 kg/m²       "

## 2023-04-17 ENCOUNTER — APPOINTMENT (OUTPATIENT)
Dept: GENERAL RADIOLOGY | Facility: HOSPITAL | Age: 78
DRG: 853 | End: 2023-04-17
Payer: MEDICARE

## 2023-04-17 ENCOUNTER — APPOINTMENT (OUTPATIENT)
Dept: CARDIAC REHAB | Facility: HOSPITAL | Age: 78
End: 2023-04-17
Payer: MEDICARE

## 2023-04-17 LAB
ALBUMIN SERPL-MCNC: 2.9 G/DL (ref 3.5–5.2)
ALBUMIN/GLOB SERPL: 1.1 G/DL
ALP SERPL-CCNC: 498 U/L (ref 39–117)
ALT SERPL W P-5'-P-CCNC: 135 U/L (ref 1–41)
ANION GAP SERPL CALCULATED.3IONS-SCNC: 12.3 MMOL/L (ref 5–15)
AST SERPL-CCNC: 83 U/L (ref 1–40)
BILIRUB SERPL-MCNC: 1.6 MG/DL (ref 0–1.2)
BUN SERPL-MCNC: 43 MG/DL (ref 8–23)
BUN/CREAT SERPL: 16.7 (ref 7–25)
CALCIUM SPEC-SCNC: 8.4 MG/DL (ref 8.6–10.5)
CHLORIDE SERPL-SCNC: 107 MMOL/L (ref 98–107)
CHLORIDE UR-SCNC: <60 MMOL/L
CO2 SERPL-SCNC: 18.7 MMOL/L (ref 22–29)
CREAT SERPL-MCNC: 2.58 MG/DL (ref 0.76–1.27)
CREAT UR-MCNC: 54.8 MG/DL
DEPRECATED RDW RBC AUTO: 42.6 FL (ref 37–54)
EGFRCR SERPLBLD CKD-EPI 2021: 24.9 ML/MIN/1.73
ERYTHROCYTE [DISTWIDTH] IN BLOOD BY AUTOMATED COUNT: 12.4 % (ref 12.3–15.4)
GLOBULIN UR ELPH-MCNC: 2.6 GM/DL
GLUCOSE BLDC GLUCOMTR-MCNC: 102 MG/DL (ref 70–130)
GLUCOSE BLDC GLUCOMTR-MCNC: 145 MG/DL (ref 70–130)
GLUCOSE SERPL-MCNC: 133 MG/DL (ref 65–99)
HCT VFR BLD AUTO: 30.4 % (ref 37.5–51)
HGB BLD-MCNC: 10 G/DL (ref 13–17.7)
MCH RBC QN AUTO: 31.3 PG (ref 26.6–33)
MCHC RBC AUTO-ENTMCNC: 32.9 G/DL (ref 31.5–35.7)
MCV RBC AUTO: 95 FL (ref 79–97)
PLATELET # BLD AUTO: 211 10*3/MM3 (ref 140–450)
PMV BLD AUTO: 11.1 FL (ref 6–12)
POTASSIUM SERPL-SCNC: 5.3 MMOL/L (ref 3.5–5.2)
PROT ?TM UR-MCNC: 11.2 MG/DL
PROT SERPL-MCNC: 5.5 G/DL (ref 6–8.5)
PROT/CREAT UR: 204.4 MG/G CREA (ref 0–200)
QT INTERVAL: 475 MS
RBC # BLD AUTO: 3.2 10*6/MM3 (ref 4.14–5.8)
SODIUM SERPL-SCNC: 138 MMOL/L (ref 136–145)
SODIUM UR-SCNC: 42 MMOL/L
WBC NRBC COR # BLD: 9.08 10*3/MM3 (ref 3.4–10.8)

## 2023-04-17 PROCEDURE — 84300 ASSAY OF URINE SODIUM: CPT | Performed by: INTERNAL MEDICINE

## 2023-04-17 PROCEDURE — 82436 ASSAY OF URINE CHLORIDE: CPT | Performed by: INTERNAL MEDICINE

## 2023-04-17 PROCEDURE — 82962 GLUCOSE BLOOD TEST: CPT

## 2023-04-17 PROCEDURE — 85027 COMPLETE CBC AUTOMATED: CPT | Performed by: FAMILY MEDICINE

## 2023-04-17 PROCEDURE — 71045 X-RAY EXAM CHEST 1 VIEW: CPT

## 2023-04-17 PROCEDURE — 99232 SBSQ HOSP IP/OBS MODERATE 35: CPT | Performed by: INTERNAL MEDICINE

## 2023-04-17 PROCEDURE — 93010 ELECTROCARDIOGRAM REPORT: CPT | Performed by: INTERNAL MEDICINE

## 2023-04-17 PROCEDURE — 80053 COMPREHEN METABOLIC PANEL: CPT | Performed by: FAMILY MEDICINE

## 2023-04-17 PROCEDURE — 25010000002 PIPERACILLIN SOD-TAZOBACTAM PER 1 G: Performed by: INTERNAL MEDICINE

## 2023-04-17 PROCEDURE — 25010000002 PIPERACILLIN SOD-TAZOBACTAM PER 1 G: Performed by: NURSE PRACTITIONER

## 2023-04-17 PROCEDURE — 94799 UNLISTED PULMONARY SVC/PX: CPT

## 2023-04-17 PROCEDURE — 84156 ASSAY OF PROTEIN URINE: CPT | Performed by: INTERNAL MEDICINE

## 2023-04-17 PROCEDURE — 82570 ASSAY OF URINE CREATININE: CPT | Performed by: INTERNAL MEDICINE

## 2023-04-17 PROCEDURE — 93005 ELECTROCARDIOGRAM TRACING: CPT | Performed by: INTERNAL MEDICINE

## 2023-04-17 RX ORDER — SODIUM BICARBONATE 650 MG/1
1300 TABLET ORAL 3 TIMES DAILY
Status: DISCONTINUED | OUTPATIENT
Start: 2023-04-17 | End: 2023-04-20 | Stop reason: HOSPADM

## 2023-04-17 RX ADMIN — TAZOBACTAM SODIUM AND PIPERACILLIN SODIUM 3.38 G: 375; 3 INJECTION, SOLUTION INTRAVENOUS at 00:57

## 2023-04-17 RX ADMIN — Medication 10 ML: at 21:04

## 2023-04-17 RX ADMIN — CLOPIDOGREL BISULFATE 75 MG: 75 TABLET, FILM COATED ORAL at 09:58

## 2023-04-17 RX ADMIN — SODIUM BICARBONATE 1300 MG: 650 TABLET ORAL at 21:04

## 2023-04-17 RX ADMIN — SODIUM CHLORIDE 100 ML/HR: 9 INJECTION, SOLUTION INTRAVENOUS at 05:14

## 2023-04-17 RX ADMIN — TAZOBACTAM SODIUM AND PIPERACILLIN SODIUM 3.38 G: 375; 3 INJECTION, SOLUTION INTRAVENOUS at 16:24

## 2023-04-17 RX ADMIN — SODIUM CHLORIDE 100 ML/HR: 9 INJECTION, SOLUTION INTRAVENOUS at 14:43

## 2023-04-17 RX ADMIN — SODIUM BICARBONATE 1300 MG: 650 TABLET ORAL at 16:23

## 2023-04-17 RX ADMIN — TAZOBACTAM SODIUM AND PIPERACILLIN SODIUM 3.38 G: 375; 3 INJECTION, SOLUTION INTRAVENOUS at 09:54

## 2023-04-17 RX ADMIN — CHLORHEXIDINE GLUCONATE 15 ML: 1.2 RINSE ORAL at 09:55

## 2023-04-17 RX ADMIN — METOPROLOL SUCCINATE 25 MG: 25 TABLET, EXTENDED RELEASE ORAL at 09:54

## 2023-04-17 NOTE — CONSULTS
[facility] Consult Note      Patient Name: Kunal Vargas  : 1945  MRN: [id number]  Primary Care Physician:  Chai Montesinos MD  Referring Physician: Tanner Myrick MD  Date of admission: 2023    Subjective     Reason for Consult: Acute kidney injury on chronic kidney disease    HPI:   Kunal Vargas is a 77 y.o. male patient was admitted on 2023, with fever and chills and elevated LFT was found to have cholelithiasis and found to have acute cholecystitis he underwent cholecystectomy on 2023.  Patient has a prior history of left nephrectomy for renal cell cancer in   His baseline creatinine has been in the 1.6-1.8 range and since admission is 6 slowly rising up to 2.58.  And also noted to have hyperkalemia, potassium is over 6.2 and today 5.3  Patient has coronary artery disease recent PCI and stent about a months ago, history of hypertension, hypothyroidism and dyslipidemia  At present he denies any chest pain or shortness of air, no orthopnea or PND, no nausea or vomiting, there is question about urinary retention.    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        Past Surgical History:   Procedure Laterality Date   • CARDIAC CATHETERIZATION     • CARDIAC CATHETERIZATION N/A 3/17/2023    Procedure: Left Heart Cath;  Surgeon: Lenny Martines MD;  Location: [facility] INVASIVE LOCATION;  Service: Cardiology;  Laterality: N/A;   • CARDIAC CATHETERIZATION N/A 3/17/2023    Procedure: Coronary angiography;  Surgeon: Bobbi  Lenny CAN MD;  [facility];  Service: Cardiology;  Laterality: N/A;   • CARDIAC CATHETERIZATION N/A 3/17/2023    Procedure: Stent SABRINA coronary;  Surgeon: Lenny Martines MD;  [facility];  Service: Cardiology;  Laterality: N/A;   • CARDIAC CATHETERIZATION N/A 3/17/2023    Procedure: Optical Coherent Tomography;  Surgeon: Lenny Martines MD;  [facility];  Service: Cardiology;  Laterality: N/A;   • CHOLECYSTECTOMY WITH INTRAOPERATIVE CHOLANGIOGRAM N/A 4/16/2023    Procedure: CHOLECYSTECTOMY LAPAROSCOPIC INTRAOPERATIVE CHOLANGIOGRAM;  Surgeon: Lucrecia Lyle MD;  Location: [facility] MAIN OR;  Service: General;  Laterality: N/A;   • COLONOSCOPY N/A 01/25/2017    Procedure: COLONOSCOPY TO CECUM AND TI WITH POLYPECTOMY ;  Surgeon: Cachorro Hancock MD;  Location: [facility] ENDOSCOPY;  Service:    • COLONOSCOPY N/A 10/12/2022    Procedure: COLONOSCOPY TO CECUM WITH COLD BX POLYPECTOMY;  Surgeon: Cachorro Hancock MD;  Location: [facility] ENDOSCOPY;  Service: General;  Laterality: N/A;  SURVEILLANCE, HX POLYPS   --POLYP, DIVERTICULOSIS   • EYE SURGERY  Jan - Feb 2021    cataract surgery both eyes   • NEPHROURETERECTOMY Left 11/12/2020    Procedure: LEFT LAPAROSCOPIC NEPHROURETERECTOMY;  Surgeon: Ángel Florentino MD;  Location: [facility] MAIN OR;  Service: Urology;  Laterality: Left;   • TRANSURETHRAL RESECTION OF BLADDER TUMOR N/A 09/10/2020    Procedure: CYSTOSCOPY, LEFT URETEROSCOPY W/BIOPSY AND STENT PLACEMENT, TRANSURETHRAL RESECTION OF BLADDER TUMOR;  Surgeon: Ángel Florentino MD;  Location: [facility] OR;  Service: Urology;  Laterality: N/A;       Family History: family history includes Aortic aneurysm in his cousin and father; Arthritis in his mother; Rheum arthritis in his mother.    Social History:  reports that he has never smoked. He has never been exposed to tobacco smoke. He has never used smokeless tobacco. He  reports that he does not drink alcohol and does not use drugs.    Home Medications:  Prior to Admission medications    Medication Sig Start Date End Date Taking? Authorizing Provider   aspirin 81 MG chewable tablet Chew 1 tablet Daily.   Yes Stephanie Hollis MD   clopidogrel (PLAVIX) 75 MG tablet Take 1 tablet by mouth Daily. 3/27/23  Yes Olivia Pardo APRN   levothyroxine (SYNTHROID, LEVOTHROID) 88 MCG tablet TAKE 1 TABLET BY MOUTH EVERY DAY 3/2/23  Yes Chai Montesinos MD   metoprolol succinate XL (TOPROL-XL) 25 MG 24 hr tablet TAKE 1 TABLET BY MOUTH EVERY DAY 12/5/22  Yes Chai Montesinos MD   atorvastatin (LIPITOR) 40 MG tablet Take 1 tablet by mouth Every Night. 3/27/23   Olivia Pardo APRN   chlorhexidine (PERIDEX) 0.12 % solution  3/1/23   Stephanie Hollis MD   nitroglycerin (NITROSTAT) 0.4 MG SL tablet place 1 tablet under the tongue as needed for angina, may repeat every 5mins for up three doses 3/18/23   Nasima Manjarrez APRN   triamcinolone (KENALOG) 0.1 % cream  2/4/21   ProviderStephanie MD       Allergies:  No Known Allergies    Objective     Vitals:   Temp:  [97.5 °F (36.4 °C)-98.2 °F (36.8 °C)] 98.2 °F (36.8 °C)  Heart Rate:  [48-70] 54  Resp:  [14-22] 16  BP: ()/(34-73) 122/56  Flow (L/min):  [10-15] 15  FiO2 (%):  [21 %-30 %] 21 %    Intake/Output Summary (Last 24 hours) at 4/17/2023 1142  Last data filed at 4/17/2023 1126  Gross per 24 hour   Intake 4754.03 ml   Output 1113 ml   Net 3641.03 ml       Physical Exam:   Constitutional: Awake, alert, no acute distress.  Chronically ill  HEENT: Sclera anicteric, no conjunctival injection  Neck: Supple, no thyromegaly, no lymphadenopathy, trachea at midline, no JVD  Respiratory: Clear to auscultation bilaterally, nonlabored respiration  Cardiovascular: RRR, no murmurs, no rubs or gallops, no carotid bruit  Gastrointestinal: Positive bowel sounds, abdomen is soft, nontender and nondistended  : Questionable palpable  bladder  Musculoskeletal: No edema, no clubbing or cyanosis  Psychiatric: Appropriate affect, cooperative  Neurologic: Oriented x3, moving all extremities, normal speech and mental status  Skin: Warm and dry       Scheduled Meds:     aspirin, 81 mg, Oral, Daily  chlorhexidine, 15 mL, Mouth/Throat, Q12H  clopidogrel, 75 mg, Oral, Daily  levothyroxine, 88 mcg, Oral, Daily  metoprolol succinate XL, 25 mg, Oral, Daily  piperacillin-tazobactam, 3.375 g, Intravenous, Q8H  polyethylene glycol, 17 g, Oral, Daily  sodium chloride, 10 mL, Intravenous, Q12H      IV Meds:   lactated ringers, 9 mL/hr  norepinephrine, 0.02-0.3 mcg/kg/min  propofol, 5-50 mcg/kg/min, Last Rate: Stopped (04/17/23 0600)  sodium chloride, 100 mL/hr, Last Rate: 100 mL/hr (04/17/23 0514)        Results Reviewed:   I have personally reviewed the results from the time of this admission to 4/17/2023 11:42 EDT     Lab Results   Component Value Date    GLUCOSE 133 (H) 04/17/2023    CALCIUM 8.4 (L) 04/17/2023     04/17/2023    K 5.3 (H) 04/17/2023    CO2 18.7 (L) 04/17/2023     04/17/2023    BUN 43 (H) 04/17/2023    CREATININE 2.58 (H) 04/17/2023    EGFRIFAFRI 46 (L) 10/13/2021    EGFRIFNONA 38 (L) 10/13/2021    BCR 16.7 04/17/2023    ANIONGAP 12.3 04/17/2023      Lab Results   Component Value Date    MG 1.9 11/17/2020    PHOS 2.3 (L) 11/17/2020    ALBUMIN 2.9 (L) 04/17/2023     US Gallbladder    Result Date: 4/15/2023  GALLBLADDER ULTRASOUND  HISTORY: Cholelithiasis  COMPARISON: 04/15/2023  TECHNIQUE: Grayscale and color Doppler sonographic images were obtained through the right upper quadrant.  FINDINGS: Visualized portions of the pancreas are unremarkable. There is no intra or extrahepatic biliary patient. Common bile duct measures up to 5 mm. Patient is noted to have stones and sludge within the gallbladder, but there is no gallbladder wall thickening or pericholecystic fluid. Gallbladder wall measures 2 mm in thickness. No focal hepatic  lesions are seen. The right kidney measures 10.4 x 4.7 cm. There is no hydronephrosis. Renal echotexture is normal.      Impression: Gallbladder stones and sludge.  This report was finalized on 4/15/2023 4:27 AM by Dr. Norah Fleming M.D.         Assessment / Plan     ASSESSMENT:  -Acute kidney injury on chronic kidney disease stage IIIb associated with hypotension, hemodynamically more stable right now.  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  -Chronic kidney disease stage IIIb associated with loss of renal mass with prior left nephrectomy  -History of renal cell cancer and radical left nephrectomy in 2020  -Coronary artery disease with recent PCI and stent  -Hypotension, resolved  -Dyslipidemia  -Acute cholecystitis underwent cholecystectomy on 4/16/2023    PLAN:  -Check random urine for sodium, chloride and protein to creatinine ratio  -Add oral sodium bicarbonate 1300 mg 3 times daily  -Check a postvoid residual  -Surveillance labs    I discussed the case with the patient and his wife at the bedside    Thank you for involving us in the care of Kunal Vargas.  Please feel free to call with any questions.    Watson Lowe MD  04/17/23  11:42 EDT    [facility]  [phone]      Please note that portions of this note were completed with a voice recognition program.

## 2023-04-17 NOTE — PLAN OF CARE
Problem: Adult Inpatient Plan of Care  Goal: Plan of Care Review  Outcome: Ongoing, Progressing  Goal: Patient-Specific Goal (Individualized)  Outcome: Ongoing, Progressing  Goal: Absence of Hospital-Acquired Illness or Injury  Outcome: Ongoing, Progressing  Intervention: Identify and Manage Fall Risk  Recent Flowsheet Documentation  Taken 4/16/2023 2000 by Erica Paula RN  Safety Promotion/Fall Prevention:   clutter free environment maintained   fall prevention program maintained   gait belt   lighting adjusted   nonskid shoes/slippers when out of bed   safety round/check completed  Intervention: Prevent Skin Injury  Recent Flowsheet Documentation  Taken 4/16/2023 2000 by Erica Paula RN  Body Position: right  Intervention: Prevent and Manage VTE (Venous Thromboembolism) Risk  Recent Flowsheet Documentation  Taken 4/16/2023 2000 by Erica Paula RN  Activity Management: bedrest  VTE Prevention/Management:   bilateral   sequential compression devices on  Range of Motion: ROM (range of motion) performed  Intervention: Prevent Infection  Recent Flowsheet Documentation  Taken 4/16/2023 2000 by Erica Paula RN  Infection Prevention:   environmental surveillance performed   equipment surfaces disinfected   hand hygiene promoted   personal protective equipment utilized   rest/sleep promoted   single patient room provided  Goal: Optimal Comfort and Wellbeing  Outcome: Ongoing, Progressing  Goal: Readiness for Transition of Care  Outcome: Ongoing, Progressing     Problem: Chest Pain  Goal: Resolution of Chest Pain Symptoms  Outcome: Ongoing, Progressing     Problem: Skin Injury Risk Increased  Goal: Skin Health and Integrity  Outcome: Ongoing, Progressing  Intervention: Optimize Skin Protection  Recent Flowsheet Documentation  Taken 4/16/2023 2000 by Erica Paula RN  Pressure Reduction Techniques: weight shift assistance provided  Head of Bed (HOB) Positioning: HOB at 30 degrees  Pressure Reduction Devices:  pressure-redistributing mattress utilized     Problem: Restraint, Nonviolent  Goal: Absence of Harm or Injury  Outcome: Ongoing, Progressing  Intervention: Implement Least Restrictive Safety Strategies  Recent Flowsheet Documentation  Taken 4/16/2023 2056 by Erica Paula, RN  Medical Device Protection:   IV pole/bag removed from visual field   tubing secured  Less Restrictive Alternative:   safety enhancements provided   sensory stimulation limited   surveillance provided  De-Escalation Techniques:   quiet time facilitated   reoriented   stimulation decreased  Diversional Activities: other (see comments)  Intervention: Protect Skin and Joint Integrity  Recent Flowsheet Documentation  Taken 4/16/2023 2000 by Erica Paula, RN  Body Position: right  Range of Motion: ROM (range of motion) performed   Goal Outcome Evaluation:

## 2023-04-17 NOTE — PAYOR COMM NOTE
"Armando Watkins (77 y.o. Male)     ATTN: NURSE REVIEWER  RE: INITIAL INPT AUTH CLINICALS  AUTH: [id number]    PLEASE REPLY TO MICHEL LINARES [phone] OR FAX# [phone]    Date of Birth   1945    Social Security Number       Address   [address] [place] [address]    Home Phone   [phone]    MRN   [id number]       Mu-ism   Amish    Marital Status                               Admission Date   4/14/23    Admission Type   Emergency    Admitting Provider   Ángel Byrd MD    Attending Provider   Ángel Byrd MD    Department, Room/Bed   [facility] INTENSIVE CARE, I378/1       Discharge Date       Discharge Disposition       Discharge Destination                               Attending Provider: Ángel Byrd MD    Allergies: No Known Allergies    Isolation: None   Infection: None   Code Status: CPR    Ht: 175.3 cm (69\")   Wt: 73.3 kg (161 lb 9.6 oz)    Admission Cmt: None   Principal Problem: Fever and chills [R50.9]                 Active Insurance as of 4/14/2023     Primary Coverage     Payor Plan Insurance Group Employer/Plan Group    [facility] MEDICARE REPLACEMENT UNITED [facility] MEDICARE REPLACEMENT [id number]     Payor Plan Address Payor Plan Phone Number Payor Plan Fax Number Effective Dates    [address] [address]   1/1/2022 - None Entered    [facility] [id number]       Subscriber Name Subscriber Birth Date Member ID       ARMANDO WATKINS 1945 [id number]                 Emergency Contacts      (Rel.) Home Phone Work Phone Mobile Phone    Renetta Watkins (Spouse) [phone] -- --    Jevon Watkins (Son) [phone] -- --    Armando Watkins (Son) [phone] -- --               History & Physical      Ángel Byrd MD at 04/15/23 0941          [facility] Admission H&P    Patient Care Team:  hCai Montesinos MD as PCP - General    Chief complaint: Fever, chills, bilateral shoulder " pain    History of Present Illness    This is a 77-year-old male with history of CAD status post drug-eluting stent placement 1 month ago who presented to the emergency room with a 5-day history of fevers and chills along with generalized weakness, decreased appetite, and fatigue.  This had been looked at a couple of times this week via urgent care and his primary care physician with unremarkable work-up.  Last night he began having intense bilateral shoulder pain which prompted his visit to the emergency room.  Patient states his shoulders are feeling better at present.  He is still feeling intermittently feverish and chilled.  He denies any chest pain, shortness of breath, palpitations, cough, nausea, vomiting, diarrhea, rash.  No neck pain or stiffness.  Recent medication changes include the addition of Plavix and changing to atorvastatin 1 month ago at the time of his heart cath with stent placement.  He denies myalgias.  No dysuria.  Work-up in the emergency room showed elevated LFTs and gallstones with possible stone at the gallbladder neck.  No CBD dilatation.  Patient denies any right upper quadrant pain this morning.  He states he did have some discomfort when receiving the right upper quadrant ultrasound.  He was started on Zosyn last night and admitted for further evaluation.    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      Past Surgical History:   Procedure Laterality Date   • CARDIAC CATHETERIZATION     • CARDIAC CATHETERIZATION N/A 3/17/2023    Procedure: Left Heart Cath;  Surgeon: Lenny Martines MD;  Location: [facility] CATH INVASIVE LOCATION;  Service: Cardiology;  Laterality: N/A;   • CARDIAC  CATHETERIZATION N/A 3/17/2023    Procedure: Coronary angiography;  Surgeon: Lenny Martines MD;  Location: [facility] CATH INVASIVE LOCATION;  Service: Cardiology;  Laterality: N/A;   • CARDIAC CATHETERIZATION N/A 3/17/2023    Procedure: Stent SABRINA coronary;  Surgeon: Lenny Martines MD;  Location: [facility] CATH INVASIVE LOC[facility];  Service: Cardiology;  Laterality: N/A;   • CARDIAC CATHETERIZATION N/A 3/17/2023    Procedure: Optical Coherent Tomography;  Surgeon: Lenny Martines MD;  Location:  [facility] CATH INVASIVE LOCATION;  Service: Cardiology;  Laterality: N/A;   • COLONOSCOPY N/A 01/25/2017    Procedure: COLONOSCOPY TO CECUM AND TI WITH POLYPECTOMY ;  Surgeon: Cachorro Hancock MD;  Location: [facility] ENDOSCOPY;  Service:    • COLONOSCOPY N/A 10/12/2022    Procedure: COLONOSCOPY TO CECUM WITH COLD BX POLYPECTOMY;  Surgeon: Cachorro Hancock MD;  Location: [facility] ENDOSCOPY;  Service: General;  Laterality: N/A;  SURVEILLANCE, HX POLYPS   --POLYP, DIVERTICULOSIS   • EYE SURGERY  Jan - Feb 2021    cataract surgery both eyes   • NEPHROURETERECTOMY Left 11/12/2020    Procedure: LEFT LAPAROSCOPIC NEPHROURETERECTOMY;  Surgeon: Ángel Florentino MD;  Location: [facility] MAIN OR;  Service: Urology;  Laterality: Left;   • TRANSURETHRAL RESECTION OF BLADDER TUMOR N/A 09/10/2020    Procedure: CYSTOSCOPY, LEFT URETEROSCOPY W/BIOPSY AND STENT PLACEMENT, TRANSURETHRAL RESECTION OF BLADDER TUMOR;  Surgeon: Ángel Florentino MD;  Location: [facility] MAIN OR;  Service: Urology;  Laterality: N/A;     Family History   Problem Relation Age of Onset   • Rheum arthritis Mother    • Arthritis Mother         RA   • Aortic aneurysm Father    • Aortic aneurysm Cousin    • Malig Hyperthermia Neg Hx      Social History     Tobacco Use   • Smoking status: Never     Passive exposure: Never   • Smokeless tobacco: Never   Vaping Use   • Vaping Use: Never used   Substance Use Topics   • Alcohol use: Never   • Drug use: Never     Medications  Prior to Admission   Medication Sig Dispense Refill Last Dose   • aspirin 81 MG chewable tablet Chew 1 tablet Daily.   2023   • clopidogrel (PLAVIX) 75 MG tablet Take 1 tablet by mouth Daily. 90 tablet 3 Past Week   • levothyroxine (SYNTHROID, LEVOTHROID) 88 MCG tablet TAKE 1 TABLET BY MOUTH EVERY DAY 90 tablet 0 2023   • metoprolol succinate XL (TOPROL-XL) 25 MG 24 hr tablet TAKE 1 TABLET BY MOUTH EVERY DAY 90 tablet 1 2023   • atorvastatin (LIPITOR) 40 MG tablet Take 1 tablet by mouth Every Night. 90 tablet 3    • chlorhexidine (PERIDEX) 0.12 % solution       • nitroglycerin (NITROSTAT) 0.4 MG SL tablet place 1 tablet under the tongue as needed for angina, may repeat every 5mins for up three doses 25 tablet 1    • triamcinolone (KENALOG) 0.1 % cream         Allergies:  Patient has no known allergies.    Review of Systems   Constitutional: Positive for chills, fatigue and fever.   HENT: Negative for congestion and sore throat.    Eyes: Negative for visual disturbance.   Respiratory: Negative for cough, chest tightness, shortness of breath and wheezing.    Cardiovascular: Negative for chest pain, palpitations and leg swelling.   Gastrointestinal: Negative for abdominal distention, abdominal pain, diarrhea, nausea and vomiting.   Endocrine: Negative for polydipsia and polyuria.   Genitourinary: Negative for difficulty urinating, dysuria, frequency and urgency.   Musculoskeletal: Positive for arthralgias. Negative for myalgias.        Pain in the bilateral shoulders with decreased range of motion secondary to pain   Skin: Negative for color change and rash.   Neurological: Positive for weakness. Negative for dizziness and light-headedness.        Generalized weakness        PHYSICAL EXAM    Vital Signs  tMax 24 hrs:  Temp (24hrs), Av.8 °F (37.7 °C), Min:98.3 °F (36.8 °C), Max:101.3 °F (38.5 °C)    Vitals Ranges:  Temp:  [98.3 °F (36.8 °C)-101.3 °F (38.5 °C)] 98.3 °F (36.8 °C)  Heart Rate:  [60-95]  62  Resp:  [18] 18  BP: (138-156)/(66-78) 144/69    Physical Exam  Vitals and nursing note reviewed.   Constitutional:       General: He is not in acute distress.     Appearance: He is well-developed. He is not ill-appearing.   HENT:      Head: Normocephalic and atraumatic.      Nose: Nose normal.      Mouth/Throat:      Mouth: Mucous membranes are not dry.   Eyes:      Conjunctiva/sclera: Conjunctivae normal.      Pupils: Pupils are equal, round, and reactive to light.   Neck:      Vascular: No JVD.   Cardiovascular:      Rate and Rhythm: Normal rate and regular rhythm.      Heart sounds: No murmur heard.    No gallop.   Pulmonary:      Effort: Pulmonary effort is normal. No accessory muscle usage or respiratory distress.      Breath sounds: No decreased breath sounds or wheezing.   Abdominal:      General: Bowel sounds are normal. There is no distension.      Palpations: Abdomen is soft.      Tenderness: There is no abdominal tenderness. There is no guarding or rebound.   Musculoskeletal:         General: No deformity. Normal range of motion.      Cervical back: Normal range of motion and neck supple.      Comments: He has full range of motion of the bilateral shoulders with passive movement.  No pain when I passively manipulate the shoulders.  On active movement he demonstrates pain and decreased ROM.  No obvious external deformity or overlying skin changes/swelling   Lymphadenopathy:      Cervical: No cervical adenopathy.   Skin:     General: Skin is warm and dry.      Findings: No erythema or rash.      Comments: He has a slightly jaundiced appearance   Neurological:      Mental Status: He is alert and oriented to person, place, and time.      Cranial Nerves: No cranial nerve deficit.         Results Review:  Results from last 7 days   Lab Units 04/15/23  0757   WBC 10*3/mm3 7.63   HEMOGLOBIN g/dL 10.1*   HEMATOCRIT % 30.7*   PLATELETS 10*3/mm3 154     Results from last 7 days   Lab Units 04/15/23  075    SODIUM mmol/L 134*   POTASSIUM mmol/L 4.4   CHLORIDE mmol/L 106   CO2 mmol/L 18.2*   BUN mg/dL 31*   CREATININE mg/dL 1.85*   CALCIUM mg/dL 8.5*   BILIRUBIN mg/dL 3.1*   ALK PHOS U/L 604*   ALT (SGPT) U/L 138*   AST (SGOT) U/L 94*   GLUCOSE mg/dL 99        I reviewed the patient's new clinical results.  I reviewed the patient's new imaging results and agree with the interpretation.        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       Assessment & Plan    The patient was admitted overnight last night for fever/chills along with elevated LFTs and evidence of gallstones and possible stone sitting in the gallbladder neck.  We will continue antibiotics as presently prescribed.  Await blood culture results.  His abdominal exam is entirely benign.  Gastroenterology has been consulted.  Acute hepatitis panel is negative.  Will ask general surgery to evaluate as well.  He had acute kidney injury on CKD with improvement of creatinine after IV fluids overnight last night.  Continue normal saline.  He will need to remain on aspirin and Plavix given his recent SABRINA placement 1 month ago.  Stop his statin for now.  I am not yet sure what to make of the bilateral shoulder pain which is only present upon active motion.  Will obtain inflammatory markers.  In light of his hepatobiliary findings, suspicion for bilateral septic arthritis is low.  He reports improvement of his pain over the morning hours and we will see how this evolves.  Additional plans based on his clinical  course.    I discussed the patients findings and my recommendations with patient and family      Ángel Byrd MD  04/15/23  09:41 EDT              Electronically signed by Ángel Byrd MD at 04/15/23 0948         Facility-Administered Medications as of 4/17/2023   Medication Dose Route Frequency Provider Last Rate Last Admin   • [COMPLETED] aluminum-magnesium hydroxide-simethicone (MAALOX MAX) 400-400-40 MG/5ML suspension 15 mL  15 mL Oral Once Abraham Nieto MD   15 mL at 04/15/23 0025   • aspirin chewable tablet 81 mg  81 mg Oral Daily Celine Hernandez MD   81 mg at 04/15/23 0937   • calcium carbonate (TUMS) chewable tablet 500 mg (200 mg elemental)  2 tablet Oral BID PRN Celine Hernandez MD       • chlorhexidine (PERIDEX) 0.12 % solution 15 mL  15 mL Mouth/Throat Q12H Celine Hernandez MD   15 mL at 04/17/23 0955   • clopidogrel (PLAVIX) tablet 75 mg  75 mg Oral Daily Celine Hernandez MD   75 mg at 04/17/23 0958   • [COMPLETED] dextrose (D50W) (25 g/50 mL) IV injection 50 mL  50 mL Intravenous Once Giovanni Folres MD   50 mL at 04/16/23 1702   • [COMPLETED] famotidine (PEPCID) injection 20 mg  20 mg Intravenous Once Carroll Varghese DO   20 mg at 04/16/23 0902   • fentaNYL citrate (PF) (SUBLIMAZE) injection 50 mcg  50 mcg Intravenous Q2H PRN Celine Hernandez MD   50 mcg at 04/16/23 1804   • [COMPLETED] insulin regular (humuLIN R,novoLIN R) injection 5 Units  5 Units Intravenous Once Giovanni Floers MD   5 Units at 04/16/23 1703   • [COMPLETED] ketorolac (TORADOL) injection 15 mg  15 mg Intravenous Once Abraham Nieto MD   15 mg at 04/14/23 2203   • lactated ringers infusion  9 mL/hr Intravenous Continuous Celine Hernandez MD   New Bag at 04/16/23 0916   • levothyroxine (SYNTHROID, LEVOTHROID) tablet 88 mcg  88 mcg Oral Daily Celine Hernandez MD   88 mcg at 04/15/23 0937   • [COMPLETED] Lidocaine Viscous HCl (XYLOCAINE) 2 % solution 15 mL  15 mL Mouth/Throat Once Abraham Nieto MD    15 mL at 04/15/23 0026   • metoprolol succinate XL (TOPROL-XL) 24 hr tablet 25 mg  25 mg Oral Daily Celine Hernandez MD   25 mg at 04/17/23 0954   • morphine injection 2 mg  2 mg Intravenous Q3H PRN Celine Hernandez MD   2 mg at 04/15/23 0943   • norepinephrine (LEVOPHED) 8 mg in 250 mL NS infusion (premix)  0.02-0.3 mcg/kg/min Intravenous Titrated Celine Hernandez MD       • ondansetron (ZOFRAN) tablet 4 mg  4 mg Oral Q6H PRN Celine Hernandez MD        Or   • ondansetron (ZOFRAN) injection 4 mg  4 mg Intravenous Q6H PRN Celine Hernandez MD       • [COMPLETED] piperacillin-tazobactam (ZOSYN) 3.375 g in iso-osmotic dextrose 50 ml (premix)  3.375 g Intravenous Once Abraham Nieto MD   Stopped at 04/15/23 0324   • piperacillin-tazobactam (ZOSYN) 3.375 g in iso-osmotic dextrose 50 ml (premix)  3.375 g Intravenous Q8H Celine Hernandez MD   3.375 g at 04/17/23 0954   • polyethylene glycol (MIRALAX) packet 17 g  17 g Oral Daily Celine Hernandez MD   17 g at 04/15/23 0937   • propofol (DIPRIVAN) infusion 10 mg/mL 100 mL  5-50 mcg/kg/min Intravenous Titrated Celine Hernandez MD   Stopped at 04/17/23 0600   • [COMPLETED] sodium chloride 0.9 % bolus 500 mL  500 mL Intravenous Once Abraham Nieto MD   Stopped at 04/14/23 2242   • [COMPLETED] sodium chloride 0.9 % bolus 500 mL  500 mL Intravenous Once Abraham Nieto MD   Stopped at 04/15/23 0133   • sodium chloride 0.9 % flush 10 mL  10 mL Intravenous Q12H Celine Hernandez MD   10 mL at 04/15/23 2000   • sodium chloride 0.9 % flush 10 mL  10 mL Intravenous PRN Celine Hernandez MD       • sodium chloride 0.9 % infusion 40 mL  40 mL Intravenous PRN Celine Hernandez MD       • sodium chloride 0.9 % infusion  100 mL/hr Intravenous Continuous Celine Hernandez  mL/hr at 04/17/23 0514 100 mL/hr at 04/17/23 0514     Lab Results (last 48 hours)     Procedure Component Value Units Date/Time    POC Glucose Once [[id number]]  (Normal) Collected: 04/17/23 1103    Specimen: Blood Updated:  04/17/23 1105     Glucose 102 mg/dL      Comment: Meter: [id number] : [id number] Graham Aryeon TALON       Comprehensive Metabolic Panel [[id number]]  (Abnormal) Collected: 04/17/23 0526    Specimen: Blood Updated: 04/17/23 0632     Glucose 133 mg/dL      BUN 43 mg/dL      Creatinine 2.58 mg/dL      Sodium 138 mmol/L      Potassium 5.3 mmol/L      Chloride 107 mmol/L      CO2 18.7 mmol/L      Calcium 8.4 mg/dL      Total Protein 5.5 g/dL      Albumin 2.9 g/dL      ALT (SGPT) 135 U/L      AST (SGOT) 83 U/L      Alkaline Phosphatase 498 U/L      Total Bilirubin 1.6 mg/dL      Globulin 2.6 gm/dL      A/G Ratio 1.1 g/dL      BUN/Creatinine Ratio 16.7     Anion Gap 12.3 mmol/L      eGFR 24.9 mL/min/1.73     Narrative:      GFR Normal >60  Chronic Kidney Disease <60  Kidney Failure <15    The GFR formula is only valid for adults with stable renal function between ages 18 and 70.    CBC (No Diff) [[id number]]  (Abnormal) Collected: 04/17/23 0526    Specimen: Blood Updated: 04/17/23 0602     WBC 9.08 10*3/mm3      RBC 3.20 10*6/mm3      Hemoglobin 10.0 g/dL      Hematocrit 30.4 %      MCV 95.0 fL      MCH 31.3 pg      MCHC 32.9 g/dL      RDW 12.4 %      RDW-SD 42.6 fl      MPV 11.1 fL      Platelets 211 10*3/mm3     Tissue Pathology Exam [[id number]] Collected: 04/16/23 0952    Specimen: Tissue from Gallbladder Updated: 04/17/23 0552    Blood Culture - Blood, Arm, Left [[id number]]  (Normal) Collected: 04/14/23 2305    Specimen: Blood from Arm, Left Updated: 04/17/23 0300     Blood Culture No growth at 2 days    POC Glucose Once [[id number]]  (Abnormal) Collected: 04/17/23 0027    Specimen: Blood Updated: 04/17/23 0028     Glucose 145 mg/dL      Comment: Meter: [id number] : SKEITMONCHO Maldonado RN       Basic Metabolic Panel [[id number]]  (Abnormal) Collected: 04/16/23 1944    Specimen: Blood Updated: 04/16/23 2044     Glucose 207 mg/dL      BUN 40 mg/dL      Creatinine 2.26 mg/dL      Sodium 138 mmol/L      Potassium  4.6 mmol/L      Chloride 108 mmol/L      CO2 17.2 mmol/L      Calcium 8.0 mg/dL      BUN/Creatinine Ratio 17.7     Anion Gap 12.8 mmol/L      eGFR 29.1 mL/min/1.73     Narrative:      GFR Normal >60  Chronic Kidney Disease <60  Kidney Failure <15    The GFR formula is only valid for adults with stable renal function between ages 18 and 70.    POC Glucose Once [[id number]]  (Abnormal) Collected: 04/16/23 1612    Specimen: Blood Updated: 04/16/23 1614     Glucose 145 mg/dL      Comment: Meter: [id number] : [id number] Soumya HANLEY       Comprehensive Metabolic Panel [[id number]]  (Abnormal) Collected: 04/16/23 1458    Specimen: Blood Updated: 04/16/23 1534     Glucose 149 mg/dL      BUN 40 mg/dL      Creatinine 2.23 mg/dL      Sodium 133 mmol/L      Potassium 6.2 mmol/L      Chloride 105 mmol/L      CO2 16.8 mmol/L      Calcium 8.1 mg/dL      Total Protein 5.4 g/dL      Albumin 3.0 g/dL      ALT (SGPT) 140 U/L      AST (SGOT) 107 U/L      Alkaline Phosphatase 554 U/L      Total Bilirubin 2.5 mg/dL      Globulin 2.4 gm/dL      A/G Ratio 1.3 g/dL      BUN/Creatinine Ratio 17.9     Anion Gap 11.2 mmol/L      eGFR 29.6 mL/min/1.73     Narrative:      GFR Normal >60  Chronic Kidney Disease <60  Kidney Failure <15    The GFR formula is only valid for adults with stable renal function between ages 18 and 70.    CBC & Differential [[id number]]  (Abnormal) Collected: 04/16/23 1351    Specimen: Blood Updated: 04/16/23 1425    Narrative:      The following orders were created for panel order CBC & Differential.  Procedure                               Abnormality         Status                     ---------                               -----------         ------                     CBC Auto Differential[[id number]]        Abnormal            Final result                 Please view results for these tests on the individual orders.    CBC Auto Differential [[id number]]  (Abnormal) Collected: 04/16/23 1351    Specimen: Blood  Updated: 04/16/23 1425     WBC 9.34 10*3/mm3      RBC 3.34 10*6/mm3      Hemoglobin 10.6 g/dL      Hematocrit 32.5 %      MCV 97.3 fL      MCH 31.7 pg      MCHC 32.6 g/dL      RDW 12.3 %      RDW-SD 43.2 fl      MPV 11.1 fL      Platelets 219 10*3/mm3      Neutrophil % 86.1 %      Lymphocyte % 6.4 %      Monocyte % 2.4 %      Eosinophil % 4.1 %      Basophil % 0.5 %      Immature Grans % 0.5 %      Neutrophils, Absolute 8.04 10*3/mm3      Lymphocytes, Absolute 0.60 10*3/mm3      Monocytes, Absolute 0.22 10*3/mm3      Eosinophils, Absolute 0.38 10*3/mm3      Basophils, Absolute 0.05 10*3/mm3      Immature Grans, Absolute 0.05 10*3/mm3      nRBC 0.0 /100 WBC     Blood Gas, Arterial - [[id number]]  (Abnormal) Collected: 04/16/23 1415    Specimen: Arterial Blood Updated: 04/16/23 1419     Site Arterial: right radial     Jacob's Test Positive     pH, Arterial 7.348 pH units      pCO2, Arterial 33.9 mm Hg      pO2, Arterial 145.7 mm Hg      HCO3, Arterial 18.6 mmol/L      Base Excess, Arterial -6.3 mmol/L      O2 Saturation Calculated 99.2 %      Comment: Meter: [id number] : [id number] Graf Tonio Solares DO2 0.6 mmHg      Barometric Pressure for Blood Gas 742.2 mmHg      Modality Adult Vent     FIO2 40 %      Ventilator Mode VC     Set Tidal Volume 600     Set Mech Resp Rate 18     Rate 23 Breaths/minute      PEEP 5    Blood Gas, Arterial - [[id number]]  (Abnormal) Collected: 04/16/23 1258    Specimen: Arterial Blood Updated: 04/16/23 1315     Site Arterial: left radial     Jacob's Test Positive     pH, Arterial 7.119 pH units      Comment: Meter: [id number] : [id number] Graf ElizaldeCritical:        pCO2, Arterial 71.4 mm Hg      Comment: Critical:        pO2, Arterial 210.3 mm Hg      HCO3, Arterial 23.1 mmol/L      Base Excess, Arterial -7.1 mmol/L      O2 Saturation Calculated 99.4 %      Barometric Pressure for Blood Gas 742.6 mmHg      Modality NRB     Flow Rate 8 lpm      Set Mech Resp Rate 18    POC  Glucose Once [[id number]]  (Normal) Collected: 04/16/23 1206    Specimen: Blood Updated: 04/16/23 1207     Glucose 119 mg/dL      Comment: Meter: [id number] : [id number] Lalita Gutiérrez RN       Blood Culture - Blood, Arm, Right [[id number]]  (Abnormal) Collected: 04/14/23 2151    Specimen: Blood from Arm, Right Updated: 04/16/23 0740     Blood Culture Staphylococcus, coagulase negative     Isolated from Anaerobic Bottle     Gram Stain Anaerobic Bottle Gram positive cocci in clusters    Narrative:      Probable contaminant requires clinical correlation, susceptibility not performed unless requested by physician.      Comprehensive Metabolic Panel [[id number]]  (Abnormal) Collected: 04/16/23 0546    Specimen: Blood Updated: 04/16/23 0641     Glucose 90 mg/dL      BUN 35 mg/dL      Creatinine 2.34 mg/dL      Sodium 134 mmol/L      Potassium 4.7 mmol/L      Chloride 105 mmol/L      CO2 20.0 mmol/L      Calcium 8.6 mg/dL      Total Protein 5.3 g/dL      Albumin 3.0 g/dL      ALT (SGPT) 132 U/L      AST (SGOT) 91 U/L      Alkaline Phosphatase 556 U/L      Total Bilirubin 2.2 mg/dL      Globulin 2.3 gm/dL      A/G Ratio 1.3 g/dL      BUN/Creatinine Ratio 15.0     Anion Gap 9.0 mmol/L      eGFR 27.9 mL/min/1.73     Narrative:      GFR Normal >60  Chronic Kidney Disease <60  Kidney Failure <15    The GFR formula is only valid for adults with stable renal function between ages 18 and 70.    CBC (No Diff) [[id number]]  (Abnormal) Collected: 04/16/23 0546    Specimen: Blood Updated: 04/16/23 0626     WBC 7.80 10*3/mm3      RBC 3.03 10*6/mm3      Hemoglobin 9.7 g/dL      Hematocrit 28.9 %      MCV 95.4 fL      MCH 32.0 pg      MCHC 33.6 g/dL      RDW 12.6 %      RDW-SD 43.7 fl      MPV 10.7 fL      Platelets 151 10*3/mm3     Blood Culture ID, PCR - Blood, Arm, Right [[id number]]  (Abnormal) Collected: 04/14/23 2151    Specimen: Blood from Arm, Right Updated: 04/15/23 2025     BCID, PCR Staph spp, not aureus or lugdunensis.  Identification by [facility] PCR.          Imaging Results (Last 48 Hours)     Procedure Component Value Units Date/Time    XR Chest 1 View [[id number]] Collected: 04/16/23 1358     Updated: 04/16/23 1405    Narrative:      XR CHEST 1 VW-  04/16/2023     HISTORY: Intubation.     Heart size is within normal limits. Lungs appear relatively clear.  Endotracheal tube is seen with its tip overlying the trachea at the  level of the clavicles. No abnormal air collections are seen.     This report was finalized on 4/16/2023 2:02 PM by Dr. Pineda Wood M.D.       [place] Cholangiogram Operative [[id number]] Collected: 04/16/23 1027     Updated: 04/16/23 1045    Narrative:      INTRAOPERATIVE CHOLANGIOGRAM     HISTORY: Gallbladder disease.     FINDINGS: Exam demonstrates injection of contrast into the cystic duct.  Contrast outlines a normal caliber common duct and contrast extends into  the duodenum without filling defect or obstruction.     FLUOROSCOPY TIME: 10.1 seconds, 3 images.     This report was finalized on 4/16/2023 10:42 AM by Dr. Abraham Washington M.D.           ECG/EMG Results (last 48 hours)     Procedure Component Value Units Date/Time    ECG 12 Lead Altered Mental Status [[id number]] Collected: 04/16/23 1303     Updated: 04/16/23 1529     QT Interval 459 ms     Narrative:      HEART RATE= 60  bpm  RR Interval= 1000  ms  AL Interval= 188  ms  P Horizontal Axis= -26  deg  P Front Axis= 65  deg  QRSD Interval= 94  ms  QT Interval= 459  ms  QRS Axis= 61  deg  T Wave Axis= 82  deg  - OTHERWISE NORMAL ECG -  Sinus rhythm  Low voltage, precordial leads  When compared with ECG of 18-Mar-2023 3:56:43,  No significant change  Electronically Signed By: Giancarlo Miller ([facility] 16-Apr-2023 15:29:25  Date and Time of Study: 2023-04-16 13:03:38           Operative/Procedure Notes (last 48 hours)      Lucrecia Lyle MD at 04/16/23 0942        OPERATIVE REPORT     DATE OF OPERATION: 4/16/2023    SURGEON:   Lucrecia Lyle,  MD    ASSISTANT:  Flory Andersen, who was present for necessary retraction, suctioning, camera holding, and suturing throughout the procedure     PREOPERATIVE DIAGNOSIS: acute cholecystitis, possible choledocholithiasis     POSTOPERATIVE DIAGNOSIS: acute cholecystitis     PROCEDURE PERFORMED: Laparoscopic cholecystectomy with intraoperative cholangiogram    ANESTHESIA: General    SPECIMEN: Gallbladder    DRAINS: None    BLOOD LOSS: Minimal     INDICATIONS FOR OPERATION: Mr. Kunal Vargas is a 77 y.o. year old gentleman who presented to the ER with clinical and radiographic findings consistent with acute cholecystitis. Laparoscopic cholecystectomy with cholangiogram was recommended. All risks (including bleeding, infection, damage to surrounding structures including the bile duct), benefits, and alternatives were explained to the patient and he agreed and wished to proceed.  Informed consent was obtained.    OPERATIVE REPORT: The patient was taken to the operating room, transferred onto the operating room table, and underwent general endotracheal anesthesia without incident. The patient was prepped and draped in the usual sterile fashion.  Preoperative antibiotics were given, and a timeout was performed.  Half percent Marcaine with epinephrine was injected into the skin and subcutaneous tissues.  Incision was made in the right upper quadrant.  An Optiview trocar with a 5 0 scope was inserted through each layer of the abdominal wall individually and into the abdomen.  The abdomen was insufflated and the area under insertion was inspected and no injuries were noted.  A 5 mm periumbilical and right lateral trocar were placed.  An 11 mm subxiphoid trocar was placed. The gallbladder was edematous. The gallbladder was retracted cranially and laterally to allow for adequate visualization.  The area around the infundibulum was dissected until the cystic duct and artery were identified. The critical view was obtained. A  cholangiogram was done by placing a clip on the cystic duct and incising it just distal to this.  A cholangiogram catheter was introduced with an Angiocath needle and directed into the cystic duct.  A clip was applied.  With fluoroscopy contrast was injected and confirmed the anatomy and that there were no stones present.  Contrast was seen going into the right and left hepatic ducts as well as the duodenum.  The cholangiogram catheter was then removed.  The cystic duct had 2 clips placed on the bile duct side and was transected.  The same was done to the cystic artery.  Bovie electrocautery was then used to remove the gallbladder from the liver bed.  The gallbladder was placed into a bag.  The area was then irrigated and inspected for hemostasis.  The liver bed was cauterized. 3g of Ben was placed in the wound bed due to his plavix history. There was no bleeding or bile noted.  The gallbladder was then removed through the subxiphoid port.  The ports were removed under direct visualization. The fascial of the subxiphoid port was closed with 0 Vicryl suture. The incisions were then closed with 4-0 Vicryl sutures and Dermabond.  All needle and lap counts were correct at the end of the case.  Was awoken from general endotracheal anesthesia and taken to the recovery area for further monitoring.    LUCRECIA ROSSI M.D.  General and Endoscopic Surgery  [facility]    [address], [address]  [place], [place], [address]  P: [phone]  F: [phone]     Electronically signed by Lucrecia Rossi MD at 04/16/23 1026          Physician Progress Notes (last 48 hours)      Celine Hernandez MD at 04/17/23 0958                [facility]         Dr Celine Hernandez   LOS: 2 days   Patient Care Team:  Chai Montesinos MD as PCP - General    Chief Complaint: Acute hypercapnic respite failure with postop respiratory failure status post lap vasquez multiple medical issues as listed below    Interval  "History: Patient on the vent sedated intubated.  Tolerating SBT trial well.  Awake following simple commands.    REVIEW OF SYSTEMS:   Sedated intubated following commands    Ventilator/Non-Invasive Ventilation Settings (From admission, onward)     Start     Ordered    04/16/23 1712  Ventilator - AC/VC; 15; 5; mL; 600  Continuous,   Status:  Canceled        Question Answer Comment   Vent Mode AC/VC    Rate 15    PEEP 5    Tidal Volume mL            04/16/23 1715                  Vital Signs  Temp:  [97.5 °F (36.4 °C)-98.2 °F (36.8 °C)] 97.9 °F (36.6 °C)  Heart Rate:  [48-84] 54  Resp:  [12-22] 16  BP: ()/(34-81) 122/56  FiO2 (%):  [21 %-30 %] 21 %    Intake/Output Summary (Last 24 hours) at 4/17/2023 0958  Last data filed at 4/17/2023 0400  Gross per 24 hour   Intake 4754.03 ml   Output 550 ml   Net 4204.03 ml     Flowsheet Rows    Flowsheet Row First Filed Value   Admission Height 175.3 cm (69\") Documented at 04/14/2023 2106   Admission Weight 70.3 kg (155 lb) Documented at 04/14/2023 2106          Physical Exam:  Patient is examined using the personal protective equipment as per guidelines from infection control for this particular patient as enacted.  Hand hygiene was performed before and after patient interaction.   General Appearance:   Intubated following simple commands tolerating SBT trial well  ENT Mallampati between 3 and 4 no nasal congestion  Neck midline trachea, no thyromegaly   Lungs:    Diminished breath sounds bilaterally    Heart:    Regular rhythm and normal rate, normal S1 and S2, no            murmur, no gallop, no rub, no click   Chest Wall:    No abnormalities observed   Abdomen:    Soft mild diffused tenderness postop.  Bowel sounds positive   Extremities:   Moves all extremities well, no edema, no cyanosis, no             redness  CNS no focal neurological deficits normal sensory exam  Skin no rashes no nodules  Musculoskeletal no cyanosis no clubbing normal range of motion "     Results Review:        Results from last 7 days   Lab Units 04/17/23  0526 04/16/23  1944 04/16/23  1458   SODIUM mmol/L 138 138 133*   POTASSIUM mmol/L 5.3* 4.6 6.2*   CHLORIDE mmol/L 107 108* 105   CO2 mmol/L 18.7* 17.2* 16.8*   BUN mg/dL 43* 40* 40*   CREATININE mg/dL 2.58* 2.26* 2.23*   GLUCOSE mg/dL 133* 207* 149*   CALCIUM mg/dL 8.4* 8.0* 8.1*         Results from last 7 days   Lab Units 04/17/23  0526 04/16/23  1351 04/16/23  0546   WBC 10*3/mm3 9.08 9.34 7.80   HEMOGLOBIN g/dL 10.0* 10.6* 9.7*   HEMATOCRIT % 30.4* 32.5* 28.9*   PLATELETS 10*3/mm3 211 219 151     Results from last 7 days   Lab Units 04/14/23  2151   INR  1.14*                 Results from last 7 days   Lab Units 04/16/23  1415   PH, ARTERIAL pH units 7.348*   PO2 ART mm Hg 145.7*   PCO2, ARTERIAL mm Hg 33.9*   HCO3 ART mmol/L 18.6*       I reviewed the patient's new clinical results.  I personally viewed and interpreted the patient's chest x-ray.        Medication Review:   [MAR Hold] aspirin, 81 mg, Oral, Daily  chlorhexidine, 15 mL, Mouth/Throat, Q12H  [MAR Hold] clopidogrel, 75 mg, Oral, Daily  [MAR Hold] levothyroxine, 88 mcg, Oral, Daily  metoprolol succinate XL, 25 mg, Oral, Daily  piperacillin-tazobactam, 3.375 g, Intravenous, Q8H  [MAR Hold] polyethylene glycol, 17 g, Oral, Daily  [MAR Hold] sodium chloride, 10 mL, Intravenous, Q12H        lactated ringers, 9 mL/hr  norepinephrine, 0.02-0.3 mcg/kg/min  propofol, 5-50 mcg/kg/min, Last Rate: Stopped (04/17/23 0600)  sodium chloride, 100 mL/hr, Last Rate: 100 mL/hr (04/17/23 0514)        ASSESSMENT:   1. Acute hypercapnic respiratory failure, on mechanical ventilation: Suspect secondary to anesthesia and poor renal clearance in setting of CKD.  There is no evidence of lung disease on imaging.  2. SHIV on CKD  3. Acute hyperkalemia, possibly secondary to paralytics and SHIV.  No ST-T changes on the monitor  4. Cholecystitis s/p lap vasquez 4/16  5. Sepsis, secondary to cholecystitis.   Improving  6. Elevated LFTs, secondary  7. CAD s/p SABRINA stent to proximal LAD 3/17  8. Elevated LFTs secondary to cholecystitis  9. Metabolic acidosis, secondary to renal failure    PLAN:  Events noted chart reviewed  SBT trial tolerating well.  Wean from the vent and extubate today.  Acute kidney injury on chronic kidney disease with hyperkalemia.  I will ask nephrology to see.  Potassium previously improved with temporizing measures.  Continue current antibiotics status post lap vasquez.  Restart home medications  ICU core measures  Discussed with patient's wife at bedside    Critical care time 35 minutes        Celine Hernandez MD  04/17/23  09:58 EDT            Electronically signed by Celine Hernandez MD at 04/17/23 1002     Ángel Byrd MD at 04/16/23 1615        [facility]    Called by PACU nurse to inform me that patient had to be reintubated and is now going to ICU post-op.  Will follow peripherally and await transfer back out to the floor.    Ángel Byrd MD    Electronically signed by Ángel Byrd MD at 04/16/23 1616          Consult Notes (last 48 hours)      Giovanni Flores MD at 04/16/23 1527      Consult Orders    1. Inpatient Consult to Pulmonology [[id number]] ordered by Lucrecia Lyle MD at 04/16/23 1321                              Referring Provider: Dr. Lyle  Reason for Consultation: Critical care management    Patient Care Team:  Chai Montesinos MD as PCP - General    Chief complaint:   Consulted for critical care management    History of present illness:    Subjective     This is a 77-year-old male patient, lifelong non-smoker with no history of lung disease.  He has CAD s/p SABRINA stent to proximal LAD 3/17.  He presented to the hospital yesterday for shoulder pain and abdominal pain and ultimately diagnosed with acute cholecystitis.  He underwent lap vasquez today.    Postop, he was extubated.  He was noted to be obtunded/lethargic and had shallow breathing.  There was  no significant respiratory distress or use of accessory muscles.  Stat ABG showed 7.1/71/210 on NRB 8 liters/min.  He received Narcan and sugammadex without benefit.  He was intubated by anesthesia using propofol and succinylcholine.    Repeat ABG on AC/VC 18/600/40 %/5 was: 7.34/34/145.    CXR done postintubation showed clear lung fields and ET tube in good position.  I reviewed the images independently.    I reviewed the CT chest done on 4/14: clear.     After intubation, patient was able to arouse intermittently when off sedation and moves his extremities to commands.    Review of Systems  Cannot obtain due to mechanical ventilation.    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    ,   Past Surgical History:   Procedure Laterality Date   • CARDIAC CATHETERIZATION     • CARDIAC CATHETERIZATION N/A 3/17/2023    Procedure: Left Heart Cath;  Surgeon: Lenny Martines MD;  Location: [facility] CATH INVASIVE LOCATION;  Service: Cardiology;  Laterality: N/A;   • CARDIAC CATHETERIZATION N/A 3/17/2023    Procedure: Coronary angiography;  Surgeon: Lenny Martines MD;  [facility];  Service: Cardiology;  Laterality: N/A;   • CARDIAC CATHETERIZATION N/A 3/17/2023    Procedure: Stent SABRINA coronary;  Surgeon: Lenyn Martines MD;  Location: [facility] CATH INVASIVE LOCATION;  Service: Cardiology;  Laterality: N/A;   • CARDIAC CATHETERIZATION N/A 3/17/2023    Procedure: Optical Coherent Tomography;  Surgeon: Lenny Martines MD;  Location: [facility] CATH INVASIVE LOCATION;  Service: Cardiology;  Laterality: N/A;   • COLONOSCOPY N/A 01/25/2017    Procedure: COLONOSCOPY TO CECUM AND TI  WITH POLYPECTOMY ;  Surgeon: Cachorro Hancock MD;  Location: [facility] ENDOSCOPY;  Service:    • COLONOSCOPY N/A 10/12/2022    Procedure: COLONOSCOPY TO CECUM WITH COLD BX POLYPECTOMY;  Surgeon: Cachorro Hancock MD;  [facility];  Service: General;  Laterality: N/A;  SURVEILLANCE, HX POLYPS   --POLYP, DIVERTICULOSIS   • EYE SURGERY  Jan - Feb 2021    cataract surgery both eyes   • NEPHROURETERECTOMY Left 11/12/2020    Procedure: LEFT LAPAROSCOPIC NEPHROURETERECTOMY;  Surgeon: Ángel Florentino MD;  Location: [facility] MAIN OR;  Service: Urology;  Laterality: Left;   • TRANSURETHRAL RESECTION OF BLADDER TUMOR N/A 09/10/2020    Procedure: CYSTOSCOPY, LEFT URETEROSCOPY W/BIOPSY AND STENT PLACEMENT, TRANSURETHRAL RESECTION OF BLADDER TUMOR;  Surgeon: Ángel Florentino MD;  Location: [facility] MAIN OR;  Service: Urology;  Laterality: N/A;   ,   Family History   Problem Relation Age of Onset   • Rheum arthritis Mother    • Arthritis Mother         RA   • Aortic aneurysm Father    • Aortic aneurysm Cousin    • Malig Hyperthermia Neg Hx    ,   Social History     Socioeconomic History   • Marital status:    Tobacco Use   • Smoking status: Never     Passive exposure: Never   • Smokeless tobacco: Never   Vaping Use   • Vaping Use: Never used   Substance and Sexual Activity   • Alcohol use: Never   • Drug use: Never   • Sexual activity: Defer     E-cigarette/Vaping   • E-cigarette/Vaping Use Never User    • Passive Exposure No    • Counseling Given No      E-cigarette/Vaping Substances   • Nicotine No    • THC No    • CBD No    • Flavoring No      E-cigarette/Vaping Devices       ,   Medications Prior to Admission   Medication Sig Dispense Refill Last Dose   • aspirin 81 MG chewable tablet Chew 1 tablet Daily.   4/14/2023   • clopidogrel (PLAVIX) 75 MG tablet Take 1 tablet by mouth Daily. 90 tablet 3 4/15/2023 at 0930   • levothyroxine (SYNTHROID, LEVOTHROID) 88 MCG tablet TAKE 1 TABLET BY MOUTH EVERY DAY 90  tablet 0 4/14/2023   • metoprolol succinate XL (TOPROL-XL) 25 MG 24 hr tablet TAKE 1 TABLET BY MOUTH EVERY DAY 90 tablet 1 4/14/2023   • atorvastatin (LIPITOR) 40 MG tablet Take 1 tablet by mouth Every Night. 90 tablet 3    • chlorhexidine (PERIDEX) 0.12 % solution       • nitroglycerin (NITROSTAT) 0.4 MG SL tablet place 1 tablet under the tongue as needed for angina, may repeat every 5mins for up three doses 25 tablet 1    • triamcinolone (KENALOG) 0.1 % cream       , Scheduled Meds:  [MAR Hold] aspirin, 81 mg, Oral, Daily  [MAR Hold] clopidogrel, 75 mg, Oral, Daily  [MAR Hold] levothyroxine, 88 mcg, Oral, Daily  metoprolol succinate XL, 25 mg, Oral, Daily  piperacillin-tazobactam, 3.375 g, Intravenous, Q8H  [MAR Hold] polyethylene glycol, 17 g, Oral, Daily  [MAR Hold] sodium chloride, 10 mL, Intravenous, Q12H    , Continuous Infusions:  lactated ringers, 9 mL/hr  norepinephrine, 0.02-0.3 mcg/kg/min  propofol, 5-50 mcg/kg/min, Last Rate: 20 mcg/kg/min (04/16/23 1358)  sodium chloride, 100 mL/hr, Last Rate: 100 mL/hr (04/16/23 0731)    , PRN Meds:  •  [MAR Hold] calcium carbonate  •  diphenhydrAMINE  •  droperidol **OR** droperidol  •  ePHEDrine  •  fentanyl  •  flumazenil  •  hydrALAZINE  •  HYDROcodone-acetaminophen  •  HYDROcodone-acetaminophen  •  HYDROmorphone  •  ipratropium-albuterol  •  labetalol  •  [MAR Hold] Morphine  •  naloxone  •  [MAR Hold] ondansetron **OR** [MAR Hold] ondansetron  •  ondansetron  •  promethazine **OR** promethazine  •  [MAR Hold] sodium chloride  •  [MAR Hold] sodium chloride and Allergies:  Patient has no known allergies.    Objective     Vital Signs   Temp:  [97.5 °F (36.4 °C)-99.6 °F (37.6 °C)] 97.5 °F (36.4 °C)  Heart Rate:  [49-84] 58  Resp:  [12-22] 16  BP: ()/(34-81) 96/45  FiO2 (%):  [40 %] 40 %    PPE used per hospital policy    Physical Exam:  Constitutional: On the ventilator.  NAD.  Eyes: Injected conjunctivae, EOMI. pupils equal reactive to light.  ENMT: ET tube  noted.  External ears normal.  Neck:  Trachea midline. No thyromegaly  Heart: RRR, no murmur  Lungs: Good and equal air entry bilaterally.  Non labored breathing.   Abdomen:  Soft. No tenderness or dullness. No HSM.  Extremities: No cyanosis, clubbing or pitting edema.  Warm extremities and well-perfused.  Neuro: Sedated with propofol.  Withdraws extremities to pain.  Psych: Cannot assess   Integumentary: No rash.  Normal skin turgor  Lymphatic: No palpable cervical or supraclavicular lymph nodes.      Diagnostic imaging:  I personally and independently reviewed the following images:   CXR 4/16/2023: Clear    Laboratory workup:    Results from last 7 days   Lab Units 04/16/23  0546 04/15/23  0757 04/14/23  2151   SODIUM mmol/L 134* 134* 134*   POTASSIUM mmol/L 4.7 4.4 4.4   CHLORIDE mmol/L 105 106 101   CO2 mmol/L 20.0* 18.2* 20.7*   BUN mg/dL 35* 31* 30*   CREATININE mg/dL 2.34* 1.85* 2.14*   GLUCOSE mg/dL 90 99 123*   CALCIUM mg/dL 8.6 8.5* 9.0         Results from last 7 days   Lab Units 04/16/23  1351 04/16/23  0546 04/15/23  0757   WBC 10*3/mm3 9.34 7.80 7.63   HEMOGLOBIN g/dL 10.6* 9.7* 10.1*   HEMATOCRIT % 32.5* 28.9* 30.7*   PLATELETS 10*3/mm3 219 151 154     Results from last 7 days   Lab Units 04/14/23  2151   INR  1.14*           Assessment   1. Acute hypercapnic respiratory failure, on mechanical ventilation: Suspect secondary to anesthesia and poor renal clearance in setting of CKD.  There is no evidence of lung disease on imaging.  2. SHIV on CKD  3. Acute hyperkalemia, possibly secondary to paralytics and SHIV.  No ST-T changes on the monitor  4. Cholecystitis s/p lap vasquez 4/16  5. Sepsis, secondary to cholecystitis.  Improving  6. Elevated LFTs, secondary  7. CAD s/p SABRINA stent to proximal LAD 3/17  8. Elevated LFTs secondary to cholecystitis  9. Metabolic acidosis, secondary to renal failure    Recommendations:    · Mechanical ventilation management: Settings reviewed and adjusted.  RR reduced to 15.   Continue .  Titrate FiO2 down.  PEEP 5.  Start weaning trials tomorrow.  Suspect patient can probably be extubated tomorrow.  · Ventilator bundle  · Sedation with propofol  · Give 1 dose of regular insulin 5 units IV and 1 amp of D50 for hyperkalemia.  Repeat BMP in 2 hours.  Avoid aggressive therapy as it will probably improve gradually on its own (suspect could be related to paralytics)  · IV hydration  · Antibiotics with Zosyn for cholecystitis.  · IV hydration  · Continue Plavix  · DVT prophylaxis with SCD.  Consider pharmacological prophylaxis when okay with surgery    Discussed with nursing staff    Giovanni Flores MD  23  15:27 EDT    Time: Critical care 40 min          Electronically signed by Giovanni Flores MD at 23 1605     Michael Dean MD at 04/15/23 1331                Patient Name: Kunal Vargas  Age/Sex: 77 y.o. male  : 1945  MRN: [id number]    Date of Admission: 2023  Date of Encounter Visit: 04/15/23  Encounter Provider: PAVAN Quigley  Place of Service: [facility] CARDIOLOGY      Referring Provider: Tanner Myrick MD  Patient Care Team:  Chai Montesinos MD as PCP - General    Subjective:       Chief Complaint: fever, chills    History of Present Illness:  Kunal Vargas is a 77 y.o. male who follows with Dr. Jones. He has a history of CAD--s/p PCI-SABRINA to LAD (3/17/2023).    Patient has history of non-obstructive CAD. He presented to ED last month with typical/atypical chest pain. Had abnormal stress. Subsequently had PCI to LAD on 3/17/2023.    Presented to ED with complaints of generalized weakness, chills, and fever for 5 days. Found to have elevated LFTS and evidence of gallstones in gallbladder. GI saw and recommended surgical evaluation.     Cardiology has been asked to see for preop clearance.              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        Past Surgical History:   Procedure Laterality Date   • CARDIAC CATHETERIZATION     • CARDIAC CATHETERIZATION N/A 3/17/2023    Procedure: Left Heart Cath;  Surgeon: Lenny Martines MD;  Location: [facility] CATH INVASIVE LOCATION;  Service: Cardiology;  Laterality: N/A;   • CARDIAC CATHETERIZATION N/A 3/17/2023    Procedure: Coronary angiography;  Surgeon: Lenny Martines MD;  Location: [facility] CATH INVASIVE LOCATION;  Service: Cardiology;  Laterality: N/A;   • CARDIAC CATHETERIZATION N/A 3/17/2023    Procedure: Stent SABRINA coronary;  Surgeon: Lenny Martines MD;  Location: [facility] CATH INVASIVE LOCATION;  Service: Cardiology;  Laterality: N/A;   • CARDIAC CATHETERIZATION N/A 3/17/2023    Procedure: Optical Coherent Tomography;  Surgeon: Lenny Martines MD;  Location: [facility] CATH INVASIVE LOCATION;  Service: Cardiology;  Laterality: N/A;   • COLONOSCOPY N/A 01/25/2017    Procedure: COLONOSCOPY TO CECUM AND TI WITH POLYPECTOMY ;  Surgeon: Cachorro Hancock MD;  Location: [facility] ENDOSCOPY;  Service:    • COLONOSCOPY N/A 10/12/2022    Procedure: COLONOSCOPY TO CECUM WITH COLD BX POLYPECTOMY;  Surgeon: Cachorro Hancock MD;  Location: [facility] ENDOSCOPY;  Service: General;  Laterality: N/A;  SURVEILLANCE, HX POLYPS   --POLYP, DIVERTICULOSIS   • EYE SURGERY  Jan - Feb 2021    cataract surgery both eyes   • NEPHROURETERECTOMY Left 11/12/2020    Procedure: LEFT LAPAROSCOPIC NEPHROURETERECTOMY;  Surgeon: Ángel Florentino MD;  Location: [facility] MAIN OR;  Service: Urology;  Laterality: Left;   • TRANSURETHRAL RESECTION OF BLADDER TUMOR N/A 09/10/2020    Procedure: CYSTOSCOPY, LEFT URETEROSCOPY W/BIOPSY AND STENT PLACEMENT, TRANSURETHRAL RESECTION  OF BLADDER TUMOR;  Surgeon: Ángel Florentino MD;  Location: [facility] OR;  Service: Urology;  Laterality: N/A;       Home Medications:   Medications Prior to Admission   Medication Sig Dispense Refill Last Dose   • aspirin 81 MG chewable tablet Chew 1 tablet Daily.   4/14/2023   • clopidogrel (PLAVIX) 75 MG tablet Take 1 tablet by mouth Daily. 90 tablet 3 Past Week   • levothyroxine (SYNTHROID, LEVOTHROID) 88 MCG tablet TAKE 1 TABLET BY MOUTH EVERY DAY 90 tablet 0 4/14/2023   • metoprolol succinate XL (TOPROL-XL) 25 MG 24 hr tablet TAKE 1 TABLET BY MOUTH EVERY DAY 90 tablet 1 4/14/2023   • atorvastatin (LIPITOR) 40 MG tablet Take 1 tablet by mouth Every Night. 90 tablet 3    • chlorhexidine (PERIDEX) 0.12 % solution       • nitroglycerin (NITROSTAT) 0.4 MG SL tablet place 1 tablet under the tongue as needed for angina, may repeat every 5mins for up three doses 25 tablet 1    • triamcinolone (KENALOG) 0.1 % cream           Allergies:  No Known Allergies    Past Social History:  Social History     Socioeconomic History   • Marital status:    Tobacco Use   • Smoking status: Never     Passive exposure: Never   • Smokeless tobacco: Never   Vaping Use   • Vaping Use: Never used   Substance and Sexual Activity   • Alcohol use: Never   • Drug use: Never   • Sexual activity: Defer        Past Family History:  Family History   Problem Relation Age of Onset   • Rheum arthritis Mother    • Arthritis Mother         RA   • Aortic aneurysm Father    • Aortic aneurysm Cousin    • Malig Hyperthermia Neg Hx        Review of Systems: All systems reviewed. Pertinent positives identified in HPI. All other systems are negative.     Review of Systems   Constitutional: Negative.   Cardiovascular: Negative.    Respiratory: Negative.    Musculoskeletal: Positive for back pain.   Gastrointestinal: Negative.          Objective:     Objective:  Temp:  [98.3 °F (36.8 °C)-101.3 °F (38.5 °C)] 98.3 °F (36.8 °C)  Heart Rate:  [60-95]  62  Resp:  [18] 18  BP: (138-156)/(66-78) 144/69    Intake/Output Summary (Last 24 hours) at 4/15/2023 1331  Last data filed at 4/15/2023 0133  Gross per 24 hour   Intake 1000 ml   Output --   Net 1000 ml     Body mass index is 22.89 kg/m².      04/14/23  2106   Weight: 70.3 kg (155 lb)           Physical Exam:   Vitals and nursing note reviewed.   Constitutional:       Appearance: Healthy appearance.   Cardiovascular:      Normal rate. Regular rhythm. Normal S1. Normal S2.      Murmurs: There is no murmur.   Edema:     Peripheral edema absent.   Psychiatric:         Attention and Perception: Attention and perception normal.         Mood and Affect: Mood and affect normal.           Lab Review:     Results from last 7 days   Lab Units 04/15/23  0757 04/14/23  2151 04/14/23  2151 04/12/23  1542   SODIUM mmol/L 134*  --  134* 134   POTASSIUM mmol/L 4.4  --  4.4 5.0   CHLORIDE mmol/L 106  --  101 99   TOTAL CO2 mmol/L  --   --   --  20   CO2 mmol/L 18.2*  --  20.7*  --    BUN mg/dL 31*  --  30* 30*   CREATININE mg/dL 1.85*  --  2.14* 2.02*   GLUCOSE mg/dL 99   < > 123* 107*   CALCIUM mg/dL 8.5*  --  9.0 9.3    < > = values in this interval not displayed.           Results from last 7 days   Lab Units 04/15/23  0757   WBC 10*3/mm3 7.63   HEMOGLOBIN g/dL 10.1*   HEMATOCRIT % 30.7*   PLATELETS 10*3/mm3 154     Results from last 7 days   Lab Units 04/14/23 2151   INR  1.14*                               Imaging:    Imaging Results (Most Recent)     Procedure Component Value Units Date/Time    US Gallbladder [[id number]] Collected: 04/15/23 0424     Updated: 04/15/23 0430    Narrative:      GALLBLADDER ULTRASOUND     HISTORY: Cholelithiasis     COMPARISON: 04/15/2023     TECHNIQUE: Grayscale and color Doppler sonographic images were obtained  through the right upper quadrant.     FINDINGS:  Visualized portions of the pancreas are unremarkable. There is no intra  or extrahepatic biliary patient. Common bile duct measures up  to 5 mm.  Patient is noted to have stones and sludge within the gallbladder, but  there is no gallbladder wall thickening or pericholecystic fluid.  Gallbladder wall measures 2 mm in thickness. No focal hepatic lesions  are seen. The right kidney measures 10.4 x 4.7 cm. There is no  hydronephrosis. Renal echotexture is normal.       Impression:      Gallbladder stones and sludge.     This report was finalized on 4/15/2023 4:27 AM by Dr. Norah Fleming M.D.       CT Chest Without Contrast Diagnostic [[id number]] Collected: 04/15/23 0122     Updated: 04/15/23 0132    Narrative:      CT OF THE CHEST WITHOUT CONTRAST; CT OF THE ABDOMEN AND PELVIS WITHOUT  CONTRAST     HISTORY: Fever and shortness of breath     COMPARISON: 11/16/2020     TECHNIQUE: Axial CT imaging was obtained from the thoracic inlet through  the symphysis pubis. No IV contrast was administered.     FINDINGS:  CT CHEST: No acute infiltrates are seen. The thyroid gland, trachea, and  esophagus appear unremarkable. There are coronary artery calcifications.  There is no pleural or pericardial effusion. Mediastinal lymph nodes do  not appear pathologically enlarged. No acute osseous abnormalities are  seen.     CT OF THE ABDOMEN AND PELVIS: No suspicious hepatic lesions are seen.  The stomach, duodenum, adrenal glands, and pancreas are normal. There is  cholelithiasis. One of the stents does appear to be within the neck of  the gallbladder. Left kidney is absent. No hydronephrosis is seen on the  right. Calcified granulomata are seen within the spleen. There is  calcification of the aorta. No distal ureteral or bladder stones are  seen. Prostate gland protrudes into the base of the bladder. There is  colonic diverticulosis. Stool burden appears mildly increased, which  could reflect some constipation. The appendix is normal. There is tiny  fat-containing umbilical hernia. No acute osseous abnormalities are  seen.       Impression:         1. No acute  findings identified within the thorax.  2. Cholelithiasis. Patient does appear to have a stone within the  gallbladder neck. Ultrasound could be considered for further assessment.  3. Increased stool burden could reflect some constipation.     Radiation dose reduction techniques were utilized, including automated  exposure control and exposure modulation based on body size.     This report was finalized on 4/15/2023 1:29 AM by Dr. Norah Fleming M.D.       CT Abdomen Pelvis Without Contrast [[id number]] Collected: 04/15/23 0122     Updated: 04/15/23 0132    Narrative:      CT OF THE CHEST WITHOUT CONTRAST; CT OF THE ABDOMEN AND PELVIS WITHOUT  CONTRAST     HISTORY: Fever and shortness of breath     COMPARISON: 11/16/2020     TECHNIQUE: Axial CT imaging was obtained from the thoracic inlet through  the symphysis pubis. No IV contrast was administered.     FINDINGS:  CT CHEST: No acute infiltrates are seen. The thyroid gland, trachea, and  esophagus appear unremarkable. There are coronary artery calcifications.  There is no pleural or pericardial effusion. Mediastinal lymph nodes do  not appear pathologically enlarged. No acute osseous abnormalities are  seen.     CT OF THE ABDOMEN AND PELVIS: No suspicious hepatic lesions are seen.  The stomach, duodenum, adrenal glands, and pancreas are normal. There is  cholelithiasis. One of the stents does appear to be within the neck of  the gallbladder. Left kidney is absent. No hydronephrosis is seen on the  right. Calcified granulomata are seen within the spleen. There is  calcification of the aorta. No distal ureteral or bladder stones are  seen. Prostate gland protrudes into the base of the bladder. There is  colonic diverticulosis. Stool burden appears mildly increased, which  could reflect some constipation. The appendix is normal. There is tiny  fat-containing umbilical hernia. No acute osseous abnormalities are  seen.       Impression:         1. No acute findings  identified within the thorax.  2. Cholelithiasis. Patient does appear to have a stone within the  gallbladder neck. Ultrasound could be considered for further assessment.  3. Increased stool burden could reflect some constipation.     Radiation dose reduction techniques were utilized, including automated  exposure control and exposure modulation based on body size.     This report was finalized on 4/15/2023 1:29 AM by Dr. Norah Fleming M.D.       XR Chest 1 View [[id number]] Collected: 04/14/23 2219     Updated: 04/14/23 2222    Narrative:      SINGLE VIEW OF THE CHEST     HISTORY: Fever and cough     COMPARISON: None available.     FINDINGS:  Heart size is within normal limits. No pneumothorax, pleural effusion,  or acute infiltrate is seen.       Impression:      No acute findings.     This report was finalized on 4/14/2023 10:19 PM by Dr. Norah Fleming M.D.             EKG:         Baseline:     I personally viewed and interpreted the patient's EKG/Telemetry tracings.    Assessment:   Assessment & Plan       1. Stable Coronary Artery Disease  2. Recent PCI with stent to LAD  3. Chronic Kidney Disease  4. Cholecystitis      Plan:     I discussed with Dr. Miller and Dr. Lyle.      Plan will be on surgery on DAPT.      Otherwise he is stable for surgery, and we will follow.     Thank you for allowing me to participate in the care of Kunal Vargas. Feel free to contact me directly with any further questions or concerns.    PAVAN Quigley  04/15/23  13:31 EDT        Electronically signed by Michael Dean MD at 04/15/23 4760     Lucrecia Lyle MD at 04/15/23 1232      Consult Orders    1. Inpatient General Surgery Consult [[id number]] ordered by Ángel Byrd MD at 04/15/23 0964               General Surgery Consult    Summary:    Mr. Kunal Vargas is a 77 y.o. year old gentleman with elevated LFT's and cholelithiasis with possible cholecystitis and possible  choledocholithiasis. Discussed options with the patient and his wife. Recommended proceeding with laparoscopic cholecystectomy with cholangiogram tomorrow morning in the OR. Discussed increased bleeding risk due to aspirin and plavix. Discussed that he may require a post-operative ERCP if he has a retained CBD stone. Cardiology consulted for clearance. NPO after midnight.     Chief Complaint:    Abdominal pain    History of Present Illness:    Mr. Kunal Vargas is a 77 y.o. year old gentleman who presented to the ER with a 5 day history of fever, chills, weakness, fatigue. He has had an unremarkable outpatient workup. His LFT's were found to be elevated on admission. He was admitted last month for chest pain and had a SABRINA to the LAD placed.     Past Medical History:   • CAD   • GERD  • HLD    Past Surgical History:    Past Surgical History:   Procedure Laterality Date   • CARDIAC CATHETERIZATION     • CARDIAC CATHETERIZATION N/A 3/17/2023    Procedure: Left Heart Cath;  Surgeon: Lenny Martines MD;  Location: [facility] CATH INVASIVE LOCATION;  Service: Cardiology;  Laterality: N/A;   • CARDIAC CATHETERIZATION N/A 3/17/2023    Procedure: Coronary angiography;  Surgeon: Lenny Martines MD;  Location: [facility] CATH INVASIVE LOCATION;  Service: Cardiology;  Laterality: N/A;   • CARDIAC CATHETERIZATION N/A 3/17/2023    Procedure: Stent SABRINA coronary;  Surgeon: Lenny Martines MD;  Location: [facility] CATH INVASIVE LOCATION;  Service: Cardiology;  Laterality: N/A;   • CARDIAC CATHETERIZATION N/A 3/17/2023    Procedure: Optical Coherent Tomography;  Surgeon: Lenny Martines MD;  Location: [facility] CATH INVASIVE LOCATION;  Service: Cardiology;  Laterality: N/A;   • COLONOSCOPY N/A 01/25/2017    Procedure: COLONOSCOPY TO CECUM AND TI WITH POLYPECTOMY ;  Surgeon: Cachorro Hancock MD;  Location: [facility] ENDOSCOPY;  Service:    • COLONOSCOPY N/A 10/12/2022    Procedure: COLONOSCOPY TO CECUM WITH COLD BX  POLYPECTOMY;  Surgeon: Cachorro Hancock MD;  Location: [facility] ENDOSCOPY;  Service: General;  Laterality: N/A;  SURVEILLANCE, HX POLYPS   --POLYP, DIVERTICULOSIS   • EYE SURGERY  Jan - Feb 2021    cataract surgery both eyes   • NEPHROURETERECTOMY Left 11/12/2020    Procedure: LEFT LAPAROSCOPIC NEPHROURETERECTOMY;  Surgeon: Ángel Florentino MD;  Location: [facility] MAIN OR;  Service: Urology;  Laterality: Left;   • TRANSURETHRAL RESECTION OF BLADDER TUMOR N/A 09/10/2020    Procedure: CYSTOSCOPY, LEFT URETEROSCOPY W/BIOPSY AND STENT PLACEMENT, TRANSURETHRAL RESECTION OF BLADDER TUMOR;  Surgeon: Ángel Florentino MD;  Location: [facility] MAIN OR;  Service: Urology;  Laterality: N/A;     Family History:    Family History   Problem Relation Age of Onset   • Rheum arthritis Mother    • Arthritis Mother         RA   • Aortic aneurysm Father    • Aortic aneurysm Cousin    • Malig Hyperthermia Neg Hx      Social History:    Social History     Socioeconomic History   • Marital status:    Tobacco Use   • Smoking status: Never     Passive exposure: Never   • Smokeless tobacco: Never   Vaping Use   • Vaping Use: Never used   Substance and Sexual Activity   • Alcohol use: Never   • Drug use: Never   • Sexual activity: Defer     Allergies:   No Known Allergies    Medications:     Current Facility-Administered Medications:   •  aspirin chewable tablet 81 mg, 81 mg, Oral, Daily, Ángel Byrd MD, 81 mg at 04/15/23 0937  •  calcium carbonate (TUMS) chewable tablet 500 mg (200 mg elemental), 2 tablet, Oral, BID PRN, Spring Sharma, APRN  •  clopidogrel (PLAVIX) tablet 75 mg, 75 mg, Oral, Daily, Ángel Byrd MD, 75 mg at 04/15/23 0937  •  levothyroxine (SYNTHROID, LEVOTHROID) tablet 88 mcg, 88 mcg, Oral, Daily, Ángel Byrd MD, 88 mcg at 04/15/23 0937  •  metoprolol succinate XL (TOPROL-XL) 24 hr tablet 25 mg, 25 mg, Oral, Daily, Ángel Byrd MD, 25 mg at 04/15/23 0937  •   morphine injection 2 mg, 2 mg, Intravenous, Q3H PRN, Spring Sharma APRN, 2 mg at 04/15/23 0943  •  ondansetron (ZOFRAN) tablet 4 mg, 4 mg, Oral, Q6H PRN **OR** ondansetron (ZOFRAN) injection 4 mg, 4 mg, Intravenous, Q6H PRN, Spring Sharma APRN  •  Pharmacy to Dose Zosyn, , Does not apply, Continuous PRN, Spring Sharma APRN  •  piperacillin-tazobactam (ZOSYN) 3.375 g in iso-osmotic dextrose 50 ml (premix), 3.375 g, Intravenous, Q8H, Spring Sharma APRN, 3.375 g at 04/15/23 0937  •  polyethylene glycol (MIRALAX) packet 17 g, 17 g, Oral, Daily, Ángel Byrd MD, 17 g at 04/15/23 0937  •  sodium chloride 0.9 % flush 10 mL, 10 mL, Intravenous, Q12H, Spring Sharma APRN, 10 mL at 04/15/23 0939  •  sodium chloride 0.9 % flush 10 mL, 10 mL, Intravenous, PRN, Spring Sharma APRN  •  sodium chloride 0.9 % infusion 40 mL, 40 mL, Intravenous, PRN, Spring Sharma APRN  •  sodium chloride 0.9 % infusion, 100 mL/hr, Intravenous, Continuous, Spring Sharma APRN, Last Rate: 100 mL/hr at 04/15/23 0610, 100 mL/hr at 04/15/23 0610    Radiology/Endoscopy:    • CT abdomen pelvis 4/14/23: cholelithiasis with gallstone stuck in the neck of the gallbladder   • 4/15/2023 RUQ US: gallstones and sludge     Labs:    • WBC 7 Hgb 10 platelets 154 Cr 1.8 AST 94  alk phos 604 tbili 3.1     Review of Systems:   Influenza-like illness: no fever, no  cough, no  sore throat, no  body aches, no loss of sense of taste or smell, no known exposure to person with Covid-19.  Constitutional: Negative for fevers or chills  HENT: Negative for hearing loss or runny nose  Eyes: Negative for vision changes or scleral icterus  Respiratory: Negative for cough or shortness of breath  Cardiovascular: Negative for chest pain or heart palpitations  Gastrointestinal: + for abdominal pain, nausea, Negative for vomiting, constipation, melena, or hematochezia  Genitourinary: Negative for  hematuria or dysuria  Musculoskeletal: Negative for joint swelling or gait instability  Neurologic: Negative for tremors or seizures  Psychiatric: Negative for suicidal ideations or depression  All other systems reviewed and negative    Physical Exam:   • Constitutional: Well-developed well-nourished, no acute distress  • Eyes:  Conjunctivae normal, sclerae nonicteric  • ENMT: Hearing grossly normal, oral mucosa moist  • Neck: Supple, trachea midline  • Respiratory: Clear to auscultation, normal inspiratory effort  • Cardiovascular: Regular rate, no peripheral edema, no jugular venous distention  • Gastrointestinal: Soft, right upper quadrant abdominal pain  • Skin:  Warm, dry, no rash on visualized skin surfaces  • Musculoskeletal: Symmetric strength, normal gait  • Psychiatric: Alert and oriented ×3, normal affect     LUCRECIA ROSSI M.D.  General and Endoscopic Surgery  [facility]    [address] [place] [address], [address]  [place], [place], [facility]  P: [phone]  F: [phone]       Electronically signed by Lucrecia Rossi MD at 04/15/23 9546

## 2023-04-17 NOTE — PROGRESS NOTES
General Surgery     POD1 s/p laparoscopic cholecystectomy with cholangiogram     Re-intubated in PACU but extubated this am. Overall feels well.     Sitting up in bed  RRR  No increased WOB  Abdomen soft, incision sites clean and dry     WBC 9 Hgb 10 platelets 211 Cr 2.5 tbili 1.6    Plan:   Diet as tolerated  PRN pain and nausea medications  OOB and ambulate as tolerated    Lucrecia Lyle MD

## 2023-04-17 NOTE — PROGRESS NOTES
Hospital Follow Up    LOS:  LOS: 2 days   Patient Name: Kunal Vargas  Age/Sex: 77 y.o. male  : 1945  MRN: [id number]    Day of Service: 23   Length of Stay: 2  Encounter Provider: Manny Awan MD  Place of Service: [facility] CARDIOLOGY  Patient Care Team:  Chai Montesinos MD as PCP - General    Subjective:     Chief Complaint: Coronary artery disease    Interval History: Patient denies any types of chest discomfort or shortness of breath.  Patient unfortunately voided and is still having fairly large residual.    Objective:     Objective:  Temp:  [97.5 °F (36.4 °C)-98.2 °F (36.8 °C)] 98.2 °F (36.8 °C)  Heart Rate:  [48-70] 54  Resp:  [15-22] 16  BP: ()/(34-70) 122/56  FiO2 (%):  [21 %-30 %] 21 %     Intake/Output Summary (Last 24 hours) at 2023 1246  Last data filed at 2023 1126  Gross per 24 hour   Intake 4754.03 ml   Output 1113 ml   Net 3641.03 ml     Body mass index is 23.86 kg/m².      23  2106 23  1629   Weight: 70.3 kg (155 lb) 73.3 kg (161 lb 9.6 oz)     Weight change:       Physical Exam:   General : Alert, cooperative, in no acute distress.  Neuro: Alert,cooperative and oriented.  Lungs: CTAB. Normal respiratory effort and rate.  CV: Regular rate and rhythm, normal S1 and S2, no murmurs, gallops or rubs.  ABD: Soft, nontender, nondistended. Positive bowel sounds.  Extr: No edema or cyanosis, moves all extremities.    Lab Review:   Results from last 7 days   Lab Units 23  0526 23  1458   SODIUM mmol/L 138 138 133*   POTASSIUM mmol/L 5.3* 4.6 6.2*   CHLORIDE mmol/L 107 108* 105   CO2 mmol/L 18.7* 17.2* 16.8*   BUN mg/dL 43* 40* 40*   CREATININE mg/dL 2.58* 2.26* 2.23*   GLUCOSE mg/dL 133* 207* 149*   CALCIUM mg/dL 8.4* 8.0* 8.1*   AST (SGOT) U/L 83*  --  107*   ALT (SGPT) U/L 135*  --  140*         Results from last 7 days   Lab Units 23  0526 23  1351   WBC 10*3/mm3 9.08 9.34    HEMOGLOBIN g/dL 10.0* 10.6*   HEMATOCRIT % 30.4* 32.5*   PLATELETS 10*3/mm3 211 219     Results from last 7 days   Lab Units 04/14/23  2151   INR  1.14*               Invalid input(s): LDLCALC            Current Medications:   Scheduled Meds:aspirin, 81 mg, Oral, Daily  clopidogrel, 75 mg, Oral, Daily  levothyroxine, 88 mcg, Oral, Daily  metoprolol succinate XL, 25 mg, Oral, Daily  piperacillin-tazobactam, 3.375 g, Intravenous, Q8H  polyethylene glycol, 17 g, Oral, Daily  sodium bicarbonate, 1,300 mg, Oral, TID  sodium chloride, 10 mL, Intravenous, Q12H      Continuous Infusions:lactated ringers, 9 mL/hr  norepinephrine, 0.02-0.3 mcg/kg/min  propofol, 5-50 mcg/kg/min, Last Rate: Stopped (04/17/23 0600)  sodium chloride, 100 mL/hr, Last Rate: 100 mL/hr (04/17/23 0514)        Allergies:  No Known Allergies    Assessment:       Fever and chills    Chronic coronary artery disease    Essential hypertension    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  Patient had a angioplasty to his LAD 3/17/2023.  Patient currently is pain-free.  He remains on his aspirin and Plavix which is key at the current time since his stents is so fresh territory.  2.  Hypertension  3.  Elevated creatinine.  Nephrology has been consulted  4.  Acute cholelithiasis  5.  We will follow for now.        Manny Awan MD  04/17/23  12:46 EDT

## 2023-04-17 NOTE — PROGRESS NOTES
"[facility] PULMONARY CARE         Dr Berger Sayied   LOS: 2 days   Patient Care Team:  Chai Montesinos MD as PCP - General    Chief Complaint: Acute hypercapnic respite failure with postop respiratory failure status post lap vasquez multiple medical issues as listed below    Interval History: Patient on the vent sedated intubated.  Tolerating SBT trial well.  Awake following simple commands.    REVIEW OF SYSTEMS:   Sedated intubated following commands    Ventilator/Non-Invasive Ventilation Settings (From admission, onward)     Start     Ordered    04/16/23 1712  Ventilator - AC/VC; 15; 5; mL; 600  Continuous,   Status:  Canceled        Question Answer Comment   Vent Mode AC/VC    Rate 15    PEEP 5    Tidal Volume mL            04/16/23 1715                  Vital Signs  Temp:  [97.5 °F (36.4 °C)-98.2 °F (36.8 °C)] 97.9 °F (36.6 °C)  Heart Rate:  [48-84] 54  Resp:  [12-22] 16  BP: ()/(34-81) 122/56  FiO2 (%):  [21 %-30 %] 21 %    Intake/Output Summary (Last 24 hours) at 4/17/2023 0958  Last data filed at 4/17/2023 0400  Gross per 24 hour   Intake 4754.03 ml   Output 550 ml   Net 4204.03 ml     Flowsheet Rows    Flowsheet Row First Filed Value   Admission Height 175.3 cm (69\") Documented at 04/14/2023 2106   Admission Weight 70.3 kg (155 lb) Documented at 04/14/2023 2106          Physical Exam:  Patient is examined using the personal protective equipment as per guidelines from infection control for this particular patient as enacted.  Hand hygiene was performed before and after patient interaction.   General Appearance:   Intubated following simple commands tolerating SBT trial well  ENT Mallampati between 3 and 4 no nasal congestion  Neck midline trachea, no thyromegaly   Lungs:    Diminished breath sounds bilaterally    Heart:    Regular rhythm and normal rate, normal S1 and S2, no            murmur, no gallop, no rub, no click   Chest Wall:    No abnormalities observed   Abdomen:    Soft mild " diffused tenderness postop.  Bowel sounds positive   Extremities:   Moves all extremities well, no edema, no cyanosis, no             redness  CNS no focal neurological deficits normal sensory exam  Skin no rashes no nodules  Musculoskeletal no cyanosis no clubbing normal range of motion     Results Review:        Results from last 7 days   Lab Units 04/17/23  0526 04/16/23  1944 04/16/23  1458   SODIUM mmol/L 138 138 133*   POTASSIUM mmol/L 5.3* 4.6 6.2*   CHLORIDE mmol/L 107 108* 105   CO2 mmol/L 18.7* 17.2* 16.8*   BUN mg/dL 43* 40* 40*   CREATININE mg/dL 2.58* 2.26* 2.23*   GLUCOSE mg/dL 133* 207* 149*   CALCIUM mg/dL 8.4* 8.0* 8.1*         Results from last 7 days   Lab Units 04/17/23  0526 04/16/23  1351 04/16/23  0546   WBC 10*3/mm3 9.08 9.34 7.80   HEMOGLOBIN g/dL 10.0* 10.6* 9.7*   HEMATOCRIT % 30.4* 32.5* 28.9*   PLATELETS 10*3/mm3 211 219 151     Results from last 7 days   Lab Units 04/14/23  2151   INR  1.14*                 Results from last 7 days   Lab Units 04/16/23  1415   PH, ARTERIAL pH units 7.348*   PO2 ART mm Hg 145.7*   PCO2, ARTERIAL mm Hg 33.9*   HCO3 ART mmol/L 18.6*       I reviewed the patient's new clinical results.  I personally viewed and interpreted the patient's chest x-ray.        Medication Review:   [MAR Hold] aspirin, 81 mg, Oral, Daily  chlorhexidine, 15 mL, Mouth/Throat, Q12H  [MAR Hold] clopidogrel, 75 mg, Oral, Daily  [MAR Hold] levothyroxine, 88 mcg, Oral, Daily  metoprolol succinate XL, 25 mg, Oral, Daily  piperacillin-tazobactam, 3.375 g, Intravenous, Q8H  [MAR Hold] polyethylene glycol, 17 g, Oral, Daily  [MAR Hold] sodium chloride, 10 mL, Intravenous, Q12H        lactated ringers, 9 mL/hr  norepinephrine, 0.02-0.3 mcg/kg/min  propofol, 5-50 mcg/kg/min, Last Rate: Stopped (04/17/23 0600)  sodium chloride, 100 mL/hr, Last Rate: 100 mL/hr (04/17/23 0514)        ASSESSMENT:   1. Acute hypercapnic respiratory failure, on mechanical ventilation: Suspect secondary to anesthesia  and poor renal clearance in setting of CKD.  There is no evidence of lung disease on imaging.  2. SHIV on CKD  3. Acute hyperkalemia, possibly secondary to paralytics and SHIV.  No ST-T changes on the monitor  4. Cholecystitis s/p lap vasquez 4/16  5. Sepsis, secondary to cholecystitis.  Improving  6. Elevated LFTs, secondary  7. CAD s/p SABRINA stent to proximal LAD 3/17  8. Elevated LFTs secondary to cholecystitis  9. Metabolic acidosis, secondary to renal failure    PLAN:  Events noted chart reviewed  SBT trial tolerating well.  Wean from the vent and extubate today.  Acute kidney injury on chronic kidney disease with hyperkalemia.  I will ask nephrology to see.  Potassium previously improved with temporizing measures.  Continue current antibiotics status post lap vasquez.  Restart home medications  ICU core measures  Discussed with patient's wife at bedside    Critical care time 35 minutes        Celine Hernandez MD  04/17/23  09:58 EDT

## 2023-04-17 NOTE — NURSING NOTE
Shift summary: Patient did well overnight. I/O cath x1. Sedation is off this morning for SBT with hopes of extubation later this morning.

## 2023-04-17 NOTE — PROGRESS NOTES
"Chief Complaint  Fever and Chills    Subjective        Kunal Vargas presents to [facility] PRIMARY [facility]ARE  History of Present Illness  For fever and chills.  Patient stated daily yesterday he had fever 101 and 102 at home and does not have any other symptoms such as upper respiratory tract infection symptoms or cough or any other issues other than the fever.  Denies having any runny nose, sore throat, earache, sinus pain, sinus pressure, chest tightness, shortness of breath, abdominal pain, nausea, vomiting, urinary issue or stool issues  Patient stated he took Tylenol every 4 hours for his fever and since this morning he does not have any high fever.  Fever     Chills  Associated symptoms include chills and a fever.       Objective   Vital Signs:  /42   Pulse 64   Temp 98.9 °F (37.2 °C)   Resp 16   Ht 175.3 cm (69.02\")   Wt 69.9 kg (154 lb)   SpO2 98%   BMI 22.73 kg/m²   Estimated body mass index is 22.73 kg/m² as calculated from the following:    Height as of this encounter: 175.3 cm (69.02\").    Weight as of this encounter: 69.9 kg (154 lb).       BMI is within normal parameters. No other follow-up for BMI required.      Physical Exam  HENT:      Head: Normocephalic and atraumatic.      Mouth/Throat:      Mouth: Mucous membranes are moist.      Pharynx: Oropharynx is clear.   Eyes:      Extraocular Movements: Extraocular movements intact.      Conjunctiva/sclera: Conjunctivae normal.      Pupils: Pupils are equal, round, and reactive to light.   Cardiovascular:      Rate and Rhythm: Normal rate and regular rhythm.   Pulmonary:      Effort: Pulmonary effort is normal.      Breath sounds: Normal breath sounds.   Abdominal:      General: Bowel sounds are normal.      Palpations: Abdomen is soft.   Musculoskeletal:         General: Normal range of motion.      Cervical back: Neck supple.   Skin:     General: Skin is warm.      Capillary Refill: Capillary refill takes less than 2 " seconds.   Neurological:      General: No focal deficit present.      Mental Status: He is alert and oriented to person, place, and time. Mental status is at baseline.   Psychiatric:         Mood and Affect: Mood normal.        Result Review :                   Assessment and Plan   Diagnoses and all orders for this visit:    1. Fever and chills (Primary)  Comments:  CBC, CMP, lactic acid, procalcitonin ordered.  Flu and COVID came out negative  Orders:  -     POCT SARS-CoV-2 Antigen MAYRA + Flu  -     CBC Auto Differential  -     Comprehensive Metabolic Panel  -     Lactic Acid, Plasma  -     Procalcitonin    2. Elevated LFTs  Comments:  Advised patient not to take any more Tylenol, also advised to stop taking Lipitor, will repeat LFTs in a week since LFTs are 3 times higher than normal    3. Drug-induced liver injury  Comments:  advised patient to stop taking tylenol and stop lipitor also , will recheck LFTs in a week    Other orders  -     CBC & Differential    Will follow-up on LFTs in a week.         Follow Up   No follow-ups on file.  Patient was given instructions and counseling regarding his condition or for health maintenance advice. Please see specific information pulled into the AVS if appropriate.

## 2023-04-17 NOTE — PLAN OF CARE
Problem: Adult Inpatient Plan of Care  Goal: Absence of Hospital-Acquired Illness or Injury  Intervention: Identify and Manage Fall Risk  Recent Flowsheet Documentation  Taken 4/17/2023 1800 by Olayinka Huertas RN  Safety Promotion/Fall Prevention:   assistive device/personal items within reach   activity supervised   nonskid shoes/slippers when out of bed   room organization consistent   safety round/check completed  Taken 4/17/2023 1616 by Olayinka Huertas RN  Safety Promotion/Fall Prevention:   activity supervised   assistive device/personal items within reach   room organization consistent   safety round/check completed     Problem: Adult Inpatient Plan of Care  Goal: Absence of Hospital-Acquired Illness or Injury  Intervention: Prevent Skin Injury  Recent Flowsheet Documentation  Taken 4/17/2023 1800 by Olayinka Huertas RN  Body Position: turned  Taken 4/17/2023 1616 by Olayinka Huertas RN  Body Position:   turned   lower extremity elevated   supine     Problem: Adult Inpatient Plan of Care  Goal: Absence of Hospital-Acquired Illness or Injury  Intervention: Prevent and Manage VTE (Venous Thromboembolism) Risk  Recent Flowsheet Documentation  Taken 4/17/2023 1800 by Olayinka Huertas RN  Activity Management: activity encouraged  Taken 4/17/2023 1616 by Olayinka Huertas RN  Activity Management: activity encouraged   Goal Outcome Evaluation:  Plan of Care Reviewed With: patient, spouse           Outcome Evaluation: patient AO x 4 ,[place], bradycardia sinus rhytm EKG ,continue IN fluids NS @ 100 ml/hr, alford catheter inserted tolerated well, bladder scanner done=0; able to walk to the toilet with walker assistant x 1 state feel a little fatigue.

## 2023-04-17 NOTE — SIGNIFICANT NOTE
04/17/23 0739   Readiness to Wean Daily Screen   Daily Screen Complete Y   Daily Screen Outcome pass   Spontaneous Breathing Trial   $ SBT Readiness to Wean yes   Vt Spontaneous (mL) 673 mL   Vital Capacity (mL) 2289   Effort (VC) good   RSBI 16   Negative Inspiratory Force (cm H2O) -33   Effort (NIF) good   Resp Rate (Obs) 16

## 2023-04-18 LAB
ALBUMIN SERPL-MCNC: 2.6 G/DL (ref 3.5–5.2)
ANION GAP SERPL CALCULATED.3IONS-SCNC: 11.6 MMOL/L (ref 5–15)
BASOPHILS # BLD AUTO: 0.07 10*3/MM3 (ref 0–0.2)
BASOPHILS NFR BLD AUTO: 0.8 % (ref 0–1.5)
BUN SERPL-MCNC: 45 MG/DL (ref 8–23)
BUN/CREAT SERPL: 16.4 (ref 7–25)
CALCIUM SPEC-SCNC: 8.1 MG/DL (ref 8.6–10.5)
CHLORIDE SERPL-SCNC: 112 MMOL/L (ref 98–107)
CO2 SERPL-SCNC: 18.4 MMOL/L (ref 22–29)
CREAT SERPL-MCNC: 2.74 MG/DL (ref 0.76–1.27)
DEPRECATED RDW RBC AUTO: 43.5 FL (ref 37–54)
EGFRCR SERPLBLD CKD-EPI 2021: 23.1 ML/MIN/1.73
EOSINOPHIL # BLD AUTO: 1.05 10*3/MM3 (ref 0–0.4)
EOSINOPHIL NFR BLD AUTO: 12 % (ref 0.3–6.2)
ERYTHROCYTE [DISTWIDTH] IN BLOOD BY AUTOMATED COUNT: 12.6 % (ref 12.3–15.4)
FERRITIN SERPL-MCNC: 651 NG/ML (ref 30–400)
GLUCOSE SERPL-MCNC: 95 MG/DL (ref 65–99)
HCT VFR BLD AUTO: 26.8 % (ref 37.5–51)
HGB BLD-MCNC: 8.9 G/DL (ref 13–17.7)
IMM GRANULOCYTES # BLD AUTO: 0.04 10*3/MM3 (ref 0–0.05)
IMM GRANULOCYTES NFR BLD AUTO: 0.5 % (ref 0–0.5)
IRON 24H UR-MRATE: 68 MCG/DL (ref 59–158)
IRON SATN MFR SERPL: 35 % (ref 20–50)
LAB AP CASE REPORT: NORMAL
LYMPHOCYTES # BLD AUTO: 1.06 10*3/MM3 (ref 0.7–3.1)
LYMPHOCYTES NFR BLD AUTO: 12.1 % (ref 19.6–45.3)
MAGNESIUM SERPL-MCNC: 2.6 MG/DL (ref 1.6–2.4)
MCH RBC QN AUTO: 31.8 PG (ref 26.6–33)
MCHC RBC AUTO-ENTMCNC: 33.2 G/DL (ref 31.5–35.7)
MCV RBC AUTO: 95.7 FL (ref 79–97)
MONOCYTES # BLD AUTO: 0.81 10*3/MM3 (ref 0.1–0.9)
MONOCYTES NFR BLD AUTO: 9.3 % (ref 5–12)
NEUTROPHILS NFR BLD AUTO: 5.71 10*3/MM3 (ref 1.7–7)
NEUTROPHILS NFR BLD AUTO: 65.3 % (ref 42.7–76)
NRBC BLD AUTO-RTO: 0 /100 WBC (ref 0–0.2)
PATH REPORT.FINAL DX SPEC: NORMAL
PATH REPORT.GROSS SPEC: NORMAL
PHOSPHATE SERPL-MCNC: 3.5 MG/DL (ref 2.5–4.5)
PLATELET # BLD AUTO: 212 10*3/MM3 (ref 140–450)
PMV BLD AUTO: 10.8 FL (ref 6–12)
POTASSIUM SERPL-SCNC: 4.7 MMOL/L (ref 3.5–5.2)
RBC # BLD AUTO: 2.8 10*6/MM3 (ref 4.14–5.8)
SODIUM SERPL-SCNC: 142 MMOL/L (ref 136–145)
TIBC SERPL-MCNC: 194 MCG/DL (ref 298–536)
TRANSFERRIN SERPL-MCNC: 130 MG/DL (ref 200–360)
URATE SERPL-MCNC: 5.6 MG/DL (ref 3.4–7)
WBC NRBC COR # BLD: 8.74 10*3/MM3 (ref 3.4–10.8)

## 2023-04-18 PROCEDURE — 85025 COMPLETE CBC W/AUTO DIFF WBC: CPT | Performed by: INTERNAL MEDICINE

## 2023-04-18 PROCEDURE — 84466 ASSAY OF TRANSFERRIN: CPT | Performed by: STUDENT IN AN ORGANIZED HEALTH CARE EDUCATION/TRAINING PROGRAM

## 2023-04-18 PROCEDURE — 80069 RENAL FUNCTION PANEL: CPT | Performed by: INTERNAL MEDICINE

## 2023-04-18 PROCEDURE — 83735 ASSAY OF MAGNESIUM: CPT | Performed by: INTERNAL MEDICINE

## 2023-04-18 PROCEDURE — 99232 SBSQ HOSP IP/OBS MODERATE 35: CPT | Performed by: INTERNAL MEDICINE

## 2023-04-18 PROCEDURE — 83540 ASSAY OF IRON: CPT | Performed by: STUDENT IN AN ORGANIZED HEALTH CARE EDUCATION/TRAINING PROGRAM

## 2023-04-18 PROCEDURE — 84550 ASSAY OF BLOOD/URIC ACID: CPT | Performed by: INTERNAL MEDICINE

## 2023-04-18 PROCEDURE — 99024 POSTOP FOLLOW-UP VISIT: CPT | Performed by: SURGERY

## 2023-04-18 PROCEDURE — 97116 GAIT TRAINING THERAPY: CPT

## 2023-04-18 PROCEDURE — 99231 SBSQ HOSP IP/OBS SF/LOW 25: CPT | Performed by: NURSE PRACTITIONER

## 2023-04-18 PROCEDURE — 97161 PT EVAL LOW COMPLEX 20 MIN: CPT

## 2023-04-18 PROCEDURE — 25010000002 PIPERACILLIN SOD-TAZOBACTAM PER 1 G: Performed by: INTERNAL MEDICINE

## 2023-04-18 PROCEDURE — 82728 ASSAY OF FERRITIN: CPT | Performed by: STUDENT IN AN ORGANIZED HEALTH CARE EDUCATION/TRAINING PROGRAM

## 2023-04-18 RX ADMIN — ASPIRIN 81 MG: 81 TABLET, CHEWABLE ORAL at 08:52

## 2023-04-18 RX ADMIN — TAZOBACTAM SODIUM AND PIPERACILLIN SODIUM 3.38 G: 375; 3 INJECTION, SOLUTION INTRAVENOUS at 08:52

## 2023-04-18 RX ADMIN — Medication 10 ML: at 21:03

## 2023-04-18 RX ADMIN — SODIUM CHLORIDE 100 ML/HR: 9 INJECTION, SOLUTION INTRAVENOUS at 06:50

## 2023-04-18 RX ADMIN — Medication 10 ML: at 08:55

## 2023-04-18 RX ADMIN — TAZOBACTAM SODIUM AND PIPERACILLIN SODIUM 3.38 G: 375; 3 INJECTION, SOLUTION INTRAVENOUS at 00:31

## 2023-04-18 RX ADMIN — SODIUM BICARBONATE 1300 MG: 650 TABLET ORAL at 21:03

## 2023-04-18 RX ADMIN — POLYETHYLENE GLYCOL 3350 17 G: 17 POWDER, FOR SOLUTION ORAL at 08:53

## 2023-04-18 RX ADMIN — TAZOBACTAM SODIUM AND PIPERACILLIN SODIUM 3.38 G: 375; 3 INJECTION, SOLUTION INTRAVENOUS at 17:28

## 2023-04-18 RX ADMIN — CLOPIDOGREL BISULFATE 75 MG: 75 TABLET, FILM COATED ORAL at 08:52

## 2023-04-18 RX ADMIN — LEVOTHYROXINE SODIUM 88 MCG: 0.09 TABLET ORAL at 08:52

## 2023-04-18 RX ADMIN — SODIUM BICARBONATE 1300 MG: 650 TABLET ORAL at 17:28

## 2023-04-18 RX ADMIN — SODIUM BICARBONATE 1300 MG: 650 TABLET ORAL at 08:52

## 2023-04-18 NOTE — PROGRESS NOTES
"      [facility]ULMON[facility]         Dr Berger Sayied   LOS: 3 days   Patient Care Team:  Chai Montesinos MD as PCP - General    Chief Complaint: Acute hypercapnic respite failure with postop respiratory failure status post lap vasquez multiple medical issues as listed below    Interval History: Resting comfortably no overnight issues.  Currently on room air.    REVIEW OF SYSTEMS:   No chest pain or shortness of breath    Ventilator/Non-Invasive Ventilation Settings (From admission, onward)   Room air      Vital Signs  Temp:  [97.6 °F (36.4 °C)-98.7 °F (37.1 °C)] 97.6 °F (36.4 °C)  Heart Rate:  [54-68] 54  Resp:  [18] 18  BP: (126-159)/(63-70) 159/70    Intake/Output Summary (Last 24 hours) at 4/18/2023 1557  Last data filed at 4/18/2023 0650  Gross per 24 hour   Intake 1848.33 ml   Output 2100 ml   Net -251.67 ml     Flowsheet Rows    Flowsheet Row First Filed Value   Admission Height 175.3 cm (69\") Documented at 04/14/2023 2106   Admission Weight 70.3 kg (155 lb) Documented at 04/14/2023 2106          Physical Exam:  Patient is examined using the personal protective equipment as per guidelines from infection control for this particular patient as enacted.  Hand hygiene was performed before and after patient interaction.   General Appearance:   Awake following simple commands  Neck midline trachea, no thyromegaly   Lungs:    Diminished breath sounds bilaterally    Heart:    Regular rhythm and normal rate, normal S1 and S2, no            murmur, no gallop, no rub, no click   Chest Wall:    No abnormalities observed   Abdomen:    Soft mild diffused tenderness postop.  Bowel sounds positive   Extremities:   Moves all extremities well, no edema, no cyanosis, no             redness  CNS no focal neurological deficits normal sensory exam  Skin no rashes no nodules  Musculoskeletal no cyanosis no clubbing normal range of motion     Results Review:        Results from last 7 days   Lab Units 04/18/23  0343 " 04/17/23  0526 04/16/23  1944   SODIUM mmol/L 142 138 138   POTASSIUM mmol/L 4.7 5.3* 4.6   CHLORIDE mmol/L 112* 107 108*   CO2 mmol/L 18.4* 18.7* 17.2*   BUN mg/dL 45* 43* 40*   CREATININE mg/dL 2.74* 2.58* 2.26*   GLUCOSE mg/dL 95 133* 207*   CALCIUM mg/dL 8.1* 8.4* 8.0*         Results from last 7 days   Lab Units 04/18/23  0343 04/17/23  0526 04/16/23  1351   WBC 10*3/mm3 8.74 9.08 9.34   HEMOGLOBIN g/dL 8.9* 10.0* 10.6*   HEMATOCRIT % 26.8* 30.4* 32.5*   PLATELETS 10*3/mm3 212 211 219     Results from last 7 days   Lab Units 04/14/23  2151   INR  1.14*         Results from last 7 days   Lab Units 04/18/23  0343   MAGNESIUM mg/dL 2.6*         Results from last 7 days   Lab Units 04/16/23  1415   PH, ARTERIAL pH units 7.348*   PO2 ART mm Hg 145.7*   PCO2, ARTERIAL mm Hg 33.9*   HCO3 ART mmol/L 18.6*       I reviewed the patient's new clinical results.  I personally viewed and interpreted the patient's chest x-ray.        Medication Review:   aspirin, 81 mg, Oral, Daily  clopidogrel, 75 mg, Oral, Daily  levothyroxine, 88 mcg, Oral, Daily  metoprolol succinate XL, 25 mg, Oral, Daily  piperacillin-tazobactam, 3.375 g, Intravenous, Q8H  polyethylene glycol, 17 g, Oral, Daily  sodium bicarbonate, 1,300 mg, Oral, TID  sodium chloride, 10 mL, Intravenous, Q12H        lactated ringers, 9 mL/hr, Last Rate: Stopped (04/17/23 1331)  sodium chloride, 100 mL/hr, Last Rate: 100 mL/hr (04/18/23 0650)        ASSESSMENT:   1. Acute hypercapnic respiratory failure, on mechanical ventilation: Suspect secondary to anesthesia and poor renal clearance in setting of CKD.  There is no evidence of lung disease on imaging.  2. SHIV on CKD  3. Acute hyperkalemia, possibly secondary to paralytics and SHIV.  No ST-T changes on the monitor  4. Cholecystitis s/p lap vasquez 4/16  5. Sepsis, secondary to cholecystitis.  Improving  6. Elevated LFTs, secondary  7. CAD s/p SABRINA stent to proximal LAD 3/17  8. Elevated LFTs secondary to  cholecystitis  9. Metabolic acidosis, secondary to renal failure    PLAN:  Postextubation doing well on room air  Acute kidney injury on chronic kidney disease with hyperkalemia.  Nephrology currently following  Current antibiotics to cover for cholecystitis.  Discussed with patient's wife  PT OT  Nothing further to add we will sign off        Celine Hernandez MD  04/18/23  15:57 EDT

## 2023-04-18 NOTE — PLAN OF CARE
Goal Outcome Evaluation:  Plan of Care Reviewed With: patient           Outcome Evaluation: AOx4. Remains on RA. IV abx continued. IVF continued. Nagy care completed. AM labs pending

## 2023-04-18 NOTE — THERAPY EVALUATION
Patient Name: Kunal Vargas  : 1945    MRN: [id number]                              Today's Date: 2023       Admit Date: 2023    Visit Dx:     ICD-10-CM ICD-9-CM   1. Fever and chills  R50.9 780.60   2. Leukocytosis, unspecified type  D72.829 288.60   3. Elevated LFTs  R79.89 790.6   4. Gallstones  K80.20 574.20   5. Acute on chronic renal insufficiency  N28.9 593.9    N18.9 585.9   6. 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   • Acute kidney failure   • Ileus   • Stage 3b chronic kidney disease (CKD)   • History of adenomatous polyp of colon   • Anemia due to chronic kidney disease   • Chest pain   • Unstable angina   • Fever and chills   • Calculus of gallbladder   • Elevated LFTs     Past Medical History:   Diagnosis Date   • Arrhythmia     YRS AGO, REASON FOR CATH - OK - NO PROBLEMS SINCE    • Cataract 2021    Surgery   • Eczema    • GERD (gastroesophageal reflux disease)    • Hematuria    • History of acute hepatitis        • Hyperlipidemia    • Hypothyroid    • 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      Past Surgical History:   Procedure Laterality Date   • CARDIAC CATHETERIZATION     • CARDIAC CATHETERIZATION N/A 3/17/2023    Procedure: Left Heart Cath;  Surgeon: Lenny Martines MD;  Location: [facility] CATH INVASIVE LOCATION;  Service: Cardiology;  Laterality: N/A;   • CARDIAC CATHETERIZATION N/A 3/17/2023    Procedure: Coronary angiography;  Surgeon: Lenny Martines MD;   Location: [facility] CATH INVASIVE LOCATION;  Service: Cardiology;  Laterality: N/A;   • CARDIAC CATHETERIZATION N/A 3/17/2023    Procedure: Stent SABRINA coronary;  Surgeon: Lenny Martines MD;  Location: [facility] CATH INVASIVE LOCATION;  Service: Cardiology;  Laterality: N/A;   • CARDIAC CATHETERIZATION N/A 3/17/2023    Procedure: Optical Coherent Tomography;  Surgeon: Lenny Martines MD;  Location: [facility] CATH INVASIVE LOCATION;  Service: Cardiology;  Laterality: N/A;   • CHOLECYSTECTOMY WITH INTRAOPERATIVE CHOLANGIOGRAM N/A 4/16/2023    Procedure: CHOLECYSTECTOMY LAPAROSCOPIC INTRAOPERATIVE CHOLANGIOGRAM;  Surgeon: Lucrecia Lyle MD;  Location: [facility] MAIN OR;  Service: General;  Laterality: N/A;   • COLONOSCOPY N/A 01/25/2017    Procedure: COLONOSCOPY TO CECUM AND TI WITH POLYPECTOMY ;  Surgeon: Cachorro Hancock MD;  Location: [facility] ENDOSCOPY;  Service:    • COLONOSCOPY N/A 10/12/2022    Procedure: COLONOSCOPY TO CECUM WITH COLD BX POLYPECTOMY;  Surgeon: Cachorro Hancock MD;  Location: [facility] ENDOSCOPY;  Service: General;  Laterality: N/A;  SURVEILLANCE, HX POLYPS   --POLYP, DIVERTICULOSIS   • EYE SURGERY  Jan - Feb 2021    cataract surgery both eyes   • NEPHROURETERECTOMY Left 11/12/2020    Procedure: LEFT LAPAROSCOPIC NEPHROURETERECTOMY;  Surgeon: Ángel Florentino MD;  Location: [facility] MAIN OR;  Service: Urology;  Laterality: Left;   • TRANSURETHRAL RESECTION OF BLADDER TUMOR N/A 09/10/2020    Procedure: CYSTOSCOPY, LEFT URETEROSCOPY W/BIOPSY AND STENT PLACEMENT, TRANSURETHRAL RESECTION OF BLADDER TUMOR;  Surgeon: Ángel Florentino MD;  Location: [facility] MAIN OR;  Service: Urology;  Laterality: N/A;      General Information     Row Name 04/18/23 0836          Physical Therapy Time and Intention    Document Type evaluation;discharge evaluation/summary  -     Mode of Treatment individual therapy;physical therapy  -     Row Name 04/18/23 0836          General Information    Patient Profile  Reviewed yes  -     Prior Level of Function independent:;transfer;bed mobility;gait  -     Existing Precautions/Restrictions no known precautions/restrictions  -     Barriers to Rehab none identified  -     Row Name 04/18/23 0836          Living Environment    People in Home spouse  -     Row Name 04/18/23 08          Home Main Entrance    Number of Stairs, Main Entrance six;five  -     Row Name 04/18/23 08          Stairs Within Home, Primary    Stairs, Within Home, Primary Office in basement  -     Number of Stairs, Within Home, Primary ten  -     Row Name 04/18/23 08          Cognition    Orientation Status (Cognition) oriented x 4  -           User Key  (r) = Recorded By, (t) = Taken By, (c) = Cosigned By    Initials Name Provider Type    Kandice Bender PT Physical Therapist               Mobility     Row Name 04/18/23 08          Bed Mobility    Bed Mobility supine-sit  -     Supine-Sit [place] (Bed Mobility) modified independence  -     Assistive Device (Bed Mobility) head of bed elevated  -     Row Name 04/18/23 08          Sit-Stand Transfer    Sit-Stand [place] (Transfers) supervision  -     Assistive Device (Sit-Stand Transfers) other (see comments)  No AD  -     Row Name 04/18/23 0836          Gait/Stairs (Locomotion)    [place] Level (Gait) standby assist;supervision  -     Assistive Device (Gait) other (see comments)  No AD  -     Distance in Feet (Gait) 450ft  -     Deviations/Abnormal Patterns (Gait) gait speed decreased  -           User Key  (r) = Recorded By, (t) = Taken By, (c) = Cosigned By    Initials Name Provider Type    Kandice Bender PT Physical Therapist               Obj/Interventions     Row Name 04/18/23 0837          Range of Motion Comprehensive    General Range of Motion bilateral lower extremity ROM WFL  -     Row Name 04/18/23 0837          Strength Comprehensive (MMT)    General Manual Muscle Testing (MMT)  Assessment no strength deficits identified  -     Comment, General Manual Muscle Testing (MMT) Assessment BLE WFL  -     Row Name 04/18/23 0837          Balance    Balance Assessment sitting static balance;sitting dynamic balance;standing static balance;standing dynamic balance  -     Static Sitting Balance independent  -     Dynamic Sitting Balance independent  -     Position, Sitting Balance unsupported;sitting edge of bed  -     Static Standing Balance supervision  -     Dynamic Standing Balance standby assist  -     Position/Device Used, Standing Balance unsupported  -     Balance Interventions sitting;standing;sit to stand;supported;static;dynamic  -     Row Name 04/18/23 08          Sensory Assessment (Somatosensory)    Sensory Assessment (Somatosensory) LE sensation intact  -           User Key  (r) = Recorded By, (t) = Taken By, (c) = Cosigned By    Initials Name Provider Type     Kandice De La Cruz, PT Physical Therapist               Goals/Plan    No documentation.                Clinical Impression     [facility] Name 04/18/23 0837          Pain    Pretreatment Pain Rating 0/10 - no pain  -     Posttreatment Pain Rating 0/10 - no pain  -[facility] Name 04/18/23 08          Plan of Care Review    Plan of Care Reviewed With patient  -     Outcome Evaluation Pt is a 78 yo M s/p lap vasquez 4/16 with post-op respiratory failure requiring reintubation x1 day. Pt lives with his wife and reports independence at BL with no AD. 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 Pt has 5-6 PERNELL with an office in the basement he uses regularly. Pt presents to PT with minimal functional deficits, appearing to be mobilizing at his BL. Pt transferred to EOB mod I, STS with SV, and ambulated 450ft with SBA-SV and no AD. Pt overall steady and independent with gait, no LOB or safety concerns. Pt returned to chair and left with needs met, RN updated. Pt with no further acute PT needs,  will sign-off. Encouraged to get up and walk with nsg throughout the day as able. Anticipate DC home with family assist as needed.  -     Row Name 04/18/23 0837          Therapy Assessment/Plan (PT)    Criteria for Skilled Interventions Met (PT) no problems identified which require skilled intervention;no  -     Therapy Frequency (PT) evaluation only  -     Row Name 04/18/23 0837          Vital Signs    O2 Delivery Pre Treatment room air  -     O2 Delivery Intra Treatment room air  -     O2 Delivery Post Treatment room air  -     Row Name 04/18/23 0837          Positioning and Restraints    Pre-Treatment Position in bed  -     Post Treatment Position chair  -     In Chair reclined;call light within reach;encouraged to call for assist  -           User Key  (r) = Recorded By, (t) = Taken By, (c) = Cosigned By    Initials Name Provider Type    Kandice Bender PT Physical Therapist               Outcome Measures     Row Name 04/18/23 0840          How much help from another person do you currently need...    Turning from your back to your side while in flat bed without using bedrails? 4  -BH     Moving from lying on back to sitting on the side of a flat bed without bedrails? 4  -BH     Moving to and from a bed to a chair (including a wheelchair)? 4  -BH     Standing up from a chair using your arms (e.g., wheelchair, bedside chair)? 4  -BH     Climbing 3-5 steps with a railing? 4  -BH     To walk in hospital room? 4  -     AM-PAC 6 Clicks Score (PT) 24  -     Highest level of mobility 8 --> Walked 250 feet or more  -     Row Name 04/18/23 0840          Functional Assessment    Outcome Measure Options AM-PAC 6 Clicks Basic Mobility (PT)  -           User Key  (r) = Recorded By, (t) = Taken By, (c) = Cosigned By    Initials Name Provider Type    Kandice Bender PT Physical Therapist                             Physical Therapy Education     Title: PT OT SLP Therapies (Done)     Topic:  Physical Therapy (Done)     Point: Mobility training (Done)     Learning Progress Summary           Patient Acceptance, E,TB,D, VU by  at 4/18/2023 0841                   Point: Home exercise program (Done)     Learning Progress Summary           Patient Acceptance, E,TB,D, VU by  at 4/18/2023 0841                   Point: Body mechanics (Done)     Learning Progress Summary           Patient Acceptance, E,TB,D, VU by  at 4/18/2023 0841                   Point: Precautions (Done)     Learning Progress Summary           Patient Acceptance, E,TB,D, VU by  at 4/18/2023 0841                               User Key     Initials Effective Dates Name Provider Type Discipline     04/08/22 -  Kandice De La Cruz, PT Physical Therapist PT              PT Recommendation and Plan     Plan of Care Reviewed With: patient  Outcome Evaluation: Pt is a 78 yo M s/p lap vasquez 4/16 with post-op respiratory failure requiring reintubation x1 day. Pt lives with his wife and reports independence at BL with no AD. 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 Pt has 5-6 PERNELL with an office in the basement he uses regularly. Pt presents to PT with minimal functional deficits, appearing to be mobilizing at his BL. Pt transferred to EOB mod I, STS with SV, and ambulated 450ft with SBA-SV and no AD. Pt overall steady and independent with gait, no LOB or safety concerns. Pt returned to chair and left with needs met, RN updated. Pt with no further acute PT needs, will sign-off. Encouraged to get up and walk with nsg throughout the day as able. Anticipate DC home with family assist as needed.     Time Calculation:    PT Charges     Row Name 04/18/23 0841             Time Calculation    Start Time 0819  -      Stop Time 0834  -      Time Calculation (min) 15 min  -      PT Received On 04/18/23  -         Time Calculation- PT    Total Timed Code Minutes- PT 12 minute(s)  -         Timed Charges    26092 - Gait  Training Minutes  8  -      89896 - PT Therapeutic Activity Minutes 4  -         Untimed Charges    PT Eval/Re-eval Minutes 5  -         Total Minutes    Timed Charges Total Minutes 12  -BH      Untimed Charges Total Minutes 5  -       Total Minutes 17  -            User Key  (r) = Recorded By, (t) = Taken By, (c) = Cosigned By    Initials Name Provider Type     Kandice De La Cruz, PT Physical Therapist              Therapy Charges for Today     Code Description Service Date Service Provider Modifiers Qty    62182405176  GAIT TRAINING EA 15 MIN 4/18/2023 Kandice De La Cruz, PT GP 1    58276406005 HC PT EVAL LOW COMPLEXITY 3 4/18/2023 Kandice De La Cruz, PT GP 1          PT G-Codes  Outcome Measure Options: AM-PAC 6 Clicks Basic Mobility (PT)  AM-PAC 6 Clicks Score (PT): 24  PT Discharge Summary  Anticipated Discharge Disposition (PT): home with assist, home    Kandice De La Cruz PT  4/18/2023

## 2023-04-18 NOTE — PROGRESS NOTES
[facility] Progress Note      Patient Name: Kunal Vargas  : 1945  MRN: [id number]  Primary Care Physician:  Chai Montesinos MD  Date of admission: 2023    Subjective     Interval History:   Follow-up acute kidney injury on chronic kidney    He was found to have significant postvoid residual Nagy catheter was placed.  He has good urine output, denies any chest pain or shortness of air, no orthopnea or PND, no nausea or vomiting, no abdominal pain.    Review of Systems:   As noted above    Objective     Vitals:   Temp:  [97.6 °F (36.4 °C)-98.7 °F (37.1 °C)] 98.3 °F (36.8 °C)  Heart Rate:  [50-68] 57  Resp:  [16-18] 18  BP: (126-147)/(57-69) 147/66    Intake/Output Summary (Last 24 hours) at 2023 1004  Last data filed at 2023 0650  Gross per 24 hour   Intake 1848.33 ml   Output 2663 ml   Net -814.67 ml       Physical Exam:    General Appearance: alert, awake, hard of hearing, chronically, no acute distress   Skin: warm and dry  HEENT: oral mucosa normal, nonicteric sclera  Neck: supple, no JVD  Lungs: CTA, unlabored breathing effort  Heart: RRR, normal S1 and S2, no S3, no rub  Abdomen: soft, nontender, nondistended, normoactive bowel  : no palpable bladder, Nagy catheter anchored in place  Extremities: no edema, cyanosis or clubbing  Neuro: normal speech and mental status     Scheduled Meds:     aspirin, 81 mg, Oral, Daily  clopidogrel, 75 mg, Oral, Daily  levothyroxine, 88 mcg, Oral, Daily  metoprolol succinate XL, 25 mg, Oral, Daily  piperacillin-tazobactam, 3.375 g, Intravenous, Q8H  polyethylene glycol, 17 g, Oral, Daily  sodium bicarbonate, 1,300 mg, Oral, TID  sodium chloride, 10 mL, Intravenous, Q12H      IV Meds:   lactated ringers, 9 mL/hr, Last Rate: Stopped (23 1331)  norepinephrine, 0.02-0.3 mcg/kg/min  propofol, 5-50 mcg/kg/min, Last Rate: Stopped (23 0600)  sodium chloride, 100 mL/hr, Last Rate: 100 mL/hr (23  0650)        Results Reviewed:   I have personally reviewed the results from the time of this admission to 4/18/2023 10:04 EDT     Results from last 7 days   Lab Units 04/18/23  0343 04/17/23  0526 04/16/23  1944 04/16/23  1458 04/16/23  0546   SODIUM mmol/L 142 138 138 133* 134*   POTASSIUM mmol/L 4.7 5.3* 4.6 6.2* 4.7   CHLORIDE mmol/L 112* 107 108* 105 105   CO2 mmol/L 18.4* 18.7* 17.2* 16.8* 20.0*   BUN mg/dL 45* 43* 40* 40* 35*   CREATININE mg/dL 2.74* 2.58* 2.26* 2.23* 2.34*   CALCIUM mg/dL 8.1* 8.4* 8.0* 8.1* 8.6   BILIRUBIN mg/dL  --  1.6*  --  2.5* 2.2*   ALK PHOS U/L  --  498*  --  554* 556*   ALT (SGPT) U/L  --  135*  --  140* 132*   AST (SGOT) U/L  --  83*  --  107* 91*   GLUCOSE mg/dL 95 133* 207* 149* 90       Estimated Creatinine Clearance: 23.4 mL/min (A) (by C-G formula based on SCr of 2.74 mg/dL (H)).    Results from last 7 days   Lab Units 04/18/23  0343   MAGNESIUM mg/dL 2.6*   PHOSPHORUS mg/dL 3.5       Results from last 7 days   Lab Units 04/18/23  0343   URIC ACID mg/dL 5.6       Results from last 7 days   Lab Units 04/18/23  0343 04/17/23  0526 04/16/23  1351 04/16/23  0546 04/15/23  0757   WBC 10*3/mm3 8.74 9.08 9.34 7.80 7.63   HEMOGLOBIN g/dL 8.9* 10.0* 10.6* 9.7* 10.1*   PLATELETS 10*3/mm3 212 211 219 151 154       Results from last 7 days   Lab Units 04/14/23  2151   INR  1.14*       Assessment / Plan     ASSESSMENT:  -Acute kidney injury on chronic kidney disease stage IIIb associated with hypotension, hemodynamically more stable right now. 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, treated with oral sodium bicarbonate, total CO2 18.4, potassium down to 4.7 and anion gap is 11.6  -Chronic kidney disease stage IIIb associated with loss of renal mass with prior left nephrectomy  -History of renal cell cancer and radical  left nephrectomy in 2020  -Coronary artery disease with recent PCI and stent  -Hypotension, improved, remains on vasopressors  -Dyslipidemia  -Acute cholecystitis underwent cholecystectomy on 4/16/2023  -Anemia, hemoglobin is down to 8.9 today    PLAN:  -Continue the same treatment  -Surveillance labs    Thank you for involving us in the care of Kunal Vargas.  Please feel free to call with any questions.    Watson Lowe MD  04/18/23  10:04 EDT    [facility]  [phone]    Please note that portions of this note were completed with a voice recognition program.

## 2023-04-18 NOTE — PROGRESS NOTES
Postop day 2    Doing well.  Pain minimal.  Tolerating regular diet.  Abdomen is soft and incisions are healing well.    Stable for discharge from surgical standpoint when felt medically ready

## 2023-04-18 NOTE — PROGRESS NOTES
"    Patient Name: Kunal Vargas  :1945  77 y.o.      Patient Care Team:  Chai Montesinos MD as PCP - General    Chief Complaint: follow up coronary artery disease    Interval History: he is ambulating in the room. He has a alford. He feels pretty good. He denies chest pain.        Objective   Vital Signs  Temp:  [97.6 °F (36.4 °C)-98.7 °F (37.1 °C)] 97.6 °F (36.4 °C)  Heart Rate:  [50-68] 54  Resp:  [16-18] 18  BP: (126-159)/(63-70) 159/70    Intake/Output Summary (Last 24 hours) at 2023 1437  Last data filed at 2023 0650  Gross per 24 hour   Intake 1848.33 ml   Output 2100 ml   Net -251.67 ml     Flowsheet Rows    Flowsheet Row First Filed Value   Admission Height 175.3 cm (69\") Documented at 2023   Admission Weight 70.3 kg (155 lb) Documented at 2023          Physical Exam:   General Appearance:    Alert, cooperative, in no acute distress   Lungs:     Clear to auscultation.  Normal respiratory effort and rate.      Heart:    Regular rhythm and normal rate, normal S1 and S2, no murmurs, gallops or rubs.     Chest Wall:    No abnormalities observed   Abdomen:     Soft, nontender, positive bowel sounds.     Extremities:   no cyanosis, clubbing or edema.  No marked joint deformities.  Adequate musculoskeletal strength.       Results Review:    Results from last 7 days   Lab Units 23  0343   SODIUM mmol/L 142   POTASSIUM mmol/L 4.7   CHLORIDE mmol/L 112*   CO2 mmol/L 18.4*   BUN mg/dL 45*   CREATININE mg/dL 2.74*   GLUCOSE mg/dL 95   CALCIUM mg/dL 8.1*         Results from last 7 days   Lab Units 23  0343   WBC 10*3/mm3 8.74   HEMOGLOBIN g/dL 8.9*   HEMATOCRIT % 26.8*   PLATELETS 10*3/mm3 212     Results from last 7 days   Lab Units 23  2151   INR  1.14*     Results from last 7 days   Lab Units 23  0343   MAGNESIUM mg/dL 2.6*                   Medication Review:   aspirin, 81 mg, Oral, Daily  clopidogrel, 75 mg, Oral, Daily  levothyroxine, 88 mcg, " Oral, Daily  metoprolol succinate XL, 25 mg, Oral, Daily  piperacillin-tazobactam, 3.375 g, Intravenous, Q8H  polyethylene glycol, 17 g, Oral, Daily  sodium bicarbonate, 1,300 mg, Oral, TID  sodium chloride, 10 mL, Intravenous, Q12H         lactated ringers, 9 mL/hr, Last Rate: Stopped (04/17/23 1331)  sodium chloride, 100 mL/hr, Last Rate: 100 mL/hr (04/18/23 0650)        Assessment & Plan   1. Acute cholelithiasis status post lap cholecystectomy with intra operative cholangiogram  2. Coronary artery disease status post angioplasty to the LAD on 3/17/23 remains on dual antiplatelet therapy.  3. Hypertension  4. SHIV on chronic kidney disease, history of nephrectomy - likely ATN. Nephrology is following.     Cardiac status appears stable.   He denies angina.   He should remain on DAPT. Nothing further to add. Will see as needed.     PAVAN Pa  [facility]  04/18/23  14:37 EDT

## 2023-04-18 NOTE — PROGRESS NOTES
[facility]  Inpatient Progress Note    Reason for Follow Up: Elevated liver tests    Subjective     Interval History:   Postop day #2 status postcholecystectomy, IOC negative    Current Facility-Administered Medications:   •  aspirin chewable tablet 81 mg, 81 mg, Oral, Daily, Celine Hernandez MD, 81 mg at 04/15/23 0937  •  calcium carbonate (TUMS) chewable tablet 500 mg (200 mg elemental), 2 tablet, Oral, BID PRN, Celine Hernandez MD  •  clopidogrel (PLAVIX) tablet 75 mg, 75 mg, Oral, Daily, Celine Hernandez MD, 75 mg at 04/17/23 0958  •  fentaNYL citrate (PF) (SUBLIMAZE) injection 50 mcg, 50 mcg, Intravenous, Q2H PRN, Celine Hernandez MD, 50 mcg at 04/16/23 1804  •  lactated ringers infusion, 9 mL/hr, Intravenous, Continuous, Celine Hernandez MD, Stopped at 04/17/23 1331  •  levothyroxine (SYNTHROID, LEVOTHROID) tablet 88 mcg, 88 mcg, Oral, Daily, Celine Hernandez MD, 88 mcg at 04/15/23 0937  •  metoprolol succinate XL (TOPROL-XL) 24 hr tablet 25 mg, 25 mg, Oral, Daily, Celine Hernandez MD, 25 mg at 04/17/23 0954  •  morphine injection 2 mg, 2 mg, Intravenous, Q3H PRN, Celine Hernandez MD, 2 mg at 04/15/23 0943  •  norepinephrine (LEVOPHED) 8 mg in 250 mL NS infusion (premix), 0.02-0.3 mcg/kg/min, Intravenous, Titrated, Celine Hernandez MD  •  ondansetron (ZOFRAN) tablet 4 mg, 4 mg, Oral, Q6H PRN **OR** ondansetron (ZOFRAN) injection 4 mg, 4 mg, Intravenous, Q6H PRN, Celine Hernandez MD  •  piperacillin-tazobactam (ZOSYN) 3.375 g in iso-osmotic dextrose 50 ml (premix), 3.375 g, Intravenous, Q8H, Celine Hernandez MD, 3.375 g at 04/18/23 0031  •  polyethylene glycol (MIRALAX) packet 17 g, 17 g, Oral, Daily, Celine Hernandez MD, 17 g at 04/15/23 0937  •  propofol (DIPRIVAN) infusion 10 mg/mL 100 mL, 5-50 mcg/kg/min, Intravenous, Titrated, Celine Hernandez MD, Stopped at 04/17/23 0600  •  sodium bicarbonate tablet 1,300 mg, 1,300 mg, Oral, TID, ChrissyWatson MD, 1,300 mg at 04/17/23 2104  •  sodium  chloride 0.9 % flush 10 mL, 10 mL, Intravenous, Q12H, Celine Hernandez MD, 10 mL at 04/17/23 2104  •  sodium chloride 0.9 % flush 10 mL, 10 mL, Intravenous, PRN, Celine Hernandez MD  •  sodium chloride 0.9 % infusion 40 mL, 40 mL, Intravenous, PRN, Celine Hernandez MD  •  sodium chloride 0.9 % infusion, 100 mL/hr, Intravenous, Continuous, Celine Hernandez MD, Last Rate: 100 mL/hr at 04/18/23 0650, 100 mL/hr at 04/18/23 0650  Review of Systems:    There is weakness fatigue all other systems reviewed and    Objective     Vital Signs  Temp:  [97.6 °F (36.4 °C)-98.7 °F (37.1 °C)] 98.3 °F (36.8 °C)  Heart Rate:  [50-68] 57  Resp:  [16-18] 18  BP: (122-147)/(56-69) 147/66  Body mass index is 23.86 kg/m².    Intake/Output Summary (Last 24 hours) at 4/18/2023 0847  Last data filed at 4/18/2023 0650  Gross per 24 hour   Intake 1848.33 ml   Output 2663 ml   Net -814.67 ml     No intake/output data recorded.     Physical Exam:   General: patient awake, alert and cooperative   Eyes: Normal lids and lashes, no scleral icterus   Neck: supple, normal ROM   Skin: warm and dry, not jaundiced   Cardiovascular: regular rhythm and rate, no murmurs auscultated   Pulm: clear to auscultation bilaterally, regular and unlabored   Abdomen: soft, nontender, nondistended; normal bowel sounds   Extremities: no rash or edema   Psychiatric: Normal mood and behavior; memory intact     Results Review:     I reviewed the patient's new clinical results.    Results from last 7 days   Lab Units 04/18/23  0343 04/17/23  0526 04/16/23  1351   WBC 10*3/mm3 8.74 9.08 9.34   HEMOGLOBIN g/dL 8.9* 10.0* 10.6*   HEMATOCRIT % 26.8* 30.4* 32.5*   PLATELETS 10*3/mm3 212 211 219     Results from last 7 days   Lab Units 04/18/23  0343 04/17/23  0526 04/16/23  1944 04/16/23  1458 04/16/23  0546   SODIUM mmol/L 142 138 138 133* 134*   POTASSIUM mmol/L 4.7 5.3* 4.6 6.2* 4.7   CHLORIDE mmol/L 112* 107 108* 105 105   CO2 mmol/L 18.4* 18.7* 17.2* 16.8* 20.0*   BUN mg/dL 45*  43* 40* 40* 35*   CREATININE mg/dL 2.74* 2.58* 2.26* 2.23* 2.34*   CALCIUM mg/dL 8.1* 8.4* 8.0* 8.1* 8.6   BILIRUBIN mg/dL  --  1.6*  --  2.5* 2.2*   ALK PHOS U/L  --  498*  --  554* 556*   ALT (SGPT) U/L  --  135*  --  140* 132*   AST (SGOT) U/L  --  83*  --  107* 91*   GLUCOSE mg/dL 95 133* 207* 149* 90     Results from last 7 days   Lab Units 04/14/23  2151   INR  1.14*     Lab Results   Lab Value Date/Time    LIPASE 239 (H) 04/15/2023 0017    LIPASE 27 01/20/2018 0856       Radiology:  XR Chest 1 View   Final Result      FL Cholangiogram Operative   Final Result      US Gallbladder   Final Result   Gallbladder stones and sludge.       This report was finalized on 4/15/2023 4:27 AM by Dr. Norah Fleming M.D.          CT Chest Without Contrast Diagnostic   Final Result       1. No acute findings identified within the thorax.   2. Cholelithiasis. Patient does appear to have a stone within the   gallbladder neck. Ultrasound could be considered for further assessment.   3. Increased stool burden could reflect some constipation.       Radiation dose reduction techniques were utilized, including automated   exposure control and exposure modulation based on body size.       This report was finalized on 4/15/2023 1:29 AM by Dr. Norah Fleming M.D.          CT Abdomen Pelvis Without Contrast   Final Result       1. No acute findings identified within the thorax.   2. Cholelithiasis. Patient does appear to have a stone within the   gallbladder neck. Ultrasound could be considered for further assessment.   3. Increased stool burden could reflect some constipation.       Radiation dose reduction techniques were utilized, including automated   exposure control and exposure modulation based on body size.       This report was finalized on 4/15/2023 1:29 AM by Dr. Norah Fleming M.D.          XR Chest 1 View   Final Result   No acute findings.       This report was finalized on 4/14/2023 10:19 PM by Dr. Almazan  ANJEL Fleming              Assessment & Plan     Active Hospital Problems    Diagnosis    • **Fever and chills    • Calculus of gallbladder    • Elevated LFTs    • Stage 3b chronic kidney disease (CKD)    • Acute kidney failure    • Status post nephrectomy - left nephrouretrectomy -11/12/2020    • Essential hypertension    • Chronic coronary artery disease        Assessment:  1. Abnormal liver tests  2. Patient was reintubated in PACU but extubated yesterday  3. For tests improved yesterday  4. Postop day #2 laparoscopic cholecystectomy with normal IOC      Plan:  · Postoperative plans per general surgery  · Liver tests were improving yesterday  · His diet has been advanced  · Symptom-free this morning likely home  · We will see as needed  I discussed the patients findings and my recommendations with patient and nursing staff.    Giancarlo Myles MD

## 2023-04-18 NOTE — PROGRESS NOTES
Name: Kunal Vargas ADMIT: 2023   : 1945  PCP: Chai Montesinos MD    MRN: [id number] LOS: 3 days   AGE/SEX: 77 y.o. male  ROOM: [facility]     Subjective   Subjective     No events overnight. No complaints.       Objective   Objective   Vital Signs  Temp:  [97.6 °F (36.4 °C)-98.7 °F (37.1 °C)] 97.6 °F (36.4 °C)  Heart Rate:  [54-68] 54  Resp:  [18] 18  BP: (126-159)/(63-70) 159/70  SpO2:  [94 %-100 %] 100 %  on   ;   Device (Oxygen Therapy): room air  Body mass index is 23.86 kg/m².  Physical Exam  Constitutional:       General: He is not in acute distress.     Appearance: He is not toxic-appearing.   Cardiovascular:      Rate and Rhythm: Normal rate and regular rhythm.      Heart sounds: Normal heart sounds.   Pulmonary:      Effort: Pulmonary effort is normal.      Breath sounds: Normal breath sounds.   Abdominal:      General: Bowel sounds are normal.      Palpations: Abdomen is soft.   Musculoskeletal:         General: No tenderness.      Right lower leg: No edema.      Left lower leg: No edema.   Neurological:      Mental Status: He is alert.   Psychiatric:         Mood and Affect: Mood normal.         Behavior: Behavior normal.         Results Review     I reviewed the patient's new clinical results.  Results from last 7 days   Lab Units 23  0343 23  0526 23  1351 23  0546   WBC 10*3/mm3 8.74 9.08 9.34 7.80   HEMOGLOBIN g/dL 8.9* 10.0* 10.6* 9.7*   PLATELETS 10*3/mm3 212 211 219 151     Results from last 7 days   Lab Units 23  0343 23  0526 23  1944 23  1458   SODIUM mmol/L 142 138 138 133*   POTASSIUM mmol/L 4.7 5.3* 4.6 6.2*   CHLORIDE mmol/L 112* 107 108* 105   CO2 mmol/L 18.4* 18.7* 17.2* 16.8*   BUN mg/dL 45* 43* 40* 40*   CREATININE mg/dL 2.74* 2.58* 2.26* 2.23*   GLUCOSE mg/dL 95 133* 207* 149*   Estimated Creatinine Clearance: 23.4 mL/min (A) (by C-G formula based on SCr of 2.74 mg/dL (H)).  Results from last 7 days   Lab Units  04/18/23  0343 04/17/23  0526 04/16/23  1458 04/16/23  0546 04/15/23  0757   ALBUMIN g/dL 2.6* 2.9* 3.0* 3.0* 3.0*   BILIRUBIN mg/dL  --  1.6* 2.5* 2.2* 3.1*   ALK PHOS U/L  --  498* 554* 556* 604*   AST (SGOT) U/L  --  83* 107* 91* 94*   ALT (SGPT) U/L  --  135* 140* 132* 138*     Results from last 7 days   Lab Units 04/18/23  0343 04/17/23  0526 04/16/23  1944 04/16/23  1458 04/16/23  0546   CALCIUM mg/dL 8.1* 8.4* 8.0* 8.1* 8.6   ALBUMIN g/dL 2.6* 2.9*  --  3.0* 3.0*   MAGNESIUM mg/dL 2.6*  --   --   --   --    PHOSPHORUS mg/dL 3.5  --   --   --   --      Results from last 7 days   Lab Units 04/14/23  2151 04/12/23  1542   PROCALCITONIN ng/mL 1.30* 1.12*   LACTATE mmol/L 1.0  --      COVID19   Date Value Ref Range Status   04/14/2023 Not Detected Not Detected - Ref. Range Final     SARS-CoV-2 PCR   Date Value Ref Range Status   11/10/2020 Not Detected Not Detected Final     Comment:     Nucleic acid specific to SARS-CoV-2 (COVID-19) virus was not detected in  this sample by the TaqPath (TM) COVID-19 Combo Kit.          SARS-CoV-2 (COVID-19) nucleic acid testing performed using [employer] Aptima (R) SARS-CoV-2 Assay or [employer] TaqPath (TM)  COVID-19 Combo Kit as indicated above under Emergency Use Authorization (EUA) from the FDA. Aptima (R) and TaqPath (TM) test performance  characteristics verified by [facility] in accordance with the FDAs Guidance Document (Policy for Diagnostic Tests for Coronavirus Disease-2019  during the Public Health Emergency) issued on March 16, 2020. The laboratory is regulated under CLIA as qualified to perform high-complexity testing  Unless otherwise noted, all testing was performed at [facility], CLIA No. [id number], [place] State Licensee No. [id number]   09/08/2020 Not Detected  Final     Comment:     Nucleic acid specific to SARS-CoV-2 (COVID-19) virus was not detected inthis sample by the LIKECHARITYPath (TM) COVID-19 Combo Kit.SARS-CoV-2 (COVID-19) nucleic acid testing  performed using [employer] Aptima (R) SARS-CoV-2 Assay or [employer] TaqPath   (TM)COVID-19 Combo Kit as indicated above under Emergency Use Authorization (EUA) from the FDA. Aptima (R) and TaqPath (TM) test performancecharacteristics verified by [employer] [facility] [facility] in accordance with the FDAs Guidance Document (Policy for Diagnostic   Tests for Coronavirus Disease-2019during the Public Health Emergency) issued on March 16, 2020. The laboratory is regulated under CLIA as qualified to perform high-complexity testingUnless otherwise noted, all testing was performed at [facility],   CLIA No. [id number], [place] State Licensee No. [id number]     Glucose   Date/Time Value Ref Range Status   04/17/2023 1103 102 70 - 130 mg/dL Final     Comment:     Meter: [id number] : [id number] Graham Aryeon NA   04/17/2023 0027 145 (H) 70 - 130 mg/dL Final     Comment:     Meter: [id number] : [id number] Chai Maldonado RN   04/16/2023 1612 145 (H) 70 - 130 mg/dL Final     Comment:     Meter: [id number] : [id number] Soumya Garza NA   04/16/2023 1206 119 70 - 130 mg/dL Final     Comment:     Meter: [id number] : [id number] Lalita Gutiérrez RN       XR Chest 1 View  XR CHEST 1 VW-  04/16/2023     HISTORY: Intubation.     Heart size is within normal limits. Lungs appear relatively clear.  Endotracheal tube is seen with its tip overlying the trachea at the  level of the clavicles. No abnormal air collections are seen.     This report was finalized on 4/16/2023 2:02 PM by Dr. Pineda Wood M.D.     FL Cholangiogram Operative  INTRAOPERATIVE CHOLANGIOGRAM     HISTORY: Gallbladder disease.     FINDINGS: Exam demonstrates injection of contrast into the cystic duct.  Contrast outlines a normal caliber common duct and contrast extends into  the duodenum without filling defect or obstruction.     FLUOROSCOPY TIME: 10.1 seconds, 3 images.     This report was finalized on 4/16/2023 10:42 AM by Dr. Abraham Washington M.D.        Scheduled Medications  aspirin, 81 mg, Oral, Daily  clopidogrel, 75 mg, Oral, Daily  levothyroxine, 88 mcg, Oral, Daily  metoprolol succinate XL, 25 mg, Oral, Daily  piperacillin-tazobactam, 3.375 g, Intravenous, Q8H  polyethylene glycol, 17 g, Oral, Daily  sodium bicarbonate, 1,300 mg, Oral, TID  sodium chloride, 10 mL, Intravenous, Q12H    Infusions  lactated ringers, 9 mL/hr, Last Rate: Stopped (04/17/23 1331)  sodium chloride, 100 mL/hr, Last Rate: 100 mL/hr (04/18/23 0650)    Diet  Diet: Regular/House Diet; Texture: Regular Texture (IDDSI 7); Fluid Consistency: Thin (IDDSI 0)       Assessment/Plan     Active Hospital Problems    Diagnosis  POA   • **Fever and chills [R50.9]  Yes   • Calculus of gallbladder [K80.20]  Unknown   • Elevated LFTs [R79.89]  Unknown   • Stage 3b chronic kidney disease (CKD) [N18.32]  Unknown   • Acute kidney failure [N17.9]  Yes   • Status post nephrectomy - left nephrouretrectomy -11/12/2020 [Z90.5]  Not Applicable   • Essential hypertension [I10]  Yes   • Chronic coronary artery disease [I25.10]  Yes      Resolved Hospital Problems   No resolved problems to display.       77 y.o. male admitted with Fever and chills.    · Acute cholecystitis-s/p laparoscopic cholecystectomy with normal IOC on 4/16/23. Currently on zosyn, tolerating a regular diet. Pain is well controlled  · SHIV on CKD 3b-prerenal due to sepsis/hypotension. Likely with a component of ATN. Creatinine is still trending up but he's making good UOP and is normotensive  · Acute hypercapnic respiratory failure-remained on mechanical ventilation in the ICU following surgery. Felt to be secondary to anesthesia and poor renal clearance. No evidence of lung disease on imaging and now extubated and on room air.  · Anemia-hgb down to 8.9 today. Will update iron studies, b12, folate.  · Type IV RTA-on oral sodium bicarb  · Coronary artery disease s/p PCI with SABRINA to the LAD on 3/17/23-on DAPT/metoprolol. Statin held for LFT  elevation acutely  · Hypothyroidism-synthroid  · History of left renal cell cancer s/p left nephrectomy   · SCDs for DVT prophylaxis.  · Full code.  · Discussed with patient, family and nursing staff.  · Anticipate discharge home with family when cleared by consultants.      Blayne Casillas MD  [facility]  04/18/23  16:48 EDT    I wore protective equipment throughout this patient encounter including a face mask, gloves and protective eyewear.  Hand hygiene was performed before donning protective equipment and after removal when leaving the room.

## 2023-04-18 NOTE — PLAN OF CARE
Goal Outcome Evaluation:  Plan of Care Reviewed With: patient           Outcome Evaluation: Pt is a 78 yo M s/p lap vasquez 4/16 with post-op respiratory failure requiring reintubation x1 day. Pt lives with his wife and reports independence at BL with no AD. 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 Pt has 5-6 PER[employer] with an office in the basement he uses regularly. Pt presents to PT with minimal functional deficits, appearing to be mobilizing at his BL. Pt transferred to EOB mod I, STS with SV, and ambulated 450ft with SBA-SV and no AD. Pt overall steady and independent with gait, no LOB or safety concerns. Pt returned to chair and left with needs met, RN updated. Pt with no further acute PT needs, will sign-off. Encouraged to get up and walk with nsg throughout the day as able. Anticipate DC home with family assist as needed.

## 2023-04-19 ENCOUNTER — APPOINTMENT (OUTPATIENT)
Dept: CARDIAC REHAB | Facility: HOSPITAL | Age: 78
End: 2023-04-19
Payer: MEDICARE

## 2023-04-19 LAB
ALBUMIN SERPL-MCNC: 2.7 G/DL (ref 3.5–5.2)
ALBUMIN/GLOB SERPL: 1.1 G/DL
ALP SERPL-CCNC: 358 U/L (ref 39–117)
ALT SERPL W P-5'-P-CCNC: 81 U/L (ref 1–41)
ANION GAP SERPL CALCULATED.3IONS-SCNC: 12.3 MMOL/L (ref 5–15)
AST SERPL-CCNC: 39 U/L (ref 1–40)
BASOPHILS # BLD AUTO: 0.12 10*3/MM3 (ref 0–0.2)
BASOPHILS NFR BLD AUTO: 1.6 % (ref 0–1.5)
BILIRUB SERPL-MCNC: 1.1 MG/DL (ref 0–1.2)
BUN SERPL-MCNC: 40 MG/DL (ref 8–23)
BUN/CREAT SERPL: 14.7 (ref 7–25)
CALCIUM SPEC-SCNC: 8.3 MG/DL (ref 8.6–10.5)
CHLORIDE SERPL-SCNC: 112 MMOL/L (ref 98–107)
CO2 SERPL-SCNC: 18.7 MMOL/L (ref 22–29)
CREAT SERPL-MCNC: 2.73 MG/DL (ref 0.76–1.27)
DEPRECATED RDW RBC AUTO: 42.2 FL (ref 37–54)
EGFRCR SERPLBLD CKD-EPI 2021: 23.2 ML/MIN/1.73
EOSINOPHIL # BLD AUTO: 1.39 10*3/MM3 (ref 0–0.4)
EOSINOPHIL NFR BLD AUTO: 18.1 % (ref 0.3–6.2)
ERYTHROCYTE [DISTWIDTH] IN BLOOD BY AUTOMATED COUNT: 12.3 % (ref 12.3–15.4)
FOLATE SERPL-MCNC: 10.9 NG/ML (ref 4.78–24.2)
GLOBULIN UR ELPH-MCNC: 2.5 GM/DL
GLUCOSE SERPL-MCNC: 89 MG/DL (ref 65–99)
HCT VFR BLD AUTO: 26.7 % (ref 37.5–51)
HGB BLD-MCNC: 9.1 G/DL (ref 13–17.7)
IMM GRANULOCYTES # BLD AUTO: 0.03 10*3/MM3 (ref 0–0.05)
IMM GRANULOCYTES NFR BLD AUTO: 0.4 % (ref 0–0.5)
LYMPHOCYTES # BLD AUTO: 1.11 10*3/MM3 (ref 0.7–3.1)
LYMPHOCYTES NFR BLD AUTO: 14.5 % (ref 19.6–45.3)
MAGNESIUM SERPL-MCNC: 2.5 MG/DL (ref 1.6–2.4)
MCH RBC QN AUTO: 32 PG (ref 26.6–33)
MCHC RBC AUTO-ENTMCNC: 34.1 G/DL (ref 31.5–35.7)
MCV RBC AUTO: 94 FL (ref 79–97)
MONOCYTES # BLD AUTO: 0.84 10*3/MM3 (ref 0.1–0.9)
MONOCYTES NFR BLD AUTO: 10.9 % (ref 5–12)
NEUTROPHILS NFR BLD AUTO: 4.19 10*3/MM3 (ref 1.7–7)
NEUTROPHILS NFR BLD AUTO: 54.5 % (ref 42.7–76)
NRBC BLD AUTO-RTO: 0 /100 WBC (ref 0–0.2)
PHOSPHATE SERPL-MCNC: 3.5 MG/DL (ref 2.5–4.5)
PLATELET # BLD AUTO: 224 10*3/MM3 (ref 140–450)
PMV BLD AUTO: 10.4 FL (ref 6–12)
POTASSIUM SERPL-SCNC: 4.6 MMOL/L (ref 3.5–5.2)
PROT SERPL-MCNC: 5.2 G/DL (ref 6–8.5)
RBC # BLD AUTO: 2.84 10*6/MM3 (ref 4.14–5.8)
SODIUM SERPL-SCNC: 143 MMOL/L (ref 136–145)
URATE SERPL-MCNC: 4.9 MG/DL (ref 3.4–7)
VIT B12 BLD-MCNC: 1243 PG/ML (ref 211–946)
WBC NRBC COR # BLD: 7.68 10*3/MM3 (ref 3.4–10.8)

## 2023-04-19 PROCEDURE — 83735 ASSAY OF MAGNESIUM: CPT | Performed by: INTERNAL MEDICINE

## 2023-04-19 PROCEDURE — 82607 VITAMIN B-12: CPT | Performed by: STUDENT IN AN ORGANIZED HEALTH CARE EDUCATION/TRAINING PROGRAM

## 2023-04-19 PROCEDURE — 84100 ASSAY OF PHOSPHORUS: CPT | Performed by: INTERNAL MEDICINE

## 2023-04-19 PROCEDURE — 80053 COMPREHEN METABOLIC PANEL: CPT | Performed by: STUDENT IN AN ORGANIZED HEALTH CARE EDUCATION/TRAINING PROGRAM

## 2023-04-19 PROCEDURE — 85025 COMPLETE CBC W/AUTO DIFF WBC: CPT | Performed by: INTERNAL MEDICINE

## 2023-04-19 PROCEDURE — 82746 ASSAY OF FOLIC ACID SERUM: CPT | Performed by: STUDENT IN AN ORGANIZED HEALTH CARE EDUCATION/TRAINING PROGRAM

## 2023-04-19 PROCEDURE — 84550 ASSAY OF BLOOD/URIC ACID: CPT | Performed by: INTERNAL MEDICINE

## 2023-04-19 PROCEDURE — 25010000002 PIPERACILLIN SOD-TAZOBACTAM PER 1 G: Performed by: INTERNAL MEDICINE

## 2023-04-19 RX ORDER — BISACODYL 10 MG
10 SUPPOSITORY, RECTAL RECTAL DAILY PRN
Status: DISCONTINUED | OUTPATIENT
Start: 2023-04-19 | End: 2023-04-20 | Stop reason: HOSPADM

## 2023-04-19 RX ORDER — ACETAMINOPHEN 325 MG/1
650 TABLET ORAL EVERY 6 HOURS PRN
Status: DISCONTINUED | OUTPATIENT
Start: 2023-04-19 | End: 2023-04-20 | Stop reason: HOSPADM

## 2023-04-19 RX ORDER — ATORVASTATIN CALCIUM 20 MG/1
40 TABLET, FILM COATED ORAL NIGHTLY
Status: DISCONTINUED | OUTPATIENT
Start: 2023-04-19 | End: 2023-04-20 | Stop reason: HOSPADM

## 2023-04-19 RX ADMIN — ATORVASTATIN CALCIUM 40 MG: 20 TABLET, FILM COATED ORAL at 21:03

## 2023-04-19 RX ADMIN — CLOPIDOGREL BISULFATE 75 MG: 75 TABLET, FILM COATED ORAL at 09:19

## 2023-04-19 RX ADMIN — SODIUM CHLORIDE 100 ML/HR: 9 INJECTION, SOLUTION INTRAVENOUS at 06:32

## 2023-04-19 RX ADMIN — BISACODYL 10 MG: 10 SUPPOSITORY RECTAL at 14:51

## 2023-04-19 RX ADMIN — TAZOBACTAM SODIUM AND PIPERACILLIN SODIUM 3.38 G: 375; 3 INJECTION, SOLUTION INTRAVENOUS at 00:27

## 2023-04-19 RX ADMIN — TAZOBACTAM SODIUM AND PIPERACILLIN SODIUM 3.38 G: 375; 3 INJECTION, SOLUTION INTRAVENOUS at 09:19

## 2023-04-19 RX ADMIN — ASPIRIN 81 MG: 81 TABLET, CHEWABLE ORAL at 09:19

## 2023-04-19 RX ADMIN — Medication 10 ML: at 21:03

## 2023-04-19 RX ADMIN — SODIUM BICARBONATE 1300 MG: 650 TABLET ORAL at 21:03

## 2023-04-19 RX ADMIN — Medication 10 ML: at 09:19

## 2023-04-19 RX ADMIN — POLYETHYLENE GLYCOL 3350 17 G: 17 POWDER, FOR SOLUTION ORAL at 09:19

## 2023-04-19 RX ADMIN — LEVOTHYROXINE SODIUM 88 MCG: 0.09 TABLET ORAL at 09:19

## 2023-04-19 RX ADMIN — SODIUM BICARBONATE 1300 MG: 650 TABLET ORAL at 17:26

## 2023-04-19 RX ADMIN — TAZOBACTAM SODIUM AND PIPERACILLIN SODIUM 3.38 G: 375; 3 INJECTION, SOLUTION INTRAVENOUS at 17:26

## 2023-04-19 RX ADMIN — SODIUM BICARBONATE 1300 MG: 650 TABLET ORAL at 09:19

## 2023-04-19 NOTE — PLAN OF CARE
Goal Outcome Evaluation:  Plan of Care Reviewed With: patient        Progress: improving  Outcome Evaluation: alford removed, kidney function improving, no other changes, vss, will continue to monitor

## 2023-04-19 NOTE — PROGRESS NOTES
Name: Kunal Vargas ADMIT: 2023   : 1945  PCP: Chai Montesinos MD    MRN: [id number] LOS: 4 days   AGE/SEX: 77 y.o. male  ROOM: [facility]     Subjective   Subjective     No events overnight. He complains of some constipation and requests a suppository. UOP looks good and his creatinine is stable today       Objective   Objective   Vital Signs  Temp:  [97 °F (36.1 °C)-99.1 °F (37.3 °C)] 98.6 °F (37 °C)  Heart Rate:  [48-56] 53  Resp:  [18] 18  BP: (114-145)/(48-69) 141/69  SpO2:  [97 %-98 %] 97 %  on   ;   Device (Oxygen Therapy): room air  Body mass index is 23.86 kg/m².  Physical Exam  Constitutional:       General: He is not in acute distress.     Appearance: He is not toxic-appearing.   Cardiovascular:      Rate and Rhythm: Normal rate and regular rhythm.      Heart sounds: Normal heart sounds.   Pulmonary:      Effort: Pulmonary effort is normal.      Breath sounds: Normal breath sounds.   Abdominal:      General: Bowel sounds are normal.      Palpations: Abdomen is soft.   Musculoskeletal:         General: No tenderness.      Right lower leg: No edema.      Left lower leg: No edema.   Neurological:      Mental Status: He is alert.   Psychiatric:         Mood and Affect: Mood normal.         Behavior: Behavior normal.         Results Review     I reviewed the patient's new clinical results.  Results from last 7 days   Lab Units 23  0356 23  0343 04/17/23  0526 04/16/23  1351   WBC 10*3/mm3 7.68 8.74 9.08 9.34   HEMOGLOBIN g/dL 9.1* 8.9* 10.0* 10.6*   PLATELETS 10*3/mm3 224 212 211 219     Results from last 7 days   Lab Units 23  0356 23  0343 04/17/23  0526 04/16/23  1944   SODIUM mmol/L 143 142 138 138   POTASSIUM mmol/L 4.6 4.7 5.3* 4.6   CHLORIDE mmol/L 112* 112* 107 108*   CO2 mmol/L 18.7* 18.4* 18.7* 17.2*   BUN mg/dL 40* 45* 43* 40*   CREATININE mg/dL 2.73* 2.74* 2.58* 2.26*   GLUCOSE mg/dL 89 95 133* 207*   Estimated Creatinine Clearance: 23.5 mL/min (A) (by C-G  formula based on SCr of 2.73 mg/dL (H)).  Results from last 7 days   Lab Units 04/19/23  0356 04/18/23  0343 04/17/23  0526 04/16/23  1458 04/16/23  0546   ALBUMIN g/dL 2.7* 2.6* 2.9* 3.0* 3.0*   BILIRUBIN mg/dL 1.1  --  1.6* 2.5* 2.2*   ALK PHOS U/L 358*  --  498* 554* 556*   AST (SGOT) U/L 39  --  83* 107* 91*   ALT (SGPT) U/L 81*  --  135* 140* 132*     Results from last 7 days   Lab Units 04/19/23  0356 04/18/23  0343 04/17/23  0526 04/16/23  1944 04/16/23  1458   CALCIUM mg/dL 8.3* 8.1* 8.4* 8.0* 8.1*   ALBUMIN g/dL 2.7* 2.6* 2.9*  --  3.0*   MAGNESIUM mg/dL 2.5* 2.6*  --   --   --    PHOSPHORUS mg/dL 3.5 3.5  --   --   --      Results from last 7 days   Lab Units 04/14/23  2151 04/12/23  1542   PROCALCITONIN ng/mL 1.30* 1.12*   LACTATE mmol/L 1.0  --      COVID19   Date Value Ref Range Status   04/14/2023 Not Detected Not Detected - Ref. Range Final     SARS-CoV-2 PCR   Date Value Ref Range Status   11/10/2020 Not Detected Not Detected Final     Comment:     Nucleic acid specific to SARS-CoV-2 (COVID-19) virus was not detected in  this sample by the TaqPath (TM) COVID-19 Combo Kit.          SARS-CoV-2 (COVID-19) nucleic acid testing performed using [employer] Aptima (R) SARS-CoV-2 Assay or [employer] TaqPath (TM)  COVID-19 Combo Kit as indicated above under Emergency Use Authorization (EUA) from the FDA. Aptima (R) and TaqPath (TM) test performance  characteristics verified by [facility] in accordance with the FDAs Guidance Document (Policy for Diagnostic Tests for Coronavirus Disease-2019  during the Public Health Emergency) issued on March 16, 2020. The laboratory is regulated under CLIA as qualified to perform high-complexity testing  Unless otherwise noted, all testing was performed at [facility], CLIA No. [id number], [place] State Licensee No. [id number]   09/08/2020 Not Detected  Final     Comment:     Nucleic acid specific to SARS-CoV-2 (COVID-19) virus was not detected inthis sample by the  TaqPath (TM) COVID-19 Combo Kit.SARS-CoV-2 (COVID-19) nucleic acid testing performed using [employer] Aptima (R) SARS-CoV-2 Assay or [employer] TaqPath   (TM)COVID-19 Combo Kit as indicated above under Emergency Use Authorization (EUA) from the FDA. Aptima (R) and TaqPath (TM) test performancecharacteristics verified by [facility] in accordance with the FDAs Guidance Document (Policy for Diagnostic   Tests for Coronavirus Disease-2019during the Public Health Emergency) issued on March 16, 2020. The laboratory is regulated under CLIA as qualified to perform high-complexity testingUnless otherwise noted, all testing was performed at [facility],   CLIA No. [id number], [place] State Licensee No. [id number]     Glucose   Date/Time Value Ref Range Status   04/17/2023 1103 102 70 - 130 mg/dL Final     Comment:     Meter: [id number] : [id number] Jones Aryeon NA   04/17/2023 0027 145 (H) 70 - 130 mg/dL Final     Comment:     Meter: [id number] : [id number] Chai Maldonado RN   04/16/2023 1612 145 (H) 70 - 130 mg/dL Final     Comment:     Meter: [id number] : [id number] Soumya Garza NA       XR Chest 1 View  XR CHEST 1 VW-  04/16/2023     HISTORY: Intubation.     Heart size is within normal limits. Lungs appear relatively clear.  Endotracheal tube is seen with its tip overlying the trachea at the  level of the clavicles. No abnormal air collections are seen.     This report was finalized on 4/16/2023 2:02 PM by Dr. Pineda Wood M.D.     [place] Cholangiogram Operative  INTRAOPERATIVE CHOLANGIOGRAM     HISTORY: Gallbladder disease.     FINDINGS: Exam demonstrates injection of contrast into the cystic duct.  Contrast outlines a normal caliber common duct and contrast extends into  the duodenum without filling defect or obstruction.     FLUOROSCOPY TIME: 10.1 seconds, 3 images.     This report was finalized on 4/16/2023 10:42 AM by Dr. Abraham Washington M.D.       Scheduled Medications  aspirin, 81 mg,  Oral, Daily  clopidogrel, 75 mg, Oral, Daily  levothyroxine, 88 mcg, Oral, Daily  metoprolol succinate XL, 25 mg, Oral, Daily  piperacillin-tazobactam, 3.375 g, Intravenous, Q8H  polyethylene glycol, 17 g, Oral, Daily  sodium bicarbonate, 1,300 mg, Oral, TID  sodium chloride, 10 mL, Intravenous, Q12H    Infusions  lactated ringers, 9 mL/hr, Last Rate: Stopped (04/17/23 1331)  sodium chloride, 100 mL/hr, Last Rate: 100 mL/hr (04/19/23 0632)    Diet  Diet: Regular/House Diet; Texture: Regular Texture (IDDSI 7); Fluid Consistency: Thin (IDDSI 0)       Assessment/Plan     Active Hospital Problems    Diagnosis  POA   • **Fever and chills [R50.9]  Yes   • Calculus of gallbladder [K80.20]  Unknown   • Elevated LFTs [R79.89]  Unknown   • Stage 3b chronic kidney disease (CKD) [N18.32]  Unknown   • Acute kidney failure [N17.9]  Yes   • Status post nephrectomy - left nephrouretrectomy -11/12/2020 [Z90.5]  Not Applicable   • Essential hypertension [I10]  Yes   • Chronic coronary artery disease [I25.10]  Yes      Resolved Hospital Problems   No resolved problems to display.       77 y.o. male admitted with Fever and chills.    · Acute cholecystitis-s/p laparoscopic cholecystectomy with normal IOC on 4/16/23. Finishing his course of zosyn. Tolerating a regular diet. Pain is well controlled  · SHIV on CKD 3b-prerenal due to sepsis/hypotension. Likely with a component of ATN. Creatinine appears to have peaked and is stable today. Continues to make good UOP and he is normotensive  · Acute hypercapnic respiratory failure-remained on mechanical ventilation in the ICU following surgery. Felt to be secondary to anesthesia and poor renal clearance. No evidence of lung disease on imaging and now extubated and on room air.  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 Statin held for LFT elevation acutely, but his  LFTs have improved and I think we can restart this today  · Hypothyroidism-synthroid  · History of left renal cell cancer s/p left nephrectomy   · SCDs for DVT prophylaxis.  · Full code.  · Discussed with patient, family and nursing staff.  · Anticipate discharge home with family when cleared by consultants.      Blayne Casillas MD  [facility]  04/19/23  15:36 EDT    I wore protective equipment throughout this patient encounter including a face mask, gloves and protective eyewear.  Hand hygiene was performed before donning protective equipment and after removal when leaving the room.

## 2023-04-19 NOTE — PROGRESS NOTES
[facility] Progress Note      Patient Name: Kunal Vargas  : 1945  MRN: [id number]  Primary Care Physician:  Chai Montesinos MD  Date of admission: 2023    Subjective     Interval History:   Follow-up Frankie on CKD IIIB. Bowels moved. Eating. Drinking. Walking.     Review of Systems:   As noted above    Objective     Vitals:   Temp:  [97 °F (36.1 °C)-99.1 °F (37.3 °C)] 98.6 °F (37 °C)  Heart Rate:  [48-56] 53  Resp:  [18] 18  BP: (114-145)/(48-69) 141/69    Intake/Output Summary (Last 24 hours) at 2023 1701  Last data filed at 2023 1457  Gross per 24 hour   Intake 200 ml   Output 2550 ml   Net -2350 ml       Physical Exam:    General Appearance: alert, awake, hard of hearing, chronically, no acute distress   Skin: warm and dry  HEENT: oral mucosa normal, nonicteric sclera  Neck: supple, no JVD  Lungs: Clear to auscultation  Heart: RRR, normal S1 and S2, no S3, no rub  Abdomen: soft, nontender, nondistended,+bs . Lap incision healing.   : alford out.   Extremities: trace lower ext edema.   Neuro: normal speech and mental status     Scheduled Meds:     aspirin, 81 mg, Oral, Daily  atorvastatin, 40 mg, Oral, Nightly  clopidogrel, 75 mg, Oral, Daily  levothyroxine, 88 mcg, Oral, Daily  metoprolol succinate XL, 25 mg, Oral, Daily  piperacillin-tazobactam, 3.375 g, Intravenous, Q8H  polyethylene glycol, 17 g, Oral, Daily  sodium bicarbonate, 1,300 mg, Oral, TID  sodium chloride, 10 mL, Intravenous, Q12H      IV Meds:   lactated ringers, 9 mL/hr, Last Rate: Stopped (23 1331)  sodium chloride, 100 mL/hr, Last Rate: 100 mL/hr (23 0632)        Results Reviewed:   I have personally reviewed the results from the time of this admission to 2023 17:01 EDT     Results from last 7 days   Lab Units 23  0356 23  0343 23  0526 23  1944 23  1458   SODIUM mmol/L 143 142 138   < > 133*   POTASSIUM mmol/L 4.6 4.7 5.3*   < > 6.2*    CHLORIDE mmol/L 112* 112* 107   < > 105   CO2 mmol/L 18.7* 18.4* 18.7*   < > 16.8*   BUN mg/dL 40* 45* 43*   < > 40*   CREATININE mg/dL 2.73* 2.74* 2.58*   < > 2.23*   CALCIUM mg/dL 8.3* 8.1* 8.4*   < > 8.1*   BILIRUBIN mg/dL 1.1  --  1.6*  --  2.5*   ALK PHOS U/L 358*  --  498*  --  554*   ALT (SGPT) U/L 81*  --  135*  --  140*   AST (SGOT) U/L 39  --  83*  --  107*   GLUCOSE mg/dL 89 95 133*   < > 149*    < > = values in this interval not displayed.       Estimated Creatinine Clearance: 23.5 mL/min (A) (by C-G formula based on SCr of 2.73 mg/dL (H)).    Results from last 7 days   Lab Units 04/19/23  0356 04/18/23  0343   MAGNESIUM mg/dL 2.5* 2.6*   PHOSPHORUS mg/dL 3.5 3.5       Results from last 7 days   Lab Units 04/19/23  0356 04/18/23  0343   URIC ACID mg/dL 4.9 5.6       Results from last 7 days   Lab Units 04/19/23  0356 04/18/23  0343 04/17/23  0526 04/16/23  1351 04/16/23  0546   WBC 10*3/mm3 7.68 8.74 9.08 9.34 7.80   HEMOGLOBIN g/dL 9.1* 8.9* 10.0* 10.6* 9.7*   PLATELETS 10*3/mm3 224 212 211 219 151       Results from last 7 days   Lab Units 04/14/23  2151   INR  1.14*       Assessment / Plan     ASSESSMENT:  1. Acute kidney injury on CKD  IIIb prior left nephrectomy. Baseline 1.6-1.8. SHIV, ATN  Due to  hypotension, urinary retention.  Creatinine stable. BP stable.   2. Type IV RTA  chronic kidney disease, po sodium bicarbonate.  3. History of renal cell cancer and radical left nephrectomy in 2020  4. CAD. PCI and stent  5. Acute cholecystitis sp cholecystectomy on 4/16/2023  6 .Anemia, hemoglobin stable.     PLAN:  1. Dc IVF.  2. If stable in am, could go home.     No Benavides MD  04/19/23  17:01 EDT    [facility]  [phone]

## 2023-04-20 ENCOUNTER — READMISSION MANAGEMENT (OUTPATIENT)
Dept: CALL CENTER | Facility: HOSPITAL | Age: 78
End: 2023-04-20
Payer: MEDICARE

## 2023-04-20 ENCOUNTER — TELEPHONE (OUTPATIENT)
Dept: CARDIOLOGY | Facility: CLINIC | Age: 78
End: 2023-04-20
Payer: MEDICARE

## 2023-04-20 VITALS
TEMPERATURE: 98.9 F | HEIGHT: 69 IN | DIASTOLIC BLOOD PRESSURE: 65 MMHG | SYSTOLIC BLOOD PRESSURE: 139 MMHG | HEART RATE: 50 BPM | OXYGEN SATURATION: 99 % | BODY MASS INDEX: 23.98 KG/M2 | RESPIRATION RATE: 18 BRPM | WEIGHT: 161.9 LBS

## 2023-04-20 PROBLEM — K80.20 CALCULUS OF GALLBLADDER: Status: RESOLVED | Noted: 2023-04-15 | Resolved: 2023-04-20

## 2023-04-20 PROBLEM — N17.9 ACUTE KIDNEY FAILURE: Status: RESOLVED | Noted: 2020-11-15 | Resolved: 2023-04-20

## 2023-04-20 PROBLEM — R50.9 FEVER AND CHILLS: Status: RESOLVED | Noted: 2023-04-15 | Resolved: 2023-04-20

## 2023-04-20 PROBLEM — R79.89 ELEVATED LFTS: Status: RESOLVED | Noted: 2023-04-15 | Resolved: 2023-04-20

## 2023-04-20 LAB
ALBUMIN SERPL-MCNC: 2.8 G/DL (ref 3.5–5.2)
ANION GAP SERPL CALCULATED.3IONS-SCNC: 11.3 MMOL/L (ref 5–15)
BACTERIA SPEC AEROBE CULT: NORMAL
BUN SERPL-MCNC: 35 MG/DL (ref 8–23)
BUN/CREAT SERPL: 13.9 (ref 7–25)
CALCIUM SPEC-SCNC: 8.1 MG/DL (ref 8.6–10.5)
CHLORIDE SERPL-SCNC: 111 MMOL/L (ref 98–107)
CO2 SERPL-SCNC: 19.7 MMOL/L (ref 22–29)
CREAT SERPL-MCNC: 2.52 MG/DL (ref 0.76–1.27)
DEPRECATED RDW RBC AUTO: 44.1 FL (ref 37–54)
EGFRCR SERPLBLD CKD-EPI 2021: 25.6 ML/MIN/1.73
ERYTHROCYTE [DISTWIDTH] IN BLOOD BY AUTOMATED COUNT: 12.6 % (ref 12.3–15.4)
GLUCOSE SERPL-MCNC: 92 MG/DL (ref 65–99)
HCT VFR BLD AUTO: 26.2 % (ref 37.5–51)
HGB BLD-MCNC: 8.3 G/DL (ref 13–17.7)
MCH RBC QN AUTO: 30.7 PG (ref 26.6–33)
MCHC RBC AUTO-ENTMCNC: 31.7 G/DL (ref 31.5–35.7)
MCV RBC AUTO: 97 FL (ref 79–97)
PHOSPHATE SERPL-MCNC: 3.3 MG/DL (ref 2.5–4.5)
PLATELET # BLD AUTO: 228 10*3/MM3 (ref 140–450)
PMV BLD AUTO: 10.5 FL (ref 6–12)
POTASSIUM SERPL-SCNC: 4.4 MMOL/L (ref 3.5–5.2)
RBC # BLD AUTO: 2.7 10*6/MM3 (ref 4.14–5.8)
SODIUM SERPL-SCNC: 142 MMOL/L (ref 136–145)
WBC NRBC COR # BLD: 8.01 10*3/MM3 (ref 3.4–10.8)

## 2023-04-20 PROCEDURE — 80069 RENAL FUNCTION PANEL: CPT | Performed by: INTERNAL MEDICINE

## 2023-04-20 PROCEDURE — 85027 COMPLETE CBC AUTOMATED: CPT | Performed by: STUDENT IN AN ORGANIZED HEALTH CARE EDUCATION/TRAINING PROGRAM

## 2023-04-20 PROCEDURE — 25010000002 PIPERACILLIN SOD-TAZOBACTAM PER 1 G: Performed by: INTERNAL MEDICINE

## 2023-04-20 RX ORDER — SODIUM BICARBONATE 650 MG/1
1300 TABLET ORAL 3 TIMES DAILY
Qty: 180 TABLET | Refills: 0 | Status: SHIPPED | OUTPATIENT
Start: 2023-04-20 | End: 2023-05-20

## 2023-04-20 RX ADMIN — METOPROLOL SUCCINATE 25 MG: 25 TABLET, EXTENDED RELEASE ORAL at 08:50

## 2023-04-20 RX ADMIN — TAZOBACTAM SODIUM AND PIPERACILLIN SODIUM 3.38 G: 375; 3 INJECTION, SOLUTION INTRAVENOUS at 00:06

## 2023-04-20 RX ADMIN — ASPIRIN 81 MG: 81 TABLET, CHEWABLE ORAL at 08:50

## 2023-04-20 RX ADMIN — Medication 10 ML: at 08:50

## 2023-04-20 RX ADMIN — CLOPIDOGREL BISULFATE 75 MG: 75 TABLET, FILM COATED ORAL at 08:50

## 2023-04-20 RX ADMIN — SODIUM BICARBONATE 1300 MG: 650 TABLET ORAL at 08:50

## 2023-04-20 RX ADMIN — LEVOTHYROXINE SODIUM 88 MCG: 0.09 TABLET ORAL at 08:50

## 2023-04-20 NOTE — CASE MANAGEMENT/SOCIAL WORK
Case Management Discharge Note      Final Note: Home via spouse, no additional CCP needs. Ja RN/CCP         Selected Continued Care - Discharged on 4/20/2023 Admission date: 4/14/2023 - Discharge disposition: Home or Self Care    Destination    No services have been selected for the patient.              Durable Medical Equipment    No services have been selected for the patient.              Dialysis/Infusion    No services have been selected for the patient.              Home Medical Care    No services have been selected for the patient.              Therapy    No services have been selected for the patient.              Community Resources    No services have been selected for the patient.              Community & DME    No services have been selected for the patient.                       Final Discharge Disposition Code: 01 - home or self-care

## 2023-04-20 NOTE — OUTREACH NOTE
Prep Survey    Flowsheet Row Responses   [facility] facility patient discharged from? [place]   Is LACE score < 7 ? No   Eligibility [facility]   Date of Admission 04/14/23   Date of Discharge 04/20/23   Discharge Disposition Home or Self Care   Discharge diagnosis Fever and chills  Calculus of gallbladder   Does the patient have one of the following disease processes/diagnoses(primary or secondary)? Other   Does the patient have Home health ordered? No   Is there a DME ordered? No   Prep survey completed? Yes          GRISELDA AGUERO - Registered Nurse

## 2023-04-20 NOTE — PROGRESS NOTES
[facility] Progress Note      Patient Name: Kunal Vargas  : 1945  MRN: [id number]  Primary Care Physician:  Chai Montesinos MD  Date of admission: 2023    Subjective     Interval History:   Follow-up Frankie on CKD IIIB. Bowels moved. Eating. Drinking. Walking.   Urinating without alford.   Review of Systems:   As noted above    Objective     Vitals:   Temp:  [98.6 °F (37 °C)-98.9 °F (37.2 °C)] 98.9 °F (37.2 °C)  Heart Rate:  [50-59] 50  Resp:  [18] 18  BP: (126-144)/(53-70) 139/65    Intake/Output Summary (Last 24 hours) at 2023 0909  Last data filed at 2023 2300  Gross per 24 hour   Intake --   Output 1750 ml   Net -1750 ml       Physical Exam:    General Appearance: alert, awake, hard of hearing, chronically, no acute distress   Skin: warm and dry  HEENT: oral mucosa normal, nonicteric sclera  Neck: supple, no JVD  Lungs: Clear to auscultation  Heart: RRR, normal S1 and S2, no S3, no rub  Abdomen: soft, nontender, nondistended,+bs . Lap incision healing.   : alford out.   Extremities: trace lower ext edema.   Neuro: normal speech and mental status     Scheduled Meds:     aspirin, 81 mg, Oral, Daily  atorvastatin, 40 mg, Oral, Nightly  clopidogrel, 75 mg, Oral, Daily  levothyroxine, 88 mcg, Oral, Daily  metoprolol succinate XL, 25 mg, Oral, Daily  polyethylene glycol, 17 g, Oral, Daily  sodium bicarbonate, 1,300 mg, Oral, TID  sodium chloride, 10 mL, Intravenous, Q12H      IV Meds:        Results Reviewed:   I have personally reviewed the results from the time of this admission to 2023 09:09 EDT     Results from last 7 days   Lab Units 23  0348 23  0356 23  0343 23  0526 23  1944 23  1458   SODIUM mmol/L 142 143 142 138   < > 133*   POTASSIUM mmol/L 4.4 4.6 4.7 5.3*   < > 6.2*   CHLORIDE mmol/L 111* 112* 112* 107   < > 105   CO2 mmol/L 19.7* 18.7* 18.4* 18.7*   < > 16.8*   BUN mg/dL 35* 40* 45* 43*   < > 40*    CREATININE mg/dL 2.52* 2.73* 2.74* 2.58*   < > 2.23*   CALCIUM mg/dL 8.1* 8.3* 8.1* 8.4*   < > 8.1*   BILIRUBIN mg/dL  --  1.1  --  1.6*  --  2.5*   ALK PHOS U/L  --  358*  --  498*  --  554*   ALT (SGPT) U/L  --  81*  --  135*  --  140*   AST (SGOT) U/L  --  39  --  83*  --  107*   GLUCOSE mg/dL 92 89 95 133*   < > 149*    < > = values in this interval not displayed.       Estimated Creatinine Clearance: 25.5 mL/min (A) (by C-G formula based on SCr of 2.52 mg/dL (H)).    Results from last 7 days   Lab Units 04/20/23  0348 04/19/23  0356 04/18/23  0343   MAGNESIUM mg/dL  --  2.5* 2.6*   PHOSPHORUS mg/dL 3.3 3.5 3.5       Results from last 7 days   Lab Units 04/19/23  0356 04/18/23  0343   URIC ACID mg/dL 4.9 5.6       Results from last 7 days   Lab Units 04/20/23  0348 04/19/23  0356 04/18/23  0343 04/17/23  0526 04/16/23  1351   WBC 10*3/mm3 8.01 7.68 8.74 9.08 9.34   HEMOGLOBIN g/dL 8.3* 9.1* 8.9* 10.0* 10.6*   PLATELETS 10*3/mm3 228 224 212 211 219       Results from last 7 days   Lab Units 04/14/23  2151   INR  1.14*       Assessment / Plan     ASSESSMENT:  1. Acute kidney injury on CKD  IIIb prior left nephrectomy. Baseline 1.6-1.8. SHIV, ATN  Due to  hypotension, urinary retention.  Creatinine improving.  BP stable.   2. Type IV RTA  chronic kidney disease, po sodium bicarbonate.  3. History of renal cell cancer and radical left nephrectomy in 2020  4. CAD. PCI and stent  5. Acute cholecystitis sp cholecystectomy on 4/16/2023  6 .Anemia, hemoglobin stable.     PLAN:  1. Ok for dc from renal view.  2.Has Follow up in [facility].  Labs Thursday April 27 at noon  BMP at our [facility] , followup appt With Jenny Carrington APRN May 10 at 1:20 pm.   3. Continue po bicarb.  4.  Patient advised to avoid all NSAIDS.  No IV contrast studies without informing of SHIV, CKD IIIB.   No Benavides MD  04/20/23  09:09 EDT    [facility]  [phone]

## 2023-04-20 NOTE — TELEPHONE ENCOUNTER
Spoke to patient's wife Renetta about Holter results.  Patient was just discharged today after acute cholecystitis syndrome hypoxic respiratory failure where his surgery was performed on dual antiplatelet therapy.  He has recovered well and I will see him in clinic tomorrow.

## 2023-04-20 NOTE — DISCHARGE SUMMARY
Patient Name: Kunal Vargas  : 1945  MRN: [id number]    Date of Admission: 2023  Date of Discharge:  2023  Primary Care Physician: Chai Montesinos MD      Chief Complaint:   Fever (Pt c/o fever x1 week. Pt recently tested - for covid and flu. 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 Vargas is a 77 y.o. male with a history of hypothyroidism, coronary artery disease status post PCI with SABRINA to LAD on 3/17/2023 on DAPT, CKD 3B after a left nephrectomy for left renal cell carcinoma who presented to [facility] initially complaining of fever, chills, and bilateral shoulder pain.  Please see the admitting history and physical for further details.  He was found to have acute cholecystitis and an SHIV and was admitted to the hospital for further evaluation and treatment.      He was seen in consultation by general surgery, and nephrology.  He was treated with IV fluids and Zosyn, and then taken for a laparoscopic cholecystectomy with IOC on 2023.  IOC was normal.  Postoperatively, he developed acute hypercapnic respiratory failure and had to be transferred to the ICU for mechanical ventilation.  This was felt to be secondary to anesthesia and poor renal clearance, and there was no evidence of lung disease on imaging.  He was shortly thereafter extubated,  and transferred to the floor on room air.  His SHIV was felt to be related to ATN from hypotension/sepsis, and this improved with fluids.  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  His creatinine has finally started to come down today, and all of his electrolytes are normal.  He will follow-up with nephrology for labs on April 27, and then he has a follow-up appointment on May 10 in their clinic.     Day of Discharge     Subjective:  No events overnight.  No new complaints.  His creatinine is improved some this morning, and he has been cleared for discharge by nephrology    Physical Exam:  Temp:  [98.6 °F (37 °C)-98.9 °F (37.2 °C)] 98.9 °F (37.2 °C)  Heart Rate:  [50-59] 50  Resp:  [18] 18  BP: (126-144)/(53-70) 139/65  Body mass index is 23.91 kg/m².  Physical Exam  Constitutional:       General: He is not in acute distress.     Appearance: He is not toxic-appearing.   Cardiovascular:      Rate and Rhythm: Normal rate and regular rhythm.      Heart sounds: Normal heart sounds.   Pulmonary:      Effort: Pulmonary effort is normal.      Breath sounds: Normal breath sounds.   Abdominal:      General: Bowel sounds are normal.      Palpations: Abdomen is soft.   Musculoskeletal:         General: No tenderness.      Right lower leg: No edema.      Left lower leg: No edema.   Neurological:      Mental Status: He is alert.   Psychiatric:         Mood and Affect: Mood normal.         Behavior: Behavior normal.         Consultants     Consult Orders (all) (From admission, onward)     Start     Ordered    04/17/23 0835  Inpatient Nephrology Consult  Once        Specialty:  Nephrology  Provider:  Watson Lowe MD    04/17/23 0838    04/16/23 1321  Inpatient Consult to Pulmonology  Once        Specialty:  Pulmonary Disease  Provider:  Giovanni Flores MD    04/16/23 1321    04/15/23 1241  Inpatient  Cardiology Consult  Once        Specialty:  Cardiology  Provider:  Deandre Nava, APRN    04/15/23 1240    04/15/23 0950  Inpatient General Surgery Consult  Once        Specialty:  General Surgery  Provider:  Salbador Ritter MD    04/15/23 0949    04/15/23 0303  Inpatient Gastroenterology Consult  Once        Specialty:  Gastroenterology  Provider:  Christelle Montaño MD    04/15/23 0305    04/15/23 0236  [facility] (on-call MD unless specified) Details  Once        Specialty:  Hospitalist  Provider:  Tanner Myrick MD    04/15/23 0235              Procedures     Imaging Results (All)     Procedure Component Value Units Date/Time    XR Chest 1 View [[id number]] Collected: 04/16/23 1358     Updated: 04/16/23 1405    Narrative:      XR CHEST 1 VW-  04/16/2023     HISTORY: Intubation.     Heart size is within normal limits. Lungs appear relatively clear.  Endotracheal tube is seen with its tip overlying the trachea at the  level of the clavicles. No abnormal air collections are seen.     This report was finalized on 4/16/2023 2:02 PM by Dr. Pineda Wood M.D.       FL Cholangiogram Operative [[id number]] Collected: 04/16/23 1027     Updated: 04/16/23 1045    Narrative:      INTRAOPERATIVE CHOLANGIOGRAM     HISTORY: Gallbladder disease.     FINDINGS: Exam demonstrates injection of contrast into the cystic duct.  Contrast outlines a normal caliber common duct and contrast extends into  the duodenum without filling defect or obstruction.     FLUOROSCOPY TIME: 10.1 seconds, 3 images.     This report was finalized on 4/16/2023 10:42 AM by Dr. Abraham Washington M.D.       US Gallbladder [[id number]] Collected: 04/15/23 0424     Updated: 04/15/23 0430    Narrative:      GALLBLADDER ULTRASOUND     HISTORY: Cholelithiasis     COMPARISON: 04/15/2023     TECHNIQUE: Grayscale and color Doppler sonographic images were obtained  through the right upper quadrant.     FINDINGS:  Visualized portions of the pancreas are  unremarkable. There is no intra  or extrahepatic biliary patient. Common bile duct measures up to 5 mm.  Patient is noted to have stones and sludge within the gallbladder, but  there is no gallbladder wall thickening or pericholecystic fluid.  Gallbladder wall measures 2 mm in thickness. No focal hepatic lesions  are seen. The right kidney measures 10.4 x 4.7 cm. There is no  hydronephrosis. Renal echotexture is normal.       Impression:      Gallbladder stones and sludge.     This report was finalized on 4/15/2023 4:27 AM by Dr. Norah Fleming M.D.       CT Chest Without Contrast Diagnostic [[id number]] Collected: 04/15/23 0122     Updated: 04/15/23 0132    Narrative:      CT OF THE CHEST WITHOUT CONTRAST; CT OF THE ABDOMEN AND PELVIS WITHOUT  CONTRAST     HISTORY: Fever and shortness of breath     COMPARISON: 11/16/2020     TECHNIQUE: Axial CT imaging was obtained from the thoracic inlet through  the symphysis pubis. No IV contrast was administered.     FINDINGS:  CT CHEST: No acute infiltrates are seen. The thyroid gland, trachea, and  esophagus appear unremarkable. There are coronary artery calcifications.  There is no pleural or pericardial effusion. Mediastinal lymph nodes do  not appear pathologically enlarged. No acute osseous abnormalities are  seen.     CT OF THE ABDOMEN AND PELVIS: No suspicious hepatic lesions are seen.  The stomach, duodenum, adrenal glands, and pancreas are normal. There is  cholelithiasis. One of the stents does appear to be within the neck of  the gallbladder. Left kidney is absent. No hydronephrosis is seen on the  right. Calcified granulomata are seen within the spleen. There is  calcification of the aorta. No distal ureteral or bladder stones are  seen. Prostate gland protrudes into the base of the bladder. There is  colonic diverticulosis. Stool burden appears mildly increased, which  could reflect some constipation. The appendix is normal. There is tiny  fat-containing  umbilical hernia. No acute osseous abnormalities are  seen.       Impression:         1. No acute findings identified within the thorax.  2. Cholelithiasis. Patient does appear to have a stone within the  gallbladder neck. Ultrasound could be considered for further assessment.  3. Increased stool burden could reflect some constipation.     Radiation dose reduction techniques were utilized, including automated  exposure control and exposure modulation based on body size.     This report was finalized on 4/15/2023 1:29 AM by Dr. Norah Fleming M.D.       CT Abdomen Pelvis Without Contrast [[id number]] Collected: 04/15/23 0122     Updated: 04/15/23 0132    Narrative:      CT OF THE CHEST WITHOUT CONTRAST; CT OF THE ABDOMEN AND PELVIS WITHOUT  CONTRAST     HISTORY: Fever and shortness of breath     COMPARISON: 11/16/2020     TECHNIQUE: Axial CT imaging was obtained from the thoracic inlet through  the symphysis pubis. No IV contrast was administered.     FINDINGS:  CT CHEST: No acute infiltrates are seen. The thyroid gland, trachea, and  esophagus appear unremarkable. There are coronary artery calcifications.  There is no pleural or pericardial effusion. Mediastinal lymph nodes do  not appear pathologically enlarged. No acute osseous abnormalities are  seen.     CT OF THE ABDOMEN AND PELVIS: No suspicious hepatic lesions are seen.  The stomach, duodenum, adrenal glands, and pancreas are normal. There is  cholelithiasis. One of the stents does appear to be within the neck of  the gallbladder. Left kidney is absent. No hydronephrosis is seen on the  right. Calcified granulomata are seen within the spleen. There is  calcification of the aorta. No distal ureteral or bladder stones are  seen. Prostate gland protrudes into the base of the bladder. There is  colonic diverticulosis. Stool burden appears mildly increased, which  could reflect some constipation. The appendix is normal. There is tiny  fat-containing umbilical  hernia. No acute osseous abnormalities are  seen.       Impression:         1. No acute findings identified within the thorax.  2. Cholelithiasis. Patient does appear to have a stone within the  gallbladder neck. Ultrasound could be considered for further assessment.  3. Increased stool burden could reflect some constipation.     Radiation dose reduction techniques were utilized, including automated  exposure control and exposure modulation based on body size.     This report was finalized on 4/15/2023 1:29 AM by Dr. Norah Fleming M.D.       XR Chest 1 View [[id number]] Collected: 04/14/23 2219     Updated: 04/14/23 2222    Narrative:      SINGLE VIEW OF THE CHEST     HISTORY: Fever and cough     COMPARISON: None available.     FINDINGS:  Heart size is within normal limits. No pneumothorax, pleural effusion,  or acute infiltrate is seen.       Impression:      No acute findings.     This report was finalized on 4/14/2023 10:19 PM by Dr. Norah Fleming M.D.             Pertinent Labs     Results from last 7 days   Lab Units 04/20/23 0348 04/19/23  0356 04/18/23 0343 04/17/23  0526   WBC 10*3/mm3 8.01 7.68 8.74 9.08   HEMOGLOBIN g/dL 8.3* 9.1* 8.9* 10.0*   PLATELETS 10*3/mm3 228 224 212 211     Results from last 7 days   Lab Units 04/20/23 0348 04/19/23  0356 04/18/23  0343 04/17/23  0526   SODIUM mmol/L 142 143 142 138   POTASSIUM mmol/L 4.4 4.6 4.7 5.3*   CHLORIDE mmol/L 111* 112* 112* 107   CO2 mmol/L 19.7* 18.7* 18.4* 18.7*   BUN mg/dL 35* 40* 45* 43*   CREATININE mg/dL 2.52* 2.73* 2.74* 2.58*   GLUCOSE mg/dL 92 89 95 133*   Estimated Creatinine Clearance: 25.5 mL/min (A) (by C-G formula based on SCr of 2.52 mg/dL (H)).  Results from last 7 days   Lab Units 04/20/23  0348 04/19/23  0356 04/18/23  0343 04/17/23  0526 04/16/23  1458 04/16/23  0546   ALBUMIN g/dL 2.8* 2.7* 2.6* 2.9* 3.0* 3.0*   BILIRUBIN mg/dL  --  1.1  --  1.6* 2.5* 2.2*   ALK PHOS U/L  --  358*  --  498* 554* 556*   AST (SGOT) U/L  --   39  --  83* 107* 91*   ALT (SGPT) U/L  --  81*  --  135* 140* 132*     Results from last 7 days   Lab Units 04/20/23  0348 04/19/23  0356 04/18/23  0343 04/17/23  0526   CALCIUM mg/dL 8.1* 8.3* 8.1* 8.4*   ALBUMIN g/dL 2.8* 2.7* 2.6* 2.9*   MAGNESIUM mg/dL  --  2.5* 2.6*  --    PHOSPHORUS mg/dL 3.3 3.5 3.5  --      Results from last 7 days   Lab Units 04/15/23  0017   LIPASE U/L 239*       Results from last 7 days   Lab Units 04/19/23  0356 04/18/23  0343 04/17/23  1645   SODIUM UR mmol/L  --   --  42   CREATININE UR mg/dL  --   --  54.8   CHLORIDE UR mmol/L  --   --  <60   PROTEIN TOTAL URINE mg/dL  --   --  11.2   URIC ACID mg/dL 4.9   < >  --    PROT/CREAT RATIO UR mg/G Crea  --   --  204.4*    < > = values in this interval not displayed.         Invalid input(s): LDLCALC  Results from last 7 days   Lab Units 04/14/23  2305 04/14/23  2151   BLOODCX  No growth at 5 days Staphylococcus, coagulase negative*   BCIDPCR   --  Staph spp, not aureus or lugdunensis. Identification by BCID2 PCR.*       Test Results Pending at Discharge       Discharge Details        Discharge Medications      New Medications      Instructions Start Date   sodium bicarbonate 650 MG tablet   1,300 mg, Oral, 3 Times Daily         Continue These Medications      Instructions Start Date   aspirin 81 MG chewable tablet   81 mg, Oral, Daily      atorvastatin 40 MG tablet  Commonly known as: LIPITOR   40 mg, Oral, Nightly      chlorhexidine 0.12 % solution  Commonly known as: PERIDEX   No dose, route, or frequency recorded.      clopidogrel 75 MG tablet  Commonly known as: PLAVIX   75 mg, Oral, Daily      levothyroxine 88 MCG tablet  Commonly known as: SYNTHROID, LEVOTHROID   TAKE 1 TABLET BY MOUTH EVERY DAY      metoprolol succinate XL 25 MG 24 hr tablet  Commonly known as: TOPROL-XL   TAKE 1 TABLET BY MOUTH EVERY DAY      nitroglycerin 0.4 MG SL tablet  Commonly known as: NITROSTAT   place 1 tablet under the tongue as needed for angina, may  repeat every 5mins for up three doses      triamcinolone 0.1 % cream  Commonly known as: KENALOG   No dose, route, or frequency recorded.             No Known Allergies      Discharge Disposition:  Home or Self Care    Discharge Diet:  Diet Order   Procedures   • Diet: Regular/House Diet; Texture: Regular Texture (IDDSI 7); Fluid Consistency: Thin (IDDSI 0)       Discharge Activity:   Activity Instructions     Activity as Tolerated            CODE STATUS:    Code Status and Medical Interventions:   Ordered at: 04/15/23 0305     Code Status (Patient has no pulse and is not breathing):    CPR (Attempt to Resuscitate)     Medical Interventions (Patient has pulse or is breathing):    Full Support       Future Appointments   Date Time Provider Department Center   4/21/2023 10:15 AM Jeff Jones Jr., MD [facility]   4/21/2023  1:00 PM TELEMETRY MONITOR - [facility]   4/24/2023  1:00 PM TELEMETRY MONITOR - [facility]   4/26/2023  1:00 PM TELEMETRY MONITOR - [facility]   4/28/2023  1:00 PM TELEMETRY MONITOR - [facility]   5/1/2023  1:00 PM TELEMETRY MONITOR - [facility]   5/3/2023  1:00 PM TELEMETRY MONITOR - [facility]   5/5/2023  1:00 PM TELEMETRY MONITOR - [facility]   5/8/2023  1:00 PM TELEMETRY MONITOR - [facility]   5/10/2023  1:00 PM TELEMETRY MONITOR - [facility]   5/12/2023  1:00 PM TELEMETRY MONITOR - [facility]   5/15/2023  1:00 PM TELEMETRY MONITOR - [facility]   5/17/2023  8:45 AM [facility]   5/17/2023  1:00 PM TELEMETRY MONITOR - [facility]   5/19/2023  1:00 PM TELEMETRY MONITOR - [facility]   5/22/2023  1:00 PM TELEMETRY MONITOR -  [facility]  [facility]   5/23/2023  1:00 PM Chai Montesinos MD [facility]   5/24/2023  1:00 PM TELEMETRY MONITOR -  [facility]  [facility]   5/26/2023  1:00 PM TELEMETRY MONITOR - [facility]   5/31/2023  1:00 PM TELEMETRY MONITOR - [facility]   6/2/2023  1:00 PM TELEMETRY MONITOR - [facility]   6/5/2023  1:00 PM TELEMETRY MONITOR - [facility]   6/7/2023  1:00 PM TELEMETRY MONITOR - [facility]   6/9/2023  1:00 PM TELEMETRY MONITOR - [facility]   6/12/2023  1:00 PM TELEMETRY MONITOR - [facility]   6/14/2023  1:00 PM TELEMETRY MONITOR - [facility]   6/16/2023  1:00 PM TELEMETRY MONITOR - [facility]   6/19/2023  1:00 PM TELEMETRY MONITOR - [facility]   6/21/2023  1:00 PM TELEMETRY MONITOR - [facility]   6/23/2023  1:00 PM TELEMETRY MONITOR - [facility]   6/26/2023  1:00 PM TELEMETRY MONITOR - [facility]   6/28/2023  1:00 PM TELEMETRY MONITOR - [facility]   6/30/2023  1:00 PM TELEMETRY MONITOR - [facility]   7/3/2023  1:00 PM TELEMETRY MONITOR - [facility]   7/5/2023  1:00 PM TELEMETRY MONITOR - [facility]   9/22/2023 12:45 PM Jeff Jones Jr., MD [facility]     Additional Instructions for the Follow-ups that You Need to Schedule     Call MD With Problems / Concerns   As directed      Instructions: return to the hospital if you experience chest pain, shortness of breath, abdominal pain, nausea, vomiting, fevers, sweats, chills, or worsening of your symptoms    Order Comments: Instructions: return to the hospital if you experience chest pain, shortness of breath, abdominal pain, nausea, vomiting, fevers, sweats, chills, or worsening of your symptoms          Discharge Follow-up with PCP   As directed       Currently Documented PCP:    Chai Montesinos MD    PCP Phone Number:    [phone]     Follow Up Details: 2 weeks         Discharge Follow-up with Specialty:  nephrology, 2 weeks or as otherwise directed   As directed      Specialty: nephrology, 2 weeks or as otherwise directed            Follow-up Information     Chai Montesinos MD .    Specialty: Internal Medicine  Why: 2 weeks  Contact information:  [address] [address] [place] [address]  [phone]             Jenny Carrington APRN. Go on 5/10/2023.    Specialty: Nephrology  Why: Patient has follow up May 10 at 1:20 pm with [facility].  Contact information:  [address] [place] [id number]  [phone]             [facility]. Go on 4/27/2023.    Why: Has lab appt for BMP at [facility] office Thursday, April 27 at noon.  Contact information:  [address] [address] [phone]                       Additional Instructions for the Follow-ups that You Need to Schedule     Call MD With Problems / Concerns   As directed      Instructions: return to the hospital if you experience chest pain, shortness of breath, abdominal pain, nausea, vomiting, fevers, sweats, chills, or worsening of your symptoms    Order Comments: Instructions: return to the hospital if you experience chest pain, shortness of breath, abdominal pain, nausea, vomiting, fevers, sweats, chills, or worsening of your symptoms          Discharge Follow-up with PCP   As directed       Currently Documented PCP:    Chai Montesinos MD    PCP Phone Number:    [phone]     Follow Up Details: 2 weeks         Discharge Follow-up with Specialty: nephrology, 2 weeks or as otherwise directed   As directed      Specialty: nephrology, 2 weeks or as otherwise directed           Time Spent on Discharge:  Greater than 30 minutes      Blayne Casillas MD  [facility]  04/20/23  12:14 EDT

## 2023-04-21 ENCOUNTER — APPOINTMENT (OUTPATIENT)
Dept: CARDIAC REHAB | Facility: HOSPITAL | Age: 78
End: 2023-04-21
Payer: MEDICARE

## 2023-04-21 ENCOUNTER — TRANSITIONAL CARE MANAGEMENT TELEPHONE ENCOUNTER (OUTPATIENT)
Dept: CALL CENTER | Facility: HOSPITAL | Age: 78
End: 2023-04-21
Payer: MEDICARE

## 2023-04-21 ENCOUNTER — OFFICE VISIT (OUTPATIENT)
Dept: CARDIOLOGY | Facility: CLINIC | Age: 78
End: 2023-04-21
Payer: MEDICARE

## 2023-04-21 VITALS
OXYGEN SATURATION: 99 % | BODY MASS INDEX: 22.96 KG/M2 | WEIGHT: 155 LBS | HEIGHT: 69 IN | HEART RATE: 71 BPM | SYSTOLIC BLOOD PRESSURE: 130 MMHG | DIASTOLIC BLOOD PRESSURE: 70 MMHG

## 2023-04-21 DIAGNOSIS — I25.10 CORONARY ARTERY DISEASE INVOLVING NATIVE CORONARY ARTERY OF NATIVE HEART WITHOUT ANGINA PECTORIS: Primary | ICD-10-CM

## 2023-04-21 DIAGNOSIS — I49.3 FREQUENT PVCS: ICD-10-CM

## 2023-04-21 DIAGNOSIS — R94.31 ABNORMAL EKG: ICD-10-CM

## 2023-04-21 NOTE — PROGRESS NOTES
[facility]      Patient Name: Kunal Vargas  :1945  Age: 77 y.o.  Encounter Provider:  Jeff Jones Jr, MD      Chief Complaint:   No chief complaint on file.        HPI  Kunal Vargas is a 77 y.o. male with nonobstructive coronary artery disease and dyslipidemia who presents for follow-up evaluation of chest discomfort.     Last clinic visit note: Patient was initially seen in  for chest pain which she describes as sharp discomfort associated with activity.  He had a treadmill stress study that showed no evidence of ischemia but continued to have recurrent and frequent pain.  Cardiac catheterization performed showing mild nonobstructive disease.  He was last seen in 2017 by Dr. Butcher.  He was doing well at that point and was discharged with clindamycin as needed.  He was doing very well and continues to perform aerobics 3 times weekly but has been having exercise-induced chest tightness over the past few months.  He had an episode while just walking in his neighborhood the other day.  The chest discomfort does not stop him from activity and he states that it stops a few minutes after starting without rest.  No associated nausea, vomiting, diaphoresis or shortness of air.  He denies orthopnea, PND or edema.  No palpitations, dizziness or syncope.  He was started on aspirin recently.  Family history of ruptured AAA and he has periodic surveillance with most recent being in 2021 at which time he was noted to have mild carotid atherosclerotic plaque with normal abdominal aorta.  He is a lifelong non-smoker.  Denies alcohol or illicit drug use.  No cardiac complaints at time of interview.    Patient had abnormal nuclear stress study showing anterior wall ischemia in March.  He was sent for cardiac catheterization the next day showing proximal to mid critical stenosis status post drug-eluting stent placement.  He was back in the hospital in mid April for confusion  "and right upper quadrant abdominal pain.  Found to have acute cholecystitis and cardiology was consulted given recent stent placement with need for dual antiplatelet therapy.  Fortunately cholecystectomy was able to be performed on dual antiplatelet therapy and the patient had no complications.  He was discharged yesterday and is doing well.  He denies angina or heart failure symptoms.  He was increasing activity as tolerated prior to this hospitalization and will be going back to cardiac rehab.  No cardiac complaints at time of interview.  Social family history was reviewed and is not pertinent to this clinic visit.    The following portions of the patient's history were reviewed and updated as appropriate: allergies, current medications, past family history, past medical history, past social history, past surgical history and problem list.      Review of Systems   Constitutional: Negative for chills and fever.   HENT: Negative for hoarse voice and sore throat.    Eyes: Negative for double vision and photophobia.   Cardiovascular: Positive for chest pain. Negative for leg swelling, near-syncope, orthopnea, palpitations, paroxysmal nocturnal dyspnea and syncope.   Respiratory: Negative for cough and wheezing.    Skin: Negative for poor wound healing and rash.   Musculoskeletal: Negative for arthritis and joint swelling.   Gastrointestinal: Negative for bloating, abdominal pain, hematemesis and hematochezia.   Neurological: Negative for dizziness and focal weakness.   Psychiatric/Behavioral: Negative for depression and suicidal ideas.       OBJECTIVE:   Vital Signs  Vitals:    04/21/23 1013   BP: 130/70   Pulse: 71   SpO2: 99%     Estimated body mass index is 22.89 kg/m² as calculated from the following:    Height as of this encounter: 175.3 cm (69\").    Weight as of this encounter: 70.3 kg (155 lb).    Vitals reviewed.   Constitutional:       Appearance: Healthy appearance. Not in distress.   Neck:      Vascular: No " JVR. JVD normal.   Pulmonary:      Effort: Pulmonary effort is normal.      Breath sounds: Normal breath sounds. No wheezing. No rhonchi. No rales.   Chest:      Chest wall: Not tender to palpatation.   Cardiovascular:      PMI at left midclavicular line. Normal rate. Regular rhythm. Normal S1. Normal S2.      Murmurs: There is no murmur.      No gallop. No click. No rub.   Pulses:     Intact distal pulses.   Edema:     Peripheral edema absent.   Abdominal:      General: Bowel sounds are normal.      Palpations: Abdomen is soft.      Tenderness: There is no abdominal tenderness.   Musculoskeletal: Normal range of motion.         General: No tenderness. Skin:     General: Skin is warm and dry.   Neurological:      General: No focal deficit present.      Mental Status: Alert and oriented to person, place and time.         Procedures    Lipid Panel        10/19/2022    10:10 3/18/2023    03:23   Lipid Panel   Total Cholesterol  120     Total Cholesterol 155      Triglycerides 42   40     HDL Cholesterol 60   54     VLDL Cholesterol 9   10     LDL Cholesterol  86   56     LDL/HDL Ratio 1.44   1.07          BUN   Date Value Ref Range Status   04/20/2023 35 (H) 8 - 23 mg/dL Final     Creatinine   Date Value Ref Range Status   04/20/2023 2.52 (H) 0.76 - 1.27 mg/dL Final     Potassium   Date Value Ref Range Status   04/20/2023 4.4 3.5 - 5.2 mmol/L Final     ALT (SGPT)   Date Value Ref Range Status   04/19/2023 81 (H) 1 - 41 U/L Final     AST (SGOT)   Date Value Ref Range Status   04/19/2023 39 1 - 40 U/L Final           ASSESSMENT:     77-year-old male with known coronary artery disease presents for evaluation of precordial pain    PLAN OF CARE:     1. Cholecystitis -recovering well after cholecystectomy.  No complications of surgery.  2. Coronary artery disease -recent intervention to proximal to mid LAD.  Continue dual antiplatelet therapy, beta-blocker and statin.  Repeat lipid profile at next  visit.  3. Dyslipidemia  4. Family history of ruptured AAA    Return 6 months             Discharge Medications          Accurate as of April 21, 2023 10:16 AM. If you have any questions, ask your nurse or doctor.            Continue These Medications      Instructions Start Date   aspirin 81 MG chewable tablet   81 mg, Oral, Daily      atorvastatin 40 MG tablet  Commonly known as: LIPITOR   40 mg, Oral, Nightly      chlorhexidine 0.12 % solution  Commonly known as: PERIDEX   No dose, route, or frequency recorded.      clopidogrel 75 MG tablet  Commonly known as: PLAVIX   75 mg, Oral, Daily      levothyroxine 88 MCG tablet  Commonly known as: SYNTHROID, LEVOTHROID   TAKE 1 TABLET BY MOUTH EVERY DAY      metoprolol succinate XL 25 MG 24 hr tablet  Commonly known as: TOPROL-XL   TAKE 1 TABLET BY MOUTH EVERY DAY      nitroglycerin 0.4 MG SL tablet  Commonly known as: NITROSTAT   place 1 tablet under the tongue as needed for angina, may repeat every 5mins for up three doses      sodium bicarbonate 650 MG tablet   1,300 mg, Oral, 3 Times Daily      triamcinolone 0.1 % cream  Commonly known as: KENALOG   No dose, route, or frequency recorded.             Thank you for allowing me to participate in the care of your patient,      Sincerely,   Jeff Jones MD  [facility]  04/21/23  10:16 EDT

## 2023-04-21 NOTE — OUTREACH NOTE
Call Center TCM Note    Flowsheet Row Responses   [facility] patient discharged from? [place]   Does the patient have one of the following disease processes/diagnoses(primary or secondary)? Other   TCM attempt successful? Yes   Call start time 0937   Call end time 0939   Person spoke with today (if not patient) and relationship Patient   Meds reviewed with patient/caregiver? Yes   Is the patient having any side effects they believe may be caused by any medication additions or changes? No   Does the patient have all medications ordered at discharge? Yes   Is the patient taking all medications as directed (includes completed medication regime)? Yes   Comments Nephrology labs 4/27/23, OV 5/10/23   Does the patient have an appointment with their PCP within 7 days of discharge? Yes  [4/24/2023 at 10:15 AM]   Psychosocial issues? No   Did the patient receive a copy of their discharge instructions? Yes   Nursing interventions Reviewed instructions with patient   What is the patient's perception of their health status since discharge? Improving   Is the patient/caregiver able to teach back signs and symptoms related to disease process for when to call PCP? Yes   Is the patient/caregiver able to teach back signs and symptoms related to disease process for when to call 911? Yes   Is the patient/caregiver able to teach back the hierarchy of who to call/visit for symptoms/problems? PCP, Specialist, Home health nurse, Urgent Care, ED, 911 Yes   If the patient is a current smoker, are they able to teach back resources for cessation? Not a smoker   TCM call completed? Yes   Wrap up additional comments Pt states he is doing good, and denies increased redness, swelling, drainage, warmth at incisional site. Reviewed AVS/medication/instructions with pt. Pt verified PCP hospital fu appt on 4/24/23, and Nephrology fu appt on 5/10/23.    Call end time 0939   Would this patient benefit from a Referral to [facility] Social Work? No   Is  the patient interested in additional calls from an ambulatory ?  NOTE:  applies to high risk patients requiring additional follow-up. Eugenia Mari RN    4/21/2023, 09:47 EDT

## 2023-04-24 ENCOUNTER — OFFICE VISIT (OUTPATIENT)
Dept: FAMILY MEDICINE CLINIC | Facility: CLINIC | Age: 78
End: 2023-04-24
Payer: MEDICARE

## 2023-04-24 ENCOUNTER — APPOINTMENT (OUTPATIENT)
Dept: CARDIAC REHAB | Facility: HOSPITAL | Age: 78
End: 2023-04-24
Payer: MEDICARE

## 2023-04-24 VITALS
SYSTOLIC BLOOD PRESSURE: 130 MMHG | OXYGEN SATURATION: 97 % | WEIGHT: 149 LBS | RESPIRATION RATE: 16 BRPM | HEIGHT: 69 IN | BODY MASS INDEX: 22.07 KG/M2 | HEART RATE: 70 BPM | TEMPERATURE: 97.8 F | DIASTOLIC BLOOD PRESSURE: 60 MMHG

## 2023-04-24 DIAGNOSIS — E78.5 HYPERLIPIDEMIA, UNSPECIFIED HYPERLIPIDEMIA TYPE: ICD-10-CM

## 2023-04-24 DIAGNOSIS — I25.10 CHRONIC CORONARY ARTERY DISEASE: Primary | ICD-10-CM

## 2023-04-24 DIAGNOSIS — I10 ESSENTIAL HYPERTENSION: ICD-10-CM

## 2023-04-24 DIAGNOSIS — Z90.5 STATUS POST NEPHRECTOMY: ICD-10-CM

## 2023-04-24 DIAGNOSIS — N18.32 STAGE 3B CHRONIC KIDNEY DISEASE (CKD): ICD-10-CM

## 2023-04-24 NOTE — PROGRESS NOTES
Subjective   Kunal Vargas is a 77 y.o. male. Patient is here today for   Chief Complaint   Patient presents with   • Hospital Follow Up Visit       (Not on file)-  Risk for Readmission (LACE) Score: 11 (4/20/2023  6:00 AM)           Vitals:    04/24/23 1029   BP: 130/60   Pulse: 70   Resp: 16   Temp: 97.8 °F (36.6 °C)   SpO2: 97%     The following portions of the patient's history were reviewed and updated as appropriate: allergies, current medications, past family history, past medical history, past social history, past surgical history and problem list.    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       No Known Allergies   Social History     Socioeconomic History   • Marital status:    Tobacco Use   • Smoking status: Never     Passive exposure: Never   • Smokeless tobacco: Never   Vaping Use   • Vaping Use: Never used   Substance and Sexual Activity   • Alcohol use: Never   • Drug use: Never   • Sexual activity: Defer        Current Outpatient Medications:   •  aspirin 81 MG chewable tablet, Chew 1 tablet Daily., Disp: , Rfl:   •  atorvastatin (LIPITOR) 40 MG tablet, Take 1 tablet by mouth Every Night., Disp: 90 tablet, Rfl: 3  •  clopidogrel (PLAVIX) 75 MG tablet, Take 1 tablet by mouth Daily., Disp: 90 tablet, Rfl: 3  •  levothyroxine (SYNTHROID, LEVOTHROID) 88 MCG tablet, TAKE 1 TABLET BY MOUTH EVERY DAY, Disp: 90 tablet, Rfl: 0  •  metoprolol succinate XL (TOPROL-XL) 25 MG 24 hr tablet, TAKE 1 TABLET BY MOUTH EVERY DAY, Disp: 90 tablet, Rfl: 1  •  nitroglycerin (NITROSTAT) 0.4 MG SL tablet, place 1 tablet under the tongue as needed for angina, may repeat every 5mins for up three doses, Disp: 25 tablet,  Rfl: 1  •  sodium bicarbonate 650 MG tablet, Take 2 tablets by mouth 3 (Three) Times a Day for 30 days., Disp: 180 tablet, Rfl: 0  •  triamcinolone (KENALOG) 0.1 % cream, , Disp: , Rfl:      Objective     History of Present Illness Kunal is here for hospital discharge follow-up.  He was admitted to [facility] on April 14 with fever, chills, bilateral shoulder pain.  He was found to have cholecystitis and acute kidney injury.  Nephrology was consulted and he responded to IV fluids and IV antibiotics.  He was started on sodium bicarb.  He underwent cholecystectomy and postop developed acute hypercapnic respiratory failure requiring an overnight stay in the ICU. .   he does have hypertension, hyperlipidemia, hypothyroidism, chronic kidney disease, coronary artery disease status post stent in March, status post left nephrectomy for transitional cell carcinoma.  He currently complains of bilateral hand arthritis.  He has been taking Tylenol.  Uric acid was checked in the hospital and was normal.  Serum creatinine 4 days ago was 2.53 and hemoglobin was 8.3.    Review of Systems   Constitutional: Negative for chills and fever.   Respiratory: Negative.    Cardiovascular: Negative.    Musculoskeletal: Positive for arthralgias.       Physical Exam  Vitals reviewed.   Constitutional:       Appearance: Normal appearance.   Cardiovascular:      Rate and Rhythm: Normal rate and regular rhythm.      Heart sounds: Normal heart sounds.      Comments: 140/60  Pulmonary:      Effort: Pulmonary effort is normal.      Breath sounds: Normal breath sounds.   Musculoskeletal:      Comments: There is swelling and tenderness of the left index finger MCP joint   Neurological:      Mental Status: He is alert.   Psychiatric:         Mood and Affect: Mood normal.         Behavior: Behavior normal.         Thought Content: Thought content normal.         Judgment: Judgment normal.         ASSESSMENT reviewed and discussed  hospital records as well as hospital discharge medication list.  Follow-up with nephrology this week as scheduled.     Problems Addressed this Visit        Cardiac and Vasculature    Hyperlipidemia    Chronic coronary artery disease - Primary    Essential hypertension       Genitourinary and Reproductive     Status post nephrectomy - left nephrouretrectomy -11/12/2020       Other    Stage 3b chronic kidney disease (CKD)   Diagnoses       Codes Comments    Chronic coronary artery disease    -  Primary ICD-10-CM: I25.10  ICD-9-CM: 414.00     Essential hypertension     ICD-10-CM: I10  ICD-9-CM: 401.9     Hyperlipidemia, unspecified hyperlipidemia type     ICD-10-CM: E78.5  ICD-9-CM: 272.4     Status post nephrectomy - left nephrouretrectomy -11/12/2020     ICD-10-CM: Z90.5  ICD-9-CM: V45.73     Stage 3b chronic kidney disease (CKD)     ICD-10-CM: N18.32  ICD-9-CM: 585.3           Current outpatient and discharge medications have been reconciled for the patient.  Reviewed by: Chai Montesinos MD      PLAN    There are no Patient Instructions on file for this visit.  No follow-ups on file.

## 2023-04-26 ENCOUNTER — OFFICE VISIT (OUTPATIENT)
Dept: FAMILY MEDICINE CLINIC | Facility: CLINIC | Age: 78
End: 2023-04-26
Payer: MEDICARE

## 2023-04-26 ENCOUNTER — APPOINTMENT (OUTPATIENT)
Dept: CARDIAC REHAB | Facility: HOSPITAL | Age: 78
End: 2023-04-26
Payer: MEDICARE

## 2023-04-26 VITALS
DIASTOLIC BLOOD PRESSURE: 70 MMHG | TEMPERATURE: 98 F | WEIGHT: 147 LBS | OXYGEN SATURATION: 98 % | HEART RATE: 68 BPM | HEIGHT: 69 IN | SYSTOLIC BLOOD PRESSURE: 150 MMHG | BODY MASS INDEX: 21.77 KG/M2

## 2023-04-26 DIAGNOSIS — M19.90 JOINT INFLAMMATION: Primary | ICD-10-CM

## 2023-04-26 DIAGNOSIS — N18.4 CKD (CHRONIC KIDNEY DISEASE), STAGE IV: ICD-10-CM

## 2023-04-26 PROBLEM — L03.115 CELLULITIS OF RIGHT LOWER EXTREMITY: Status: RESOLVED | Noted: 2019-10-22 | Resolved: 2023-04-26

## 2023-04-26 PROBLEM — R07.9 CHEST PAIN: Status: RESOLVED | Noted: 2023-02-20 | Resolved: 2023-04-26

## 2023-04-26 NOTE — PROGRESS NOTES
Flavoi Carreon MD  Internal Medicine  [phone] (office)             04/26/2023    Patient Information  Kunal Vargas                                                                                          [id number] [facility] [address]  1945        Chief Complaint   Patient presents with   • Pain   • Joint Swelling          History of Present Illness:    Kunal Vargas is a 77 y.o. male who presents to the office for evaluation of acute joint pain. Patient reports pain in shoulders, L elbow, wrists, and MCPs for the last week. He denies fever, chills, night sweats. Currently, pain only located in MCPs. He is using acetaminophen PRN and reports some benefit. Oral NSAIDs contraindicated due to dual antiplatelet use, history of CAD, stage IV CKD. Family history notable for mother with RA.       No Known Allergies        Current Outpatient Medications:   •  aspirin 81 MG chewable tablet, Chew 1 tablet Daily., Disp: , Rfl:   •  atorvastatin (LIPITOR) 40 MG tablet, Take 1 tablet by mouth Every Night., Disp: 90 tablet, Rfl: 3  •  clopidogrel (PLAVIX) 75 MG tablet, Take 1 tablet by mouth Daily., Disp: 90 tablet, Rfl: 3  •  Diclofenac Sodium (VOLTAREN) 1 % gel gel, Apply 4 g topically to the appropriate area as directed 4 (Four) Times a Day As Needed (arthritis) for up to 30 days., Disp: 150 g, Rfl: 5  •  levothyroxine (SYNTHROID, LEVOTHROID) 88 MCG tablet, TAKE 1 TABLET BY MOUTH EVERY DAY, Disp: 90 tablet, Rfl: 0  •  metoprolol succinate XL (TOPROL-XL) 25 MG 24 hr tablet, TAKE 1 TABLET BY MOUTH EVERY DAY, Disp: 90 tablet, Rfl: 1  •  nitroglycerin (NITROSTAT) 0.4 MG SL tablet, place 1 tablet under the tongue as needed for angina, may repeat every 5mins for up three doses, Disp: 25 tablet, Rfl: 1  •  sodium bicarbonate 650 MG tablet, Take 2 tablets by mouth 3 (Three) Times a Day for 30 days., Disp: 180 tablet, Rfl: 0  •  triamcinolone (KENALOG) 0.1 % cream, , Disp: , Rfl:       Social History  "    Socioeconomic History   • Marital status:    Tobacco Use   • Smoking status: Never     Passive exposure: Never   • Smokeless tobacco: Never   Vaping Use   • Vaping Use: Never used   Substance and Sexual Activity   • Alcohol use: Never   • Drug use: Never   • Sexual activity: Defer         Vitals:    04/26/23 0950   BP: 150/70   Pulse: 68   Temp: 98 °F (36.7 °C)   SpO2: 98%   Weight: 66.7 kg (147 lb)   Height: 175.3 cm (69.02\")      Wt Readings from Last 3 Encounters:   04/26/23 66.7 kg (147 lb)   04/24/23 67.6 kg (149 lb)   04/21/23 70.3 kg (155 lb)     Body mass index is 21.7 kg/m².      Physical Exam  Vitals reviewed.   Constitutional:       Appearance: Normal appearance. He is not ill-appearing.   Pulmonary:      Effort: Pulmonary effort is normal.   Musculoskeletal:      Comments: No swelling, warmth, erythema, or TTP of shoulders, elbows, or wrists.     L 2nd-5th MCPs with swelling and erythema, no TTP. R 5th MCP with swelling and erythema, no TTP.    Neurological:      Mental Status: He is alert and oriented to person, place, and time.   Psychiatric:         Mood and Affect: Mood normal.         Behavior: Behavior normal.            Lab/other results:  Common labs        4/18/2023    03:43 4/19/2023    03:56 4/20/2023    03:48   Common Labs   Glucose 95   89   92     BUN 45   40   35     Creatinine 2.74   2.73   2.52     Sodium 142   143   142     Potassium 4.7   4.6   4.4     Chloride 112   112   111     Calcium 8.1   8.3   8.1     Albumin 2.6   2.7   2.8     Total Bilirubin  1.1      Alkaline Phosphatase  358      AST (SGOT)  39      ALT (SGPT)  81      WBC 8.74   7.68   8.01     Hemoglobin 8.9   9.1   8.3     Hematocrit 26.8   26.7   26.2     Platelets 212   224   228     Uric Acid 5.6   4.9          Assessment/Plan:    Diagnoses and all orders for this visit:    1. Joint inflammation (Primary)  -     RHEUMATOID FACTOR  -     Cyclic Citrul Peptide Antibody, IgG / IgA  -     [facility] Comprehensive " Panel  -     Diclofenac Sodium (VOLTAREN) 1 % gel gel; Apply 4 g topically to the appropriate area as directed 4 (Four) Times a Day As Needed (arthritis) for up to 30 days.  Dispense: 150 g; Refill: 5    2. CKD (chronic kidney disease), stage IV  Assessment & Plan:  Renal condition is worsening.  Regular aerobic exercise.  Continue current medications.  Renal condition will be reassessed in 1 month.    Reviewed recent labs. Patient has follow-up with nephrology next week. I advised him to inquire about EPO use. Avoid nephrotoxins - patient aware that he should not be using oral NSAIDs under any circumstances.        Don't suspect an infectious process and don't suspect gout. Distribution of inflammation and FH of RA are concerning for RA. Labs ordered and will send to rheumatology, if positive. Patient will try diclofenac gel q6h PRN.

## 2023-04-26 NOTE — ASSESSMENT & PLAN NOTE
Renal condition is worsening.  Regular aerobic exercise.  Continue current medications.  Renal condition will be reassessed in 1 month.    Reviewed recent labs. Patient has follow-up with nephrology next week. I advised him to inquire about EPO use. Avoid nephrotoxins - patient aware that he should not be using oral NSAIDs under any circumstances.

## 2023-04-27 LAB
CCP IGA+IGG SERPL IA-ACNC: 6 UNITS (ref 0–19)
CENTROMERE B AB SER-ACNC: <0.2 AI (ref 0–0.9)
CHROMATIN AB SERPL-ACNC: <0.2 AI (ref 0–0.9)
DSDNA AB SER-ACNC: <1 IU/ML (ref 0–9)
ENA JO1 AB SER-ACNC: <0.2 AI (ref 0–0.9)
ENA RNP AB SER-ACNC: <0.2 AI (ref 0–0.9)
ENA SCL70 AB SER-ACNC: <0.2 AI (ref 0–0.9)
ENA SM AB SER-ACNC: <0.2 AI (ref 0–0.9)
ENA SS-A AB SER-ACNC: <0.2 AI (ref 0–0.9)
ENA SS-B AB SER-ACNC: <0.2 AI (ref 0–0.9)
Lab: NORMAL
RHEUMATOID FACT SERPL-ACNC: <10 IU/ML

## 2023-04-28 ENCOUNTER — TELEPHONE (OUTPATIENT)
Dept: INTERNAL MEDICINE | Facility: CLINIC | Age: 78
End: 2023-04-28
Payer: MEDICARE

## 2023-04-28 ENCOUNTER — APPOINTMENT (OUTPATIENT)
Dept: CARDIAC REHAB | Facility: HOSPITAL | Age: 78
End: 2023-04-28
Payer: MEDICARE

## 2023-04-28 NOTE — TELEPHONE ENCOUNTER
----- Message from Hermilo Carreon MD sent at 4/27/2023  3:44 PM EDT -----  Regarding: FW:  Please tell patient that labs are negative and rheumatoid arthritis. If he continues to have joint swelling and pain, I recommend following up with his PCP.  ----- Message -----  From: Interface, Reflab Results In  Sent: 4/27/2023   2:11 PM EDT  To: Hermilo Carreon MD

## 2023-04-28 NOTE — TELEPHONE ENCOUNTER
HUB TO READ: [employer] MESSAGE SENT TO PATIENT: Dr. Carreon reviewed your labs and said: Please tell patient that labs are negative and rheumatoid arthritis. If he continues to have joint swelling and pain, I recommend following up with his PCP.     Please let me know if you have any questions. Thank you.

## 2023-05-01 ENCOUNTER — APPOINTMENT (OUTPATIENT)
Dept: CARDIAC REHAB | Facility: HOSPITAL | Age: 78
End: 2023-05-01
Payer: MEDICARE

## 2023-05-02 ENCOUNTER — TELEPHONE (OUTPATIENT)
Dept: FAMILY MEDICINE CLINIC | Facility: CLINIC | Age: 78
End: 2023-05-02

## 2023-05-02 NOTE — TELEPHONE ENCOUNTER
Hub staff attempted to follow warm transfer process and was unsuccessful     Caller: Kunal Vargas    Relationship to patient: Self    Best call back number: [phone]    Patient is needing: PLEASE CALL PATIENT TO RESCHEDULE 5/17/23 LAB APPOINTMENT.    PLEASE ADVISE.

## 2023-05-03 ENCOUNTER — APPOINTMENT (OUTPATIENT)
Dept: CARDIAC REHAB | Facility: HOSPITAL | Age: 78
End: 2023-05-03
Payer: MEDICARE

## 2023-05-03 RX ORDER — METOPROLOL SUCCINATE 25 MG/1
TABLET, EXTENDED RELEASE ORAL
Qty: 90 TABLET | Refills: 0 | Status: SHIPPED | OUTPATIENT
Start: 2023-05-03

## 2023-05-05 ENCOUNTER — APPOINTMENT (OUTPATIENT)
Dept: CARDIAC REHAB | Facility: HOSPITAL | Age: 78
End: 2023-05-05
Payer: MEDICARE

## 2023-05-08 ENCOUNTER — APPOINTMENT (OUTPATIENT)
Dept: CARDIAC REHAB | Facility: HOSPITAL | Age: 78
End: 2023-05-08
Payer: MEDICARE

## 2023-05-10 ENCOUNTER — APPOINTMENT (OUTPATIENT)
Dept: CARDIAC REHAB | Facility: HOSPITAL | Age: 78
End: 2023-05-10
Payer: MEDICARE

## 2023-05-12 ENCOUNTER — APPOINTMENT (OUTPATIENT)
Dept: CARDIAC REHAB | Facility: HOSPITAL | Age: 78
End: 2023-05-12
Payer: MEDICARE

## 2023-05-13 LAB
ALBUMIN SERPL-MCNC: 4.1 G/DL (ref 3.5–5.2)
ALBUMIN/GLOB SERPL: 1.9 G/DL
ALP SERPL-CCNC: 248 U/L (ref 39–117)
ALT SERPL-CCNC: 34 U/L (ref 1–41)
AST SERPL-CCNC: 31 U/L (ref 1–40)
BILIRUB SERPL-MCNC: 0.6 MG/DL (ref 0–1.2)
BUN SERPL-MCNC: 32 MG/DL (ref 8–23)
BUN/CREAT SERPL: 16.2 (ref 7–25)
CALCIUM SERPL-MCNC: 9.2 MG/DL (ref 8.6–10.5)
CHLORIDE SERPL-SCNC: 106 MMOL/L (ref 98–107)
CO2 SERPL-SCNC: 24.5 MMOL/L (ref 22–29)
CREAT SERPL-MCNC: 1.97 MG/DL (ref 0.76–1.27)
EGFRCR SERPLBLD CKD-EPI 2021: 34.4 ML/MIN/1.73
GLOBULIN SER CALC-MCNC: 2.2 GM/DL
GLUCOSE SERPL-MCNC: 97 MG/DL (ref 65–99)
POTASSIUM SERPL-SCNC: 5 MMOL/L (ref 3.5–5.2)
PROT SERPL-MCNC: 6.3 G/DL (ref 6–8.5)
SODIUM SERPL-SCNC: 139 MMOL/L (ref 136–145)

## 2023-05-15 ENCOUNTER — APPOINTMENT (OUTPATIENT)
Dept: CARDIAC REHAB | Facility: HOSPITAL | Age: 78
End: 2023-05-15
Payer: MEDICARE

## 2023-05-17 ENCOUNTER — APPOINTMENT (OUTPATIENT)
Dept: CARDIAC REHAB | Facility: HOSPITAL | Age: 78
End: 2023-05-17
Payer: MEDICARE

## 2023-05-19 ENCOUNTER — APPOINTMENT (OUTPATIENT)
Dept: CARDIAC REHAB | Facility: HOSPITAL | Age: 78
End: 2023-05-19
Payer: MEDICARE

## 2023-05-22 ENCOUNTER — TREATMENT (OUTPATIENT)
Dept: CARDIAC REHAB | Facility: HOSPITAL | Age: 78
End: 2023-05-22
Payer: MEDICARE

## 2023-05-22 DIAGNOSIS — Z95.5 STATUS POST INSERTION OF DRUG-ELUTING STENT INTO LEFT ANTERIOR DESCENDING (LAD) ARTERY: Primary | ICD-10-CM

## 2023-05-22 PROCEDURE — 93798 PHYS/QHP OP CAR RHAB W/ECG: CPT

## 2023-05-23 ENCOUNTER — OFFICE VISIT (OUTPATIENT)
Dept: FAMILY MEDICINE CLINIC | Facility: CLINIC | Age: 78
End: 2023-05-23
Payer: MEDICARE

## 2023-05-23 VITALS
WEIGHT: 148 LBS | TEMPERATURE: 98.2 F | HEIGHT: 69 IN | OXYGEN SATURATION: 100 % | DIASTOLIC BLOOD PRESSURE: 60 MMHG | HEART RATE: 56 BPM | SYSTOLIC BLOOD PRESSURE: 120 MMHG | BODY MASS INDEX: 21.92 KG/M2

## 2023-05-23 DIAGNOSIS — D63.1 ANEMIA DUE TO STAGE 3B CHRONIC KIDNEY DISEASE: Primary | ICD-10-CM

## 2023-05-23 DIAGNOSIS — N18.32 STAGE 3B CHRONIC KIDNEY DISEASE (CKD): ICD-10-CM

## 2023-05-23 DIAGNOSIS — N18.32 ANEMIA DUE TO STAGE 3B CHRONIC KIDNEY DISEASE: Primary | ICD-10-CM

## 2023-05-23 NOTE — PROGRESS NOTES
Subjective   Kunal Vargas is a 77 y.o. male. Patient is here today for   Chief Complaint   Patient presents with   • Follow-up   • Hyperlipidemia   • Chronic Kidney Disease          Vitals:    05/23/23 1254   BP: 120/60   Pulse: 56   Temp: 98.2 °F (36.8 °C)   SpO2: 100%     Body mass index is 21.85 kg/m².    The following portions of the patient's history were reviewed and updated as appropriate: allergies, current medications, past family history, past medical history, past social history, past surgical history and problem list.    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       No Known Allergies   Social History     Socioeconomic History   • Marital status:    Tobacco Use   • Smoking status: Never     Passive exposure: Never   • Smokeless tobacco: Never   Vaping Use   • Vaping Use: Never used   Substance and Sexual Activity   • Alcohol use: Never   • Drug use: Never   • Sexual activity: Defer        Current Outpatient Medications:   •  aspirin 81 MG chewable tablet, Chew 1 tablet Daily., Disp: , Rfl:   •  atorvastatin (LIPITOR) 40 MG tablet, Take 1 tablet by mouth Every Night., Disp: 90 tablet, Rfl: 3  •  clopidogrel (PLAVIX) 75 MG tablet, Take 1 tablet by mouth Daily., Disp: 90 tablet, Rfl: 3  •  Diclofenac Sodium (VOLTAREN) 1 % gel gel, Apply 4 g topically to the appropriate area as directed 4 (Four) Times a Day As Needed (arthritis) for up to 30 days., Disp: 150 g, Rfl: 5  •  levothyroxine (SYNTHROID, LEVOTHROID) 88 MCG tablet, TAKE 1 TABLET BY MOUTH EVERY DAY, Disp: 90 tablet, Rfl: 0  •  metoprolol succinate XL (TOPROL-XL) 25 MG 24 hr tablet, TAKE 1 TABLET BY MOUTH EVERY DAY, Disp: 90 tablet, Rfl: 0  •   nitroglycerin (NITROSTAT) 0.4 MG SL tablet, place 1 tablet under the tongue as needed for angina, may repeat every 5mins for up three doses, Disp: 25 tablet, Rfl: 1  •  triamcinolone (KENALOG) 0.1 % cream, , Disp: , Rfl:      Objective     History of Present Illness Kunal is here for blood pressure check and for follow-up on migratory arthralgias.  He has hypertension, hyperlipidemia, chronic kidney disease, anemia, status post nephrectomy for renal mass, coronary artery disease.  He was seen 1 month ago with complaints of migratory arthralgias.  Rheumatological studies were negative.  CRP was elevated significantly and sed rate was mildly elevated.  Serum creatinine was higher than baseline and hemoglobin was 8.3.  He feels fine now.  He recently saw the nurse practitioner Dr. Real's office.  CBC was not done.    Review of Systems   Constitutional: Negative for chills, fatigue and fever.   Respiratory: Negative.    Cardiovascular: Negative.    Musculoskeletal: Negative for arthralgias.       Physical Exam  Vitals reviewed.   Constitutional:       Appearance: Normal appearance.   Cardiovascular:      Rate and Rhythm: Normal rate and regular rhythm.      Heart sounds: Normal heart sounds.   Pulmonary:      Effort: Pulmonary effort is normal.      Breath sounds: Normal breath sounds.   Musculoskeletal:         General: No swelling or tenderness.   Neurological:      Mental Status: He is alert.   Psychiatric:         Mood and Affect: Mood normal.         Behavior: Behavior normal.         Thought Content: Thought content normal.         Judgment: Judgment normal.         Assessment    ASSESSMENT blood pressure is well controlled.  We will recheck CBC today.  Discussed importance of adequate hydration and avoidance of nonsteroidal anti-inflammatory agents.      Problems Addressed this Visit        Hematology and Neoplasia    Anemia due to chronic kidney disease - Primary    Relevant Orders    CBC & Differential     Basic Metabolic Panel       Other    Stage 3b chronic kidney disease (CKD)   Diagnoses       Codes Comments    Anemia due to stage 3b chronic kidney disease    -  Primary ICD-10-CM: N18.32, D63.1  ICD-9-CM: 285.21, 585.3     Stage 3b chronic kidney disease (CKD)     ICD-10-CM: N18.32  ICD-9-CM: 585.3           PLAN  There are no Patient Instructions on file for this visit.  No follow-ups on file.

## 2023-05-24 ENCOUNTER — TREATMENT (OUTPATIENT)
Dept: CARDIAC REHAB | Facility: HOSPITAL | Age: 78
End: 2023-05-24
Payer: MEDICARE

## 2023-05-24 DIAGNOSIS — Z95.5 STATUS POST INSERTION OF DRUG-ELUTING STENT INTO LEFT ANTERIOR DESCENDING (LAD) ARTERY: Primary | ICD-10-CM

## 2023-05-24 LAB
BASOPHILS # BLD AUTO: 0.1 X10E3/UL (ref 0–0.2)
BASOPHILS NFR BLD AUTO: 1 %
BUN SERPL-MCNC: 29 MG/DL (ref 8–27)
BUN/CREAT SERPL: 17 (ref 10–24)
CALCIUM SERPL-MCNC: 8.6 MG/DL (ref 8.6–10.2)
CHLORIDE SERPL-SCNC: 105 MMOL/L (ref 96–106)
CO2 SERPL-SCNC: 21 MMOL/L (ref 20–29)
CREAT SERPL-MCNC: 1.7 MG/DL (ref 0.76–1.27)
EGFRCR SERPLBLD CKD-EPI 2021: 41 ML/MIN/1.73
EOSINOPHIL # BLD AUTO: 1 X10E3/UL (ref 0–0.4)
EOSINOPHIL NFR BLD AUTO: 16 %
ERYTHROCYTE [DISTWIDTH] IN BLOOD BY AUTOMATED COUNT: 12.6 % (ref 11.6–15.4)
GLUCOSE SERPL-MCNC: 124 MG/DL (ref 70–99)
HCT VFR BLD AUTO: 28.8 % (ref 37.5–51)
HGB BLD-MCNC: 9.8 G/DL (ref 13–17.7)
IMM GRANULOCYTES # BLD AUTO: 0 X10E3/UL (ref 0–0.1)
IMM GRANULOCYTES NFR BLD AUTO: 0 %
LYMPHOCYTES # BLD AUTO: 1.1 X10E3/UL (ref 0.7–3.1)
LYMPHOCYTES NFR BLD AUTO: 18 %
MCH RBC QN AUTO: 32.6 PG (ref 26.6–33)
MCHC RBC AUTO-ENTMCNC: 34 G/DL (ref 31.5–35.7)
MCV RBC AUTO: 96 FL (ref 79–97)
MONOCYTES # BLD AUTO: 0.6 X10E3/UL (ref 0.1–0.9)
MONOCYTES NFR BLD AUTO: 10 %
NEUTROPHILS # BLD AUTO: 3.4 X10E3/UL (ref 1.4–7)
NEUTROPHILS NFR BLD AUTO: 55 %
PLATELET # BLD AUTO: 195 X10E3/UL (ref 150–450)
POTASSIUM SERPL-SCNC: 4.8 MMOL/L (ref 3.5–5.2)
RBC # BLD AUTO: 3.01 X10E6/UL (ref 4.14–5.8)
SODIUM SERPL-SCNC: 139 MMOL/L (ref 134–144)
WBC # BLD AUTO: 6.2 X10E3/UL (ref 3.4–10.8)

## 2023-05-24 PROCEDURE — 93798 PHYS/QHP OP CAR RHAB W/ECG: CPT

## 2023-05-25 ENCOUNTER — TELEPHONE (OUTPATIENT)
Dept: FAMILY MEDICINE CLINIC | Facility: CLINIC | Age: 78
End: 2023-05-25
Payer: MEDICARE

## 2023-05-25 NOTE — TELEPHONE ENCOUNTER
Discussed lab results.  Renal functions are back to baseline.  Hemoglobin is 9.8 and may continue to improve.  We will follow-up as scheduled.

## 2023-05-26 ENCOUNTER — TREATMENT (OUTPATIENT)
Dept: CARDIAC REHAB | Facility: HOSPITAL | Age: 78
End: 2023-05-26
Payer: MEDICARE

## 2023-05-26 DIAGNOSIS — Z95.5 STATUS POST INSERTION OF DRUG-ELUTING STENT INTO LEFT ANTERIOR DESCENDING (LAD) ARTERY: Primary | ICD-10-CM

## 2023-05-26 PROCEDURE — 93798 PHYS/QHP OP CAR RHAB W/ECG: CPT

## 2023-05-31 ENCOUNTER — TREATMENT (OUTPATIENT)
Dept: CARDIAC REHAB | Facility: HOSPITAL | Age: 78
End: 2023-05-31
Payer: MEDICARE

## 2023-05-31 DIAGNOSIS — Z95.5 STATUS POST INSERTION OF DRUG-ELUTING STENT INTO LEFT ANTERIOR DESCENDING (LAD) ARTERY: Primary | ICD-10-CM

## 2023-05-31 PROCEDURE — 93798 PHYS/QHP OP CAR RHAB W/ECG: CPT

## 2023-06-02 ENCOUNTER — TREATMENT (OUTPATIENT)
Dept: CARDIAC REHAB | Facility: HOSPITAL | Age: 78
End: 2023-06-02
Payer: MEDICARE

## 2023-06-02 DIAGNOSIS — Z95.5 STATUS POST INSERTION OF DRUG-ELUTING STENT INTO LEFT ANTERIOR DESCENDING (LAD) ARTERY: Primary | ICD-10-CM

## 2023-06-02 PROCEDURE — 93798 PHYS/QHP OP CAR RHAB W/ECG: CPT

## 2023-06-05 ENCOUNTER — TREATMENT (OUTPATIENT)
Dept: CARDIAC REHAB | Facility: HOSPITAL | Age: 78
End: 2023-06-05
Payer: MEDICARE

## 2023-06-05 DIAGNOSIS — Z95.5 STATUS POST INSERTION OF DRUG-ELUTING STENT INTO LEFT ANTERIOR DESCENDING (LAD) ARTERY: Primary | ICD-10-CM

## 2023-06-05 PROCEDURE — 93798 PHYS/QHP OP CAR RHAB W/ECG: CPT

## 2023-06-05 RX ORDER — LEVOTHYROXINE SODIUM 88 UG/1
TABLET ORAL
Qty: 90 TABLET | Refills: 0 | Status: SHIPPED | OUTPATIENT
Start: 2023-06-05

## 2023-06-19 ENCOUNTER — TREATMENT (OUTPATIENT)
Dept: CARDIAC REHAB | Facility: HOSPITAL | Age: 78
End: 2023-06-19
Payer: MEDICARE

## 2023-06-19 DIAGNOSIS — Z95.5 STATUS POST INSERTION OF DRUG-ELUTING STENT INTO LEFT ANTERIOR DESCENDING (LAD) ARTERY: Primary | ICD-10-CM

## 2023-06-19 PROCEDURE — 93798 PHYS/QHP OP CAR RHAB W/ECG: CPT

## 2023-09-13 RX ORDER — LEVOTHYROXINE SODIUM 88 UG/1
TABLET ORAL
Qty: 90 TABLET | Refills: 0 | Status: SHIPPED | OUTPATIENT
Start: 2023-09-13

## 2023-10-11 ENCOUNTER — TELEPHONE (OUTPATIENT)
Dept: FAMILY MEDICINE CLINIC | Facility: CLINIC | Age: 78
End: 2023-10-11

## 2023-10-11 NOTE — TELEPHONE ENCOUNTER
Caller: Kunal Vargas    Relationship: Self    Best call back number: [phone]      PATIENT HAS LAB APPOINTMENT FOR PRIMARY ORDERED LABS 11-17-23.    DR PRAVIN WINSLOW WITH [facility] ALSO WOULD LIKE TO REQUEST CERTAIN LABS TO BE DRAWN THAT DAY.    IS THIS POSSIBLE AND IF SO, PATIENT HAS THE LIST OF LABS HE NEEDS TO BE ADDED.    PLEASE CALL TO DISCUSS/ADVISE.

## 2023-10-13 ENCOUNTER — OFFICE VISIT (OUTPATIENT)
Dept: CARDIOLOGY | Facility: CLINIC | Age: 78
End: 2023-10-13
Payer: MEDICARE

## 2023-10-13 VITALS
DIASTOLIC BLOOD PRESSURE: 72 MMHG | SYSTOLIC BLOOD PRESSURE: 142 MMHG | RESPIRATION RATE: 18 BRPM | OXYGEN SATURATION: 99 % | HEIGHT: 69 IN | BODY MASS INDEX: 22.51 KG/M2 | HEART RATE: 53 BPM | WEIGHT: 152 LBS

## 2023-10-13 DIAGNOSIS — I25.10 CORONARY ARTERY DISEASE INVOLVING NATIVE CORONARY ARTERY OF NATIVE HEART WITHOUT ANGINA PECTORIS: Primary | ICD-10-CM

## 2023-10-13 DIAGNOSIS — I49.3 FREQUENT PVCS: ICD-10-CM

## 2023-10-13 DIAGNOSIS — R94.31 ABNORMAL EKG: ICD-10-CM

## 2023-10-13 DIAGNOSIS — Z95.5 STATUS POST CORONARY ARTERY STENT PLACEMENT: ICD-10-CM

## 2023-10-13 PROCEDURE — 1160F RVW MEDS BY RX/DR IN RCRD: CPT | Performed by: INTERNAL MEDICINE

## 2023-10-13 PROCEDURE — 3078F DIAST BP <80 MM HG: CPT | Performed by: INTERNAL MEDICINE

## 2023-10-13 PROCEDURE — 1159F MED LIST DOCD IN RCRD: CPT | Performed by: INTERNAL MEDICINE

## 2023-10-13 PROCEDURE — 3077F SYST BP >= 140 MM HG: CPT | Performed by: INTERNAL MEDICINE

## 2023-10-13 PROCEDURE — 99214 OFFICE O/P EST MOD 30 MIN: CPT | Performed by: INTERNAL MEDICINE

## 2023-10-13 RX ORDER — FUROSEMIDE 40 MG/1
40 TABLET ORAL DAILY
COMMUNITY
Start: 2023-10-11 | End: 2024-10-10

## 2023-10-13 RX ORDER — SODIUM BICARBONATE 650 MG/1
1300 TABLET ORAL 2 TIMES DAILY
COMMUNITY
Start: 2023-10-11 | End: 2024-10-10

## 2023-10-13 NOTE — PROGRESS NOTES
[facility]      Patient Name: Kunal Vargas  :1945  Age: 78 y.o.  Encounter Provider:  Jeff Jones Jr, MD      Chief Complaint:   No chief complaint on file.        HPI  Kunal Vargas is a 78 y.o. male with nonobstructive coronary artery disease and dyslipidemia who presents for follow-up evaluation of chest discomfort.     Last clinic visit note: Patient was initially seen in  for chest pain which she describes as sharp discomfort associated with activity.  He had a treadmill stress study that showed no evidence of ischemia but continued to have recurrent and frequent pain.  Cardiac catheterization performed showing mild nonobstructive disease.  He was last seen in 2017 by Dr. Butcher.  He was doing well at that point and was discharged with clindamycin as needed.  He was doing very well and continues to perform aerobics 3 times weekly but has been having exercise-induced chest tightness over the past few months.  He had an episode while just walking in his neighborhood the other day.  The chest discomfort does not stop him from activity and he states that it stops a few minutes after starting without rest.  No associated nausea, vomiting, diaphoresis or shortness of air.  He denies orthopnea, PND or edema.  No palpitations, dizziness or syncope.  He was started on aspirin recently.  Family history of ruptured AAA and he has periodic surveillance with most recent being in 2021 at which time he was noted to have mild carotid atherosclerotic plaque with normal abdominal aorta.  He is a lifelong non-smoker.  Denies alcohol or illicit drug use.  No cardiac complaints at time of interview.    Patient had abnormal nuclear stress study showing anterior wall ischemia in March.  He was sent for cardiac catheterization the next day showing proximal to mid critical stenosis status post drug-eluting stent placement.  He was back in the hospital in mid April for confusion  and right upper quadrant abdominal pain.  Found to have acute cholecystitis and cardiology was consulted given recent stent placement with need for dual antiplatelet therapy.  Fortunately cholecystectomy was able to be performed on dual antiplatelet therapy and the patient had no complications.  He was discharged yesterday and is doing well.  He denies angina or heart failure symptoms.  He was increasing activity as tolerated prior to this hospitalization and will be going back to cardiac rehab.  No cardiac complaints at time of interview.      He has now past 6 months since cardiac catheterization.  He would like to complete his dental implant surgery soon.  He completed cardiac rehab and continues to increase activity as tolerated.  He worked on a recent mission trip with no chest pain or shortness of air.  He is walking several miles daily.  No orthopnea, PND or edema.  No palpitations, dizziness or syncope.  Social family history was reviewed and is not pertinent to this clinic visit.    The following portions of the patient's history were reviewed and updated as appropriate: allergies, current medications, past family history, past medical history, past social history, past surgical history and problem list.      Review of Systems   Constitutional: Negative for chills and fever.   HENT:  Negative for hoarse voice and sore throat.    Eyes:  Negative for double vision and photophobia.   Cardiovascular:  Positive for chest pain. Negative for leg swelling, near-syncope, orthopnea, palpitations, paroxysmal nocturnal dyspnea and syncope.   Respiratory:  Negative for cough and wheezing.    Skin:  Negative for poor wound healing and rash.   Musculoskeletal:  Negative for arthritis and joint swelling.   Gastrointestinal:  Negative for bloating, abdominal pain, hematemesis and hematochezia.   Neurological:  Negative for dizziness and focal weakness.   Psychiatric/Behavioral:  Negative for depression and suicidal ideas.   "      OBJECTIVE:   Vital Signs  Vitals:    10/13/23 1125   BP: 142/72   Pulse: 53   Resp: 18   SpO2: 99%     Estimated body mass index is 22.45 kg/mý as calculated from the following:    Height as of this encounter: 175.3 cm (69\").    Weight as of this encounter: 68.9 kg (152 lb).    Vitals reviewed.   Constitutional:       Appearance: Healthy appearance. Not in distress.   Neck:      Vascular: No JVR. JVD normal.   Pulmonary:      Effort: Pulmonary effort is normal.      Breath sounds: Normal breath sounds. No wheezing. No rhonchi. No rales.   Chest:      Chest wall: Not tender to palpatation.   Cardiovascular:      PMI at left midclavicular line. Normal rate. Regular rhythm. Normal S1. Normal S2.       Murmurs: There is no murmur.      No gallop.  No click. No rub.   Pulses:     Intact distal pulses.   Edema:     Peripheral edema absent.   Abdominal:      General: Bowel sounds are normal.      Palpations: Abdomen is soft.      Tenderness: There is no abdominal tenderness.   Musculoskeletal: Normal range of motion.         General: No tenderness. Skin:     General: Skin is warm and dry.   Neurological:      General: No focal deficit present.      Mental Status: Alert and oriented to person, place and time.         Procedures    Lipid Panel          10/19/2022    10:10 3/18/2023    03:23   Lipid Panel   Total Cholesterol  120    Total Cholesterol 155     Triglycerides 42  40    HDL Cholesterol 60  54    VLDL Cholesterol 9  10    LDL Cholesterol  86  56    LDL/HDL Ratio 1.44  1.07         BUN   Date Value Ref Range Status   05/23/2023 29 (H) 8 - 27 mg/dL Final   04/20/2023 35 (H) 8 - 23 mg/dL Final     Creatinine   Date Value Ref Range Status   05/23/2023 1.70 (H) 0.76 - 1.27 mg/dL Final   04/20/2023 2.52 (H) 0.76 - 1.27 mg/dL Final     Potassium   Date Value Ref Range Status   05/23/2023 4.8 3.5 - 5.2 mmol/L Final   04/20/2023 4.4 3.5 - 5.2 mmol/L Final     ALT (SGPT)   Date Value Ref Range Status   05/12/2023 34 1 " - 41 U/L Final   04/19/2023 81 (H) 1 - 41 U/L Final     AST (SGOT)   Date Value Ref Range Status   05/12/2023 31 1 - 40 U/L Final   04/19/2023 39 1 - 40 U/L Final           ASSESSMENT:     77-year-old male with known coronary artery disease presents for evaluation of precordial pain    PLAN OF CARE:     Cholecystitis -recovering well after cholecystectomy.  No complications of surgery.  Coronary artery disease -PCI to proximal to mid LAD March 2023.  Continue dual antiplatelet therapy, beta-blocker and statin.  Now that he has completed a 6-month follow-up of uninterrupted dual antiplatelet therapy I would be comfortable holding Plavix for his dental implant surgery.  Clearance letter will be prepared.  Dyslipidemia  Family history of ruptured AAA    Return 6 months             Discharge Medications            Accurate as of October 13, 2023 11:34 AM. If you have any questions, ask your nurse or doctor.                Changes to Medications        Instructions Start Date   nitroglycerin 0.4 MG SL tablet  Commonly known as: NITROSTAT  What changed:   how much to take  how to take this  when to take this  reasons to take this   place 1 tablet under the tongue as needed for angina, may repeat every 5mins for up three doses             Continue These Medications        Instructions Start Date   aspirin 81 MG chewable tablet   81 mg, Oral, Daily      atorvastatin 40 MG tablet  Commonly known as: LIPITOR   40 mg, Oral, Nightly      clopidogrel 75 MG tablet  Commonly known as: PLAVIX   75 mg, Oral, Daily      furosemide 40 MG tablet  Commonly known as: LASIX   40 mg, Oral, Daily      levothyroxine 88 MCG tablet  Commonly known as: SYNTHROID, LEVOTHROID   TAKE 1 TABLET BY MOUTH EVERY DAY      metoprolol succinate XL 25 MG 24 hr tablet  Commonly known as: TOPROL-XL   TAKE 1 TABLET BY MOUTH EVERY DAY      sodium bicarbonate 650 MG tablet   1,300 mg, Oral, 2 Times Daily      triamcinolone 0.1 % cream  Commonly known as:  KENALOG   No dose, route, or frequency recorded.               Thank you for allowing me to participate in the care of your patient,      Sincerely,   Jeff Jones MD  [facility]  10/13/23  11:34 EDT

## 2023-10-18 NOTE — TELEPHONE ENCOUNTER
Called spoke with  Sam  advised him he can bring in lab orders and Dr. Montesinos will placed them. Mr. Vargas advised he scheduled lab appointment with nephrology

## 2023-11-06 RX ORDER — METOPROLOL SUCCINATE 25 MG/1
TABLET, EXTENDED RELEASE ORAL
Qty: 90 TABLET | Refills: 0 | Status: SHIPPED | OUTPATIENT
Start: 2023-11-06

## 2023-11-15 DIAGNOSIS — E03.9 ACQUIRED HYPOTHYROIDISM: ICD-10-CM

## 2023-11-15 DIAGNOSIS — N18.32 STAGE 3B CHRONIC KIDNEY DISEASE (CKD): ICD-10-CM

## 2023-11-15 DIAGNOSIS — E78.5 HYPERLIPIDEMIA, UNSPECIFIED HYPERLIPIDEMIA TYPE: Primary | ICD-10-CM

## 2023-11-15 DIAGNOSIS — D63.1 ANEMIA DUE TO STAGE 3B CHRONIC KIDNEY DISEASE: ICD-10-CM

## 2023-11-15 DIAGNOSIS — N18.32 ANEMIA DUE TO STAGE 3B CHRONIC KIDNEY DISEASE: ICD-10-CM

## 2023-11-15 DIAGNOSIS — R73.01 ELEVATED FASTING GLUCOSE: ICD-10-CM

## 2023-11-18 LAB
ALBUMIN SERPL-MCNC: 4.5 G/DL (ref 3.5–5.2)
ALBUMIN/GLOB SERPL: 2.8 G/DL
ALP SERPL-CCNC: 96 U/L (ref 39–117)
ALT SERPL-CCNC: 26 U/L (ref 1–41)
AST SERPL-CCNC: 28 U/L (ref 1–40)
BASOPHILS # BLD AUTO: 0.03 10*3/MM3 (ref 0–0.2)
BASOPHILS NFR BLD AUTO: 0.8 % (ref 0–1.5)
BILIRUB SERPL-MCNC: 0.5 MG/DL (ref 0–1.2)
BUN SERPL-MCNC: 37 MG/DL (ref 8–23)
BUN/CREAT SERPL: 20.2 (ref 7–25)
CALCIUM SERPL-MCNC: 8.9 MG/DL (ref 8.6–10.5)
CHLORIDE SERPL-SCNC: 102 MMOL/L (ref 98–107)
CHOLEST SERPL-MCNC: 122 MG/DL (ref 0–200)
CO2 SERPL-SCNC: 25.7 MMOL/L (ref 22–29)
CREAT SERPL-MCNC: 1.83 MG/DL (ref 0.76–1.27)
EGFRCR SERPLBLD CKD-EPI 2021: 37.3 ML/MIN/1.73
EOSINOPHIL # BLD AUTO: 0.22 10*3/MM3 (ref 0–0.4)
EOSINOPHIL NFR BLD AUTO: 5.8 % (ref 0.3–6.2)
ERYTHROCYTE [DISTWIDTH] IN BLOOD BY AUTOMATED COUNT: 12.9 % (ref 12.3–15.4)
GLOBULIN SER CALC-MCNC: 1.6 GM/DL
GLUCOSE SERPL-MCNC: 102 MG/DL (ref 65–99)
HBA1C MFR BLD: 5.6 % (ref 4.8–5.6)
HCT VFR BLD AUTO: 35.7 % (ref 37.5–51)
HDLC SERPL-MCNC: 58 MG/DL (ref 40–60)
HGB BLD-MCNC: 12 G/DL (ref 13–17.7)
IMM GRANULOCYTES # BLD AUTO: 0 10*3/MM3 (ref 0–0.05)
IMM GRANULOCYTES NFR BLD AUTO: 0 % (ref 0–0.5)
LDLC SERPL CALC-MCNC: 52 MG/DL (ref 0–100)
LDLC/HDLC SERPL: 0.93 {RATIO}
LYMPHOCYTES # BLD AUTO: 0.81 10*3/MM3 (ref 0.7–3.1)
LYMPHOCYTES NFR BLD AUTO: 21.3 % (ref 19.6–45.3)
MCH RBC QN AUTO: 32.5 PG (ref 26.6–33)
MCHC RBC AUTO-ENTMCNC: 33.6 G/DL (ref 31.5–35.7)
MCV RBC AUTO: 96.7 FL (ref 79–97)
MONOCYTES # BLD AUTO: 0.6 10*3/MM3 (ref 0.1–0.9)
MONOCYTES NFR BLD AUTO: 15.8 % (ref 5–12)
NEUTROPHILS # BLD AUTO: 2.14 10*3/MM3 (ref 1.7–7)
NEUTROPHILS NFR BLD AUTO: 56.3 % (ref 42.7–76)
NRBC BLD AUTO-RTO: 0 /100 WBC (ref 0–0.2)
PLATELET # BLD AUTO: 180 10*3/MM3 (ref 140–450)
POTASSIUM SERPL-SCNC: 4.8 MMOL/L (ref 3.5–5.2)
PROT SERPL-MCNC: 6.1 G/DL (ref 6–8.5)
RBC # BLD AUTO: 3.69 10*6/MM3 (ref 4.14–5.8)
SODIUM SERPL-SCNC: 136 MMOL/L (ref 136–145)
TRIGL SERPL-MCNC: 49 MG/DL (ref 0–150)
TSH SERPL DL<=0.005 MIU/L-ACNC: 2.87 UIU/ML (ref 0.27–4.2)
VIT B12 SERPL-MCNC: 438 PG/ML (ref 211–946)
VLDLC SERPL CALC-MCNC: 12 MG/DL (ref 5–40)
WBC # BLD AUTO: 3.8 10*3/MM3 (ref 3.4–10.8)

## 2023-11-27 ENCOUNTER — OFFICE VISIT (OUTPATIENT)
Dept: FAMILY MEDICINE CLINIC | Facility: CLINIC | Age: 78
End: 2023-11-27
Payer: MEDICARE

## 2023-11-27 VITALS
DIASTOLIC BLOOD PRESSURE: 58 MMHG | SYSTOLIC BLOOD PRESSURE: 110 MMHG | HEIGHT: 69 IN | OXYGEN SATURATION: 99 % | WEIGHT: 152.34 LBS | HEART RATE: 57 BPM | BODY MASS INDEX: 22.56 KG/M2

## 2023-11-27 DIAGNOSIS — D63.1 ANEMIA DUE TO STAGE 3B CHRONIC KIDNEY DISEASE: ICD-10-CM

## 2023-11-27 DIAGNOSIS — N18.4 CKD (CHRONIC KIDNEY DISEASE), STAGE IV: ICD-10-CM

## 2023-11-27 DIAGNOSIS — I10 ESSENTIAL HYPERTENSION: ICD-10-CM

## 2023-11-27 DIAGNOSIS — E03.9 ACQUIRED HYPOTHYROIDISM: ICD-10-CM

## 2023-11-27 DIAGNOSIS — Z00.00 MEDICARE ANNUAL WELLNESS VISIT, SUBSEQUENT: ICD-10-CM

## 2023-11-27 DIAGNOSIS — E78.5 HYPERLIPIDEMIA, UNSPECIFIED HYPERLIPIDEMIA TYPE: ICD-10-CM

## 2023-11-27 DIAGNOSIS — Z90.5 STATUS POST NEPHRECTOMY: ICD-10-CM

## 2023-11-27 DIAGNOSIS — N18.32 ANEMIA DUE TO STAGE 3B CHRONIC KIDNEY DISEASE: ICD-10-CM

## 2023-11-27 DIAGNOSIS — I25.10 CHRONIC CORONARY ARTERY DISEASE: Primary | ICD-10-CM

## 2023-11-27 NOTE — PROGRESS NOTES
The ABCs of the Annual Wellness Visit  Subsequent Medicare Wellness Visit    Subjective    Kunal Vargas is a 78 y.o. male who presents for a Subsequent Medicare Wellness Visit.    The following portions of the patient's history were reviewed and   updated as appropriate: allergies, current medications, past family history, past medical history, past social history, past surgical history, and problem list.    Compared to one year ago, the patient feels his physical   health is worse.    Compared to one year ago, the patient feels his mental   health is the same.    Recent Hospitalizations:  This patient has had a [facility] admission record on file within the last 365 days.    Current Medical Providers:  Patient Care Team:  Chai Montesinos MD as PCP - General    Outpatient Medications Prior to Visit   Medication Sig Dispense Refill    aspirin 81 MG chewable tablet Chew 1 tablet Daily.      atorvastatin (LIPITOR) 40 MG tablet Take 1 tablet by mouth Every Night. 90 tablet 3    clopidogrel (PLAVIX) 75 MG tablet Take 1 tablet by mouth Daily. 90 tablet 3    furosemide (LASIX) 40 MG tablet Take 1 tablet by mouth Daily.      levothyroxine (SYNTHROID, LEVOTHROID) 88 MCG tablet TAKE 1 TABLET BY MOUTH EVERY DAY 90 tablet 0    metoprolol succinate XL (TOPROL-XL) 25 MG 24 hr tablet TAKE 1 TABLET BY MOUTH EVERY DAY 90 tablet 0    nitroglycerin (NITROSTAT) 0.4 MG SL tablet place 1 tablet under the tongue as needed for angina, may repeat every 5mins for up three doses (Patient taking differently: Place 1 tablet under the tongue Every 5 (Five) Minutes As Needed. place 1 tablet under the tongue as needed for angina, may repeat every 5mins for up three doses) 25 tablet 1    sodium bicarbonate 650 MG tablet Take 2 tablets by mouth 2 (Two) Times a Day.      triamcinolone (KENALOG) 0.1 % cream        No facility-administered medications prior to visit.       No opioid medication identified on active medication list. I have  "reviewed chart for other potential  high risk medication/s and harmful drug interactions in the elderly.        Aspirin is on active medication list. Aspirin use is indicated based on review of current medical condition/s. Pros and cons of this therapy have been discussed today. Benefits of this medication outweigh potential harm.  Patient has been encouraged to continue taking this medication.  .      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  ACP discussion was held with the patient during this visit. Patient has an advance directive in EMR which is still valid.      Objective    Vitals:    11/27/23 0910   BP: 110/58   Pulse: 57   SpO2: 99%   Weight: 69.1 kg (152 lb 5.4 oz)   Height: 175.3 cm (69.02\")     Estimated body mass index is 22.49 kg/m² as calculated from the following:    Height as of this encounter: 175.3 cm (69.02\").    Weight as of this encounter: 69.1 kg (152 lb 5.4 oz).    BMI is within normal parameters. No other follow-up for BMI required.      Does the patient have evidence of cognitive impairment? No    Lab Results   Component Value Date    CHLPL 122 11/17/2023    TRIG 49 11/17/2023    HDL 58 11/17/2023    LDL 52 11/17/2023    VLDL 12 11/17/2023    HGBA1C 5.60 11/17/2023        HEALTH " RISK ASSESSMENT    Smoking Status:  Social History     Tobacco Use   Smoking Status Never    Passive exposure: Never   Smokeless Tobacco Never     Alcohol Consumption:  Social History     Substance and Sexual Activity   Alcohol Use Never     Fall Risk Screen:    PERNELLADI Fall Risk Assessment was completed, and patient is at LOW risk for falls.Assessment completed on:2023    Depression Screenin/27/2023     9:19 AM   PHQ-2/PHQ-9 Depression Screening   Little Interest or Pleasure in Doing Things 0-->not at all   Feeling Down, Depressed or Hopeless 0-->not at all   PHQ-9: Brief Depression Severity Measure Score 0       Health Habits and Functional and Cognitive Screenin/27/2023     9:18 AM   Functional & Cognitive Status   Do you have difficulty preparing food and eating? No   Do you have difficulty bathing yourself, getting dressed or grooming yourself? No   Do you have difficulty using the toilet? Yes   Do you have difficulty moving around from place to place? No   Do you have trouble with steps or getting out of a bed or a chair? No   Current Diet Well Balanced Diet   Dental Exam Up to date   Eye Exam Up to date   Exercise (times per week) 3 times per week   Current Exercises Include Yard Work;Walking;House Cleaning;Home Exercise Program (TV, Computer, Etc.)   Do you need help using the phone?  No   Are you deaf or do you have serious difficulty hearing?  No   Do you need help to go to places out of walking distance? No   Do you need help shopping? No   Do you need help preparing meals?  No   Do you need help with housework?  No   Do you need help with laundry? No   Do you need help taking your medications? No   Do you need help managing money? No   Do you ever drive or ride in a car without wearing a seat belt? No   Have you felt unusual stress, anger or loneliness in the last month? No   Who do you live with? Spouse   If you need help, do you have trouble finding someone available to you? No    Have you been bothered in the last four weeks by sexual problems? No   Do you have difficulty concentrating, remembering or making decisions? No       Age-appropriate Screening Schedule:  Refer to the list below for future screening recommendations based on patient's age, sex and/or medical conditions. Orders for these recommended tests are listed in the plan section. The patient has been provided with a written plan.    Health Maintenance   Topic Date Due    INFLUENZA VACCINE  08/01/2023    LIPID PANEL  11/17/2024    ANNUAL WELLNESS VISIT  11/27/2024    COLORECTAL CANCER SCREENING  10/12/2027    TDAP/TD VACCINES (2 - Td or Tdap) 10/29/2028    HEPATITIS C SCREENING  Completed    COVID-19 Vaccine  Completed    Pneumococcal Vaccine 65+  Completed    ZOSTER VACCINE  Discontinued                  CMS Preventative Services Quick Reference  Risk Factors Identified During Encounter  None Identified  The above risks/problems have been discussed with the patient.  Pertinent information has been shared with the patient in the After Visit Summary.  An After Visit Summary and PPPS were made available to the patient.    Follow Up:   Next Medicare Wellness visit to be scheduled in 1 year.       Additional E&M Note during same encounter follows:  Patient has multiple medical problems which are significant and separately identifiable that require additional work above and beyond the Medicare Wellness Visit.      Chief Complaint  Medicare Wellness-subsequent    Subjective        HPI  Kunal Vargas is also being seen today for blood pressure check and lab follow-up.  He has hypertension, hyperlipidemia, hypothyroidism, coronary artery disease, chronic kidney disease.  He generally feels well.  He eats healthy and exercises on a regular basis.  He monitors his blood pressure reports stable readings.  He is followed by cardiology and nephrology.  He does complain of constipation.  He also has some numbness of his right upper  "lateral leg.         Objective   Vital Signs:  /58   Pulse 57   Ht 175.3 cm (69.02\")   Wt 69.1 kg (152 lb 5.4 oz)   SpO2 99%   BMI 22.49 kg/m²     Physical Exam  Vitals reviewed.   Constitutional:       Appearance: Normal appearance.   Cardiovascular:      Rate and Rhythm: Normal rate and regular rhythm.      Heart sounds: Normal heart sounds.      Comments: 106/0  Pulmonary:      Effort: Pulmonary effort is normal.      Breath sounds: Normal breath sounds.   Abdominal:      General: Abdomen is flat.      Palpations: Abdomen is soft.   Neurological:      Mental Status: He is alert.   Psychiatric:         Mood and Affect: Mood normal.         Behavior: Behavior normal.         Thought Content: Thought content normal.         Judgment: Judgment normal.                         Assessment and Plan blood pressure is well controlled.  LDL cholesterol is at goal.  Kidney functions are stable.  Hemoglobin is 12.0.  B12 is normal.  Thyroid-stimulating hormone level is normal.  Discussed healthy heart diet.  Continue exercise and current medicines.  Discussed recommended daily allowance of fiber which is 30 g daily and adequate hydration.  Also discussed starting a stool softener on a daily basis.  The numbness of the right upper leg is most likely from lateral femoral cutaneous nerve.  Diagnoses and all orders for this visit:    1. Chronic coronary artery disease (Primary)    2. Essential hypertension    3. Hyperlipidemia, unspecified hyperlipidemia type    4. Acquired hypothyroidism    5. CKD (chronic kidney disease), stage IV    6. Status post nephrectomy - left nephrouretrectomy -11/12/2020    7. Anemia due to stage 3b chronic kidney disease    8. Medicare annual wellness visit, subsequent             Follow Up   No follow-ups on file.  Patient was given instructions and counseling regarding his condition or for health maintenance advice. Please see specific information pulled into the AVS if appropriate. "

## 2023-12-07 RX ORDER — LEVOTHYROXINE SODIUM 88 UG/1
TABLET ORAL
Qty: 90 TABLET | Refills: 0 | Status: SHIPPED | OUTPATIENT
Start: 2023-12-07

## 2024-02-19 RX ORDER — METOPROLOL SUCCINATE 25 MG/1
TABLET, EXTENDED RELEASE ORAL
Qty: 90 TABLET | Refills: 0 | Status: SHIPPED | OUTPATIENT
Start: 2024-02-19

## 2024-02-23 ENCOUNTER — OFFICE VISIT (OUTPATIENT)
Dept: CARDIOLOGY | Facility: CLINIC | Age: 79
End: 2024-02-23
Payer: MEDICARE

## 2024-02-23 VITALS
WEIGHT: 152 LBS | BODY MASS INDEX: 22.51 KG/M2 | RESPIRATION RATE: 16 BRPM | SYSTOLIC BLOOD PRESSURE: 112 MMHG | HEART RATE: 56 BPM | OXYGEN SATURATION: 100 % | HEIGHT: 69 IN | DIASTOLIC BLOOD PRESSURE: 68 MMHG

## 2024-02-23 DIAGNOSIS — I49.3 FREQUENT PVCS: ICD-10-CM

## 2024-02-23 DIAGNOSIS — Z95.5 STATUS POST CORONARY ARTERY STENT PLACEMENT: ICD-10-CM

## 2024-02-23 DIAGNOSIS — E78.5 HYPERLIPIDEMIA, UNSPECIFIED HYPERLIPIDEMIA TYPE: ICD-10-CM

## 2024-02-23 DIAGNOSIS — I25.10 CORONARY ARTERY DISEASE INVOLVING NATIVE CORONARY ARTERY OF NATIVE HEART WITHOUT ANGINA PECTORIS: Primary | ICD-10-CM

## 2024-02-23 PROCEDURE — 1160F RVW MEDS BY RX/DR IN RCRD: CPT | Performed by: INTERNAL MEDICINE

## 2024-02-23 PROCEDURE — 99214 OFFICE O/P EST MOD 30 MIN: CPT | Performed by: INTERNAL MEDICINE

## 2024-02-23 PROCEDURE — 3074F SYST BP LT 130 MM HG: CPT | Performed by: INTERNAL MEDICINE

## 2024-02-23 PROCEDURE — 3078F DIAST BP <80 MM HG: CPT | Performed by: INTERNAL MEDICINE

## 2024-02-23 PROCEDURE — 1159F MED LIST DOCD IN RCRD: CPT | Performed by: INTERNAL MEDICINE

## 2024-02-23 NOTE — PROGRESS NOTES
[facility]      Patient Name: Kunal Vargas  :1945  Age: 78 y.o.  Encounter Provider:  Jeff Jones Jr, MD      Chief Complaint:   No chief complaint on file.        HPI  Kunal aVrgas is a 78 y.o. male with nonobstructive coronary artery disease and dyslipidemia who presents for follow-up evaluation of chest discomfort.     Last clinic visit note: Patient was initially seen in  for chest pain which she describes as sharp discomfort associated with activity.  He had a treadmill stress study that showed no evidence of ischemia but continued to have recurrent and frequent pain.  Cardiac catheterization performed showing mild nonobstructive disease.  He was last seen in 2017 by Dr. Butcher.  He was doing well at that point and was discharged with clindamycin as needed.  He was doing very well and continues to perform aerobics 3 times weekly but has been having exercise-induced chest tightness over the past few months.  He had an episode while just walking in his neighborhood the other day.  The chest discomfort does not stop him from activity and he states that it stops a few minutes after starting without rest.  No associated nausea, vomiting, diaphoresis or shortness of air.  He denies orthopnea, PND or edema.  No palpitations, dizziness or syncope.  He was started on aspirin recently.  Family history of ruptured AAA and he has periodic surveillance with most recent being in 2021 at which time he was noted to have mild carotid atherosclerotic plaque with normal abdominal aorta.  He is a lifelong non-smoker.  Denies alcohol or illicit drug use.  No cardiac complaints at time of interview.    Patient had abnormal nuclear stress study showing anterior wall ischemia in March.  He was sent for cardiac catheterization the next day showing proximal to mid critical stenosis status post drug-eluting stent placement.  He was back in the hospital in mid April for confusion  and right upper quadrant abdominal pain.  Found to have acute cholecystitis and cardiology was consulted given recent stent placement with need for dual antiplatelet therapy.  Fortunately cholecystectomy was able to be performed on dual antiplatelet therapy and the patient had no complications.  He was discharged yesterday and is doing well.  He denies angina or heart failure symptoms.  He was increasing activity as tolerated prior to this hospitalization and will be going back to cardiac rehab.  No cardiac complaints at time of interview.      He is doing well since last visit.  He has not yet had dental implant surgery but is undergoing evaluation.  No angina with activity.  No orthopnea, PND or edema.  Social family history was reviewed and is not pertinent to this clinic visit.    The following portions of the patient's history were reviewed and updated as appropriate: allergies, current medications, past family history, past medical history, past social history, past surgical history and problem list.      Review of Systems   Constitutional: Negative for chills and fever.   HENT:  Negative for hoarse voice and sore throat.    Eyes:  Negative for double vision and photophobia.   Cardiovascular:  Positive for chest pain. Negative for leg swelling, near-syncope, orthopnea, palpitations, paroxysmal nocturnal dyspnea and syncope.   Respiratory:  Negative for cough and wheezing.    Skin:  Negative for poor wound healing and rash.   Musculoskeletal:  Negative for arthritis and joint swelling.   Gastrointestinal:  Negative for bloating, abdominal pain, hematemesis and hematochezia.   Neurological:  Negative for dizziness and focal weakness.   Psychiatric/Behavioral:  Negative for depression and suicidal ideas.        OBJECTIVE:   Vital Signs  Vitals:    02/23/24 1130   BP: 112/68   Pulse: 56   Resp: 16   SpO2: 100%     Estimated body mass index is 22.45 kg/m² as calculated from the following:    Height as of this  "encounter: 175.3 cm (69\").    Weight as of this encounter: 68.9 kg (152 lb).    Vitals reviewed.   Constitutional:       Appearance: Healthy appearance. Not in distress.   Neck:      Vascular: No JVR. JVD normal.   Pulmonary:      Effort: Pulmonary effort is normal.      Breath sounds: Normal breath sounds. No wheezing. No rhonchi. No rales.   Chest:      Chest wall: Not tender to palpatation.   Cardiovascular:      PMI at left midclavicular line. Normal rate. Regular rhythm. Normal S1. Normal S2.       Murmurs: There is no murmur.      No gallop.  No click. No rub.   Pulses:     Intact distal pulses.   Edema:     Peripheral edema absent.   Abdominal:      General: Bowel sounds are normal.      Palpations: Abdomen is soft.      Tenderness: There is no abdominal tenderness.   Musculoskeletal: Normal range of motion.         General: No tenderness. Skin:     General: Skin is warm and dry.   Neurological:      General: No focal deficit present.      Mental Status: Alert and oriented to person, place and time.         Procedures    Lipid Panel          3/18/2023    03:23 11/17/2023    09:09   Lipid Panel   Total Cholesterol 120     Total Cholesterol  122    Triglycerides 40  49    HDL Cholesterol 54  58    VLDL Cholesterol 10  12    LDL Cholesterol  56  52    LDL/HDL Ratio 1.07  0.93         BUN   Date Value Ref Range Status   11/17/2023 37 (H) 8 - 23 mg/dL Final   04/20/2023 35 (H) 8 - 23 mg/dL Final     Creatinine   Date Value Ref Range Status   11/17/2023 1.83 (H) 0.76 - 1.27 mg/dL Final   04/20/2023 2.52 (H) 0.76 - 1.27 mg/dL Final     Potassium   Date Value Ref Range Status   11/17/2023 4.8 3.5 - 5.2 mmol/L Final   04/20/2023 4.4 3.5 - 5.2 mmol/L Final     ALT (SGPT)   Date Value Ref Range Status   11/17/2023 26 1 - 41 U/L Final   04/19/2023 81 (H) 1 - 41 U/L Final     AST (SGOT)   Date Value Ref Range Status   11/17/2023 28 1 - 40 U/L Final   04/19/2023 39 1 - 40 U/L Final           ASSESSMENT:     77-year-old " male with known coronary artery disease presents for evaluation of precordial pain    PLAN OF CARE:     Cholecystitis -recovering well after cholecystectomy.  No complications of surgery.  Coronary artery disease -PCI to proximal to mid LAD March 2023.  Continue dual antiplatelet therapy, beta-blocker and statin.  Now that he has completed more than 6-month follow-up of uninterrupted dual antiplatelet therapy I would be comfortable holding Plavix for his dental implant surgery.  Clearance letter was sent.  Continue current medical management.  LDL 52.  Dyslipidemia  Family history of ruptured AAA    Return 6 months             Discharge Medications            Accurate as of February 23, 2024 12:14 PM. If you have any questions, ask your nurse or doctor.                Changes to Medications        Instructions Start Date   nitroglycerin 0.4 MG SL tablet  Commonly known as: NITROSTAT  What changed:   how much to take  how to take this  when to take this  reasons to take this   place 1 tablet under the tongue as needed for angina, may repeat every 5mins for up three doses             Continue These Medications        Instructions Start Date   aspirin 81 MG chewable tablet   81 mg, Oral, Daily      atorvastatin 40 MG tablet  Commonly known as: LIPITOR   40 mg, Oral, Nightly      clopidogrel 75 MG tablet  Commonly known as: PLAVIX   75 mg, Oral, Daily      furosemide 40 MG tablet  Commonly known as: LASIX   40 mg, Oral, Daily      levothyroxine 88 MCG tablet  Commonly known as: SYNTHROID, LEVOTHROID   TAKE 1 TABLET BY MOUTH EVERY DAY      metoprolol succinate XL 25 MG 24 hr tablet  Commonly known as: TOPROL-XL   TAKE 1 TABLET BY MOUTH EVERY DAY      sodium bicarbonate 650 MG tablet   1,300 mg, Oral, 2 Times Daily      triamcinolone 0.1 % cream  Commonly known as: KENALOG   No dose, route, or frequency recorded.               Thank you for allowing me to participate in the care of your patient,      Sincerely,   Jeff  MD Karen  [facility]  02/23/24  12:14 EST

## 2024-03-04 RX ORDER — LEVOTHYROXINE SODIUM 88 UG/1
TABLET ORAL
Qty: 90 TABLET | Refills: 0 | Status: SHIPPED | OUTPATIENT
Start: 2024-03-04

## 2024-04-03 RX ORDER — ATORVASTATIN CALCIUM 40 MG/1
40 TABLET, FILM COATED ORAL NIGHTLY
Qty: 90 TABLET | Refills: 2 | Status: SHIPPED | OUTPATIENT
Start: 2024-04-03

## 2024-05-13 RX ORDER — METOPROLOL SUCCINATE 25 MG/1
TABLET, EXTENDED RELEASE ORAL
Qty: 90 TABLET | Refills: 0 | Status: SHIPPED | OUTPATIENT
Start: 2024-05-13

## 2024-05-15 DIAGNOSIS — E78.5 HYPERLIPIDEMIA, UNSPECIFIED HYPERLIPIDEMIA TYPE: Primary | ICD-10-CM

## 2024-05-16 LAB
ALBUMIN SERPL-MCNC: 4.4 G/DL (ref 3.5–5.2)
ALBUMIN/GLOB SERPL: 2.4 G/DL
ALP SERPL-CCNC: 125 U/L (ref 39–117)
ALT SERPL-CCNC: 26 U/L (ref 1–41)
AST SERPL-CCNC: 30 U/L (ref 1–40)
BASOPHILS # BLD AUTO: 0.04 10*3/MM3 (ref 0–0.2)
BASOPHILS NFR BLD AUTO: 0.8 % (ref 0–1.5)
BILIRUB SERPL-MCNC: 0.5 MG/DL (ref 0–1.2)
BUN SERPL-MCNC: 30 MG/DL (ref 8–23)
BUN/CREAT SERPL: 17.5 (ref 7–25)
CALCIUM SERPL-MCNC: 8.7 MG/DL (ref 8.6–10.5)
CHLORIDE SERPL-SCNC: 102 MMOL/L (ref 98–107)
CHOLEST SERPL-MCNC: 119 MG/DL (ref 0–200)
CO2 SERPL-SCNC: 22.3 MMOL/L (ref 22–29)
CREAT SERPL-MCNC: 1.71 MG/DL (ref 0.76–1.27)
EGFRCR SERPLBLD CKD-EPI 2021: 40.5 ML/MIN/1.73
EOSINOPHIL # BLD AUTO: 0.19 10*3/MM3 (ref 0–0.4)
EOSINOPHIL NFR BLD AUTO: 4 % (ref 0.3–6.2)
ERYTHROCYTE [DISTWIDTH] IN BLOOD BY AUTOMATED COUNT: 12 % (ref 12.3–15.4)
GLOBULIN SER CALC-MCNC: 1.8 GM/DL
GLUCOSE SERPL-MCNC: 93 MG/DL (ref 65–99)
HCT VFR BLD AUTO: 35.5 % (ref 37.5–51)
HDLC SERPL-MCNC: 63 MG/DL (ref 40–60)
HGB BLD-MCNC: 11.8 G/DL (ref 13–17.7)
IMM GRANULOCYTES # BLD AUTO: 0.01 10*3/MM3 (ref 0–0.05)
IMM GRANULOCYTES NFR BLD AUTO: 0.2 % (ref 0–0.5)
LDLC SERPL CALC-MCNC: 45 MG/DL (ref 0–100)
LDLC/HDLC SERPL: 0.76 {RATIO}
LYMPHOCYTES # BLD AUTO: 0.94 10*3/MM3 (ref 0.7–3.1)
LYMPHOCYTES NFR BLD AUTO: 20 % (ref 19.6–45.3)
MCH RBC QN AUTO: 32.5 PG (ref 26.6–33)
MCHC RBC AUTO-ENTMCNC: 33.2 G/DL (ref 31.5–35.7)
MCV RBC AUTO: 97.8 FL (ref 79–97)
MONOCYTES # BLD AUTO: 0.55 10*3/MM3 (ref 0.1–0.9)
MONOCYTES NFR BLD AUTO: 11.7 % (ref 5–12)
NEUTROPHILS # BLD AUTO: 2.98 10*3/MM3 (ref 1.7–7)
NEUTROPHILS NFR BLD AUTO: 63.3 % (ref 42.7–76)
NRBC BLD AUTO-RTO: 0 /100 WBC (ref 0–0.2)
PLATELET # BLD AUTO: 186 10*3/MM3 (ref 140–450)
POTASSIUM SERPL-SCNC: 4.8 MMOL/L (ref 3.5–5.2)
PROT SERPL-MCNC: 6.2 G/DL (ref 6–8.5)
RBC # BLD AUTO: 3.63 10*6/MM3 (ref 4.14–5.8)
SODIUM SERPL-SCNC: 135 MMOL/L (ref 136–145)
TRIGL SERPL-MCNC: 42 MG/DL (ref 0–150)
VLDLC SERPL CALC-MCNC: 11 MG/DL (ref 5–40)
WBC # BLD AUTO: 4.71 10*3/MM3 (ref 3.4–10.8)

## 2024-05-20 ENCOUNTER — OFFICE VISIT (OUTPATIENT)
Dept: FAMILY MEDICINE CLINIC | Facility: CLINIC | Age: 79
End: 2024-05-20
Payer: MEDICARE

## 2024-05-20 VITALS
WEIGHT: 150 LBS | SYSTOLIC BLOOD PRESSURE: 104 MMHG | BODY MASS INDEX: 22.22 KG/M2 | OXYGEN SATURATION: 98 % | HEART RATE: 58 BPM | RESPIRATION RATE: 16 BRPM | HEIGHT: 69 IN | DIASTOLIC BLOOD PRESSURE: 50 MMHG

## 2024-05-20 DIAGNOSIS — N18.4 CKD (CHRONIC KIDNEY DISEASE), STAGE IV: ICD-10-CM

## 2024-05-20 DIAGNOSIS — I10 ESSENTIAL HYPERTENSION: Primary | ICD-10-CM

## 2024-05-20 DIAGNOSIS — E03.9 ACQUIRED HYPOTHYROIDISM: ICD-10-CM

## 2024-05-20 DIAGNOSIS — I25.10 CHRONIC CORONARY ARTERY DISEASE: ICD-10-CM

## 2024-05-20 PROCEDURE — 3078F DIAST BP <80 MM HG: CPT | Performed by: INTERNAL MEDICINE

## 2024-05-20 PROCEDURE — 99213 OFFICE O/P EST LOW 20 MIN: CPT | Performed by: INTERNAL MEDICINE

## 2024-05-20 PROCEDURE — 1126F AMNT PAIN NOTED NONE PRSNT: CPT | Performed by: INTERNAL MEDICINE

## 2024-05-20 PROCEDURE — 3074F SYST BP LT 130 MM HG: CPT | Performed by: INTERNAL MEDICINE

## 2024-05-20 RX ORDER — CLOBETASOL PROPIONATE 0.5 MG/G
1 CREAM TOPICAL 2 TIMES DAILY
COMMUNITY
Start: 2024-03-21

## 2024-05-20 NOTE — PROGRESS NOTES
Subjective   Kunal Vargas is a 78 y.o. male. Patient is here today for   Chief Complaint   Patient presents with    Follow-up    Chronic Kidney Disease          Vitals:    05/20/24 1020   BP: 104/50   Pulse: 58   Resp: 16   SpO2: 98%     Body mass index is 22.14 kg/m².      The following portions of the patient's history were reviewed and updated as appropriate: allergies, current medications, past family history, past medical history, past social history, past surgical history and problem list.    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       No Known Allergies   Social History     Socioeconomic History    Marital status:    Tobacco Use    Smoking status: Never     Passive exposure: Never    Smokeless tobacco: Never   Vaping Use    Vaping status: Never Used   Substance and Sexual Activity    Alcohol use: Never    Drug use: Never    Sexual activity: Defer        Current Outpatient Medications:     clobetasol propionate (TEMOVATE) 0.05 % cream, Apply 1 Application topically to the appropriate area as directed 2 (Two) Times a Day., Disp: , Rfl:     aspirin 81 MG chewable tablet, Chew 1 tablet Daily., Disp: , Rfl:     atorvastatin (LIPITOR) 40 MG tablet, TAKE 1 TABLET BY MOUTH EVERY NIGHT., Disp: 90 tablet, Rfl: 2    clopidogrel (PLAVIX) 75 MG tablet, Take 1 tablet by mouth Daily., Disp: 90 tablet, Rfl: 3    furosemide (LASIX) 40 MG tablet, Take 1 tablet by mouth Daily., Disp: , Rfl:     levothyroxine (SYNTHROID, LEVOTHROID) 88 MCG tablet, TAKE 1 TABLET BY MOUTH EVERY DAY, Disp: 90 tablet, Rfl: 0    metoprolol succinate XL (TOPROL-XL) 25 MG 24 hr tablet, TAKE 1 TABLET BY MOUTH EVERY DAY, Disp: 90  tablet, Rfl: 0    nitroglycerin (NITROSTAT) 0.4 MG SL tablet, place 1 tablet under the tongue as needed for angina, may repeat every 5mins for up three doses (Patient taking differently: Place 1 tablet under the tongue Every 5 (Five) Minutes As Needed. place 1 tablet under the tongue as needed for angina, may repeat every 5mins for up three doses), Disp: 25 tablet, Rfl: 1    sodium bicarbonate 650 MG tablet, Take 2 tablets by mouth 2 (Two) Times a Day., Disp: , Rfl:     triamcinolone (KENALOG) 0.1 % cream, , Disp: , Rfl:      Objective   History of Present Illness Kunal is here for blood pressure check and lab follow-up.  He has hypertension, hyperlipidemia, coronary artery disease, carotid atherosclerosis, chronic kidney disease.  He had a nephrectomy for renal cell carcinoma.  He feels well.  He continues to eat healthy and exercises on a regular basis.  He monitors his blood pressure reports normal readings.  He does not take any nonsteroidal anti-inflammatory agents.  He had vascular screening 2021 had some carotid artery plaque without stenosis.    Review of Systems   Constitutional:  Negative for activity change and unexpected weight change.   Eyes:         Annual exam   Respiratory: Negative.     Cardiovascular: Negative.    Neurological: Negative.    Psychiatric/Behavioral: Negative.         Physical Exam  Vitals reviewed.   Constitutional:       Appearance: Normal appearance.   Neck:      Vascular: No carotid bruit.   Cardiovascular:      Rate and Rhythm: Normal rate and regular rhythm.      Heart sounds: Normal heart sounds.   Pulmonary:      Effort: Pulmonary effort is normal.      Breath sounds: Normal breath sounds.   Neurological:      Mental Status: He is alert.   Psychiatric:         Mood and Affect: Mood normal.         Behavior: Behavior normal.         Thought Content: Thought content normal.         Judgment: Judgment normal.         Assessment blood pressure is well-controlled.  LDL  cholesterol is at goal.  Renal functions are stable.  Continue healthy heart diet, exercise, current medicines.  ASSESSMENT    Problems Addressed this Visit          Cardiac and Vasculature    Chronic coronary artery disease    Essential hypertension - Primary       Endocrine and Metabolic    Acquired hypothyroidism       Genitourinary and Reproductive     CKD (chronic kidney disease), stage IV     Diagnoses         Codes Comments    Essential hypertension    -  Primary ICD-10-CM: I10  ICD-9-CM: 401.9     Chronic coronary artery disease     ICD-10-CM: I25.10  ICD-9-CM: 414.00     Acquired hypothyroidism     ICD-10-CM: E03.9  ICD-9-CM: 244.9     CKD (chronic kidney disease), stage IV     ICD-10-CM: N18.4  ICD-9-CM: 585.4             PLAN  There are no Patient Instructions on file for this visit.    Return in about 6 months (around 11/20/2024), or Wellness visit, for cbc, lipid, cmp, tsh.

## 2024-06-06 RX ORDER — LEVOTHYROXINE SODIUM 88 UG/1
TABLET ORAL
Qty: 90 TABLET | Refills: 0 | Status: SHIPPED | OUTPATIENT
Start: 2024-06-06

## 2024-06-06 RX ORDER — CLOPIDOGREL BISULFATE 75 MG/1
75 TABLET ORAL DAILY
Qty: 90 TABLET | Refills: 2 | Status: SHIPPED | OUTPATIENT
Start: 2024-06-06

## 2024-08-06 RX ORDER — METOPROLOL SUCCINATE 25 MG/1
TABLET, EXTENDED RELEASE ORAL
Qty: 90 TABLET | Refills: 0 | Status: SHIPPED | OUTPATIENT
Start: 2024-08-06

## 2024-09-03 RX ORDER — LEVOTHYROXINE SODIUM 88 UG/1
TABLET ORAL
Qty: 90 TABLET | Refills: 0 | Status: SHIPPED | OUTPATIENT
Start: 2024-09-03

## 2024-11-18 RX ORDER — METOPROLOL SUCCINATE 25 MG/1
TABLET, EXTENDED RELEASE ORAL
Qty: 90 TABLET | Refills: 0 | Status: SHIPPED | OUTPATIENT
Start: 2024-11-18

## 2024-12-04 DIAGNOSIS — I10 ESSENTIAL HYPERTENSION: Primary | ICD-10-CM

## 2024-12-04 DIAGNOSIS — D63.1 ANEMIA DUE TO STAGE 3B CHRONIC KIDNEY DISEASE: ICD-10-CM

## 2024-12-04 DIAGNOSIS — R73.01 ELEVATED FASTING GLUCOSE: ICD-10-CM

## 2024-12-04 DIAGNOSIS — E78.5 HYPERLIPIDEMIA, UNSPECIFIED HYPERLIPIDEMIA TYPE: ICD-10-CM

## 2024-12-04 DIAGNOSIS — E03.9 ACQUIRED HYPOTHYROIDISM: ICD-10-CM

## 2024-12-04 DIAGNOSIS — N18.32 ANEMIA DUE TO STAGE 3B CHRONIC KIDNEY DISEASE: ICD-10-CM

## 2024-12-04 DIAGNOSIS — N18.4 CKD (CHRONIC KIDNEY DISEASE), STAGE IV: ICD-10-CM

## 2024-12-05 RX ORDER — LEVOTHYROXINE SODIUM 88 UG/1
TABLET ORAL
Qty: 90 TABLET | Refills: 0 | Status: SHIPPED | OUTPATIENT
Start: 2024-12-05

## 2024-12-10 DIAGNOSIS — I10 ESSENTIAL HYPERTENSION: ICD-10-CM

## 2024-12-10 DIAGNOSIS — E78.5 HYPERLIPIDEMIA, UNSPECIFIED HYPERLIPIDEMIA TYPE: ICD-10-CM

## 2024-12-10 DIAGNOSIS — D63.1 ANEMIA DUE TO STAGE 3B CHRONIC KIDNEY DISEASE: ICD-10-CM

## 2024-12-10 DIAGNOSIS — N18.32 STAGE 3B CHRONIC KIDNEY DISEASE (CKD): ICD-10-CM

## 2024-12-10 DIAGNOSIS — N18.4 CKD (CHRONIC KIDNEY DISEASE), STAGE IV: ICD-10-CM

## 2024-12-10 DIAGNOSIS — E03.9 ACQUIRED HYPOTHYROIDISM: Primary | ICD-10-CM

## 2024-12-10 DIAGNOSIS — N18.32 ANEMIA DUE TO STAGE 3B CHRONIC KIDNEY DISEASE: ICD-10-CM

## 2024-12-11 LAB
ALBUMIN SERPL-MCNC: 4 G/DL (ref 3.5–5.2)
ALBUMIN/GLOB SERPL: 2.1 G/DL
ALP SERPL-CCNC: 114 U/L (ref 39–117)
ALT SERPL-CCNC: 18 U/L (ref 1–41)
AST SERPL-CCNC: 25 U/L (ref 1–40)
BASOPHILS # BLD AUTO: 0.06 10*3/MM3 (ref 0–0.2)
BASOPHILS NFR BLD AUTO: 1.2 % (ref 0–1.5)
BILIRUB SERPL-MCNC: 0.6 MG/DL (ref 0–1.2)
BUN SERPL-MCNC: 30 MG/DL (ref 8–23)
BUN/CREAT SERPL: 17.1 (ref 7–25)
CALCIUM SERPL-MCNC: 8.6 MG/DL (ref 8.6–10.5)
CHLORIDE SERPL-SCNC: 102 MMOL/L (ref 98–107)
CHOLEST SERPL-MCNC: 117 MG/DL (ref 0–200)
CO2 SERPL-SCNC: 24.4 MMOL/L (ref 22–29)
CREAT SERPL-MCNC: 1.75 MG/DL (ref 0.76–1.27)
EGFRCR SERPLBLD CKD-EPI 2021: 39.1 ML/MIN/1.73
EOSINOPHIL # BLD AUTO: 0.31 10*3/MM3 (ref 0–0.4)
EOSINOPHIL NFR BLD AUTO: 6.1 % (ref 0.3–6.2)
ERYTHROCYTE [DISTWIDTH] IN BLOOD BY AUTOMATED COUNT: 12.4 % (ref 12.3–15.4)
GLOBULIN SER CALC-MCNC: 1.9 GM/DL
GLUCOSE SERPL-MCNC: 97 MG/DL (ref 65–99)
HCT VFR BLD AUTO: 36.5 % (ref 37.5–51)
HDLC SERPL-MCNC: 57 MG/DL (ref 40–60)
HGB BLD-MCNC: 11.8 G/DL (ref 13–17.7)
IMM GRANULOCYTES # BLD AUTO: 0.01 10*3/MM3 (ref 0–0.05)
IMM GRANULOCYTES NFR BLD AUTO: 0.2 % (ref 0–0.5)
LDLC SERPL CALC-MCNC: 49 MG/DL (ref 0–100)
LDLC/HDLC SERPL: 0.89 {RATIO}
LYMPHOCYTES # BLD AUTO: 1.11 10*3/MM3 (ref 0.7–3.1)
LYMPHOCYTES NFR BLD AUTO: 21.7 % (ref 19.6–45.3)
MCH RBC QN AUTO: 31.7 PG (ref 26.6–33)
MCHC RBC AUTO-ENTMCNC: 32.3 G/DL (ref 31.5–35.7)
MCV RBC AUTO: 98.1 FL (ref 79–97)
MONOCYTES # BLD AUTO: 0.59 10*3/MM3 (ref 0.1–0.9)
MONOCYTES NFR BLD AUTO: 11.5 % (ref 5–12)
NEUTROPHILS # BLD AUTO: 3.04 10*3/MM3 (ref 1.7–7)
NEUTROPHILS NFR BLD AUTO: 59.3 % (ref 42.7–76)
NRBC BLD AUTO-RTO: 0 /100 WBC (ref 0–0.2)
PLATELET # BLD AUTO: 200 10*3/MM3 (ref 140–450)
POTASSIUM SERPL-SCNC: 4.7 MMOL/L (ref 3.5–5.2)
PROT SERPL-MCNC: 5.9 G/DL (ref 6–8.5)
RBC # BLD AUTO: 3.72 10*6/MM3 (ref 4.14–5.8)
SODIUM SERPL-SCNC: 138 MMOL/L (ref 136–145)
TRIGL SERPL-MCNC: 46 MG/DL (ref 0–150)
TSH SERPL DL<=0.005 MIU/L-ACNC: 3.38 UIU/ML (ref 0.27–4.2)
VLDLC SERPL CALC-MCNC: 11 MG/DL (ref 5–40)
WBC # BLD AUTO: 5.12 10*3/MM3 (ref 3.4–10.8)

## 2024-12-16 ENCOUNTER — OFFICE VISIT (OUTPATIENT)
Dept: FAMILY MEDICINE CLINIC | Facility: CLINIC | Age: 79
End: 2024-12-16
Payer: MEDICARE

## 2024-12-16 VITALS
OXYGEN SATURATION: 98 % | TEMPERATURE: 97.8 F | HEART RATE: 69 BPM | DIASTOLIC BLOOD PRESSURE: 60 MMHG | BODY MASS INDEX: 22.57 KG/M2 | SYSTOLIC BLOOD PRESSURE: 130 MMHG | HEIGHT: 69 IN | RESPIRATION RATE: 15 BRPM | WEIGHT: 152.4 LBS

## 2024-12-16 DIAGNOSIS — Z00.00 MEDICARE ANNUAL WELLNESS VISIT, SUBSEQUENT: Primary | ICD-10-CM

## 2024-12-16 DIAGNOSIS — Z23 NEED FOR PNEUMOCOCCAL VACCINATION: ICD-10-CM

## 2024-12-16 DIAGNOSIS — I10 ESSENTIAL HYPERTENSION: ICD-10-CM

## 2024-12-16 DIAGNOSIS — I65.23 ATHEROSCLEROSIS OF BOTH CAROTID ARTERIES: ICD-10-CM

## 2024-12-16 DIAGNOSIS — N18.32 STAGE 3B CHRONIC KIDNEY DISEASE (CKD): ICD-10-CM

## 2024-12-16 DIAGNOSIS — E03.9 ACQUIRED HYPOTHYROIDISM: ICD-10-CM

## 2024-12-16 DIAGNOSIS — D63.1 ANEMIA DUE TO STAGE 3B CHRONIC KIDNEY DISEASE: ICD-10-CM

## 2024-12-16 DIAGNOSIS — I49.9 CARDIAC ARRHYTHMIA, UNSPECIFIED CARDIAC ARRHYTHMIA TYPE: ICD-10-CM

## 2024-12-16 DIAGNOSIS — N18.32 ANEMIA DUE TO STAGE 3B CHRONIC KIDNEY DISEASE: ICD-10-CM

## 2024-12-16 PROCEDURE — 90677 PCV20 VACCINE IM: CPT | Performed by: INTERNAL MEDICINE

## 2024-12-16 PROCEDURE — 99212 OFFICE O/P EST SF 10 MIN: CPT | Performed by: INTERNAL MEDICINE

## 2024-12-16 PROCEDURE — 3078F DIAST BP <80 MM HG: CPT | Performed by: INTERNAL MEDICINE

## 2024-12-16 PROCEDURE — G0439 PPPS, SUBSEQ VISIT: HCPCS | Performed by: INTERNAL MEDICINE

## 2024-12-16 PROCEDURE — 1170F FXNL STATUS ASSESSED: CPT | Performed by: INTERNAL MEDICINE

## 2024-12-16 PROCEDURE — G0009 ADMIN PNEUMOCOCCAL VACCINE: HCPCS | Performed by: INTERNAL MEDICINE

## 2024-12-16 PROCEDURE — 3075F SYST BP GE 130 - 139MM HG: CPT | Performed by: INTERNAL MEDICINE

## 2024-12-16 PROCEDURE — 1126F AMNT PAIN NOTED NONE PRSNT: CPT | Performed by: INTERNAL MEDICINE

## 2024-12-16 NOTE — PROGRESS NOTES
Subjective   The ABCs of the Annual Wellness Visit  Medicare Wellness Visit      Kunal Vargas is a 79 y.o. patient who presents for a Medicare Wellness Visit.    The following portions of the patient's history were reviewed and   updated as appropriate: allergies, current medications, past family history, past medical history, past social history, past surgical history, and problem list.    Compared to one year ago, the patient's physical   health is the same.  Compared to one year ago, the patient's mental   health is the same.    Recent Hospitalizations:  He was not admitted to the hospital during the last year.     Current Medical Providers:  Patient Care Team:  Chai Montesinos MD as PCP - General    Outpatient Medications Prior to Visit   Medication Sig Dispense Refill    aspirin 81 MG chewable tablet Chew 1 tablet Daily.      atorvastatin (LIPITOR) 40 MG tablet TAKE 1 TABLET BY MOUTH EVERY NIGHT. 90 tablet 2    clobetasol propionate (TEMOVATE) 0.05 % cream Apply 1 Application topically to the appropriate area as directed 2 (Two) Times a Day.      clopidogrel (PLAVIX) 75 MG tablet TAKE 1 TABLET BY MOUTH DAILY. 90 tablet 2    furosemide (LASIX) 40 MG tablet Take 1 tablet by mouth Daily.      levothyroxine (SYNTHROID, LEVOTHROID) 88 MCG tablet TAKE 1 TABLET BY MOUTH EVERY DAY 90 tablet 0    metoprolol succinate XL (TOPROL-XL) 25 MG 24 hr tablet TAKE 1 TABLET BY MOUTH EVERY DAY 90 tablet 0    nitroglycerin (NITROSTAT) 0.4 MG SL tablet place 1 tablet under the tongue as needed for angina, may repeat every 5mins for up three doses (Patient taking differently: Place 1 tablet under the tongue Every 5 (Five) Minutes As Needed. place 1 tablet under the tongue as needed for angina, may repeat every 5mins for up three doses) 25 tablet 1    triamcinolone (KENALOG) 0.1 % cream        No facility-administered medications prior to visit.     No opioid medication identified on active medication list. I have reviewed  "chart for other potential  high risk medication/s and harmful drug interactions in the elderly.      Aspirin is on active medication list. Aspirin use is indicated based on review of current medical condition/s. Pros and cons of this therapy have been discussed today. Benefits of this medication outweigh potential harm.  Patient has been encouraged to continue taking this medication.  .      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  ACP discussion was held with the patient during this visit. Patient has an advance directive in EMR which is still valid.             Objective   Vitals:    12/16/24 1309   BP: 130/60   BP Location: Right arm   Patient Position: Sitting   Cuff Size: Adult   Pulse: 69   Resp: 15   Temp: 97.8 °F (36.6 °C)   TempSrc: Temporal   SpO2: 98%   Weight: 69.1 kg (152 lb 6.4 oz)   Height: 175.3 cm (69.02\")   PainSc: 0-No pain       Estimated body mass index is 22.5 kg/m² as calculated from the following:    Height as of this encounter: 175.3 cm (69.02\").    Weight as of this encounter: 69.1 kg (152 lb 6.4 oz).    BMI is within normal parameters. No other follow-up for BMI required.       Does the patient have evidence of cognitive impairment? No  Lab Results   Component Value Date    CHLPL 117 " 12/10/2024    TRIG 46 12/10/2024    HDL 57 12/10/2024    LDL 49 12/10/2024    VLDL 11 12/10/2024                                                                                                Health  Risk Assessment    Smoking Status:  Social History     Tobacco Use   Smoking Status Never    Passive exposure: Never   Smokeless Tobacco Never     Alcohol Consumption:  Social History     Substance and Sexual Activity   Alcohol Use Never       Fall Risk Screen  STEADI Fall Risk Assessment was completed, and patient is at LOW risk for falls.Assessment completed on:2024    Depression Screening   Little interest or pleasure in doing things? Not at all   Feeling down, depressed, or hopeless? Not at all   PHQ-2 Total Score 0      Health Habits and Functional and Cognitive Screenin/16/2024     1:00 PM   Functional & Cognitive Status   Do you have difficulty preparing food and eating? No   Do you have difficulty bathing yourself, getting dressed or grooming yourself? No   Do you have difficulty using the toilet? No   Do you have difficulty moving around from place to place? No   Do you have trouble with steps or getting out of a bed or a chair? No   Current Diet Well Balanced Diet   Dental Exam Up to date   Eye Exam Up to date   Exercise (times per week) 3 times per week   Current Exercises Include Aerobics   Do you need help using the phone?  No   Are you deaf or do you have serious difficulty hearing?  No   Do you need help to go to places out of walking distance? No   Do you need help shopping? No   Do you need help preparing meals?  No   Do you need help with housework?  No   Do you need help with laundry? No   Do you need help taking your medications? No   Do you need help managing money? No   Do you ever drive or ride in a car without wearing a seat belt? No   Have you felt unusual stress, anger or loneliness in the last month? No   Who do you live with? Spouse   If you need help, do you have trouble  finding someone available to you? No   Have you been bothered in the last four weeks by sexual problems? No   Do you have difficulty concentrating, remembering or making decisions? No           Age-appropriate Screening Schedule:  Refer to the list below for future screening recommendations based on patient's age, sex and/or medical conditions. Orders for these recommended tests are listed in the plan section. The patient has been provided with a written plan.    Health Maintenance List  Health Maintenance   Topic Date Due    LIPID PANEL  12/10/2025    ANNUAL WELLNESS VISIT  12/16/2025    COLORECTAL CANCER SCREENING  10/12/2027    TDAP/TD VACCINES (2 - Td or Tdap) 10/29/2028    HEPATITIS C SCREENING  Completed    COVID-19 Vaccine  Completed    RSV Vaccine - Adults  Completed    INFLUENZA VACCINE  Completed    Pneumococcal Vaccine 65+  Completed    ZOSTER VACCINE  Discontinued                                                                                                                                                CMS Preventative Services Quick Reference  Risk Factors Identified During Encounter  Immunizations Discussed/Encouraged: Prevnar 20 (Pneumococcal 20-valent conjugate)    The above risks/problems have been discussed with the patient.  Pertinent information has been shared with the patient in the After Visit Summary.  An After Visit Summary and PPPS were made available to the patient.    Follow Up:   Next Medicare Wellness visit to be scheduled in 1 year.         Additional E&M Note during same encounter follows:  Patient has additional, significant, and separately identifiable condition(s)/problem(s) that require work above and beyond the Medicare Wellness Visit     Chief Complaint  Medicare Wellness-subsequent    Subjective    HPI         The patient is a 79-year-old male here for an annual wellness visit, blood pressure check, and lab follow-up.    He reports no significant changes in his physical or  mental health compared to the previous year. He has not required hospitalization within the past year. He maintains a healthy diet and engages in regular exercise, including strength training and cardio, three times a week. He does pushups, biceps curls, planks, elbow, knees on his back for his hands. He monitors his blood pressure at home, which typically reads around 120/50s to 60s, occasionally spiking to 130. He has a history of glaucoma and uses hearing aids, which he finds beneficial. He receives annual eye examinations and dental check-ups. He also consults with a dermatologist, with whom he has an upcoming appointment scheduled for Friday. He had a biopsy on his back, which came back normal. He sees a nephrologist every 6 months. He has a living will on file. He does not have any cognitive impairment, does not drink alcohol, is not in chronic pain, is not depressed, and does not use drugs.    He experienced an episode of arrhythmia upon his return from [facility] in 09/2024, which lasted from late afternoon until approximately 9:00 the following morning. During this episode, he reported feeling unwell and noted an irregular pulse. He has not experienced any similar episodes since. He has a follow-up appointment with his cardiologist scheduled for 02/28/2025.    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 It does not hinder him in any way. He has had it for several months or maybe a year. He does not have any pain.    SOCIAL HISTORY  He does not drink alcohol and does not use drugs.    MEDICATIONS  aspirin    IMMUNIZATIONS  He received a flu shot on 09/06/2024. His last pneumonia shot was in 2018 (Pneumovax 23).  Review of Systems   Constitutional:  Negative for activity change.   Eyes:         Annual exam   Respiratory: Negative.     Cardiovascular:         As in HPI   Gastrointestinal: Negative.    Neurological: Negative.    Psychiatric/Behavioral: Negative.            Objective   Vital Signs:  BP  "130/60 (BP Location: Right arm, Patient Position: Sitting, Cuff Size: Adult)   Pulse 69   Temp 97.8 °F (36.6 °C) (Temporal)   Resp 15   Ht 175.3 cm (69.02\")   Wt 69.1 kg (152 lb 6.4 oz)   SpO2 98%   BMI 22.50 kg/m²   Physical Exam  Vitals reviewed.   Constitutional:       Appearance: Normal appearance.   Neck:      Vascular: No carotid bruit.   Cardiovascular:      Rate and Rhythm: Normal rate and regular rhythm.      Heart sounds: Normal heart sounds.      Comments: 128/60  Pulmonary:      Effort: Pulmonary effort is normal.      Breath sounds: Normal breath sounds.   Musculoskeletal:      Right lower leg: No edema.      Left lower leg: No edema.   Neurological:      Mental Status: He is alert.   Psychiatric:         Mood and Affect: Mood normal.         Behavior: Behavior normal.         Thought Content: Thought content normal.         Judgment: Judgment normal.           Vital Signs  Blood pressure is 128/40.            Results  Laboratory Studies  Total cholesterol was 117, LDL cholesterol is 49, HDL cholesterol is 57, triglycerides are 46. Fasting glucose was normal. Creatinine is 1.75. Hemoglobin is 11.8. TSH is 3.38.    Testing  Holter monitor showed predominant rhythm was normal sinus rhythm with some supraventricular tachycardia, the longest of which was 13 beats.              Assessment and Plan        1. Annual wellness visit.  His total cholesterol level is 117, a slight decrease from 119 recorded seven months prior. The LDL cholesterol level is within the target range for individuals with coronary artery disease, at 49, compared to the previous reading of 45. HDL cholesterol remains optimal at 57, and triglycerides are within normal limits at 46. Fasting glucose levels are normal, and kidney function is stable with a creatinine level of 1.75, marginally higher than the previous 1.71. He has a single kidney. A mild anemia is present, with a hemoglobin level of 11.8, potentially attributable to " kidney disease. Iron levels were normal in 2023, but ferritin was elevated. Thyroid stimulating hormone (TSH) levels are within the normal range at 3.38, on a dosage of 88 mcg. Vascular screening conducted in 2021 revealed minor plaque on both sides.  Will order vascular screening test again.  A Holter monitor test conducted in April 2023 showed a predominant rhythm of normal sinus rhythm, with some instances of supraventricular tachycardia, the longest of which lasted for 13 beats. A ZIO patch will be ordered for a duration of one week to monitor for any abnormalities. A vascular screening will be scheduled. He will receive the Prevnar 20 vaccine today.    2. Arrhythmia.  He experienced an episode of arrhythmia lasting from late afternoon until 9:00 the next morning after returning from [facility] in September. He reported an erratic pulse and feeling unwell but did not seek immediate medical attention as the symptoms resolved before he could do so. He has not experienced any further episodes since then. A ZIO patch will be ordered to monitor for any abnormalities. If another episode occurs, he is advised to go to the ER to be hooked up to a monitor to check for atrial fibrillation.    3. Double jointed thumb.  He reports being double jointed in his thumb for several months or possibly a year. There is no associated pain or hindrance in function. No intervention is necessary at this time.            Follow Up   No follow-ups on file.  Patient was given instructions and counseling regarding his condition or for health maintenance advice. Please see specific information pulled into the AVS if appropriate.  Patient or patient representative verbalized consent for the use of Ambient Listening during the visit with  Chai Montesinos MD for chart documentation. 12/16/2024  13:46 EST

## 2025-01-01 ENCOUNTER — HOSPITAL ENCOUNTER (EMERGENCY)
Facility: HOSPITAL | Age: 80
Discharge: HOME | End: 2025-01-01
Attending: EMERGENCY MEDICINE
Payer: MEDICARE

## 2025-01-01 VITALS
HEIGHT: 69 IN | TEMPERATURE: 98.3 F | WEIGHT: 150 LBS | OXYGEN SATURATION: 100 % | DIASTOLIC BLOOD PRESSURE: 66 MMHG | SYSTOLIC BLOOD PRESSURE: 142 MMHG | HEART RATE: 52 BPM | RESPIRATION RATE: 16 BRPM | BODY MASS INDEX: 22.22 KG/M2

## 2025-01-01 DIAGNOSIS — R00.2 PALPITATIONS: Primary | ICD-10-CM

## 2025-01-01 LAB
ALBUMIN SERPL-MCNC: 4.1 G/DL (ref 3.5–5.2)
ALBUMIN/GLOB SERPL: 1.9 G/DL
ALP SERPL-CCNC: 111 U/L (ref 39–117)
ALT SERPL W P-5'-P-CCNC: 15 U/L (ref 1–41)
ANION GAP SERPL CALCULATED.3IONS-SCNC: 8.3 MMOL/L (ref 5–15)
AST SERPL-CCNC: 22 U/L (ref 1–40)
BASOPHILS # BLD AUTO: 0.02 10*3/MM3 (ref 0–0.2)
BASOPHILS NFR BLD AUTO: 0.4 % (ref 0–1.5)
BILIRUB SERPL-MCNC: 0.5 MG/DL (ref 0–1.2)
BUN SERPL-MCNC: 29 MG/DL (ref 8–23)
BUN/CREAT SERPL: 16.2 (ref 7–25)
CALCIUM SPEC-SCNC: 8.5 MG/DL (ref 8.6–10.5)
CHLORIDE SERPL-SCNC: 105 MMOL/L (ref 98–107)
CO2 SERPL-SCNC: 22.7 MMOL/L (ref 22–29)
CREAT SERPL-MCNC: 1.79 MG/DL (ref 0.76–1.27)
D-LACTATE SERPL-SCNC: 0.7 MMOL/L (ref 0.5–2)
DEPRECATED RDW RBC AUTO: 47.3 FL (ref 37–54)
EGFRCR SERPLBLD CKD-EPI 2021: 38.1 ML/MIN/1.73
EOSINOPHIL # BLD AUTO: 0.16 10*3/MM3 (ref 0–0.4)
EOSINOPHIL NFR BLD AUTO: 3.4 % (ref 0.3–6.2)
ERYTHROCYTE [DISTWIDTH] IN BLOOD BY AUTOMATED COUNT: 12.8 % (ref 12.3–15.4)
FLUAV SUBTYP SPEC NAA+PROBE: NOT DETECTED
FLUBV RNA ISLT QL NAA+PROBE: NOT DETECTED
GEN 5 1HR TROPONIN T REFLEX: 11 NG/L
GLOBULIN UR ELPH-MCNC: 2.2 GM/DL
GLUCOSE SERPL-MCNC: 93 MG/DL (ref 65–99)
HCT VFR BLD AUTO: 35.2 % (ref 37.5–51)
HGB BLD-MCNC: 11.5 G/DL (ref 13–17.7)
IMM GRANULOCYTES # BLD AUTO: 0.01 10*3/MM3 (ref 0–0.05)
IMM GRANULOCYTES NFR BLD AUTO: 0.2 % (ref 0–0.5)
LYMPHOCYTES # BLD AUTO: 0.93 10*3/MM3 (ref 0.7–3.1)
LYMPHOCYTES NFR BLD AUTO: 19.8 % (ref 19.6–45.3)
MCH RBC QN AUTO: 32.6 PG (ref 26.6–33)
MCHC RBC AUTO-ENTMCNC: 32.7 G/DL (ref 31.5–35.7)
MCV RBC AUTO: 99.7 FL (ref 79–97)
MONOCYTES # BLD AUTO: 0.54 10*3/MM3 (ref 0.1–0.9)
MONOCYTES NFR BLD AUTO: 11.5 % (ref 5–12)
NEUTROPHILS NFR BLD AUTO: 3.04 10*3/MM3 (ref 1.7–7)
NEUTROPHILS NFR BLD AUTO: 64.7 % (ref 42.7–76)
PLATELET # BLD AUTO: 188 10*3/MM3 (ref 140–450)
PMV BLD AUTO: 10.4 FL (ref 6–12)
POTASSIUM SERPL-SCNC: 4.6 MMOL/L (ref 3.5–5.2)
PROT SERPL-MCNC: 6.3 G/DL (ref 6–8.5)
QT INTERVAL: 429 MS
QTC INTERVAL: 415 MS
RBC # BLD AUTO: 3.53 10*6/MM3 (ref 4.14–5.8)
SARS-COV-2 RNA RESP QL NAA+PROBE: NOT DETECTED
SODIUM SERPL-SCNC: 136 MMOL/L (ref 136–145)
TROPONIN T NUMERIC DELTA: -1 NG/L
TROPONIN T SERPL HS-MCNC: 12 NG/L
WBC NRBC COR # BLD AUTO: 4.7 10*3/MM3 (ref 3.4–10.8)

## 2025-01-01 PROCEDURE — 99284 EMERGENCY DEPT VISIT MOD MDM: CPT | Performed by: EMERGENCY MEDICINE

## 2025-01-01 PROCEDURE — 80053 COMPREHEN METABOLIC PANEL: CPT | Performed by: EMERGENCY MEDICINE

## 2025-01-01 PROCEDURE — 84484 ASSAY OF TROPONIN QUANT: CPT | Performed by: EMERGENCY MEDICINE

## 2025-01-01 PROCEDURE — 93005 ELECTROCARDIOGRAM TRACING: CPT | Performed by: EMERGENCY MEDICINE

## 2025-01-01 PROCEDURE — 36415 COLL VENOUS BLD VENIPUNCTURE: CPT

## 2025-01-01 PROCEDURE — 99283 EMERGENCY DEPT VISIT LOW MDM: CPT

## 2025-01-01 PROCEDURE — 85025 COMPLETE CBC W/AUTO DIFF WBC: CPT | Performed by: EMERGENCY MEDICINE

## 2025-01-01 PROCEDURE — 83605 ASSAY OF LACTIC ACID: CPT | Performed by: EMERGENCY MEDICINE

## 2025-01-01 PROCEDURE — 87636 SARSCOV2 & INF A&B AMP PRB: CPT | Performed by: EMERGENCY MEDICINE

## 2025-01-01 RX ORDER — ALPRAZOLAM 0.25 MG/1
0.5 TABLET ORAL ONCE
Status: DISCONTINUED | OUTPATIENT
Start: 2025-01-01 | End: 2025-01-01

## 2025-01-01 NOTE — DISCHARGE INSTRUCTIONS
Today your lab work was good and the monitor did not alarm or catch what you are feeling. An order was placed today for a cardiac monitor, a ZIO, to be placed before this weekend. Call your cardiologist office and ask which location to go to in order to have this placed.

## 2025-01-01 NOTE — FSED PROVIDER NOTE
Subjective   History of Present Illness  79-year-old male with a history of feeling a change in his heart rate and heartbeats off and on the last 24 hours.  In October he had an episode that lasted almost overnight and when he got to his cardiologist he had changed back to a normal feel.  They will get a set him up for monitor and it never happened.  He has had an LAD stent several years ago.  Prior to this he did not have any issues at all with his heart.  He has not had any symptoms with these episodes no shortness of breath or chest pain or back pain or syncope or lightheadedness or anything including nausea and vomiting.  He is currently on Plavix and aspirin.  He has stage IV kidney disease.      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 INVASIVE LOCATION;  Service: Cardiology;  Laterality: N/A;    CARDIAC CATHETERIZATION N/A 03/17/2023    Procedure: Coronary angiography;  Surgeon: Lenny Martines MD;  Location: [facility] INVASIVE LOCATION;  Service: Cardiology;  Laterality: N/A;    CARDIAC CATHETERIZATION N/A 03/17/2023    Procedure: Stent SABRINA coronary;  Surgeon: Lenny Martines MD;  Location: [facility] INVASIVE LOCATION;  Service: Cardiology;  Laterality: N/A;    CARDIAC CATHETERIZATION N/A 03/17/2023    Procedure: Optical Coherent Tomography;  Surgeon: Lenny Martines MD;  Location: [facility] INVASIVE LOCATION;   Service: Cardiology;  Laterality: N/A;    CHOLECYSTECTOMY      CHOLECYSTECTOMY WITH INTRAOPERATIVE CHOLANGIOGRAM N/A 04/16/2023    Procedure: CHOLECYSTECTOMY LAPAROSCOPIC INTRAOPERATIVE CHOLANGIOGRAM;  Surgeon: Lucrecia Lyle MD;  Location:  [facility] MAIN OR;  Service: General;  Laterality: N/A;    COLONOSCOPY N/A 01/25/2017    Procedure: COLONOSCOPY TO CECUM AND TI WITH POLYPECTOMY ;  Surgeon: Cachorro Hancock MD;  [facility];  Service:     COLONOSCOPY N/A 10/12/2022    Procedure: COLONOSCOPY TO CECUM WITH COLD BX POLYPECTOMY;  Surgeon: Cachorro Hancock MD;  [facility];  Service: General;  Laterality: N/A;  SURVEILLANCE, HX POLYPS   --POLYP, DIVERTICULOSIS    EYE SURGERY  Jan - Feb 2021    cataract surgery both eyes    NEPHROURETERECTOMY Left 11/12/2020    Procedure: LEFT LAPAROSCOPIC NEPHROURETERECTOMY;  Surgeon: Ángel Florentino MD;  Location: [facility] MAIN OR;  Service: Urology;  Laterality: Left;    TRANSURETHRAL RESECTION OF BLADDER TUMOR N/A 09/10/2020    Procedure: CYSTOSCOPY, LEFT URETEROSCOPY W/BIOPSY AND STENT PLACEMENT, TRANSURETHRAL RESECTION OF BLADDER TUMOR;  Surgeon: Ángel Florentino MD;  Location: [facility] MAIN OR;  Service: Urology;  Laterality: N/A;       Family History   Problem Relation Age of Onset    Rheum arthritis Mother     Arthritis Mother         RA    Hearing loss Mother     Aortic aneurysm Father     Aortic aneurysm Cousin     Hearing loss Sister     Thyroid disease Sister     Thyroid disease Brother     Malig Hyperthermia Neg Hx        Social History     Socioeconomic History    Marital status:    Tobacco Use    Smoking status: Never     Passive exposure: Never    Smokeless tobacco: Never   Vaping Use    Vaping status: Never Used   Substance and Sexual Activity    Alcohol use: Never    Drug use: Never    Sexual activity: Defer           Objective   Physical Exam  Vitals and nursing note reviewed.   Constitutional:       General: He is not in  acute distress.     Appearance: Normal appearance. He is normal weight. He is not ill-appearing or toxic-appearing.   HENT:      Head: Normocephalic.      Mouth/Throat:      Mouth: Mucous membranes are moist.      Pharynx: Oropharynx is clear.   Eyes:      Extraocular Movements: Extraocular movements intact.      Conjunctiva/sclera: Conjunctivae normal.   Cardiovascular:      Rate and Rhythm: Normal rate and regular rhythm.      Pulses: Normal pulses.      Heart sounds: Normal heart sounds. No murmur heard.  Pulmonary:      Effort: Pulmonary effort is normal.      Breath sounds: Normal breath sounds.   Musculoskeletal:         General: Normal range of motion.      Cervical back: Neck supple.   Skin:     General: Skin is warm and dry.      Capillary Refill: Capillary refill takes less than 2 seconds.   Neurological:      General: No focal deficit present.      Mental Status: He is alert and oriented to person, place, and time. Mental status is at baseline.   Psychiatric:         Mood and Affect: Mood normal.         Behavior: Behavior normal.         Thought Content: Thought content normal.         Judgment: Judgment normal.         Procedures           ED Course  ED Course as of 01/01/25 1618   Wed Jan 01, 2025   1345 EKG normal sinus rhythm rate 56 no evidence of STEMI compared to 4/17/2023 very similar in characteristics and waveform. [AR]   1415 White blood count total is 4.7 H&H is 11.5 slightly low with [place] slightly low at 35.2 consequently MCV is slightly elevated [AR]   1431 Lactate is .7 no not septic. COVID flu neg [AR]   1435 Cmp Bun 29 cratinine 1.79 eGFR 38, fits with h/o of renal disease [AR]   1606 Opponent is normal the monitor has not alerted us to any changes in his heart rate or rhythm since he has been here. [AR]      ED Course User Index  [AR] Kelli Anderson MD                                           Medical Decision Making  Differential diagnosis of palpitations includes but not   limited to heart arrhythmia, electrolyte disorder, dehydration, MI, sick sinus syndrome, V. tach V-fib atrial fib anxiety    EKG does not show anything concerning at this time he is on the monitor we will watch to see if his heart provides information to help us determine what is going on.    Amount and/or Complexity of Data Reviewed  Labs: ordered.  ECG/medicine tests: ordered.        Final diagnoses:   Palpitations       ED Disposition  ED Disposition       ED Disposition   Discharge    Condition   Stable    Comment   --               Jeff Jones Jr., MD  [address] [place] [address]  [phone]      Call his office tomorrow and tell them you have an order for the ZIO monitor and need to know which facility to go to.         Medication List        Changed      nitroglycerin 0.4 MG SL tablet  Commonly known as: NITROSTAT  place 1 tablet under the tongue as needed for angina, may repeat every 5mins for up three doses  What changed:   how much to take  how to take this  when to take this  reasons to take this

## 2025-01-20 RX ORDER — ATORVASTATIN CALCIUM 40 MG/1
40 TABLET, FILM COATED ORAL NIGHTLY
Qty: 90 TABLET | Refills: 1 | Status: SHIPPED | OUTPATIENT
Start: 2025-01-20

## 2025-02-07 ENCOUNTER — TELEPHONE (OUTPATIENT)
Dept: FAMILY MEDICINE CLINIC | Facility: CLINIC | Age: 80
End: 2025-02-07

## 2025-02-07 NOTE — TELEPHONE ENCOUNTER
Caller: Kunal Vargas    Relationship: Self    Best call back number: [phone]     What is the best time to reach you: ANY     Who are you requesting to speak with (clinical staff, provider,  specific staff member): CLINICAL STAFF     Do you know the name of the person who called:     What was the call regarding: PATIENT IS REQUESTING A CALLER T SCHEDULE HIS VASCULAR SCREENING APPOINTMENT.    Is it okay if the provider responds through MyChart: NO

## 2025-02-10 NOTE — TELEPHONE ENCOUNTER
SPOKE WITH PT AND HE IS OK WITH GOING TO [facility] TO HAVE THIS TEST DONE AND HE IS SCHEDULED ON 4-22-25 @ 8:00 AM. [place]

## 2025-02-14 ENCOUNTER — OFFICE VISIT (OUTPATIENT)
Dept: CARDIOLOGY | Facility: CLINIC | Age: 80
End: 2025-02-14
Payer: MEDICARE

## 2025-02-14 VITALS
RESPIRATION RATE: 16 BRPM | BODY MASS INDEX: 22.07 KG/M2 | OXYGEN SATURATION: 100 % | WEIGHT: 149 LBS | SYSTOLIC BLOOD PRESSURE: 126 MMHG | HEART RATE: 48 BPM | DIASTOLIC BLOOD PRESSURE: 80 MMHG | HEIGHT: 69 IN

## 2025-02-14 DIAGNOSIS — R00.2 PALPITATIONS: Primary | ICD-10-CM

## 2025-02-14 DIAGNOSIS — I25.10 CORONARY ARTERY DISEASE INVOLVING NATIVE CORONARY ARTERY OF NATIVE HEART WITHOUT ANGINA PECTORIS: ICD-10-CM

## 2025-02-14 DIAGNOSIS — E78.5 HYPERLIPIDEMIA, UNSPECIFIED HYPERLIPIDEMIA TYPE: ICD-10-CM

## 2025-02-14 DIAGNOSIS — I49.3 FREQUENT PVCS: ICD-10-CM

## 2025-02-14 RX ORDER — SODIUM BICARBONATE 650 MG/1
1300 TABLET ORAL 2 TIMES DAILY
COMMUNITY
Start: 2024-10-11 | End: 2025-10-11

## 2025-02-14 NOTE — PROGRESS NOTES
[facility]      Patient Name: Kunal Vargas  :1945  Age: 79 y.o.  Encounter Provider:  Jeff Jones Jr, MD      Chief Complaint: Follow-up coronary artery disease      HPI  Kunal Vargas is a 79 y.o. male with nonobstructive coronary artery disease and dyslipidemia who presents for follow-up evaluation of chest discomfort.     Summary of clinic visitation: Patient was initially seen in  for chest pain which she describes as sharp discomfort associated with activity.  He had a treadmill stress study that showed no evidence of ischemia but continued to have recurrent and frequent pain.  Cardiac catheterization performed showing mild nonobstructive disease.  He was last seen in 2017 by Dr. Butcher.  He was doing well at that point and was discharged with clindamycin as needed.  He was doing very well and continues to perform aerobics 3 times weekly but has been having exercise-induced chest tightness over the past few months.  He had an episode while just walking in his neighborhood the other day.  The chest discomfort does not stop him from activity and he states that it stops a few minutes after starting without rest.  No associated nausea, vomiting, diaphoresis or shortness of air.  He denies orthopnea, PND or edema.  No palpitations, dizziness or syncope.  He was started on aspirin recently.  Family history of ruptured AAA and he has periodic surveillance with most recent being in 2021 at which time he was noted to have mild carotid atherosclerotic plaque with normal abdominal aorta.  He is a lifelong non-smoker.  Denies alcohol or illicit drug use.  No cardiac complaints at time of interview.    Patient had abnormal nuclear stress study showing anterior wall ischemia in March.  He was sent for cardiac catheterization the next day showing proximal to mid critical stenosis status post drug-eluting stent placement.  He was back in the hospital in mid April for  confusion and right upper quadrant abdominal pain.  Found to have acute cholecystitis and cardiology was consulted given recent stent placement with need for dual antiplatelet therapy.  Fortunately cholecystectomy was able to be performed on dual antiplatelet therapy and the patient had no complications.  He was discharged yesterday and is doing well.  He denies angina or heart failure symptoms.  He was increasing activity as tolerated prior to this hospitalization and will be going back to cardiac rehab.  No cardiac complaints at time of interview.      He is doing well since last visit.  No chest pain or shortness of air with activity.  No orthopnea, PND or edema.  He had palpitations for which he was seen in the emergency room.  He had follow-up Holter monitor that showed a few symptomatic episodes of PACs but otherwise very short episodes of SVT and no corollary arrhythmias.  Blood pressure and heart rate are very well-controlled today in clinic.  Last LDL 49.  No cardiac complaints at time of interview.    The following portions of the patient's history were reviewed and updated as appropriate: allergies, current medications, past family history, past medical history, past social history, past surgical history and problem list.      Review of Systems   Constitutional: Negative for chills and fever.   HENT:  Negative for hoarse voice and sore throat.    Eyes:  Negative for double vision and photophobia.   Cardiovascular:  Positive for chest pain. Negative for leg swelling, near-syncope, orthopnea, palpitations, paroxysmal nocturnal dyspnea and syncope.   Respiratory:  Negative for cough and wheezing.    Skin:  Negative for poor wound healing and rash.   Musculoskeletal:  Negative for arthritis and joint swelling.   Gastrointestinal:  Negative for bloating, abdominal pain, hematemesis and hematochezia.   Neurological:  Negative for dizziness and focal weakness.   Psychiatric/Behavioral:  Negative for depression  "and suicidal ideas.        OBJECTIVE:   Vital Signs  Vitals:    02/14/25 1259   BP: 126/80   Pulse: (!) 48   Resp: 16   SpO2: 100%     Estimated body mass index is 22 kg/m² as calculated from the following:    Height as of this encounter: 175.3 cm (69\").    Weight as of this encounter: 67.6 kg (149 lb).    Vitals reviewed.   Constitutional:       Appearance: Healthy appearance. Not in distress.   Neck:      Vascular: No JVR. JVD normal.   Pulmonary:      Effort: Pulmonary effort is normal.      Breath sounds: Normal breath sounds. No wheezing. No rhonchi. No rales.   Chest:      Chest wall: Not tender to palpatation.   Cardiovascular:      PMI at left midclavicular line. Normal rate. Regular rhythm. Normal S1. Normal S2.       Murmurs: There is no murmur.      No gallop.  No click. No rub.   Pulses:     Intact distal pulses.   Edema:     Peripheral edema absent.   Abdominal:      General: Bowel sounds are normal.      Palpations: Abdomen is soft.      Tenderness: There is no abdominal tenderness.   Musculoskeletal: Normal range of motion.         General: No tenderness. Skin:     General: Skin is warm and dry.   Neurological:      General: No focal deficit present.      Mental Status: Alert and oriented to person, place and time.         Procedures    Lipid Panel          5/15/2024    09:32 12/10/2024    08:42   Lipid Panel   Total Cholesterol 119  117    Triglycerides 42  46    HDL Cholesterol 63  57    VLDL Cholesterol 11  11    LDL Cholesterol  45  49    LDL/HDL Ratio 0.76  0.89         BUN   Date Value Ref Range Status   01/01/2025 29 (H) 8 - 23 mg/dL Final     Creatinine   Date Value Ref Range Status   01/01/2025 1.79 (H) 0.76 - 1.27 mg/dL Final     Potassium   Date Value Ref Range Status   01/01/2025 4.6 3.5 - 5.2 mmol/L Final     ALT (SGPT)   Date Value Ref Range Status   01/01/2025 15 1 - 41 U/L Final     AST (SGOT)   Date Value Ref Range Status   01/01/2025 22 1 - 40 U/L Final           ASSESSMENT: "     77-year-old male with known coronary artery disease presents for evaluation of precordial pain    PLAN OF CARE:     PSVT -very short episodes from which she was not symptomatic.  He had some symptoms with isolated supraventricular ectopy.  No indication for further testing or treatment at this time.  Coronary artery disease -PCI to proximal to mid LAD March 2023.  Continue current medical management.  LDL at goal.  Dyslipidemia  Family history of ruptured AAA    Return to clinic 12 months             Discharge Medications            Accurate as of February 14, 2025  1:28 PM. If you have any questions, ask your nurse or doctor.                Continue These Medications        Instructions Start Date   aspirin 81 MG chewable tablet   81 mg, Daily      atorvastatin 40 MG tablet  Commonly known as: LIPITOR   40 mg, Oral, Nightly      clobetasol propionate 0.05 % cream  Commonly known as: TEMOVATE   1 Application, 2 Times Daily      clopidogrel 75 MG tablet  Commonly known as: PLAVIX   75 mg, Oral, Daily      furosemide 40 MG tablet  Commonly known as: LASIX   40 mg, Daily      levothyroxine 88 MCG tablet  Commonly known as: SYNTHROID, LEVOTHROID   TAKE 1 TABLET BY MOUTH EVERY DAY      metoprolol succinate XL 25 MG 24 hr tablet  Commonly known as: TOPROL-XL   TAKE 1 TABLET BY MOUTH EVERY DAY      nitroglycerin 0.4 MG SL tablet  Commonly known as: NITROSTAT   place 1 tablet under the tongue as needed for angina, may repeat every 5mins for up three doses      sodium bicarbonate 650 MG tablet   1,300 mg, 2 Times Daily      triamcinolone 0.1 % cream  Commonly known as: KENALOG   No dose, route, or frequency recorded.               Thank you for allowing me to participate in the care of your patient,      Sincerely,   Jeff Jones MD  [facility]  02/14/25  13:28 EST

## 2025-02-17 RX ORDER — METOPROLOL SUCCINATE 25 MG/1
TABLET, EXTENDED RELEASE ORAL
Qty: 90 TABLET | Refills: 0 | Status: SHIPPED | OUTPATIENT
Start: 2025-02-17

## 2025-03-10 RX ORDER — LEVOTHYROXINE SODIUM 88 UG/1
88 TABLET ORAL DAILY
Qty: 90 TABLET | Refills: 0 | Status: SHIPPED | OUTPATIENT
Start: 2025-03-10

## 2025-04-01 RX ORDER — CLOPIDOGREL BISULFATE 75 MG/1
75 TABLET ORAL DAILY
Qty: 90 TABLET | Refills: 1 | Status: SHIPPED | OUTPATIENT
Start: 2025-04-01

## 2025-05-19 RX ORDER — METOPROLOL SUCCINATE 25 MG/1
25 TABLET, EXTENDED RELEASE ORAL DAILY
Qty: 90 TABLET | Refills: 0 | Status: SHIPPED | OUTPATIENT
Start: 2025-05-19

## 2025-06-15 ENCOUNTER — APPOINTMENT (OUTPATIENT)
Dept: GENERAL RADIOLOGY | Facility: HOSPITAL | Age: 80
End: 2025-06-15
Payer: MEDICARE

## 2025-06-15 ENCOUNTER — HOSPITAL ENCOUNTER (OUTPATIENT)
Facility: HOSPITAL | Age: 80
Discharge: HOME | End: 2025-06-15
Attending: STUDENT IN AN ORGANIZED HEALTH CARE EDUCATION/TRAINING PROGRAM | Admitting: STUDENT IN AN ORGANIZED HEALTH CARE EDUCATION/TRAINING PROGRAM
Payer: MEDICARE

## 2025-06-15 VITALS
SYSTOLIC BLOOD PRESSURE: 143 MMHG | RESPIRATION RATE: 16 BRPM | WEIGHT: 149.03 LBS | BODY MASS INDEX: 22.07 KG/M2 | TEMPERATURE: 100.1 F | HEIGHT: 69 IN | DIASTOLIC BLOOD PRESSURE: 64 MMHG | HEART RATE: 75 BPM | OXYGEN SATURATION: 94 %

## 2025-06-15 DIAGNOSIS — J18.9 PNEUMONIA DUE TO INFECTIOUS ORGANISM, UNSPECIFIED LATERALITY, UNSPECIFIED PART OF LUNG: Primary | ICD-10-CM

## 2025-06-15 LAB
FLUAV SUBTYP SPEC NAA+PROBE: NOT DETECTED
FLUBV RNA ISLT QL NAA+PROBE: NOT DETECTED
SARS-COV-2 RNA RESP QL NAA+PROBE: NOT DETECTED

## 2025-06-15 PROCEDURE — G0463 HOSPITAL OUTPT CLINIC VISIT: HCPCS | Performed by: STUDENT IN AN ORGANIZED HEALTH CARE EDUCATION/TRAINING PROGRAM

## 2025-06-15 PROCEDURE — 71046 X-RAY EXAM CHEST 2 VIEWS: CPT

## 2025-06-15 PROCEDURE — 87636 SARSCOV2 & INF A&B AMP PRB: CPT | Performed by: STUDENT IN AN ORGANIZED HEALTH CARE EDUCATION/TRAINING PROGRAM

## 2025-06-15 RX ORDER — AZITHROMYCIN 250 MG/1
250 TABLET, FILM COATED ORAL DAILY
Qty: 4 TABLET | Refills: 0 | Status: SHIPPED | OUTPATIENT
Start: 2025-06-15 | End: 2025-06-19

## 2025-06-15 RX ORDER — AZITHROMYCIN 250 MG/1
500 TABLET, FILM COATED ORAL ONCE
Status: COMPLETED | OUTPATIENT
Start: 2025-06-15 | End: 2025-06-15

## 2025-06-15 RX ADMIN — AZITHROMYCIN 500 MG: 250 TABLET, FILM COATED ORAL at 09:44

## 2025-06-15 RX ADMIN — AMOXICILLIN AND CLAVULANATE POTASSIUM 1 TABLET: 875; 125 TABLET, COATED ORAL at 09:44

## 2025-06-15 NOTE — Clinical Note
[facility] [place]  [address]  [place] [address]    Kunal Vargas was seen and treated in our emergency department on 6/15/2025.  He may return to work on 06/17/2025.         Thank you for choosing [facility].    Sean Cabral MD

## 2025-06-15 NOTE — FSED PROVIDER NOTE
Subjective   History of Present Illness  79-year-old male past medical history of an arrhythmia presenting with complaint that he has had a cough for a week and a half and stated that was getting better and he was having clear nasal drainage [place] now he has yellow and green nasal drainage, yellow-green sputum, states that he feels acutely worse and he states that he has been running a fever at home has been taking antipyretics for but no nausea no vomiting no chest pain no shortness of breath never been a smoker        Review of Systems    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        No Known Allergies    Past Surgical History:   Procedure Laterality Date    CARDIAC CATHETERIZATION      CARDIAC CATHETERIZATION N/A 03/17/2023    Procedure: Left Heart Cath;  Surgeon: Lenny Martines MD;  Location: [facility] INVASIVE LOCATION;  Service: Cardiology;  Laterality: N/A;    CARDIAC CATHETERIZATION N/A 03/17/2023    Procedure: Coronary angiography;  Surgeon: Lenny Martines MD;  Location: [facility] CATH INVASIVE LOCATION;  Service: Cardiology;  Laterality: N/A;    CARDIAC CATHETERIZATION N/A 03/17/2023    Procedure: Stent SABRINA coronary;  Surgeon: Lenny Martines MD;  Location: [facility] CATH INVASIVE LOCATION;  Service: Cardiology;  Laterality: N/A;    CARDIAC CATHETERIZATION N/A 03/17/2023    Procedure: Optical Coherent Tomography;  Surgeon: Lenny Martines MD;  Location: [facility] INVASIVE LOCATION;  Service: Cardiology;  Laterality: N/A;    CHOLECYSTECTOMY      CHOLECYSTECTOMY WITH INTRAOPERATIVE CHOLANGIOGRAM N/A 04/16/2023    Procedure:  CHOLECYSTECTOMY LAPAROSCOPIC INTRAOPERATIVE CHOLANGIOGRAM;  Surgeon: Lucrecia Lyle MD;  Location:  [facility] MAIN OR;  Service: General;  Laterality: N/A;    COLONOSCOPY N/A 01/25/2017    Procedure: COLONOSCOPY TO CECUM AND TI WITH POLYPECTOMY ;  Surgeon: Cachorro Hancock MD;  [facility];  Service:     COLONOSCOPY N/A 10/12/2022    Procedure: COLONOSCOPY TO CECUM WITH COLD BX POLYPECTOMY;  Surgeon: Cachorro Hancock MD;  [facility];  Service: General;  Laterality: N/A;  SURVEILLANCE, HX POLYPS   --POLYP, DIVERTICULOSIS    EYE SURGERY  Jan - Feb 2021    cataract surgery both eyes    NEPHROURETERECTOMY Left 11/12/2020    Procedure: LEFT LAPAROSCOPIC NEPHROURETERECTOMY;  Surgeon: Ángel Florentino MD;  Location: [facility] MAIN OR;  Service: Urology;  Laterality: Left;    TRANSURETHRAL RESECTION OF BLADDER TUMOR N/A 09/10/2020    Procedure: CYSTOSCOPY, LEFT URETEROSCOPY W/BIOPSY AND STENT PLACEMENT, TRANSURETHRAL RESECTION OF BLADDER TUMOR;  Surgeon: Ángel Florentino MD;  Location: [facility] MAIN OR;  Service: Urology;  Laterality: N/A;       Family History   Problem Relation Age of Onset    Rheum arthritis Mother     Arthritis Mother         RA    Hearing loss Mother     Aortic aneurysm Father     Aortic aneurysm Cousin     Hearing loss Sister     Thyroid disease Sister     Thyroid disease Brother     Malig Hyperthermia Neg Hx        Social History     Socioeconomic History    Marital status:    Tobacco Use    Smoking status: Never     Passive exposure: Never    Smokeless tobacco: Never   Vaping Use    Vaping status: Never Used   Substance and Sexual Activity    Alcohol use: Never    Drug use: Never    Sexual activity: Defer           Objective   Physical Exam  Vitals and nursing note reviewed. Exam conducted with a chaperone present.   Constitutional:       General: He is not in acute distress.     Appearance: Normal appearance. He is not ill-appearing, toxic-appearing or  diaphoretic.      Comments: Conversational speaking in full sentences no respiratory distress   HENT:      Head: Normocephalic and atraumatic.      Right Ear: Tympanic membrane and ear canal normal.      Left Ear: Tympanic membrane normal.      Nose: Nose normal.      Mouth/Throat:      Pharynx: No oropharyngeal exudate or posterior oropharyngeal erythema.   Eyes:      Extraocular Movements: Extraocular movements intact.      Conjunctiva/sclera: Conjunctivae normal.      Pupils: Pupils are equal, round, and reactive to light.   Cardiovascular:      Rate and Rhythm: Normal rate and regular rhythm.      Pulses: Normal pulses.      Heart sounds: No murmur heard.  Pulmonary:      Effort: Pulmonary effort is normal. No respiratory distress.      Breath sounds: Normal breath sounds. No stridor. No wheezing, rhonchi or rales.   Abdominal:      General: Abdomen is flat. There is no distension.      Tenderness: There is no abdominal tenderness. There is no guarding or rebound.   Musculoskeletal:         General: No swelling, tenderness, deformity or signs of injury. Normal range of motion.      Cervical back: Normal range of motion. No rigidity or tenderness.      Right lower leg: No edema.      Left lower leg: No edema.   Skin:     General: Skin is warm and dry.      Capillary Refill: Capillary refill takes less than 2 seconds.      Findings: No erythema or rash.   Neurological:      General: No focal deficit present.      Mental Status: He is alert and oriented to person, place, and time. Mental status is at baseline.      Cranial Nerves: No cranial nerve deficit.      Sensory: No sensory deficit.      Motor: No weakness.   Psychiatric:         Mood and Affect: Mood normal.         Procedures           ED Course  ED Course as of 06/15/25 0937   [place] 15, 2025   0922 X-ray appears negative but starting empiric treatment due to concerning history [AK]      ED Course User Index  [AK] Sean Cabral MD                                            Medical Decision Making  This is an overall well-appearing 79-year-old male who presents with a history concerning for secondary bacterial pneumonia from an upper respiratory infection has been present for over 10 days.  Will treat with Augmentin and get a chest x-ray but lungs were clear to auscultation bilaterally with no signs of bacterial sinusitis, strep pharyngitis    Amount and/or Complexity of Data Reviewed  Radiology: ordered.        Final diagnoses:   Pneumonia due to infectious organism, unspecified laterality, unspecified part of lung       ED Disposition  ED Disposition       ED Disposition   Discharge    Condition   Stable    Comment   --               Chai Montesinos MD  [address] [address] [id number] [phone]    In 3 days      [facility]  [address] [address]  Go to   If symptoms worsen         Medication List        New Prescriptions      amoxicillin-clavulanate 875-125 MG per tablet  Commonly known as: AUGMENTIN  Take 1 tablet by mouth 2 (Two) Times a Day for 7 days.     azithromycin 250 MG tablet  Commonly known as: ZITHROMAX  Take 1 tablet by mouth Daily for 4 days.               Where to Get Your Medications        These medications were sent to [facility] [address] [place] [address] - [address] [facility] - [phone]  - [phone]   [address] [facility] [address]      Phone: [phone]   amoxicillin-clavulanate 875-125 MG per tablet  azithromycin 250 MG tablet

## 2025-06-16 RX ORDER — LEVOTHYROXINE SODIUM 88 UG/1
88 TABLET ORAL DAILY
Qty: 90 TABLET | Refills: 0 | Status: SHIPPED | OUTPATIENT
Start: 2025-06-16

## 2025-06-19 ENCOUNTER — OFFICE VISIT (OUTPATIENT)
Dept: FAMILY MEDICINE CLINIC | Facility: CLINIC | Age: 80
End: 2025-06-19
Payer: MEDICARE

## 2025-06-19 VITALS
OXYGEN SATURATION: 97 % | TEMPERATURE: 98.1 F | WEIGHT: 145 LBS | HEART RATE: 69 BPM | RESPIRATION RATE: 16 BRPM | HEIGHT: 69 IN | BODY MASS INDEX: 21.48 KG/M2 | DIASTOLIC BLOOD PRESSURE: 60 MMHG | SYSTOLIC BLOOD PRESSURE: 110 MMHG

## 2025-06-19 DIAGNOSIS — J40 BRONCHITIS: Primary | ICD-10-CM

## 2025-06-19 NOTE — PROGRESS NOTES
Subjective   Kunal Vargas is a 79 y.o. male. Patient is here today for   Chief Complaint   Patient presents with    Hospital Follow Up Visit    Pneumonia       (Not on file)-  Risk for Readmission (LACE) No data recorded         Vitals:    06/19/25 1016   BP: 110/60   Pulse: 69   Resp: 16   Temp: 98.1 °F (36.7 °C)   SpO2: 97%     The following portions of the patient's history were reviewed and updated as appropriate: allergies, current medications, past family history, past medical history, past social history, past surgical history and problem list.    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       No Known Allergies   Social History     Socioeconomic History    Marital status:    Tobacco Use    Smoking status: Never     Passive exposure: Never    Smokeless tobacco: Never   Vaping Use    Vaping status: Never Used   Substance and Sexual Activity    Alcohol use: Never    Drug use: Never    Sexual activity: Defer        Current Outpatient Medications:     amoxicillin-clavulanate (AUGMENTIN) 875-125 MG per tablet, Take 1 tablet by mouth 2 (Two) Times a Day for 7 days., Disp: 14 tablet, Rfl: 0    aspirin 81 MG chewable tablet, Chew 1 tablet Daily., Disp: , Rfl:     atorvastatin (LIPITOR) 40 MG tablet, TAKE 1 TABLET BY MOUTH EVERY NIGHT., Disp: 90 tablet, Rfl: 1    azithromycin (ZITHROMAX) 250 MG tablet, Take 1 tablet by mouth Daily for 4 days., Disp: 4 tablet, Rfl: 0    clobetasol propionate (TEMOVATE) 0.05 % cream, Apply 1 Application topically to the appropriate area as directed 2 (Two) Times a Day., Disp: , Rfl:     clopidogrel (PLAVIX) 75 MG tablet, TAKE 1 TABLET BY MOUTH DAILY., Disp:  90 tablet, Rfl: 1    furosemide (LASIX) 40 MG tablet, Take 1 tablet by mouth Daily., Disp: , Rfl:     levothyroxine (SYNTHROID, LEVOTHROID) 88 MCG tablet, TAKE 1 TABLET BY MOUTH EVERY DAY, Disp: 90 tablet, Rfl: 0    metoprolol succinate XL (TOPROL-XL) 25 MG 24 hr tablet, TAKE 1 TABLET BY MOUTH EVERY DAY, Disp: 90 tablet, Rfl: 0    nitroglycerin (NITROSTAT) 0.4 MG SL tablet, place 1 tablet under the tongue as needed for angina, may repeat every 5mins for up three doses (Patient not taking: Reported on 2/14/2025), Disp: 25 tablet, Rfl: 1    sodium bicarbonate 650 MG tablet, Take 2 tablets by mouth 2 (Two) Times a Day., Disp: , Rfl:     triamcinolone (KENALOG) 0.1 % cream, , Disp: , Rfl:      Objective     Pneumonia  He complains of cough and shortness of breath. There is no wheezing. Pertinent negatives include no headaches.      History of Present Illness  The patient is a 79-year-old male here for an emergency room follow-up.    He has been experiencing symptoms of bronchitis for the past 10 days, which initially presented as sinus issues and coughing. He also reported fever and chills. His condition has improved since his last visit to the emergency room on 06/15/2025, where he was seen by Dr. Sean Cabral. He was prescribed Augmentin and underwent a chest x-ray, which did not reveal any signs of pneumonia. He completed his course of Augmentin today and was also given azithromycin. His appetite is gradually returning, although he has experienced a weight loss of 4 pounds. He does not take multivitamins but maintains a balanced diet and supplements with vitamin D. He reports mild shortness of breath, which is significantly improved from before. He does not experience headaches or nasal congestion upon waking. He has been producing a large amount of phlegm and has been blowing his nose frequently. He tested negative for COVID-19 at home when his symptoms first appeared. He received his last COVID-19 vaccine on  02/04/2025 and a tetanus shot on 06/04/2025. He has been consuming bran buds as part of his diet.    Review of Systems   Constitutional:         4 lb weight loss   Respiratory:  Positive for cough and shortness of breath. Negative for wheezing.    Gastrointestinal: Negative.    Genitourinary: Negative.    Neurological:  Negative for headaches.       Physical Exam  Vitals reviewed.   Constitutional:       Appearance: Normal appearance.   HENT:      Right Ear: Tympanic membrane normal.      Left Ear: Tympanic membrane normal.   Cardiovascular:      Rate and Rhythm: Normal rate and regular rhythm.      Heart sounds: Normal heart sounds.   Pulmonary:      Effort: Pulmonary effort is normal.      Breath sounds: No wheezing, rhonchi or rales.   Neurological:      Mental Status: He is alert.   Psychiatric:         Mood and Affect: Mood normal.         Behavior: Behavior normal.         Thought Content: Thought content normal.         Judgment: Judgment normal.       Physical Exam      Results  Labs   - COVID-19 test: Negative   - Influenza A and B test: Negative    Imaging   - Chest x-ray: 06/15/2025, Did not show pneumonia    ASSESSMENT  Assessment & Plan    Problems Addressed this Visit          Pulmonary and Pneumonias    Bronchitis - Primary     Diagnoses         Codes Comments      Bronchitis    -  Primary ICD-10-CM: J40  ICD-9-CM: 490           Assessment & Plan  1. Bronchitis.  - Symptoms have persisted for over 10 days, including sinus issues, coughing, fever, and chills.  - Chest x-ray did not show pneumonia; improvement noted with Augmentin and azithromycin.  - Negative tests for COVID-19 and influenza A and B; appetite returning after a 4-pound weight loss.  - Continue current antibiotic regimen, ensure proper nutrition, and maintain good hygiene practices.    Follow-up  The patient will follow up on 07/07/2025.      Current outpatient and discharge medications have been reconciled for the patient.  Reviewed by:  Chai Montesinos MD      There are no Patient Instructions on file for this visit.  Return for Next scheduled follow up.  Patient or patient representative verbalized consent for the use of Ambient Listening during the visit with  Chai Montesinos MD for chart documentation. 6/19/2025  10:34 EDT

## 2025-06-25 DIAGNOSIS — E78.5 HYPERLIPIDEMIA, UNSPECIFIED HYPERLIPIDEMIA TYPE: Primary | ICD-10-CM

## 2025-06-25 DIAGNOSIS — E03.9 ACQUIRED HYPOTHYROIDISM: ICD-10-CM

## 2025-07-01 LAB
ALBUMIN SERPL-MCNC: 4 G/DL (ref 3.5–5.2)
ALBUMIN/GLOB SERPL: 2.1 G/DL
ALP SERPL-CCNC: 110 U/L (ref 39–117)
ALT SERPL-CCNC: 20 U/L (ref 1–41)
AST SERPL-CCNC: 30 U/L (ref 1–40)
BASOPHILS # BLD AUTO: 0.03 10*3/MM3 (ref 0–0.2)
BASOPHILS NFR BLD AUTO: 0.5 % (ref 0–1.5)
BILIRUB SERPL-MCNC: 0.4 MG/DL (ref 0–1.2)
BUN SERPL-MCNC: 33 MG/DL (ref 8–23)
BUN/CREAT SERPL: 20.2 (ref 7–25)
CALCIUM SERPL-MCNC: 8.9 MG/DL (ref 8.6–10.5)
CHLORIDE SERPL-SCNC: 103 MMOL/L (ref 98–107)
CHOLEST SERPL-MCNC: 114 MG/DL (ref 0–200)
CO2 SERPL-SCNC: 24 MMOL/L (ref 22–29)
CREAT SERPL-MCNC: 1.63 MG/DL (ref 0.76–1.27)
EGFRCR SERPLBLD CKD-EPI 2021: 42.6 ML/MIN/1.73
EOSINOPHIL # BLD AUTO: 0.27 10*3/MM3 (ref 0–0.4)
EOSINOPHIL NFR BLD AUTO: 4.3 % (ref 0.3–6.2)
ERYTHROCYTE [DISTWIDTH] IN BLOOD BY AUTOMATED COUNT: 12.2 % (ref 12.3–15.4)
GLOBULIN SER CALC-MCNC: 1.9 GM/DL
GLUCOSE SERPL-MCNC: 95 MG/DL (ref 65–99)
HCT VFR BLD AUTO: 36.6 % (ref 37.5–51)
HDLC SERPL-MCNC: 57 MG/DL (ref 40–60)
HGB BLD-MCNC: 11.6 G/DL (ref 13–17.7)
IMM GRANULOCYTES # BLD AUTO: 0.03 10*3/MM3 (ref 0–0.05)
IMM GRANULOCYTES NFR BLD AUTO: 0.5 % (ref 0–0.5)
LDLC SERPL CALC-MCNC: 47 MG/DL (ref 0–100)
LDLC/HDLC SERPL: 0.87 {RATIO}
LYMPHOCYTES # BLD AUTO: 1.11 10*3/MM3 (ref 0.7–3.1)
LYMPHOCYTES NFR BLD AUTO: 17.9 % (ref 19.6–45.3)
MCH RBC QN AUTO: 32 PG (ref 26.6–33)
MCHC RBC AUTO-ENTMCNC: 31.7 G/DL (ref 31.5–35.7)
MCV RBC AUTO: 101.1 FL (ref 79–97)
MONOCYTES # BLD AUTO: 0.57 10*3/MM3 (ref 0.1–0.9)
MONOCYTES NFR BLD AUTO: 9.2 % (ref 5–12)
NEUTROPHILS # BLD AUTO: 4.2 10*3/MM3 (ref 1.7–7)
NEUTROPHILS NFR BLD AUTO: 67.6 % (ref 42.7–76)
NRBC BLD AUTO-RTO: 0 /100 WBC (ref 0–0.2)
PLATELET # BLD AUTO: 266 10*3/MM3 (ref 140–450)
POTASSIUM SERPL-SCNC: 5.3 MMOL/L (ref 3.5–5.2)
PROT SERPL-MCNC: 5.9 G/DL (ref 6–8.5)
RBC # BLD AUTO: 3.62 10*6/MM3 (ref 4.14–5.8)
SODIUM SERPL-SCNC: 135 MMOL/L (ref 136–145)
TRIGL SERPL-MCNC: 37 MG/DL (ref 0–150)
TSH SERPL DL<=0.005 MIU/L-ACNC: 4.09 UIU/ML (ref 0.27–4.2)
VLDLC SERPL CALC-MCNC: 10 MG/DL (ref 5–40)
WBC # BLD AUTO: 6.21 10*3/MM3 (ref 3.4–10.8)

## 2025-07-07 ENCOUNTER — OFFICE VISIT (OUTPATIENT)
Dept: FAMILY MEDICINE CLINIC | Facility: CLINIC | Age: 80
End: 2025-07-07
Payer: MEDICARE

## 2025-07-07 VITALS
SYSTOLIC BLOOD PRESSURE: 110 MMHG | HEART RATE: 54 BPM | HEIGHT: 69 IN | DIASTOLIC BLOOD PRESSURE: 50 MMHG | RESPIRATION RATE: 16 BRPM | OXYGEN SATURATION: 100 % | WEIGHT: 149 LBS | BODY MASS INDEX: 22.07 KG/M2

## 2025-07-07 DIAGNOSIS — D63.1 ANEMIA DUE TO STAGE 3B CHRONIC KIDNEY DISEASE: ICD-10-CM

## 2025-07-07 DIAGNOSIS — E03.9 ACQUIRED HYPOTHYROIDISM: Primary | ICD-10-CM

## 2025-07-07 DIAGNOSIS — I25.10 CHRONIC CORONARY ARTERY DISEASE: ICD-10-CM

## 2025-07-07 DIAGNOSIS — N18.4 CKD (CHRONIC KIDNEY DISEASE), STAGE IV: ICD-10-CM

## 2025-07-07 DIAGNOSIS — I10 ESSENTIAL HYPERTENSION: ICD-10-CM

## 2025-07-07 DIAGNOSIS — N18.32 ANEMIA DUE TO STAGE 3B CHRONIC KIDNEY DISEASE: ICD-10-CM

## 2025-07-07 NOTE — PROGRESS NOTES
Subjective   Kunal Vargas is a 79 y.o. male. Patient is here today for   Chief Complaint   Patient presents with    Follow-up    Hypertension          Vitals:    07/07/25 1300   BP: 110/50   Pulse: 54   Resp: 16   SpO2: 100%     Body mass index is 21.99 kg/m².      The following portions of the patient's history were reviewed and updated as appropriate: allergies, current medications, past family history, past medical history, past social history, past surgical history and problem list.    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       Allergies   Allergen Reactions    Neomycin Unknown - Low Severity      Social History     Socioeconomic History    Marital status:    Tobacco Use    Smoking status: Never     Passive exposure: Never    Smokeless tobacco: Never   Vaping Use    Vaping status: Never Used   Substance and Sexual Activity    Alcohol use: Never    Drug use: Never    Sexual activity: Defer        Current Outpatient Medications:     aspirin 81 MG chewable tablet, Chew 1 tablet Daily., Disp: , Rfl:     atorvastatin (LIPITOR) 40 MG tablet, TAKE 1 TABLET BY MOUTH EVERY NIGHT., Disp: 90 tablet, Rfl: 1    clobetasol propionate (TEMOVATE) 0.05 % cream, Apply 1 Application topically to the appropriate area as directed 2 (Two) Times a Day., Disp: , Rfl:     clopidogrel (PLAVIX) 75 MG tablet, TAKE 1 TABLET BY MOUTH DAILY., Disp: 90 tablet, Rfl: 1    furosemide (LASIX) 40 MG tablet, Take 1 tablet by mouth Daily., Disp: , Rfl:     levothyroxine (SYNTHROID, LEVOTHROID) 88 MCG tablet, TAKE 1 TABLET BY MOUTH EVERY DAY, Disp: 90 tablet, Rfl: 0    metoprolol succinate XL (TOPROL-XL) 25 MG 24 hr  tablet, TAKE 1 TABLET BY MOUTH EVERY DAY, Disp: 90 tablet, Rfl: 0    nitroglycerin (NITROSTAT) 0.4 MG SL tablet, place 1 tablet under the tongue as needed for angina, may repeat every 5mins for up three doses (Patient not taking: Reported on 2/14/2025), Disp: 25 tablet, Rfl: 1    sodium bicarbonate 650 MG tablet, Take 2 tablets by mouth 2 (Two) Times a Day., Disp: , Rfl:     triamcinolone (KENALOG) 0.1 % cream, , Disp: , Rfl:      Objective   Hypertension     History of Present Illness  The patient is a 79-year-old male here for a blood pressure check and lab follow-up.    He reports monitoring his blood pressure at home, with typical readings around 130/50 to 60, occasionally dropping to 114/50 or 60. He maintains a healthy diet and engages in regular exercise, including aerobics, stretching, resistance training, and high-impact rolling back aerobics. Additionally, he performs physical tasks such as mowing the yard and redoing his deck. He resumed his workout routine today after a brief hiatus.     He has an annual eye exam scheduled for 09/2025. He uses sunblock when outdoors, although he finds it difficult to remember consistently. He recently had a skin patch test conducted by his dermatologist, which revealed allergies to six different substances, including neomycin and black rubber mix. He has been provided with an sudhakar that allows him to scan barcodes of products to identify potential allergens.    He is currently taking a daily dose of 81 mg aspirin due to coronary artery disease. He underwent a vascular screening test at [facility] on 02/18/2025, which showed 40 to 50% luminal narrowing on the right side and mild to moderate plaque on the left side.    He has been diagnosed with mild anemia, likely related to his kidney disease, and has only one kidney. His hemoglobin level is 11.6, slightly below the normal range. He is under the care of a nephrologist, whom he visits twice a year, with the next appointment  scheduled for 10/2025.    He has been diagnosed with vitamin D deficiency and is currently taking a supplement.    He reports full recovery from bronchitis and feels back to his normal state.    SOCIAL HISTORY  He does not drink alcohol.       Review of Systems   Constitutional:  Negative for activity change and unexpected weight change.   Respiratory: Negative.     Cardiovascular: Negative.    Neurological: Negative.    Psychiatric/Behavioral: Negative.         Physical Exam  Vitals reviewed.   Constitutional:       Appearance: Normal appearance.   Neck:      Vascular: No carotid bruit.   Cardiovascular:      Rate and Rhythm: Normal rate and regular rhythm.      Heart sounds: Normal heart sounds.   Pulmonary:      Effort: Pulmonary effort is normal.      Breath sounds: Normal breath sounds.   Neurological:      Mental Status: He is alert.   Psychiatric:         Mood and Affect: Mood normal.         Behavior: Behavior normal.         Thought Content: Thought content normal.         Judgment: Judgment normal.       Physical Exam  Respiratory: Clear to auscultation, no wheezing, rales or rhonchi  Cardiovascular: Regular rate and rhythm, no murmurs, rubs, or gallops       Results  Labs   - TSH: 4.09   - Hemoglobin: 11.6 g/dL   - Creatinine: 1.63 mg/dL   - BUN: 33 mg/dL   - LDL Cholesterol: 47 mg/dL   - HDL Cholesterol: 57 mg/dL   - Triglycerides: 37 mg/dL    Imaging   - Vascular screening test: 02/18/2025, 40 to 50% luminal narrowing on the right side and mild to moderate plaque on the left side       Assessment   ASSESSMENT    Problems Addressed this Visit          Cardiac and Vasculature    Chronic coronary artery disease    Essential hypertension       Endocrine and Metabolic    Acquired hypothyroidism - Primary       Genitourinary and Reproductive     CKD (chronic kidney disease), stage IV       Hematology and Neoplasia    Anemia due to chronic kidney disease     Diagnoses         Codes Comments      Acquired  hypothyroidism    -  Primary ICD-10-CM: E03.9  ICD-9-CM: 244.9       Essential hypertension     ICD-10-CM: I10  ICD-9-CM: 401.9       Chronic coronary artery disease     ICD-10-CM: I25.10  ICD-9-CM: 414.00       CKD (chronic kidney disease), stage IV     ICD-10-CM: N18.4  ICD-9-CM: 585.4       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 Hypertension.  - Blood pressure readings are within the normal range, typically around 130/50-60 mmHg, occasionally dropping to 114/50-60 mmHg.  - Advised to continue monitoring blood pressure at home.  - No changes in treatment are necessary at this time.  - Continue healthy diet and regular exercise.    2. Anemia.  - Mild anemia likely related to kidney disease.  - Hemoglobin level is 11.6 g/dL, slightly decreased from 11.5 g/dL six months ago.  - Elevated mean corpuscular volume is not significantly high and unlikely to cause symptoms.  - No changes in treatment are necessary at this time.    3. Chronic Kidney Disease.  - Kidney function remains stable with creatinine level of 1.63 mg/dL and BUN of 33 mg/dL, compared to 1.79 mg/dL and 29 mg/dL six months ago.  - Advised to avoid nonsteroidal anti-inflammatory drugs like ibuprofen or Aleve and to use Tylenol instead.  - Continue regular follow-ups with nephrologist.  - Maintain hydration and healthy lifestyle.    4. Vitamin D Deficiency.  - Currently taking a vitamin D supplement as recommended by nephrologist.  - Vitamin D levels were low as per previous lab results.  - Continue vitamin D supplementation.  - Follow-up with nephrologist in October.    5. Allergies.  - Allergies to neomycin and black rubber mix, among other things.  - Uses an sudhakar to scan barcodes of products to identify potential allergens.  - Reviewed allergy testing results from dermatologist.  - Continue to avoid identified allergens and use sudhakar for product safety.    6.  Coronary Artery Disease.  - Taking 81 mg of aspirin daily due to coronary artery disease.  - LDL cholesterol level is well-controlled at 47 mg/dL, with a goal of less than 55 mg/dL.  - HDL cholesterol level is excellent at 57 mg/dL, and triglycerides are also excellent at 37 mg/dL.  - Advised to continue current medication regimen and maintain a healthy lifestyle, including regular exercise.       PLAN  There are no Patient Instructions on file for this visit.    Return Wellness visit after December 16, for lipid, tsh, cmp.    Patient or patient representative verbalized consent for the use of Ambient Listening during the visit with  Chai Montesinos MD for chart documentation. 7/7/2025  13:24 EDT

## 2025-07-17 RX ORDER — ATORVASTATIN CALCIUM 40 MG/1
40 TABLET, FILM COATED ORAL NIGHTLY
Qty: 90 TABLET | Refills: 2 | Status: SHIPPED | OUTPATIENT
Start: 2025-07-17

## 2025-08-18 RX ORDER — METOPROLOL SUCCINATE 25 MG/1
25 TABLET, EXTENDED RELEASE ORAL DAILY
Qty: 90 TABLET | Refills: 0 | Status: SHIPPED | OUTPATIENT
Start: 2025-08-18

## (undated) DEVICE — SUT VIC 0 CT1 36IN J946H

## (undated) DEVICE — TRAP FLD MINIVAC MEGADYNE 100ML

## (undated) DEVICE — SINGLE-USE BIOPSY FORCEPS: Brand: RADIAL JAW 4

## (undated) DEVICE — LOU LAP CHOLE: Brand: MEDLINE INDUSTRIES, INC.

## (undated) DEVICE — ENDOPATH XCEL BLADELESS TROCARS WITH STABILITY SLEEVES: Brand: ENDOPATH XCEL

## (undated) DEVICE — TOTAL TRAY, 16FR 10ML SIL FOLEY, URN: Brand: MEDLINE

## (undated) DEVICE — GLV SURG BIOGEL LTX PF 7 1/2

## (undated) DEVICE — MEDI-VAC YANKAUER SUCTION HANDLE W/BULBOUS TIP: Brand: CARDINAL HEALTH

## (undated) DEVICE — DRSNG WND GZ PAD BORDERED 4X8IN STRL

## (undated) DEVICE — CATH URETRL PA CONE TP 8F

## (undated) DEVICE — CATH IMG DRAGONFLY OPTIS 2.7F 135CM

## (undated) DEVICE — ANTIBACTERIAL UNDYED BRAIDED (POLYGLACTIN 910), SYNTHETIC ABSORBABLE SUTURE: Brand: COATED VICRYL

## (undated) DEVICE — URETEROSCOPIC BIOPSY FORCEPS: Brand: PIRANHA

## (undated) DEVICE — ENDOPATH XCEL UNIVERSAL TROCAR STABLILITY SLEEVES: Brand: ENDOPATH XCEL

## (undated) DEVICE — VISUALIZATION SYSTEM: Brand: CLEARIFY

## (undated) DEVICE — SYRINGE,TOOMEY,IRRIGATION,70CC,STERILE: Brand: MEDLINE

## (undated) DEVICE — BLUNT TIP TROCAR: Brand: AUTO SUTURE

## (undated) DEVICE — GLV SURG BIOGEL LTX PF 6 1/2

## (undated) DEVICE — PK CATH CARD 40

## (undated) DEVICE — SENSR O2 OXIMAX FNGR A/ 18IN NONSTR

## (undated) DEVICE — DRAPE,REIN 53X77,STERILE: Brand: MEDLINE

## (undated) DEVICE — SUT SILK 0 TIES 18IN SA66G

## (undated) DEVICE — RUNTHROUGH NS EXTRA FLOPPY PTCA GUIDEWIRE: Brand: RUNTHROUGH

## (undated) DEVICE — TUBING, SUCTION, 1/4" X 20', STRAIGHT: Brand: MEDLINE INDUSTRIES, INC.

## (undated) DEVICE — CANN O2 ETCO2 FITS ALL CONN CO2 SMPL A/ 7IN DISP LF

## (undated) DEVICE — Device: Brand: OLYMPUS

## (undated) DEVICE — PRT BIOP SEALS

## (undated) DEVICE — CATH DIAG IMPULSE FR5 5F 100CM

## (undated) DEVICE — TREK CORONARY DILATATION CATHETER 3.50 MM X 15 MM / RAPID-EXCHANGE: Brand: TREK

## (undated) DEVICE — ENDOPATH ETS-FLEX45 ARTICULATING ENDOSCOPIC LINEAR CUTTER, NO RELOAD: Brand: ENDOPATH

## (undated) DEVICE — INTENDED FOR TISSUE SEPARATION, AND OTHER PROCEDURES THAT REQUIRE A SHARP SURGICAL BLADE TO PUNCTURE OR CUT.: Brand: BARD-PARKER ® CARBON RIB-BACK BLADES

## (undated) DEVICE — TUBING, SUCTION, 1/4" X 10', STRAIGHT: Brand: MEDLINE

## (undated) DEVICE — GLV SURG BIOGEL LTX PF 8

## (undated) DEVICE — SOL NACL 0.9PCT 1000ML

## (undated) DEVICE — NITINOL WIRE WITH HYDROPHILIC TIP: Brand: SENSOR

## (undated) DEVICE — APPL CHLORAPREP HI/LITE 26ML ORNG

## (undated) DEVICE — ENDOPOUCH RETRIEVER SPECIMEN RETRIEVAL BAGS: Brand: ENDOPOUCH RETRIEVER

## (undated) DEVICE — URETERAL DILATATION SYSTEM

## (undated) DEVICE — SPNG LAP 18X18IN LF STRL PK/5

## (undated) DEVICE — CATH DIAG IMPULSE FL3.5 5F 100CM

## (undated) DEVICE — KT ORCA ORCAPOD DISP STRL

## (undated) DEVICE — TOWEL,OR,DSP,ST,BLUE,STD,4/PK,20PK/CS: Brand: MEDLINE

## (undated) DEVICE — GLIDESHEATH SLENDER STAINLESS STEEL KIT: Brand: GLIDESHEATH SLENDER

## (undated) DEVICE — JACKSON-PRATT 100CC BULB RESERVOIR: Brand: CARDINAL HEALTH

## (undated) DEVICE — ENDOCUT SCISSOR TIP, DISPOSABLE: Brand: RENEW

## (undated) DEVICE — DRP C/ARM 41X74IN

## (undated) DEVICE — UNDYED BRAIDED (POLYGLACTIN 910), SYNTHETIC ABSORBABLE SUTURE: Brand: COATED VICRYL

## (undated) DEVICE — TREK CORONARY DILATATION CATHETER 2.50 MM X 12 MM / RAPID-EXCHANGE: Brand: TREK

## (undated) DEVICE — SHEET, T, LAPAROTOMY, STERILE: Brand: MEDLINE

## (undated) DEVICE — SYS IRR PUMP SGL ACTN VAC SYR 10CC

## (undated) DEVICE — COVER,MAYO STAND,STERILE: Brand: MEDLINE

## (undated) DEVICE — HARMONIC ACE +7 LAPAROSCOPIC SHEARS ADVANCED HEMOSTASIS 5MM DIAMETER 36CM SHAFT LENGTH  FOR USE WITH GRAY HAND PIECE ONLY: Brand: HARMONIC ACE

## (undated) DEVICE — ELECTRD BLD EZ CLN STD 6.5IN

## (undated) DEVICE — ADHS SKIN SURG TISS VISC PREMIERPRO EXOFIN HI/VISC FAST/DRY

## (undated) DEVICE — 6F .070 XB 3.5 100CM: Brand: VISTA BRITE TIP

## (undated) DEVICE — LAPAROSCOPIC SMOKE FILTRATION SYSTEM: Brand: PALL LAPAROSHIELD® PLUS LAPAROSCOPIC SMOKE FILTRATION SYSTEM

## (undated) DEVICE — CATH IV INSYTE AUTOGARD 14G 1 1/2IN ORNG

## (undated) DEVICE — LN SMPL CO2 SHTRM SD STREAM W/M LUER

## (undated) DEVICE — SUT GUT CHRM 3/0 SH 27IN G122H

## (undated) DEVICE — GW EMR FIX EXCHG J STD .035 3MM 260CM

## (undated) DEVICE — NC TREK NEO™ CORONARY DILATATION CATHETER 3.50 MM X 15 MM / RAPID-EXCHANGE: Brand: NC TREK NEO™

## (undated) DEVICE — DRSNG WND BORDR/ADHS NONADHR/GZ LF 4X4IN STRL

## (undated) DEVICE — STPCK 3WY D201 DISCOFIX

## (undated) DEVICE — ADAPT CLN BIOGUARD AIR/H2O DISP

## (undated) DEVICE — CONTAINER,SPECIMEN,OR STERILE,4OZ: Brand: MEDLINE

## (undated) DEVICE — 3M™ STERI-STRIP™ REINFORCED ADHESIVE SKIN CLOSURES, R1547, 1/2 IN X 4 IN (12 MM X 100 MM), 6 STRIPS/ENVELOPE: Brand: 3M™ STERI-STRIP™

## (undated) DEVICE — EXTENSION SET, MALE LUER LOCK ADAPTER WITH RETRACTABLE COLLAR

## (undated) DEVICE — ADHS SKIN DERMABOND TOP ADVANCED

## (undated) DEVICE — CATH CHOLANG 4.5F18IN BRGNDY

## (undated) DEVICE — PENCL E/S ULTRAVAC TELESCP NOSE HOLSTR 10FT

## (undated) DEVICE — LAPAROVUE VISIBILITY SYSTEM LAPAROSCOPIC SOLUTIONS: Brand: LAPAROVUE

## (undated) DEVICE — SUT VIC 0/0 UR6 27IN DYED J603H

## (undated) DEVICE — DISPOSABLE MONOPOLAR ENDOSCOPIC CORD 10 FT. (3M): Brand: KIRWAN

## (undated) DEVICE — ROUND SHAPE BALLOON: Brand: PDB

## (undated) DEVICE — KT MANIFLD CARDIAC

## (undated) DEVICE — Device

## (undated) DEVICE — STPLR SKIN VISISTAT WD 35CT

## (undated) DEVICE — LOU TUR: Brand: MEDLINE INDUSTRIES, INC.

## (undated) DEVICE — TIDISHIELD UROLOGY DRAIN BAGS FROSTY VINYL STERILE FITS SIEMENS UROSKOP ACCESS 20 PER CASE: Brand: TIDISHIELD